# Patient Record
Sex: FEMALE | Race: BLACK OR AFRICAN AMERICAN | Employment: UNEMPLOYED | RURAL
[De-identification: names, ages, dates, MRNs, and addresses within clinical notes are randomized per-mention and may not be internally consistent; named-entity substitution may affect disease eponyms.]

---

## 2017-01-10 ENCOUNTER — OFFICE VISIT (OUTPATIENT)
Dept: FAMILY MEDICINE CLINIC | Age: 37
End: 2017-01-10

## 2017-01-10 VITALS
DIASTOLIC BLOOD PRESSURE: 84 MMHG | SYSTOLIC BLOOD PRESSURE: 130 MMHG | WEIGHT: 210 LBS | TEMPERATURE: 98.5 F | RESPIRATION RATE: 16 BRPM | HEIGHT: 62 IN | BODY MASS INDEX: 38.64 KG/M2 | HEART RATE: 115 BPM | OXYGEN SATURATION: 100 %

## 2017-01-10 DIAGNOSIS — J02.9 SORE THROAT: ICD-10-CM

## 2017-01-10 DIAGNOSIS — J02.0 STREP THROAT: Primary | ICD-10-CM

## 2017-01-10 LAB
S PYO AG THROAT QL: POSITIVE
VALID INTERNAL CONTROL?: YES

## 2017-01-10 RX ORDER — LOSARTAN POTASSIUM 100 MG/1
100 TABLET ORAL DAILY
COMMUNITY
End: 2017-04-28 | Stop reason: SDUPTHER

## 2017-01-10 RX ORDER — AMOXICILLIN 500 MG/1
500 CAPSULE ORAL 2 TIMES DAILY
Qty: 20 CAP | Refills: 0 | Status: SHIPPED | OUTPATIENT
Start: 2017-01-10 | End: 2017-01-20

## 2017-01-10 NOTE — MR AVS SNAPSHOT
Visit Information Date & Time Provider Department Dept. Phone Encounter #  
 1/10/2017  8:30 AM Eliseo Del Rosario  [address] [phone] [id number] Follow-up Instructions Return if symptoms worsen or fail to improve. Your Appointments 2/6/2017  9:45 AM  
ROUTINE CARE with Idalmis Sheffield MD  
Care Diabetes & Endocrinology [facility] Appt Note: 3 mo fu; r/s  
 [address] [place] [address] G [address] [facility] [address]  
[phone]  
  
   
 [address] [facility] [address]  
  
    
 4/28/2017  9:20 AM  
ESTABLISHED PATIENT with Jim Cruz MD  
[facility] ([facility]) Appt Note: 6 mo fup  
 [address] [facility] [address] 100[address] [facility]  
[address] [facility] [place] [address] [address] Upcoming Health Maintenance Date Due Pneumococcal 19-64 Medium Risk (1 of 1 - PPSV23) 1/18/1999 EYE EXAM RETINAL OR DILATED Q1 5/19/2014 HEMOGLOBIN A1C Q6M 2/28/2017 MICROALBUMIN Q1 4/13/2017 FOOT EXAM Q1 6/24/2017 LIPID PANEL Q1 10/4/2017 PAP AKA CERVICAL CYTOLOGY 10/1/2018 DTaP/Tdap/Td series (2 - Td) 12/8/2025 Allergies as of 1/10/2017  Review Complete On: 1/10/2017 By: Eliseo Del Rosario NP Severity Noted Reaction Type Reactions Aspirin  03/05/2010    Itching Contrast Agent [Iodine]  05/01/2015    Hives Dilaudid [Hydromorphone]  05/01/2015    Hives Metformin  07/03/2013    Nausea Only Reglan [Metoclopramide]  03/05/2010    Other (comments) Impaired speech Ritalin [Methylphenidate]  05/01/2015    Other (comments) Speech impairment Current Immunizations  Reviewed on 12/8/2015 Name Date Influenza Vaccine (Quad) PF 10/4/2016, 11/23/2015  9:10 AM  
 Tdap 12/8/2015  9:54 AM  
  
 Not reviewed this visit You Were Diagnosed With   
  
 Codes Comments Strep throat    -  Primary ICD-10-CM: J02.0 ICD-9-CM: 034.0 Sore throat     ICD-10-CM: J02.9 ICD-9-CM: 168 Vitals BP Pulse Temp Resp Height(growth percentile) Weight(growth percentile) 130/84 (!) 115 98.5 °F (36.9 °C) (Oral) 16 5' 2\" (1.575 m) 210 lb (95.3 kg) LMP SpO2 BMI OB Status Smoking Status 12/15/2016 100% 38.41 kg/m2 Having regular periods Never Smoker BMI and BSA Data Body Mass Index Body Surface Area  
 38.41 kg/m 2 2.04 m 2 Preferred Pharmacy Pharmacy Name Phone [facility] [address] [place] [address] [phone] Your Updated Medication List  
  
   
This list is accurate as of: 1/10/17  9:00 AM.  Always use your most recent med list.  
  
  
  
  
 * albiglutide 50 mg/0.5 mL injector pen Commonly known as:  TANZEUM  
1 Syringe by SubCUTAneous route every seven (7) days. * albiglutide 50 mg/0.5 mL injector pen Commonly known as:  TANZEUM  
1 Syringe by SubCUTAneous route every seven (7) days. * TANZEUM 50 mg/0.5 mL injector pen Generic drug:  albiglutide INJECT ONE SYRINGE SUBCUTANEOUSLY EVERY 7 DAYS. * albuterol 2.5 mg /3 mL (0.083 %) nebulizer solution Commonly known as:  PROVENTIL VENTOLIN  
3 mL by Nebulization route every four (4) hours as needed for Wheezing. * albuterol 90 mcg/actuation inhaler Commonly known as:  VENTOLIN HFA Take 2 Puffs by inhalation every four (4) hours as needed for Wheezing. Indications: ACUTE ASTHMA ATTACK  
  
 amoxicillin 500 mg capsule Commonly known as:  AMOXIL Take 1 Cap by mouth two (2) times a day for 10 days. Blood-Glucose Meter monitoring kit Patient is request the [facility] Brand meter. Patient test QID  
  
 BREO ELLIPTA 100-25 mcg/dose inhaler Generic drug:  fluticasone-vilanterol Take 1 Puff by inhalation daily. cyclobenzaprine 10 mg tablet Commonly known as:  FLEXERIL  
 Take 1 Tab by mouth three (3) times daily as needed. gabapentin 300 mg capsule Commonly known as:  NEURONTIN Take 300 mg by mouth three (3) times daily. glucose blood VI test strips strip Commonly known as:  blood glucose test  
Patient request the [facility] brand of meter and strips and to test QID. insulin glargine 100 unit/mL (3 mL) pen Commonly known as:  LANTUS SOLOSTAR Inject 70 units at bedtime  
  
 insulin glulisine 100 unit/mL pen Commonly known as:  APIDRA SOLOSTAR  
20 Units by SubCUTAneous route Before breakfast, lunch, and dinner. Insulin Needles (Disposable) 30 gauge x 1/3\" Sliding scale Lancets Misc Bid for diabetes 250.00 Lot # [id number] Exp 7/2018 Man Brian Nordisk  
  
 losartan 100 mg tablet Commonly known as:  COZAAR Take 100 mg by mouth daily. losartan-hydroCHLOROthiazide 100-25 mg per tablet Commonly known as:  HYZAAR Take 1 Tab by mouth daily. Indications: HYPERTENSION  
  
 montelukast 10 mg tablet Commonly known as:  SINGULAIR  
TAKE ONE TABLET BY MOUTH ONCE DAILY Nebulizer & Compressor machine 1 Each by Does Not Apply route two (2) times daily as needed. * Notice: This list has 5 medication(s) that are the same as other medications prescribed for you. Read the directions carefully, and ask your doctor or other care provider to review them with you. Prescriptions Sent to Pharmacy Refills  
 amoxicillin (AMOXIL) 500 mg capsule 0 Sig: Take 1 Cap by mouth two (2) times a day for 10 days. Class: Normal  
 Pharmacy: [address] [place] [address] Ph #: [phone] Route: Oral  
  
We Performed the Following AMB POC RAPID STREP A [91944 CPT(R)] Follow-up Instructions Return if symptoms worsen or fail to improve. Patient Instructions Strep Throat: Care Instructions Your Care Instructions Strep throat is a bacterial infection that causes sudden, severe sore throat and fever. Strep throat, which is caused by bacteria called streptococcus, is treated with antibiotics. Sometimes a strep test is necessary to tell if the sore throat is caused by strep bacteria. Treatment can help ease symptoms and may prevent future problems. Follow-up care is a key part of your treatment and safety. Be sure to make and go to all appointments, and call your doctor if you are having problems. It's also a good idea to know your test results and keep a list of the medicines you take. How can you care for yourself at home? · Take your antibiotics as directed. Do not stop taking them just because you feel better. You need to take the full course of antibiotics. · Strep throat can spread to others until 24 hours after you begin taking antibiotics. During this time, you should avoid contact with other people at work or home, especially infants and children. Do not sneeze or cough on others, and wash your hands often. Keep your drinking glass and eating utensils separate from those of others, and wash these items well in hot, soapy water. · Gargle with warm salt water at least once each hour to help reduce swelling and make your throat feel better. Use 1 teaspoon of salt mixed in 8 fluid ounces of warm water. · Take an over-the-counter pain medication, such as acetaminophen (Tylenol), ibuprofen (Advil, Motrin), or naproxen (Aleve). Read and follow all instructions on the label. · Try an over-the-counter anesthetic throat spray or throat lozenges, which may help relieve throat pain. · Drink plenty of fluids. Fluids may help soothe an irritated throat. Hot fluids, such as tea or soup, may help your throat feel better. · Eat soft solids and drink plenty of clear liquids. Flavored ice pops, ice cream, scrambled eggs, sherbet, and gelatin dessert (such as Jell-O) may also soothe the throat.  
· Get lots of rest. 
 · Do not smoke, and avoid secondhand smoke. If you need help quitting, talk to your doctor about stop-smoking programs and medicines. These can increase your chances of quitting for good. · Use a vaporizer or humidifier to add moisture to the air in your bedroom. Follow the directions for cleaning the machine. When should you call for help? Call your doctor now or seek immediate medical care if: 
· You have a new or higher fever. · You have a fever with a stiff neck or severe headache. · You have new or worse trouble swallowing. · Your sore throat gets much worse on one side. · Your pain becomes much worse on one side of your throat. Watch closely for changes in your health, and be sure to contact your doctor if: 
· You are not getting better after 2 days (48 hours). · You do not get better as expected. Where can you learn more? Go to http://aziza-niyah.info/. Enter K625 in the search box to learn more about \"Strep Throat: Care Instructions. \" Current as of: July 29, 2016 Content Version: 11.1 © 8345-3994 [employer]. Care instructions adapted under license by [employer] (which disclaims liability or warranty for this information). If you have questions about a medical condition or this instruction, always ask your healthcare professional. [address] any warranty or liability for your use of this information. Introducing [facility]! Kirit Eisenberg introduces Hangtime patient portal. Now you can access parts of your medical record, email your doctor's office, and request medication refills online. 1. In your internet browser, go to [email or web address] [email or web address] 2. Click on the First Time User? Click Here link in the Sign In box. You will see the New Member Sign Up page. 3. Enter your Hangtime Access Code exactly as it appears below. You will not need to use this code after youve completed the sign-up process.  If you do not sign up before the expiration date, you must request a new code. · [employer] Access Code: [email or web address] Expires: 4/10/2017  9:00 AM 
 
4. Enter the last four digits of your Social Security Number (xxxx) and Date of Birth (mm/dd/yyyy) as indicated and click Submit. You will be taken to the next sign-up page. 5. Create a [employer] ID. This will be your [employer] login ID and cannot be changed, so think of one that is secure and easy to remember. 6. Create a [employer] password. You can change your password at any time. 7. Enter your Password Reset Question and Answer. This can be used at a later time if you forget your password. 8. Enter your e-mail address. You will receive e-mail notification when new information is available in [address] 9. Click Sign Up. You can now view and download portions of your medical record. 10. Click the Download Summary menu link to download a portable copy of your medical information. If you have questions, please visit the Frequently Asked Questions section of the [employer] website. Remember, [employer] is NOT to be used for urgent needs. For medical emergencies, dial 911. Now available from your iPhone and Android! Please provide this summary of care documentation to your next provider. Your primary care clinician is listed as Omari Rodríguez. If you have any questions after today's visit, please call [phone].

## 2017-01-10 NOTE — PROGRESS NOTES
Identified pt with two pt identifiers(name and ). Chief Complaint   Patient presents with    Cold Symptoms     x1 week    Sinus Pain    Cough     dry cough    Sweats     around neck mostly at night        Health Maintenance Due   Topic    Pneumococcal 19-64 Medium Risk (1 of 1 - PPSV23)    EYE EXAM RETINAL OR DILATED Q1        Wt Readings from Last 3 Encounters:   01/10/17 210 lb (95.3 kg)   10/28/16 204 lb 9.6 oz (92.8 kg)   10/20/16 201 lb (91.2 kg)     Temp Readings from Last 3 Encounters:   01/10/17 98.5 °F (36.9 °C) (Oral)   10/20/16 98 °F (36.7 °C) (Oral)   10/04/16 97.7 °F (36.5 °C) (Oral)     BP Readings from Last 3 Encounters:   01/10/17 130/84   10/28/16 108/80   10/20/16 130/84     Pulse Readings from Last 3 Encounters:   01/10/17 (!) 115   10/28/16 96   10/20/16 90         Learning Assessment:  :     Learning Assessment 2016 2015 10/6/2015 2014   PRIMARY LEARNER Patient Patient Patient Patient   HIGHEST LEVEL OF EDUCATION - PRIMARY LEARNER  - - DID NOT GRADUATE HIGH SCHOOL SOME COLLEGE   BARRIERS PRIMARY LEARNER - - NONE NONE   CO-LEARNER CAREGIVER - - No -   PRIMARY LANGUAGE ENGLISH ENGLISH ENGLISH ENGLISH   LEARNER PREFERENCE PRIMARY LISTENING DEMONSTRATION DEMONSTRATION LISTENING   ANSWERED BY patient Patient patient patient   RELATIONSHIP SELF SELF SELF SELF       Depression Screening:  :     PHQ 2 / 9, over the last two weeks 1/10/2017   Little interest or pleasure in doing things Not at all   Feeling down, depressed or hopeless Not at all   Total Score PHQ 2 0           Abuse Screening:  :     Abuse Screening Questionnaire 8/15/2016 2014 2014   Do you ever feel afraid of your partner? N N N   Are you in a relationship with someone who physically or mentally threatens you? N N N   Is it safe for you to go home? Kuldeep Loo       Coordination of Care Questionnaire:  :     1) Have you been to an emergency room, urgent care clinic since your last visit?  Yes [facility] Nov. 2016  Hospitalized since your last visit? no             2) Have you seen or consulted any other health care providers outside of [address] [place] [address] since your last visit? no  (Include any pap smears or colon screenings in this section.)    3) Do you have an Advance Directive on file? no  Are you interested in receiving information about Advance Directives? no    Patient is accompanied by self I have received verbal consent from [address] [place] [address] to discuss any/all medical information while they are present in the room. Reviewed record in preparation for visit and have obtained necessary documentation. Medication reconciliation up to date and corrected with patient at this time.

## 2017-01-10 NOTE — PATIENT INSTRUCTIONS

## 2017-03-18 DIAGNOSIS — M62.838 MUSCLE SPASMS OF NECK: ICD-10-CM

## 2017-03-20 RX ORDER — CYCLOBENZAPRINE HCL 10 MG
TABLET ORAL
Qty: 30 TAB | Refills: 0 | Status: SHIPPED | OUTPATIENT
Start: 2017-03-20 | End: 2017-03-27 | Stop reason: ALTCHOICE

## 2017-03-26 RX ORDER — INSULIN GLARGINE 100 [IU]/ML
INJECTION, SOLUTION SUBCUTANEOUS
Qty: 30 ML | Refills: 1 | Status: SHIPPED | OUTPATIENT
Start: 2017-03-26 | End: 2017-04-28 | Stop reason: SDUPTHER

## 2017-03-27 ENCOUNTER — OFFICE VISIT (OUTPATIENT)
Dept: FAMILY MEDICINE CLINIC | Age: 37
End: 2017-03-27

## 2017-03-27 VITALS
RESPIRATION RATE: 16 BRPM | HEIGHT: 62 IN | HEART RATE: 78 BPM | OXYGEN SATURATION: 99 % | BODY MASS INDEX: 38.64 KG/M2 | TEMPERATURE: 98.2 F | DIASTOLIC BLOOD PRESSURE: 82 MMHG | SYSTOLIC BLOOD PRESSURE: 130 MMHG | WEIGHT: 210 LBS

## 2017-03-27 DIAGNOSIS — J40 BRONCHITIS: Primary | ICD-10-CM

## 2017-03-27 DIAGNOSIS — R68.89 FLU-LIKE SYMPTOMS: ICD-10-CM

## 2017-03-27 LAB
QUICKVUE INFLUENZA TEST: NEGATIVE
VALID INTERNAL CONTROL?: YES

## 2017-03-27 RX ORDER — BENZONATATE 100 MG/1
100 CAPSULE ORAL
Qty: 21 CAP | Refills: 0 | Status: SHIPPED | OUTPATIENT
Start: 2017-03-27 | End: 2017-04-03

## 2017-03-27 RX ORDER — CEFDINIR 300 MG/1
300 CAPSULE ORAL 2 TIMES DAILY
Qty: 20 CAP | Refills: 0 | Status: SHIPPED | OUTPATIENT
Start: 2017-03-27 | End: 2017-04-06

## 2017-03-27 NOTE — PROGRESS NOTES
HISTORY OF PRESENT ILLNESS  Anupam Lawson is a 40 y.o. female. HPI  Pt presents with \"cold symptoms, cough and ear pain\"    Symptoms started Wednesday (5 days ago)  Symptoms worsened drastically over the weekend  Chills  Dry, \"rough cough\"  Throat is still dry  Right ear pain  Headaches  Fever: under 99  Diarrhea all day yesterday  No nausea, no vomiting  OTC: Nyquil  Review of Systems   Constitutional: Positive for fever and malaise/fatigue. HENT: Positive for congestion and ear pain. Respiratory: Positive for cough. Negative for sputum production. Gastrointestinal: Negative for abdominal pain, diarrhea and vomiting. Neurological: Positive for headaches. Physical Exam   Constitutional: She is oriented to person, place, and time. She appears well-developed and well-nourished. HENT:   Head: Normocephalic and atraumatic. Right Ear: Hearing, tympanic membrane, external ear and ear canal normal.   Left Ear: Hearing, tympanic membrane, external ear and ear canal normal.   Nose: Mucosal edema present. Mouth/Throat: Oropharynx is clear and moist.   Neck: Normal range of motion. Neck supple. Cardiovascular: Normal rate, regular rhythm and normal heart sounds. Pulmonary/Chest: Effort normal. She has no decreased breath sounds. She has no wheezes. She has rhonchi in the right lower field and the left lower field. Lymphadenopathy:     She has no cervical adenopathy. Neurological: She is alert and oriented to person, place, and time. Skin: Skin is warm and dry. Psychiatric: She has a normal mood and affect. Her behavior is normal.       ASSESSMENT and PLAN    ICD-10-CM ICD-9-CM    1. Bronchitis J40 490 cefdinir (OMNICEF) 300 mg capsule      benzonatate (TESSALON PERLES) 100 mg capsule   2. Flu-like symptoms R68.89 780.99 AMB POC RAPID INFLUENZA TEST     Informed patient that I have sent medication to the pharmacy, and she should take as prescribed.   Educated about taking with food, to decrease the risk of stomach upset. Educated about staying well hydrated, by pushing fluids as much as possible. Pt informed to return to office with worsening of symptoms, or PRN with any questions or concerns. Pt verbalizes understanding of plan of care and denies further questions or concerns at this time.

## 2017-03-27 NOTE — PATIENT INSTRUCTIONS
Bronchitis: Care Instructions  Your Care Instructions    Bronchitis is inflammation of the bronchial tubes, which carry air to the lungs. The tubes swell and produce mucus, or phlegm. The mucus and inflamed bronchial tubes make you cough. You may have trouble breathing. Most cases of bronchitis are caused by viruses like those that cause colds. Antibiotics usually do not help and they may be harmful. Bronchitis usually develops rapidly and lasts about 2 to 3 weeks in otherwise healthy people. Follow-up care is a key part of your treatment and safety. Be sure to make and go to all appointments, and call your doctor if you are having problems. It's also a good idea to know your test results and keep a list of the medicines you take. How can you care for yourself at home? · Take all medicines exactly as prescribed. Call your doctor if you think you are having a problem with your medicine. · Get some extra rest.  · Take an over-the-counter pain medicine, such as acetaminophen (Tylenol), ibuprofen (Advil, Motrin), or naproxen (Aleve) to reduce fever and relieve body aches. Read and follow all instructions on the label. · Do not take two or more pain medicines at the same time unless the doctor told you to. Many pain medicines have acetaminophen, which is Tylenol. Too much acetaminophen (Tylenol) can be harmful. · Take an over-the-counter cough medicine that contains dextromethorphan to help quiet a dry, hacking cough so that you can sleep. Avoid cough medicines that have more than one active ingredient. Read and follow all instructions on the label. · Breathe moist air from a humidifier, hot shower, or sink filled with hot water. The heat and moisture will thin mucus so you can cough it out. · Do not smoke. Smoking can make bronchitis worse. If you need help quitting, talk to your doctor about stop-smoking programs and medicines. These can increase your chances of quitting for good.   When should you call for help? Call 911 anytime you think you may need emergency care. For example, call if:  · You have severe trouble breathing. Call your doctor now or seek immediate medical care if:  · You have new or worse trouble breathing. · You cough up dark brown or bloody mucus (sputum). · You have a new or higher fever. · You have a new rash. Watch closely for changes in your health, and be sure to contact your doctor if:  · You cough more deeply or more often, especially if you notice more mucus or a change in the color of your mucus. · You are not getting better as expected. Where can you learn more? Go to http://aziza-niyah.info/. Enter H333 in the search box to learn more about \"Bronchitis: Care Instructions. \"  Current as of: May 23, 2016  Content Version: 11.1  © 8558-9895 [employer]. Care instructions adapted under license by [employer] (which disclaims liability or warranty for this information). If you have questions about a medical condition or this instruction, always ask your healthcare professional. [address] any warranty or liability for your use of this information.

## 2017-03-27 NOTE — PROGRESS NOTES
Identified pt with two pt identifiers(name and ). Chief Complaint   Patient presents with    Cold Symptoms     x4 days    Cough    Ear Pain     x5 days        Health Maintenance Due   Topic    Pneumococcal 19-64 Medium Risk (1 of 1 - PPSV23)    EYE EXAM RETINAL OR DILATED Q1     HEMOGLOBIN A1C Q6M     MICROALBUMIN Q1        Wt Readings from Last 3 Encounters:   17 210 lb (95.3 kg)   01/10/17 210 lb (95.3 kg)   10/28/16 204 lb 9.6 oz (92.8 kg)     Temp Readings from Last 3 Encounters:   17 98.2 °F (36.8 °C) (Oral)   01/10/17 98.5 °F (36.9 °C) (Oral)   10/20/16 98 °F (36.7 °C) (Oral)     BP Readings from Last 3 Encounters:   17 130/82   01/10/17 130/84   10/28/16 108/80     Pulse Readings from Last 3 Encounters:   17 78   01/10/17 (!) 115   10/28/16 96         Learning Assessment:  :     Learning Assessment 2016 2015 10/6/2015 2014   PRIMARY LEARNER Patient Patient Patient Patient   HIGHEST LEVEL OF EDUCATION - PRIMARY LEARNER  - - DID NOT GRADUATE HIGH SCHOOL SOME COLLEGE   BARRIERS PRIMARY LEARNER - - NONE NONE   CO-LEARNER CAREGIVER - - No -   PRIMARY LANGUAGE ENGLISH ENGLISH ENGLISH ENGLISH   LEARNER PREFERENCE PRIMARY LISTENING DEMONSTRATION DEMONSTRATION LISTENING   ANSWERED BY patient Patient patient patient   RELATIONSHIP SELF SELF SELF SELF       Depression Screening:  :     PHQ 2 / 9, over the last two weeks 3/27/2017   Little interest or pleasure in doing things Not at all   Feeling down, depressed or hopeless Not at all   Total Score PHQ 2 0         Abuse Screening:  :     Abuse Screening Questionnaire 8/15/2016 2014 2014   Do you ever feel afraid of your partner? N N N   Are you in a relationship with someone who physically or mentally threatens you? N N N   Is it safe for you to go home?  Meño Mas       Coordination of Care Questionnaire:  :     1) Have you been to an emergency room, urgent care clinic since your last visit? no   Hospitalized since your last visit? no             2) Have you seen or consulted any other health care providers outside of [address] [place] [address] since your last visit? no  (Include any pap smears or colon screenings in this section.)    3) Do you have an Advance Directive on file? no  Are you interested in receiving information about Advance Directives? no    Patient is accompanied by self I have received verbal consent from [address] [place] [address] to discuss any/all medical information while they are present in the room. Reviewed record in preparation for visit and have obtained necessary documentation. Medication reconciliation up to date and corrected with patient at this time.

## 2017-03-27 NOTE — MR AVS SNAPSHOT
Visit Information Date & Time Provider Department Dept. Phone Encounter #  
 3/27/2017  8:00 AM Rachidclinton MartelTawanna 108 [phone] [id number] Follow-up Instructions Return if symptoms worsen or fail to improve. Your Appointments 4/28/2017  9:20 AM  
ESTABLISHED PATIENT with Dasha Andres MD  
[facility] ([address] [facility]) Appt Note: 6 mo fup  
 [address] [facility] [address] [facility]  
[address] [place] [address]  
  
    
 5/5/2017  9:30 AM  
ROUTINE CARE with Rosalia Arroyo MD  
[facility] [address] [facility]) Appt Note: fu DM; Pat RS time [address] [place] [address] [facility] [address]  
[phone]  
  
   
 [address] [place] [id number] Upcoming Health Maintenance Date Due Pneumococcal 19-64 Medium Risk (1 of 1 - PPSV23) 1/18/1999 EYE EXAM RETINAL OR DILATED Q1 5/19/2014 HEMOGLOBIN A1C Q6M 2/28/2017 MICROALBUMIN Q1 4/13/2017 FOOT EXAM Q1 6/24/2017 LIPID PANEL Q1 10/4/2017 PAP AKA CERVICAL CYTOLOGY 10/1/2018 DTaP/Tdap/Td series (2 - Td) 12/8/2025 Allergies as of 3/27/2017  Review Complete On: 3/27/2017 By: Rachid Martel NP Severity Noted Reaction Type Reactions Aspirin  03/05/2010    Itching Contrast Agent [Iodine]  05/01/2015    Hives Dilaudid [Hydromorphone]  05/01/2015    Hives Metformin  07/03/2013    Nausea Only Reglan [Metoclopramide]  03/05/2010    Other (comments) Impaired speech Ritalin [Methylphenidate]  05/01/2015    Other (comments) Speech impairment Current Immunizations  Reviewed on 12/8/2015 Name Date Influenza Vaccine (Quad) PF 10/4/2016, 11/23/2015  9:10 AM  
 Tdap 12/8/2015  9:54 AM  
  
 Not reviewed this visit You Were Diagnosed With   
  
 Codes Comments Bronchitis    -  Primary ICD-10-CM: N19 ICD-9-CM: 585 Vitals BP Pulse Temp Resp Height(growth percentile) Weight(growth percentile) 130/82 78 98.2 °F (36.8 °C) (Oral) 16 5' 2\" (1.575 m) 210 lb (95.3 kg) SpO2 BMI OB Status Smoking Status 99% 38.41 kg/m2 Having regular periods Never Smoker BMI and BSA Data Body Mass Index Body Surface Area  
 38.41 kg/m 2 2.04 m 2 Preferred Pharmacy Pharmacy Name Phone [facility] [address] [facility] [place] [phone] Your Updated Medication List  
  
   
This list is accurate as of: 3/27/17  8:30 AM.  Always use your most recent med list.  
  
  
  
  
 * albiglutide 50 mg/0.5 mL injector pen Commonly known as:  TANZEUM  
1 Syringe by SubCUTAneous route every seven (7) days. * albiglutide 50 mg/0.5 mL injector pen Commonly known as:  TANZEUM  
1 Syringe by SubCUTAneous route every seven (7) days. * TANZEUM 50 mg/0.5 mL injector pen Generic drug:  albiglutide INJECT ONE SYRINGE SUBCUTANEOUSLY EVERY 7 DAYS. * albuterol 2.5 mg /3 mL (0.083 %) nebulizer solution Commonly known as:  PROVENTIL VENTOLIN  
3 mL by Nebulization route every four (4) hours as needed for Wheezing. * albuterol 90 mcg/actuation inhaler Commonly known as:  VENTOLIN HFA Take 2 Puffs by inhalation every four (4) hours as needed for Wheezing. Indications: ACUTE ASTHMA ATTACK  
  
 benzonatate 100 mg capsule Commonly known as:  TESSALON PERLES Take 1 Cap by mouth three (3) times daily as needed for Cough for up to 7 days. Blood-Glucose Meter monitoring kit Patient is request the [facility] Brand meter. Patient test QID  
  
 BREO ELLIPTA 100-25 mcg/dose inhaler Generic drug:  fluticasone-vilanterol Take 1 Puff by inhalation daily. cefdinir 300 mg capsule Commonly known as:  OMNICEF Take 1 Cap by mouth two (2) times a day for 10 days. gabapentin 300 mg capsule Commonly known as:  NEURONTIN Take 300 mg by mouth three (3) times daily. glucose blood VI test strips strip Commonly known as:  blood glucose test  
Patient request the [facility] brand of meter and strips and to test QID. * insulin glargine 100 unit/mL (3 mL) pen Commonly known as:  LANTUS SOLOSTAR Inject 70 units at bedtime * LANTUS SOLOSTAR 100 unit/mL (3 mL) pen Generic drug:  insulin glargine INJECT 70 UNITS SUBCUTANEOUSLY AT BEDTIME  
  
 insulin glulisine 100 unit/mL pen Commonly known as:  APIDRA SOLOSTAR  
20 Units by SubCUTAneous route Before breakfast, lunch, and dinner. Insulin Needles (Disposable) 30 gauge x 1/3\" Sliding scale Lancets Misc Bid for diabetes 250.00 Lot # [id number] Exp 7/2018 Man Brian Nordisk  
  
 losartan 100 mg tablet Commonly known as:  COZAAR Take 100 mg by mouth daily. losartan-hydroCHLOROthiazide 100-25 mg per tablet Commonly known as:  HYZAAR Take 1 Tab by mouth daily. Indications: HYPERTENSION  
  
 montelukast 10 mg tablet Commonly known as:  SINGULAIR  
TAKE ONE TABLET BY MOUTH ONCE DAILY Nebulizer & Compressor machine 1 Each by Does Not Apply route two (2) times daily as needed. * Notice: This list has 7 medication(s) that are the same as other medications prescribed for you. Read the directions carefully, and ask your doctor or other care provider to review them with you. Prescriptions Sent to Pharmacy Refills  
 cefdinir (OMNICEF) 300 mg capsule 0 Sig: Take 1 Cap by mouth two (2) times a day for 10 days. Class: Normal  
 Pharmacy: [address] [place] [address] Ph #: [phone] Route: Oral  
 benzonatate (TESSALON PERLES) 100 mg capsule 0 Sig: Take 1 Cap by mouth three (3) times daily as needed for Cough for up to 7 days. Class: Normal  
 Pharmacy: [id number] [place] [address] Ph #: [phone] Route: Oral  
  
Follow-up Instructions Return if symptoms worsen or fail to improve. Patient Instructions Bronchitis: Care Instructions Your Care Instructions Bronchitis is inflammation of the bronchial tubes, which carry air to the lungs. The tubes swell and produce mucus, or phlegm. The mucus and inflamed bronchial tubes make you cough. You may have trouble breathing. Most cases of bronchitis are caused by viruses like those that cause colds. Antibiotics usually do not help and they may be harmful. Bronchitis usually develops rapidly and lasts about 2 to 3 weeks in otherwise healthy people. Follow-up care is a key part of your treatment and safety. Be sure to make and go to all appointments, and call your doctor if you are having problems. It's also a good idea to know your test results and keep a list of the medicines you take. How can you care for yourself at home? · Take all medicines exactly as prescribed. Call your doctor if you think you are having a problem with your medicine. · Get some extra rest. 
· Take an over-the-counter pain medicine, such as acetaminophen (Tylenol), ibuprofen (Advil, Motrin), or naproxen (Aleve) to reduce fever and relieve body aches. Read and follow all instructions on the label. · Do not take two or more pain medicines at the same time unless the doctor told you to. Many pain medicines have acetaminophen, which is Tylenol. Too much acetaminophen (Tylenol) can be harmful. · Take an over-the-counter cough medicine that contains dextromethorphan to help quiet a dry, hacking cough so that you can sleep. Avoid cough medicines that have more than one active ingredient. Read and follow all instructions on the label. · Breathe moist air from a humidifier, hot shower, or sink filled with hot water. The heat and moisture will thin mucus so you can cough it out. · Do not smoke. Smoking can make bronchitis worse.  If you need help quitting, talk to your doctor about stop-smoking programs and medicines. These can increase your chances of quitting for good. When should you call for help? Call 911 anytime you think you may need emergency care. For example, call if: 
· You have severe trouble breathing. Call your doctor now or seek immediate medical care if: 
· You have new or worse trouble breathing. · You cough up dark brown or bloody mucus (sputum). · You have a new or higher fever. · You have a new rash. Watch closely for changes in your health, and be sure to contact your doctor if: 
· You cough more deeply or more often, especially if you notice more mucus or a change in the color of your mucus. · You are not getting better as expected. Where can you learn more? Go to http://aziza-niyah.info/. Enter H333 in the search box to learn more about \"Bronchitis: Care Instructions. \" Current as of: May 23, 2016 Content Version: 11.1 © 2482-8010 [employer]. Care instructions adapted under license by [employer] (which disclaims liability or warranty for this information). If you have questions about a medical condition or this instruction, always ask your healthcare professional. [place] [address] any warranty or liability for your use of this information. Introducing [facility]! Lisseth Iraheta introduces [employer] patient portal. Now you can access parts of your medical record, email your doctor's office, and request medication refills online. 1. In your internet browser, go to [email or web address] 2. Click on the First Time User? Click Here link in the Sign In box. You will see the New Member Sign Up page. 3. Enter your [employer] Access Code exactly as it appears below. You will not need to use this code after youve completed the sign-up process. If you do not sign up before the expiration date, you must request a new code. · [email or web address] Access Code: [email or web address] Expires: 4/10/2017 10:00 AM 
 
4. Enter the last four digits of your Social Security Number (xxxx) and Date of Birth (mm/dd/yyyy) as indicated and click Submit. You will be taken to the next sign-up page. 5. Create a [employer] ID. This will be your [employer] login ID and cannot be changed, so think of one that is secure and easy to remember. 6. Create a [employer] password. You can change your password at any time. 7. Enter your Password Reset Question and Answer. This can be used at a later time if you forget your password. 8. Enter your e-mail address. You will receive e-mail notification when new information is available in [address] 9. Click Sign Up. You can now view and download portions of your medical record. 10. Click the Download Summary menu link to download a portable copy of your medical information. If you have questions, please visit the Frequently Asked Questions section of the [employer] website. Remember, [employer] is NOT to be used for urgent needs. For medical emergencies, dial 911. Now available from your iPhone and Android! Please provide this summary of care documentation to your next provider. Your primary care clinician is listed as Rika Herron. If you have any questions after today's visit, please call [phone].

## 2017-03-29 ENCOUNTER — TELEPHONE (OUTPATIENT)
Dept: FAMILY MEDICINE CLINIC | Age: 37
End: 2017-03-29

## 2017-03-29 NOTE — TELEPHONE ENCOUNTER
See note below. Pt states her throat is still very sore & swollen. Pt denies reaction to current Rx. Pt states she think antibiotic is not working. Advised pt that she needs to finish all antibiotic due to strep throat. advised pt to take OTC Advil or motrin for pain & inflammation. advised pt is she feels she has trouble breathing or  symptoms continue or worsen to call office or go to nearest ER for eval.  Pt understood.

## 2017-03-29 NOTE — TELEPHONE ENCOUNTER
Patient called stating that antibiotic prescribed on 3/27/17 is causing her throat to swell. Patient states she can barely swallow. Patient has requested a call back.     Best contact: [phone]

## 2017-04-17 DIAGNOSIS — M62.838 MUSCLE SPASMS OF NECK: ICD-10-CM

## 2017-04-17 RX ORDER — CYCLOBENZAPRINE HCL 10 MG
TABLET ORAL
Qty: 30 TAB | Refills: 0 | OUTPATIENT
Start: 2017-04-17

## 2017-04-17 RX ORDER — INSULIN GLULISINE 100 [IU]/ML
INJECTION, SOLUTION SUBCUTANEOUS
Qty: 70 ML | Refills: 2 | Status: SHIPPED | OUTPATIENT
Start: 2017-04-17 | End: 2017-05-05 | Stop reason: SDUPTHER

## 2017-04-17 NOTE — TELEPHONE ENCOUNTER
Pt states she is having pain in shoulders & arms. advised pt she will need to make an apt for eval. For new symptoms. Transferred pt to  to schedule apt.

## 2017-04-17 NOTE — TELEPHONE ENCOUNTER
This is not something she should need continuously. Should pain persist, we can refer to PT. Let me know. Thanks!

## 2017-04-28 ENCOUNTER — OFFICE VISIT (OUTPATIENT)
Dept: CARDIOLOGY CLINIC | Age: 37
End: 2017-04-28

## 2017-04-28 VITALS
OXYGEN SATURATION: 98 % | BODY MASS INDEX: 39.93 KG/M2 | RESPIRATION RATE: 20 BRPM | HEIGHT: 62 IN | DIASTOLIC BLOOD PRESSURE: 88 MMHG | SYSTOLIC BLOOD PRESSURE: 128 MMHG | HEART RATE: 96 BPM | WEIGHT: 217 LBS

## 2017-04-28 DIAGNOSIS — I87.2 VENOUS INSUFFICIENCY: ICD-10-CM

## 2017-04-28 DIAGNOSIS — R60.9 EDEMA, UNSPECIFIED TYPE: Primary | ICD-10-CM

## 2017-04-28 DIAGNOSIS — I10 ESSENTIAL HYPERTENSION: ICD-10-CM

## 2017-04-28 RX ORDER — LOSARTAN POTASSIUM 100 MG/1
100 TABLET ORAL DAILY
COMMUNITY
End: 2017-08-22 | Stop reason: ALTCHOICE

## 2017-04-28 RX ORDER — CYCLOBENZAPRINE HCL 10 MG
TABLET ORAL 3 TIMES DAILY
COMMUNITY
End: 2017-07-27

## 2017-04-28 NOTE — PROGRESS NOTES
Delfin Unger MD    [address] [place], [address] [facility]    Office [phone],EVU [phone]  Pager [phone]    Bernardo Puente is a 40 y.o. female is here for f/u visit. Primary care physician:  Popeye Kang MD    Patient Active Problem List   Diagnosis Code    Asthma J45.909    Diabetes mellitus ([facility] E11.9    Avascular necrosis of bones of both hips ([facility] M87.051, M87.052    Lumbar degenerative disc disease M51.36    LGSIL (low grade squamous intraepithelial dysplasia) [id number]    UTI (lower urinary tract infection) N39.0    Non compliance with medical treatment Z91.19    Obesity E66.9    Type II diabetes mellitus, uncontrolled ([facility] E11.65    BMI 35.0-35.9,adult Z68.35    Essential hypertension I10    Noncompliance of patient with dietary regimen Z91.11    Sinusitis J32.9    Ankle edema M25.473    Strep throat J02.0    Muscle spasms of neck M62.838    Recurrent streptococcal tonsillitis J03.01    Shortness of breath R06.02    Dysuria R30.0    Need for pneumococcal vaccination Z23    Hypoalbuminemia E88.09    Acute bacterial conjunctivitis H10.30    Facial bruising S00.83XA    Encounter for long-term (current) use of insulin (HCC) Z79.4    Muscle spasm of back M62.830    Elevated serum creatinine R79.89    Bronchitis J40       Chief complaint:  Chief Complaint   Patient presents with    Hypertension       Assessment:  Edema-probably secondary to venous insufficiency  Palpitations/Tachycardia -sinus tachycardia on Holter monitor  DM - uncontrolled  HTN  Hx of Asthma      Plan:     Patient states that her renal function is normal.  Diuretics did not have the swelling and cause her sodium to decrease. The swelling decreases on elevation. She probably has venous insufficiency. Advised to get fitted compression stockings and wear them regularly. .  Pt is allergic to ASA  Follow-up in 6 months or earlier when necessary.     Patient understands the plan. All questions were answered to the patient's satisfaction. Medication Side Effects and Warnings were discussed with patient: yes  Patient Labs were reviewed and or requested:  yes  Patient Past Records were reviewed and or requested: yes    I appreciate the opportunity to be involved in Ms. [employer]. See note below for details. Please do not hesitate to contact us with questions or concerns. Lazaro Rendon MD    Cardiac Testing/ Procedures: A. Cardiac Cath/PCI:    B.ECHO/MARK: 5/16/2016-EF 03-44%, grade 1 diastolic dysfunction    C. StressNuclear/Stress ECHO/Stress test: May 12, 2016-7 minutes, normal exercise nuclear study. EF 70%    D. Vascular:    E. EP: 4/22/2016-Holter sinus tachycardia 99-1 63 bpm, no PACs, 6 PVCs. Diary entries of dyspnea, chest pain, dizziness correlates with sinus tachycardia. F. Miscellaneous:    Subjective:  Sage Garcia is a 40 y.o. female who returns for follow up visit. Patient states that she continues to have swelling the lower extremities. Swelling reduces on keeping her feet elevated. She was started on diuretic previously but it did not help and her sodium went low-her nephrologist of the medication. No chest pain. Mild dyspnea on exertion. She states that she has been checked for clots in her lower extremities at [facility] previously and she had no clots. ROS:  (bold if positive, if negative)    mild SOB/HERNANDEZ         Medications before admission:    Current Outpatient Prescriptions   Medication Sig Dispense    losartan (COZAAR) 100 mg tablet Take 100 mg by mouth daily.  cyclobenzaprine (FLEXERIL) 10 mg tablet Take  by mouth three (3) times daily.      APIDRA SOLOSTAR 100 unit/mL pen INJECT 20 UNITS SUBCUTANEOUSLY BEFORE BREAKFAST, LUNCH, AND DINNER 70 mL    montelukast (SINGULAIR) 10 mg tablet TAKE ONE TABLET BY MOUTH ONCE DAILY 90 Tab    albiglutide (TANZEUM) 50 mg/0.5 mL pnij 1 Syringe by SubCUTAneous route every seven (7) days. 4 mL    fluticasone-vilanterol (BREO ELLIPTA) 100-25 mcg/dose inhaler Take 1 Puff by inhalation daily.  albuterol (VENTOLIN HFA) 90 mcg/actuation inhaler Take 2 Puffs by inhalation every four (4) hours as needed for Wheezing. Indications: ACUTE ASTHMA ATTACK 1 Inhaler    albuterol (PROVENTIL VENTOLIN) 2.5 mg /3 mL (0.083 %) nebulizer solution 3 mL by Nebulization route every four (4) hours as needed for Wheezing. 1 Package    gabapentin (NEURONTIN) 300 mg capsule Take 300 mg by mouth three (3) times daily.  Nebulizer & Compressor machine 1 Each by Does Not Apply route two (2) times daily as needed. 1 Each    insulin glargine (LANTUS SOLOSTAR) 100 unit/mL (3 mL) pen Inject 70 units at bedtime (Patient taking differently: Inject 80 units at bedtime) 30 mL    Blood-Glucose Meter monitoring kit Patient is request the [employer] meter. Patient test QID 1 Kit    glucose blood VI test strips (BLOOD GLUCOSE TEST) strip Patient request the [facility] brand of meter and strips and to test QID. 100 Strip    Insulin Needles, Disposable, 30 x 1/3 \" Sliding scale 4 Package    Lancets misc Bid for diabetes 250.00  Lot # [id number]  Exp 7/2018  Man Brian Nordisk 400 Package     No current facility-administered medications for this visit. Physical Exam:  Visit Vitals    /88 (BP 1 Location: Left arm)    Pulse 96    Resp 20    Ht 5' 2\" (1.575 m)    Wt 217 lb (98.4 kg)    SpO2 98%    BMI 39.69 kg/m2          Gen: Well-developed, well-nourished, in no acute distress, walks with a cane  Neck: Supple,No JVD, No Carotid Bruit,   Resp: No accessory muscle use, Clear breath sounds, No rales or rhonchi  Card: Regular Rate,Rythm,Normal S1, S2, No murmurs, rubs or gallop. No thrills.    Abd:  Soft, non-tender, non-distended,BS+,   MSK: No cyanosis  Skin: No rashes    Neuro: moving all four extremities , follows commands appropriately  Psych:  Good insight, oriented to person, place , alert, Nml Affect  LE: 1+ edema    EKG:       LABS:        Lab Results   Component Value Date/Time    WBC 6.8 04/08/2016 12:25 PM    HGB 12.6 04/08/2016 12:25 PM    HCT 37.0 04/08/2016 12:25 PM    PLATELET 170 86/80/1722 12:25 PM    MCV 90.9 04/08/2016 12:25 PM     Lab Results   Component Value Date/Time    Sodium 133 08/31/2016 12:19 PM    Potassium 4.3 08/31/2016 12:19 PM    Chloride 93 08/31/2016 12:19 PM    CO2 24 08/31/2016 12:19 PM    Anion gap 8 04/08/2016 12:25 PM    Glucose 254 08/31/2016 12:19 PM    BUN 15 08/31/2016 12:19 PM    Creatinine 1.07 08/31/2016 12:19 PM    BUN/Creatinine ratio 14 08/31/2016 12:19 PM    GFR est AA 77 08/31/2016 12:19 PM    GFR est non-AA 67 08/31/2016 12:19 PM    Calcium 9.2 08/31/2016 12:19 PM       No results found for: APTT  No results found for: INR, PTMR, PTP, PT1, PT2  No components found for: Kanwal Castanon MD

## 2017-04-28 NOTE — MR AVS SNAPSHOT
Visit Information Date & Time Provider Department Dept. Phone Encounter #  
 4/28/2017  9:20 AM Mabel Valladares MD [facility] Niharika Lamar [phone] [id number] Your Appointments 5/5/2017  9:30 AM  
ROUTINE CARE with Lupis Schneider MD  
[facility]) Appt Note: fu DM; Pat RS time [address] [place] [address] [address] [facility] [address] [phone]  
  
   
 [address] [place] [address] Upcoming Health Maintenance Date Due Pneumococcal 19-64 Medium Risk (1 of 1 - PPSV23) 1/18/1999 EYE EXAM RETINAL OR DILATED Q1 5/19/2014 HEMOGLOBIN A1C Q6M 2/28/2017 MICROALBUMIN Q1 4/13/2017 FOOT EXAM Q1 6/24/2017 LIPID PANEL Q1 10/4/2017 PAP AKA CERVICAL CYTOLOGY 10/1/2018 DTaP/Tdap/Td series (2 - Td) 12/8/2025 Allergies as of 4/28/2017  Review Complete On: 4/28/2017 By: Rai Irwin Severity Noted Reaction Type Reactions Aspirin  03/05/2010    Itching Contrast Agent [Iodine]  05/01/2015    Hives Dilaudid [Hydromorphone]  05/01/2015    Hives Metformin  07/03/2013    Nausea Only Reglan [Metoclopramide]  03/05/2010    Other (comments) Impaired speech Ritalin [Methylphenidate]  05/01/2015    Other (comments) Speech impairment Current Immunizations  Reviewed on 12/8/2015 Name Date Influenza Vaccine (Quad) PF 10/4/2016, 11/23/2015  9:10 AM  
 Tdap 12/8/2015  9:54 AM  
  
 Not reviewed this visit Vitals BP Pulse Resp Height(growth percentile) Weight(growth percentile) SpO2  
 128/88 (BP 1 Location: Left arm) 96 20 5' 2\" (1.575 m) 217 lb (98.4 kg) 98% BMI OB Status Smoking Status 39.69 kg/m2 Having regular periods Never Smoker Vitals History BMI and BSA Data Body Mass Index Body Surface Area  
 39.69 kg/m 2 2.07 m 2 Preferred Pharmacy Pharmacy Name Phone [facility] [address] [place] [address] Jo Ann [phone] Your Updated Medication List  
  
   
This list is accurate as of: 4/28/17 10:08 AM.  Always use your most recent med list.  
  
  
  
  
 albiglutide 50 mg/0.5 mL injector pen Commonly known as:  TANZEUM  
1 Syringe by SubCUTAneous route every seven (7) days. * albuterol 2.5 mg /3 mL (0.083 %) nebulizer solution Commonly known as:  PROVENTIL VENTOLIN  
3 mL by Nebulization route every four (4) hours as needed for Wheezing. * albuterol 90 mcg/actuation inhaler Commonly known as:  VENTOLIN HFA Take 2 Puffs by inhalation every four (4) hours as needed for Wheezing. Indications: ACUTE ASTHMA ATTACK APIDRA SOLOSTAR 100 unit/mL pen Generic drug:  insulin glulisine INJECT 20 UNITS SUBCUTANEOUSLY BEFORE BREAKFAST, LUNCH, AND DINNER Blood-Glucose Meter monitoring kit Patient is request the [facility] Brand meter. Patient test QID  
  
 BREO ELLIPTA 100-25 mcg/dose inhaler Generic drug:  fluticasone-vilanterol Take 1 Puff by inhalation daily. cyclobenzaprine 10 mg tablet Commonly known as:  FLEXERIL Take  by mouth three (3) times daily. gabapentin 300 mg capsule Commonly known as:  NEURONTIN Take 300 mg by mouth three (3) times daily. glucose blood VI test strips strip Commonly known as:  blood glucose test  
Patient request the [facility] brand of meter and strips and to test QID. insulin glargine 100 unit/mL (3 mL) pen Commonly known as:  LANTUS SOLOSTAR Inject 70 units at bedtime Insulin Needles (Disposable) 30 gauge x 1/3\" Sliding scale Lancets Misc Bid for diabetes 250.00 Lot # [id number] Exp 7/2018 Man Brian Nordisk  
  
 losartan 100 mg tablet Commonly known as:  COZAAR Take 100 mg by mouth daily. montelukast 10 mg tablet Commonly known as:  SINGULAIR  
TAKE ONE TABLET BY MOUTH ONCE DAILY Nebulizer & Compressor machine 1 Each by Does Not Apply route two (2) times daily as needed. * Notice: This list has 2 medication(s) that are the same as other medications prescribed for you. Read the directions carefully, and ask your doctor or other care provider to review them with you. Introducing [facility] SERVICES! Celina Sanchez introduces [employer] patient portal. Now you can access parts of your medical record, email your doctor's office, and request medication refills online. 1. In your internet browser, go to https://[employer]. EasyProve/[employer] 2. Click on the First Time User? Click Here link in the Sign In box. You will see the New Member Sign Up page. 3. Enter your Merc[employer]o Access Code exactly as it appears below. You will not need to use this code after youve completed the sign-up process. If you do not sign up before the expiration date, you must request a new code. · [employer] Access Code: [email or web address] Expires: 7/27/2017  9:26 AM 
 
4. Enter the last four digits of your Social Security Number (xxxx) and Date of Birth (mm/dd/yyyy) as indicated and click Submit. You will be taken to the next sign-up page. 5. Create a Mercateo ID. This will be your Mercateo login ID and cannot be changed, so think of one that is secure and easy to remember. 6. Create a Merc[employer]o password. You can change your password at any time. 7. Enter your Password Reset Question and Answer. This can be used at a later time if you forget your password. 8. Enter your e-mail address. You will receive e-mail notification when new information is available in [address] 9. Click Sign Up. You can now view and download portions of your medical record. 10. Click the Download Summary menu link to download a portable copy of your medical information. If you have questions, please visit the Frequently Asked Questions section of the Merc[employer] website. Remember, Merc[employer]o is NOT to be used for urgent needs. For medical emergencies, dial 911. Now available from your iPhone and Android! Please provide this summary of care documentation to your next provider. Your primary care clinician is listed as Geovani Kimball. If you have any questions after today's visit, please call [phone].

## 2017-05-05 DIAGNOSIS — Z79.4 TYPE 2 DIABETES MELLITUS WITH HYPERGLYCEMIA, WITH LONG-TERM CURRENT USE OF INSULIN (HCC): Primary | ICD-10-CM

## 2017-05-05 DIAGNOSIS — E11.65 TYPE 2 DIABETES MELLITUS WITH HYPERGLYCEMIA, WITH LONG-TERM CURRENT USE OF INSULIN (HCC): Primary | ICD-10-CM

## 2017-05-05 RX ORDER — INSULIN GLARGINE 100 [IU]/ML
INJECTION, SOLUTION SUBCUTANEOUS
Qty: 30 ML | Refills: 3 | Status: SHIPPED | OUTPATIENT
Start: 2017-05-05 | End: 2017-08-10 | Stop reason: SDUPTHER

## 2017-06-20 ENCOUNTER — OFFICE VISIT (OUTPATIENT)
Dept: FAMILY MEDICINE CLINIC | Age: 37
End: 2017-06-20

## 2017-06-20 VITALS
TEMPERATURE: 97.6 F | RESPIRATION RATE: 16 BRPM | DIASTOLIC BLOOD PRESSURE: 78 MMHG | BODY MASS INDEX: 39.56 KG/M2 | HEART RATE: 121 BPM | HEIGHT: 62 IN | WEIGHT: 215 LBS | SYSTOLIC BLOOD PRESSURE: 122 MMHG

## 2017-06-20 DIAGNOSIS — J02.9 ACUTE PHARYNGITIS, UNSPECIFIED ETIOLOGY: Primary | ICD-10-CM

## 2017-06-20 PROBLEM — J06.9 URI (UPPER RESPIRATORY INFECTION): Status: ACTIVE | Noted: 2017-06-20

## 2017-06-20 RX ORDER — AZITHROMYCIN 250 MG/1
TABLET, FILM COATED ORAL
Qty: 6 TAB | Refills: 0 | Status: SHIPPED | OUTPATIENT
Start: 2017-06-20 | End: 2017-06-25

## 2017-06-20 NOTE — PROGRESS NOTES
Identified pt with two pt identifiers(name and ). Chief Complaint   Patient presents with    Cold Symptoms     x4 days    Cough     thick white mucus        Health Maintenance Due   Topic    Pneumococcal 19-64 Medium Risk (1 of 1 - PPSV23)    EYE EXAM RETINAL OR DILATED Q1     HEMOGLOBIN A1C Q6M     MICROALBUMIN Q1     FOOT EXAM Q1        Wt Readings from Last 3 Encounters:   17 215 lb (97.5 kg)   17 217 lb (98.4 kg)   17 210 lb (95.3 kg)     Temp Readings from Last 3 Encounters:   17 97.6 °F (36.4 °C) (Oral)   17 98.2 °F (36.8 °C) (Oral)   01/10/17 98.5 °F (36.9 °C) (Oral)     BP Readings from Last 3 Encounters:   17 122/78   17 128/88   17 130/82     Pulse Readings from Last 3 Encounters:   17 (!) 121   17 96   17 78         Learning Assessment:  :     Learning Assessment 2016 2015 10/6/2015 2014   PRIMARY LEARNER Patient Patient Patient Patient   HIGHEST LEVEL OF EDUCATION - PRIMARY LEARNER  - - DID NOT GRADUATE HIGH SCHOOL SOME COLLEGE   BARRIERS PRIMARY LEARNER - - NONE NONE   CO-LEARNER CAREGIVER - - No -   PRIMARY LANGUAGE ENGLISH ENGLISH ENGLISH ENGLISH   LEARNER PREFERENCE PRIMARY LISTENING DEMONSTRATION DEMONSTRATION LISTENING   ANSWERED BY patient Patient patient patient   RELATIONSHIP SELF SELF SELF SELF       Depression Screening:  :     PHQ over the last two weeks 2017   Little interest or pleasure in doing things Not at all   Feeling down, depressed or hopeless Not at all   Total Score PHQ 2 0           Abuse Screening:  :     Abuse Screening Questionnaire 8/15/2016 2014 2014   Do you ever feel afraid of your partner? N N N   Are you in a relationship with someone who physically or mentally threatens you? N N N   Is it safe for you to go home? Bal Guzman       Coordination of Care Questionnaire:  :     1) Have you been to an emergency room, urgent care clinic since your last visit?  yes [facility] in May.  Hospitalized since your last visit? no             2) Have you seen or consulted any other health care providers outside of [address] [place] [address] since your last visit? no  (Include any pap smears or colon screenings in this section.)    3) Do you have an Advance Directive on file? no  Are you interested in receiving information about Advance Directives? no    Patient is accompanied by self I have received verbal consent from [address] [place] [address] to discuss any/all medical information while they are present in the room. Reviewed record in preparation for visit and have obtained necessary documentation. Medication reconciliation up to date and corrected with patient at this time.

## 2017-06-20 NOTE — MR AVS SNAPSHOT
Visit Information Date & Time Provider Department Dept. Phone Encounter #  
 6/20/2017  9:00 AM Александр GonzalesJose [phone] [id number] Follow-up Instructions Return if symptoms worsen or fail to improve. Your Appointments 8/18/2017  9:45 AM  
ROUTINE CARE with Wade Nuñez MD  
[facility] [address] [place] [address]) Appt Note: r/s fu  
 [address] [place] [address]  
[phone]  
  
   
 [address] [place] [id number]  
  
    
 10/30/2017  9:20 AM  
ESTABLISHED PATIENT with Sakina Bartlett MD  
[facility] ([facility]) Appt Note: 6 mo fup  
 [address] [facility] [place] [address] [facility]  
[address] [facility] [place] [address] [address] Upcoming Health Maintenance Date Due Pneumococcal 19-64 Medium Risk (1 of 1 - PPSV23) 1/18/1999 EYE EXAM RETINAL OR DILATED Q1 5/19/2014 HEMOGLOBIN A1C Q6M 2/28/2017 MICROALBUMIN Q1 4/13/2017 FOOT EXAM Q1 6/24/2017 INFLUENZA AGE 9 TO ADULT 8/1/2017 LIPID PANEL Q1 10/4/2017 PAP AKA CERVICAL CYTOLOGY 10/1/2018 DTaP/Tdap/Td series (2 - Td) 12/8/2025 Allergies as of 6/20/2017  Review Complete On: 6/20/2017 By: Александр Gonzales NP Severity Noted Reaction Type Reactions Aspirin  03/05/2010    Itching Contrast Agent [Iodine]  05/01/2015    Hives Dilaudid [Hydromorphone]  05/01/2015    Hives Metformin  07/03/2013    Nausea Only Reglan [Metoclopramide]  03/05/2010    Other (comments) Impaired speech Ritalin [Methylphenidate]  05/01/2015    Other (comments) Speech impairment Current Immunizations  Reviewed on 12/8/2015 Name Date Influenza Vaccine (Quad) PF 10/4/2016, 11/23/2015  9:10 AM  
 Tdap 12/8/2015  9:54 AM  
  
 Not reviewed this visit You Were Diagnosed With   
  
 Codes Comments Acute pharyngitis, unspecified etiology    -  Primary ICD-10-CM: J02.9 ICD-9-CM: 199 Vitals BP Pulse Temp Resp Height(growth percentile) Weight(growth percentile) 122/78 (!) 121 97.6 °F (36.4 °C) (Oral) 16 5' 2\" (1.575 m) 215 lb (97.5 kg) LMP PF BMI OB Status Smoking Status 05/24/2017 100 L/min 39.32 kg/m2 Having regular periods Never Smoker BMI and BSA Data Body Mass Index Body Surface Area  
 39.32 kg/m 2 2.07 m 2 Preferred Pharmacy Pharmacy Name Phone [facility] [address] [facility] [phone] Your Updated Medication List  
  
   
This list is accurate as of: 6/20/17  9:28 AM.  Always use your most recent med list.  
  
  
  
  
 * albiglutide 50 mg/0.5 mL injector pen Commonly known as:  TANZEUM  
1 Syringe by SubCUTAneous route every seven (7) days. * TANZEUM 50 mg/0.5 mL injector pen Generic drug:  albiglutide INJECT ONE SYRINGE SUBCUTANEOUSLY EVERY 7 DAYS. * albuterol 2.5 mg /3 mL (0.083 %) nebulizer solution Commonly known as:  PROVENTIL VENTOLIN  
3 mL by Nebulization route every four (4) hours as needed for Wheezing. * albuterol 90 mcg/actuation inhaler Commonly known as:  VENTOLIN HFA Take 2 Puffs by inhalation every four (4) hours as needed for Wheezing. Indications: ACUTE ASTHMA ATTACK  
  
 azithromycin 250 mg tablet Commonly known as:  Cesario Carpenter Take 2 tablets today, then take 1 tablet daily Blood-Glucose Meter monitoring kit Patient is request the [facility] Brand meter. Patient test QID  
  
 BREO ELLIPTA 100-25 mcg/dose inhaler Generic drug:  fluticasone-vilanterol Take 1 Puff by inhalation daily. cyclobenzaprine 10 mg tablet Commonly known as:  FLEXERIL Take  by mouth three (3) times daily. gabapentin 300 mg capsule Commonly known as:  NEURONTIN Take 300 mg by mouth three (3) times daily. glucose blood VI test strips strip Commonly known as:  blood glucose test  
Patient request the [facility] brand of meter and strips and to test QID. insulin glargine 100 unit/mL (3 mL) Inpn Commonly known asMickle Hammers Inject 70 units at bedtime  
  
 insulin glulisine 100 unit/mL pen Commonly known as:  Leandra Cervantes INJECT 20 UNITS SUBCUTANEOUSLY BEFORE BREAKFAST, LUNCH, AND DINNER Insulin Needles (Disposable) 30 gauge x 1/3\" Sliding scale Lancets Misc Bid for diabetes 250.00 Lot # [id number] Exp 7/2018 Man Brian Nordisk  
  
 losartan 100 mg tablet Commonly known as:  COZAAR Take 100 mg by mouth daily. montelukast 10 mg tablet Commonly known as:  SINGULAIR  
TAKE ONE TABLET BY MOUTH ONCE DAILY Nebulizer & Compressor machine 1 Each by Does Not Apply route two (2) times daily as needed. * Notice: This list has 4 medication(s) that are the same as other medications prescribed for you. Read the directions carefully, and ask your doctor or other care provider to review them with you. Prescriptions Sent to Pharmacy Refills  
 azithromycin (ZITHROMAX) 250 mg tablet 0 Sig: Take 2 tablets today, then take 1 tablet daily Class: Normal  
 Pharmacy: [address] #: [phone] Follow-up Instructions Return if symptoms worsen or fail to improve. Patient Instructions Sore Throat: Care Instructions Your Care Instructions Infection by bacteria or a virus causes most sore throats. Cigarette smoke, dry air, air pollution, allergies, and yelling can also cause a sore throat. Sore throats can be painful and annoying. Fortunately, most sore throats go away on their own. If you have a bacterial infection, your doctor may prescribe antibiotics. Follow-up care is a key part of your treatment and safety.  Be sure to make and go to all appointments, and call your doctor if you are having problems. It's also a good idea to know your test results and keep a list of the medicines you take. How can you care for yourself at home? · If your doctor prescribed antibiotics, take them as directed. Do not stop taking them just because you feel better. You need to take the full course of antibiotics. · Gargle with warm salt water once an hour to help reduce swelling and relieve discomfort. Use 1 teaspoon of salt mixed in 1 cup of warm water. · Take an over-the-counter pain medicine, such as acetaminophen (Tylenol), ibuprofen (Advil, Motrin), or naproxen (Aleve). Read and follow all instructions on the label. · Be careful when taking over-the-counter cold or flu medicines and Tylenol at the same time. Many of these medicines have acetaminophen, which is Tylenol. Read the labels to make sure that you are not taking more than the recommended dose. Too much acetaminophen (Tylenol) can be harmful. · Drink plenty of fluids. Fluids may help soothe an irritated throat. Hot fluids, such as tea or soup, may help decrease throat pain. · Use over-the-counter throat lozenges to soothe pain. Regular cough drops or hard candy may also help. These should not be given to young children because of the risk of choking. · Do not smoke or allow others to smoke around you. If you need help quitting, talk to your doctor about stop-smoking programs and medicines. These can increase your chances of quitting for good. · Use a vaporizer or humidifier to add moisture to your bedroom. Follow the directions for cleaning the machine. When should you call for help? Call your doctor now or seek immediate medical care if: 
· You have new or worse trouble swallowing. · Your sore throat gets much worse on one side. Watch closely for changes in your health, and be sure to contact your doctor if you do not get better as expected. Where can you learn more? Go to http://aziza-niyah.info/. Enter [id number] [phone] in the search box to learn more about \"Sore Throat: Care Instructions. \" Current as of: July 29, 2016 Content Version: 11.3 © 3795-7028 [employer]ettle. Care instructions adapted under license by [employer] (which disclaims liability or warranty for this information). If you have questions about a medical condition or this instruction, always ask your healthcare professional. [address] any warranty or liability for your use of this information. Introducing [facility]! Juana Tuttle introduces Our[employer] patient portal. Now you can access parts of your medical record, email your doctor's office, and request medication refills online. 1. In your internet browser, go to [email or web address] 2. Click on the First Time User? Click Here link in the Sign In box. You will see the New Member Sign Up page. 3. Enter your OurStory Access Code exactly as it appears below. You will not need to use this code after youve completed the sign-up process. If you do not sign up before the expiration date, you must request a new code. · Our[employer] Access Code: [email or web address] Expires: 7/27/2017  9:26 AM 
 
4. Enter the last four digits of your Social Security Number (xxxx) and Date of Birth (mm/dd/yyyy) as indicated and click Submit. You will be taken to the next sign-up page. 5. Create a OurStory ID. This will be your OurStory login ID and cannot be changed, so think of one that is secure and easy to remember. 6. Create a OurStory password. You can change your password at any time. 7. Enter your Password Reset Question and Answer. This can be used at a later time if you forget your password. 8. Enter your e-mail address. You will receive e-mail notification when new information is available in [address] 9. Click Sign Up. You can now view and download portions of your medical record. 10. Click the Download Summary menu link to download a portable copy of your medical information. If you have questions, please visit the Frequently Asked Questions section of the YouTube website. Remember, YouTube is NOT to be used for urgent needs. For medical emergencies, dial 911. Now available from your iPhone and Android! Please provide this summary of care documentation to your next provider. Your primary care clinician is listed as Lydia Hashimoto. If you have any questions after today's visit, please call [phone].

## 2017-06-20 NOTE — PATIENT INSTRUCTIONS
Sore Throat: Care Instructions  Your Care Instructions    Infection by bacteria or a virus causes most sore throats. Cigarette smoke, dry air, air pollution, allergies, and yelling can also cause a sore throat. Sore throats can be painful and annoying. Fortunately, most sore throats go away on their own. If you have a bacterial infection, your doctor may prescribe antibiotics. Follow-up care is a key part of your treatment and safety. Be sure to make and go to all appointments, and call your doctor if you are having problems. It's also a good idea to know your test results and keep a list of the medicines you take. How can you care for yourself at home? · If your doctor prescribed antibiotics, take them as directed. Do not stop taking them just because you feel better. You need to take the full course of antibiotics. · Gargle with warm salt water once an hour to help reduce swelling and relieve discomfort. Use 1 teaspoon of salt mixed in 1 cup of warm water. · Take an over-the-counter pain medicine, such as acetaminophen (Tylenol), ibuprofen (Advil, Motrin), or naproxen (Aleve). Read and follow all instructions on the label. · Be careful when taking over-the-counter cold or flu medicines and Tylenol at the same time. Many of these medicines have acetaminophen, which is Tylenol. Read the labels to make sure that you are not taking more than the recommended dose. Too much acetaminophen (Tylenol) can be harmful. · Drink plenty of fluids. Fluids may help soothe an irritated throat. Hot fluids, such as tea or soup, may help decrease throat pain. · Use over-the-counter throat lozenges to soothe pain. Regular cough drops or hard candy may also help. These should not be given to young children because of the risk of choking. · Do not smoke or allow others to smoke around you. If you need help quitting, talk to your doctor about stop-smoking programs and medicines.  These can increase your chances of quitting for good. · Use a vaporizer or humidifier to add moisture to your bedroom. Follow the directions for cleaning the machine. When should you call for help? Call your doctor now or seek immediate medical care if:  · You have new or worse trouble swallowing. · Your sore throat gets much worse on one side. Watch closely for changes in your health, and be sure to contact your doctor if you do not get better as expected. Where can you learn more? Go to http://aziza-niyah.info/. Enter [phone] in the search box to learn more about \"Sore Throat: Care Instructions. \"  Current as of: July 29, 2016  Content Version: 11.3  © 4597-2841 [employer]. Care instructions adapted under license by [employer] (which disclaims liability or warranty for this information). If you have questions about a medical condition or this instruction, always ask your healthcare professional. [address] any warranty or liability for your use of this information.

## 2017-06-22 DIAGNOSIS — R10.9 FLANK PAIN: ICD-10-CM

## 2017-06-22 RX ORDER — TRAMADOL HYDROCHLORIDE 50 MG/1
50 TABLET ORAL
Qty: 20 TAB | Refills: 0 | OUTPATIENT
Start: 2017-06-22

## 2017-06-22 RX ORDER — CYCLOBENZAPRINE HCL 10 MG
TABLET ORAL 3 TIMES DAILY
OUTPATIENT
Start: 2017-06-22

## 2017-06-22 NOTE — TELEPHONE ENCOUNTER
See note below. LOV 6/20/17  Last refill flexeril is historical Rx  Tramadol 6/2015 given by Marylen Mulberry  Please advise.

## 2017-06-22 NOTE — TELEPHONE ENCOUNTER
Pt should not be on Flexeril around the clock, and I do not prescribe pain medication without a visit, etc.  Thanks!

## 2017-06-22 NOTE — TELEPHONE ENCOUNTER
Per fax from [address] needs these two medications refilled. Noticed the ultram pt has allergic reaction too - hives ? ?  Not sure why it is being as to reorder

## 2017-07-07 ENCOUNTER — HOSPITAL ENCOUNTER (EMERGENCY)
Age: 37
Discharge: HOME | End: 2017-07-07
Attending: EMERGENCY MEDICINE
Payer: MEDICAID

## 2017-07-07 VITALS
WEIGHT: 215 LBS | TEMPERATURE: 98.3 F | BODY MASS INDEX: 39.56 KG/M2 | HEART RATE: 101 BPM | OXYGEN SATURATION: 94 % | HEIGHT: 62 IN | RESPIRATION RATE: 19 BRPM | DIASTOLIC BLOOD PRESSURE: 67 MMHG | SYSTOLIC BLOOD PRESSURE: 127 MMHG

## 2017-07-07 DIAGNOSIS — M79.10 MYALGIA: Primary | ICD-10-CM

## 2017-07-07 DIAGNOSIS — G43.911 INTRACTABLE MIGRAINE WITH STATUS MIGRAINOSUS, UNSPECIFIED MIGRAINE TYPE: ICD-10-CM

## 2017-07-07 LAB
ALBUMIN SERPL BCP-MCNC: 3.3 G/DL (ref 3.5–5)
ALBUMIN/GLOB SERPL: 0.7 {RATIO} (ref 1.1–2.2)
ALP SERPL-CCNC: 62 U/L (ref 45–117)
ALT SERPL-CCNC: 162 U/L (ref 12–78)
ANION GAP BLD CALC-SCNC: 13 MMOL/L (ref 5–15)
APPEARANCE UR: CLEAR
AST SERPL W P-5'-P-CCNC: 96 U/L (ref 15–37)
BACTERIA URNS QL MICRO: NEGATIVE /HPF
BASOPHILS # BLD AUTO: 0 K/UL (ref 0–0.1)
BASOPHILS # BLD: 0 % (ref 0–1)
BILIRUB SERPL-MCNC: 0.4 MG/DL (ref 0.2–1)
BILIRUB UR QL: NEGATIVE
BUN SERPL-MCNC: 8 MG/DL (ref 6–20)
BUN/CREAT SERPL: 7 (ref 12–20)
CALCIUM SERPL-MCNC: 9.2 MG/DL (ref 8.5–10.1)
CHLORIDE SERPL-SCNC: 102 MMOL/L (ref 97–108)
CO2 SERPL-SCNC: 23 MMOL/L (ref 21–32)
COLOR UR: ABNORMAL
CREAT SERPL-MCNC: 1.23 MG/DL (ref 0.55–1.02)
D DIMER PPP FEU-MCNC: 0.29 MG/L FEU (ref 0–0.65)
DIFFERENTIAL METHOD BLD: NORMAL
EOSINOPHIL # BLD: 0.1 K/UL (ref 0–0.4)
EOSINOPHIL NFR BLD: 2 % (ref 0–7)
EPITH CASTS URNS QL MICRO: ABNORMAL /LPF
ERYTHROCYTE [DISTWIDTH] IN BLOOD BY AUTOMATED COUNT: 12.3 % (ref 11.5–14.5)
GLOBULIN SER CALC-MCNC: 4.7 G/DL (ref 2–4)
GLUCOSE SERPL-MCNC: 162 MG/DL (ref 65–100)
GLUCOSE UR STRIP.AUTO-MCNC: NEGATIVE MG/DL
HCG UR QL: NEGATIVE
HCT VFR BLD AUTO: 37.4 % (ref 35–47)
HGB BLD-MCNC: 12.8 G/DL (ref 11.5–16)
HGB UR QL STRIP: ABNORMAL
KETONES UR QL STRIP.AUTO: NEGATIVE MG/DL
LEUKOCYTE ESTERASE UR QL STRIP.AUTO: NEGATIVE
LYMPHOCYTES # BLD AUTO: 42 % (ref 12–49)
LYMPHOCYTES # BLD: 2.7 K/UL (ref 0.8–3.5)
MCH RBC QN AUTO: 31.4 PG (ref 26–34)
MCHC RBC AUTO-ENTMCNC: 34.2 G/DL (ref 30–36.5)
MCV RBC AUTO: 91.9 FL (ref 80–99)
MONOCYTES # BLD: 0.9 K/UL (ref 0–1)
MONOCYTES NFR BLD AUTO: 13 % (ref 5–13)
NEUTS SEG # BLD: 2.8 K/UL (ref 1.8–8)
NEUTS SEG NFR BLD AUTO: 43 % (ref 32–75)
NITRITE UR QL STRIP.AUTO: NEGATIVE
PH UR STRIP: 6.5 [PH] (ref 5–8)
PLATELET # BLD AUTO: 372 K/UL (ref 150–400)
POTASSIUM SERPL-SCNC: 3.8 MMOL/L (ref 3.5–5.1)
PROT SERPL-MCNC: 8 G/DL (ref 6.4–8.2)
PROT UR STRIP-MCNC: 30 MG/DL
RBC # BLD AUTO: 4.07 M/UL (ref 3.8–5.2)
RBC #/AREA URNS HPF: ABNORMAL /HPF (ref 0–5)
SODIUM SERPL-SCNC: 138 MMOL/L (ref 136–145)
SP GR UR REFRACTOMETRY: 1.01 (ref 1–1.03)
UROBILINOGEN UR QL STRIP.AUTO: 0.2 EU/DL (ref 0.2–1)
WBC # BLD AUTO: 6.5 K/UL (ref 3.6–11)
WBC URNS QL MICRO: ABNORMAL /HPF (ref 0–4)

## 2017-07-07 PROCEDURE — 99285 EMERGENCY DEPT VISIT HI MDM: CPT

## 2017-07-07 PROCEDURE — 36415 COLL VENOUS BLD VENIPUNCTURE: CPT | Performed by: EMERGENCY MEDICINE

## 2017-07-07 PROCEDURE — 51701 INSERT BLADDER CATHETER: CPT

## 2017-07-07 PROCEDURE — 81001 URINALYSIS AUTO W/SCOPE: CPT | Performed by: EMERGENCY MEDICINE

## 2017-07-07 PROCEDURE — 85379 FIBRIN DEGRADATION QUANT: CPT | Performed by: EMERGENCY MEDICINE

## 2017-07-07 PROCEDURE — 77030011943

## 2017-07-07 PROCEDURE — 74011250637 HC RX REV CODE- 250/637: Performed by: EMERGENCY MEDICINE

## 2017-07-07 PROCEDURE — 81025 URINE PREGNANCY TEST: CPT

## 2017-07-07 PROCEDURE — 80053 COMPREHEN METABOLIC PANEL: CPT | Performed by: EMERGENCY MEDICINE

## 2017-07-07 PROCEDURE — 96365 THER/PROPH/DIAG IV INF INIT: CPT

## 2017-07-07 PROCEDURE — 96361 HYDRATE IV INFUSION ADD-ON: CPT

## 2017-07-07 PROCEDURE — 85025 COMPLETE CBC W/AUTO DIFF WBC: CPT | Performed by: EMERGENCY MEDICINE

## 2017-07-07 PROCEDURE — 96375 TX/PRO/DX INJ NEW DRUG ADDON: CPT

## 2017-07-07 PROCEDURE — 74011250636 HC RX REV CODE- 250/636: Performed by: EMERGENCY MEDICINE

## 2017-07-07 PROCEDURE — 77030005563 HC CATH URETH INT MMGH -A

## 2017-07-07 RX ORDER — SODIUM CHLORIDE 0.9 % (FLUSH) 0.9 %
5-10 SYRINGE (ML) INJECTION AS NEEDED
Status: DISCONTINUED | OUTPATIENT
Start: 2017-07-07 | End: 2017-07-07 | Stop reason: HOSPADM

## 2017-07-07 RX ORDER — ONDANSETRON 2 MG/ML
4 INJECTION INTRAMUSCULAR; INTRAVENOUS
Status: COMPLETED | OUTPATIENT
Start: 2017-07-07 | End: 2017-07-07

## 2017-07-07 RX ORDER — PREDNISONE 10 MG/1
TABLET ORAL
Qty: 21 TAB | Refills: 0 | Status: SHIPPED | OUTPATIENT
Start: 2017-07-07 | End: 2017-07-27 | Stop reason: ALTCHOICE

## 2017-07-07 RX ORDER — BUTALBITAL, ACETAMINOPHEN AND CAFFEINE 50; 325; 40 MG/1; MG/1; MG/1
2 TABLET ORAL
Status: COMPLETED | OUTPATIENT
Start: 2017-07-07 | End: 2017-07-07

## 2017-07-07 RX ORDER — BUTALBITAL, ACETAMINOPHEN AND CAFFEINE 50; 325; 40 MG/1; MG/1; MG/1
TABLET ORAL
Qty: 30 TAB | Refills: 0 | Status: SHIPPED | OUTPATIENT
Start: 2017-07-07 | End: 2017-07-27

## 2017-07-07 RX ORDER — MORPHINE SULFATE 2 MG/ML
4 INJECTION, SOLUTION INTRAMUSCULAR; INTRAVENOUS
Status: DISCONTINUED | OUTPATIENT
Start: 2017-07-07 | End: 2017-07-07

## 2017-07-07 RX ORDER — GABAPENTIN 300 MG/1
300 CAPSULE ORAL 3 TIMES DAILY
Qty: 30 CAP | Refills: 0 | Status: SHIPPED | OUTPATIENT
Start: 2017-07-07 | End: 2018-04-10 | Stop reason: SDUPTHER

## 2017-07-07 RX ORDER — SODIUM CHLORIDE 0.9 % (FLUSH) 0.9 %
5-10 SYRINGE (ML) INJECTION EVERY 8 HOURS
Status: DISCONTINUED | OUTPATIENT
Start: 2017-07-07 | End: 2017-07-07 | Stop reason: HOSPADM

## 2017-07-07 RX ORDER — DIPHENHYDRAMINE HYDROCHLORIDE 50 MG/ML
50 INJECTION, SOLUTION INTRAMUSCULAR; INTRAVENOUS
Status: COMPLETED | OUTPATIENT
Start: 2017-07-07 | End: 2017-07-07

## 2017-07-07 RX ORDER — PROCHLORPERAZINE MALEATE 5 MG
TABLET ORAL
Qty: 40 TAB | Refills: 0 | Status: SHIPPED | OUTPATIENT
Start: 2017-07-07 | End: 2017-07-27

## 2017-07-07 RX ORDER — DEXAMETHASONE SODIUM PHOSPHATE 4 MG/ML
10 INJECTION, SOLUTION INTRA-ARTICULAR; INTRALESIONAL; INTRAMUSCULAR; INTRAVENOUS; SOFT TISSUE
Status: COMPLETED | OUTPATIENT
Start: 2017-07-07 | End: 2017-07-07

## 2017-07-07 RX ORDER — MAGNESIUM SULFATE HEPTAHYDRATE 40 MG/ML
2 INJECTION, SOLUTION INTRAVENOUS
Status: COMPLETED | OUTPATIENT
Start: 2017-07-07 | End: 2017-07-07

## 2017-07-07 RX ORDER — MORPHINE SULFATE 2 MG/ML
2 INJECTION, SOLUTION INTRAMUSCULAR; INTRAVENOUS
Status: COMPLETED | OUTPATIENT
Start: 2017-07-07 | End: 2017-07-07

## 2017-07-07 RX ORDER — PROCHLORPERAZINE EDISYLATE 5 MG/ML
10 INJECTION INTRAMUSCULAR; INTRAVENOUS
Status: COMPLETED | OUTPATIENT
Start: 2017-07-07 | End: 2017-07-07

## 2017-07-07 RX ADMIN — PROCHLORPERAZINE EDISYLATE 10 MG: 5 INJECTION INTRAMUSCULAR; INTRAVENOUS at 12:27

## 2017-07-07 RX ADMIN — ONDANSETRON 4 MG: 2 INJECTION INTRAMUSCULAR; INTRAVENOUS at 10:51

## 2017-07-07 RX ADMIN — BUTALBITAL, ACETAMINOPHEN, AND CAFFEINE 2 TABLET: 50; 325; 40 TABLET ORAL at 12:22

## 2017-07-07 RX ADMIN — DEXAMETHASONE SODIUM PHOSPHATE 10 MG: 4 INJECTION, SOLUTION INTRAMUSCULAR; INTRAVENOUS at 12:24

## 2017-07-07 RX ADMIN — MAGNESIUM SULFATE HEPTAHYDRATE 2 G: 40 INJECTION, SOLUTION INTRAVENOUS at 12:25

## 2017-07-07 RX ADMIN — SODIUM CHLORIDE 1000 ML: 900 INJECTION, SOLUTION INTRAVENOUS at 10:50

## 2017-07-07 RX ADMIN — Medication 2 MG: at 10:52

## 2017-07-07 RX ADMIN — DIPHENHYDRAMINE HYDROCHLORIDE 50 MG: 50 INJECTION, SOLUTION INTRAMUSCULAR; INTRAVENOUS at 12:23

## 2017-07-07 RX ADMIN — SODIUM CHLORIDE 1000 ML: 900 INJECTION, SOLUTION INTRAVENOUS at 11:53

## 2017-07-07 NOTE — ED NOTES
Pt and spouse given discharge instructions by Dr Radha Lancaster they verbalize an understanding pt assisted to car via wheelchair

## 2017-07-07 NOTE — ED NOTES
Pt asked if we would give her medication for her migraine headache, I asked her if the morphine we gave her made the migraine worse she said yes and so I held new order for morphine and informed Dr Owen Baker

## 2017-07-07 NOTE — ED TRIAGE NOTES
Pt assisted to treatment area she states that she woke this morning with abdominal and lower back pain that has caused her to feel nauseated and has vomited 4 times. She notes that it hurts to urinate as well and this too started this morning. Pt states that she has not had this type of pain before and it is sharp everywhere.   Pt is tearful and hyperventilating

## 2017-07-07 NOTE — ED PROVIDER NOTES
HPI Comments: 46yo BF presents to the ED c/o \"hurting all over\" since she woke up this morning, c/o painful urination. Patient denies trauma, denies fevers, or having pain like this before. Patient admits to N/V and \"hurts to breath\" and c/o abdominal and back pain. Patient poorly cooperative with H&P as she is crying and taking shallow breaths    The history is provided by the patient.         Past Medical History:   Diagnosis Date    Abnormal pap 2012 10/5/13     hx w/Colpo     Arthritis     Asthma     Diabetes ([address]     Headache     Hypertension     Joint pain     Pap smear for cervical cancer screening 10/1/15 ASCUS HPV NEG RP 3 YEARS    Ringing in ears        Past Surgical History:   Procedure Laterality Date    HX  SECTION  01 & 08    HX COLPOSCOPY  2012 neg    HX HEENT      cataract R eye    HX TUBAL LIGATION           Family History:   Problem Relation Age of Onset    Diabetes Mother     Heart Disease Mother     Hypertension Mother     Stroke Mother     Hypertension Father     Stroke Father     Heart Disease Father     Breast Cancer Neg Hx        Social History     Social History    Marital status:      Spouse name: N/A    Number of children: 2    Years of education: N/A     Occupational History    --      Social History Main Topics    Smoking status: Never Smoker    Smokeless tobacco: Never Used    Alcohol use No    Drug use: No    Sexual activity: Yes     Partners: Male     Birth control/ protection: Surgical     Other Topics Concern     Service No    Blood Transfusions No    Caffeine Concern No    Occupational Exposure No    Hobby Hazards No    Sleep Concern Yes    Stress Concern Yes    Weight Concern Yes    Special Diet Yes     DM    Back Care Yes    Exercise Yes    Bike Helmet No    Seat Belt Yes    Self-Exams Yes     Social History Narrative         ALLERGIES: Aspirin; Contrast agent [iodine]; Dilaudid [hydromorphone]; Metformin; Reglan [metoclopramide]; and Ritalin [methylphenidate]    Review of Systems   Constitutional: Negative for chills and fever. HENT: Negative for congestion and trouble swallowing. Respiratory: Positive for shortness of breath. Negative for chest tightness. Gastrointestinal: Positive for abdominal pain, nausea and vomiting. Musculoskeletal: Positive for back pain. Negative for neck pain. Skin: Negative for rash and wound. Neurological: Positive for headaches. No headache mentioned initially, but reported after the morphine made it worse   All other systems reviewed and are negative. Vitals:    07/07/17 1025 07/07/17 1225   BP: (!) 175/98 127/71   Pulse: (!) 118 (!) 104   Resp: 24    Temp: 98.3 °F (36.8 °C)    SpO2: 100%    Weight: 97.5 kg (215 lb)    Height: 5' 2\" (1.575 m)             Physical Exam   Constitutional: She is oriented to person, place, and time. She appears well-developed and well-nourished. She appears distressed. HENT:   Head: Normocephalic and atraumatic. Eyes: Conjunctivae and EOM are normal.   Neck: Normal range of motion. Cardiovascular: Regular rhythm, normal heart sounds and intact distal pulses. Tachycardia present. No murmur heard. Pulmonary/Chest: Effort normal and breath sounds normal. No stridor. Tachypnea noted. No respiratory distress. She has no wheezes. Short shallow breathing with crying   Abdominal: Soft. Bowel sounds are normal. There is no tenderness. There is no rebound. Musculoskeletal: Normal range of motion. She exhibits no edema, tenderness or deformity. Neurological: She is alert and oriented to person, place, and time. No cranial nerve deficit. Skin: Skin is warm and dry. She is not diaphoretic. Psychiatric: Her mood appears anxious. tearful   Nursing note and vitals reviewed.        MDM  Number of Diagnoses or Management Options  Intractable migraine with status migrainosus, unspecified migraine type:   Myalgia:   Diagnosis management comments: Patient c/o pain all over - check labs and urine and give morphine and re-eval.    1155 - patient states her headache is worse (this is the first she mentioned of a headache) - hx of migraines - so will not give additional morphine and will give migraine headache.    1350 - patient improved, but still in pain. Will d/c home with meds for symptoms and refer her back to her PCP for further evaluation and care.   Advised to return to the Ed if symptoms worsen       Amount and/or Complexity of Data Reviewed  Clinical lab tests: reviewed and ordered    Patient Progress  Patient progress: improved    ED Course       Procedures

## 2017-07-07 NOTE — DISCHARGE INSTRUCTIONS
Migraine Headache: Care Instructions  Your Care Instructions  Migraines are painful, throbbing headaches that often start on one side of the head. They may cause nausea and vomiting and make you sensitive to light, sound, or smell. Without treatment, migraines can last from 4 hours to a few days. Medicines can help prevent migraines or stop them after they have started. Your doctor can help you find which ones work best for you. Follow-up care is a key part of your treatment and safety. Be sure to make and go to all appointments, and call your doctor if you are having problems. It's also a good idea to know your test results and keep a list of the medicines you take. How can you care for yourself at home? · Do not drive if you have taken a prescription pain medicine. · Rest in a quiet, dark room until your headache is gone. Close your eyes, and try to relax or go to sleep. Don't watch TV or read. · Put a cold, moist cloth or cold pack on the painful area for 10 to 20 minutes at a time. Put a thin cloth between the cold pack and your skin. · Use a warm, moist towel or a heating pad set on low to relax tight shoulder and neck muscles. · Have someone gently massage your neck and shoulders. · Take your medicines exactly as prescribed. Call your doctor if you think you are having a problem with your medicine. You will get more details on the specific medicines your doctor prescribes. · Be careful not to take pain medicine more often than the instructions allow. You could get worse or more frequent headaches when the medicine wears off. To prevent migraines  · Keep a headache diary so you can figure out what triggers your headaches. Avoiding triggers may help you prevent headaches. Record when each headache began, how long it lasted, and what the pain was like.  (Was it throbbing, aching, stabbing, or dull?) Write down any other symptoms you had with the headache, such as nausea, flashing lights or dark spots, or sensitivity to bright light or loud noise. Note if the headache occurred near your period. List anything that might have triggered the headache. Triggers may include certain foods (chocolate, cheese, wine) or odors, smoke, bright light, stress, or lack of sleep. · If your doctor has prescribed medicine for your migraines, take it as directed. You may have medicine that you take only when you get a migraine and medicine that you take all the time to help prevent migraines. ¨ If your doctor has prescribed medicine for when you get a headache, take it at the first sign of a migraine, unless your doctor has given you other instructions. ¨ If your doctor has prescribed medicine to prevent migraines, take it exactly as prescribed. Call your doctor if you think you are having a problem with your medicine. · Find healthy ways to deal with stress. Migraines are most common during or right after stressful times. Take time to relax before and after you do something that has caused a migraine in the past.  · Try to keep your muscles relaxed by keeping good posture. Check your jaw, face, neck, and shoulder muscles for tension. Try to relax them. When you sit at a desk, change positions often. And make sure to stretch for 30 seconds each hour. · Get plenty of sleep and exercise. · Eat meals on a regular schedule. Avoid foods and drinks that often trigger migraines. These include chocolate, alcohol (especially red wine and port), aspartame, monosodium glutamate (MSG), and some additives found in foods (such as hot dogs, martinez, cold cuts, aged cheeses, and pickled foods). · Limit caffeine. Don't drink too much coffee, tea, or soda. But don't quit caffeine suddenly. That can also give you migraines. · Do not smoke or allow others to smoke around you. If you need help quitting, talk to your doctor about stop-smoking programs and medicines. These can increase your chances of quitting for good.   · If you are taking birth control pills or hormone therapy, talk to your doctor about whether they are triggering your migraines. When should you call for help? Call 911 anytime you think you may need emergency care. For example, call if:  · You have signs of a stroke. These may include:  ¨ Sudden numbness, paralysis, or weakness in your face, arm, or leg, especially on only one side of your body. ¨ Sudden vision changes. ¨ Sudden trouble speaking. ¨ Sudden confusion or trouble understanding simple statements. ¨ Sudden problems with walking or balance. ¨ A sudden, severe headache that is different from past headaches. Call your doctor now or seek immediate medical care if:  · You have new or worse nausea and vomiting. · You have a new or higher fever. · Your headache gets much worse. Watch closely for changes in your health, and be sure to contact your doctor if:  · You are not getting better after 2 days (48 hours). Where can you learn more? Go to http://aziza-niyah.info/. Enter W047 in the search box to learn more about \"Migraine Headache: Care Instructions. \"  Current as of: October 14, 2016  Content Version: 11.3  © 5060-5616 [employer]. Care instructions adapted under license by [facility] (which disclaims liability or warranty for this information). If you have questions about a medical condition or this instruction, always ask your healthcare professional. [address] any warranty or liability for your use of this information. Fibromyalgia: Care Instructions  Your Care Instructions  Fibromyalgia is a painful condition that is not completely understood by medical experts. The cause of fibromyalgia is not known. It can make you feel tired and ache all over. It causes tender spots at specific points of the body that hurt only when you press on them. You may have trouble sleeping, as well as other symptoms.  These problems can upset your work and home life. Symptoms tend to come and go, although they may never go away completely. Fibromyalgia does not harm your muscles, joints, or organs. Follow-up care is a key part of your treatment and safety. Be sure to make and go to all appointments, and call your doctor if you are having problems. It's also a good idea to know your test results and keep a list of the medicines you take. How can you care for yourself at home? · Exercise often. Walk, swim, or bike to help with pain and sleep problems and to make you feel better. · Try to get a good night's sleep. Go to bed and get up at the same time each day, whether you feel rested or not. Make sure you have a good mattress and pillow. · Reduce stress. Avoid things that cause you stress, if you can. If not, work at making them less stressful. Learn to use biofeedback, guided imagery, meditation, or other methods to relax. · Make healthy changes. Eat a balanced diet, quit smoking, and limit alcohol and caffeine. · Use a heating pad set on low or take warm baths or showers for pain. Using cold packs for up to 20 minutes at a time can also relieve pain. Put a thin cloth between the cold pack and your skin. A gentle massage might help too. · Be safe with medicines. Take your medicines exactly as prescribed. Call your doctor if you think you are having a problem with your medicine. Your doctor may talk to you about taking antidepressant medicines. These medicines may improve sleep, relieve pain, and in some cases treat depression. · Learn about fibromyalgia. This makes coping easier. Then, take an active role in your treatment. · Think about joining a support group with others who have fibromyalgia to learn more and get support. When should you call for help?   Watch closely for changes in your health, and be sure to contact your doctor if:  · You feel sad, helpless, or hopeless; lose interest in things you used to enjoy; or have other symptoms of depression. · Your fibromyalgia symptoms get worse. Where can you learn more? Go to http://aziza-niyah.info/. Enter V003 in the search box to learn more about \"Fibromyalgia: Care Instructions. \"  Current as of: October 14, 2016  Content Version: 11.3  © 9907-9421 [employer]. Care instructions adapted under license by [facility] (which disclaims liability or warranty for this information). If you have questions about a medical condition or this instruction, always ask your healthcare professional. [place] [address] any warranty or liability for your use of this information.

## 2017-07-10 RX ORDER — CYCLOBENZAPRINE HCL 10 MG
TABLET ORAL 3 TIMES DAILY
OUTPATIENT
Start: 2017-07-10

## 2017-07-11 ENCOUNTER — OFFICE VISIT (OUTPATIENT)
Dept: FAMILY MEDICINE CLINIC | Age: 37
End: 2017-07-11

## 2017-07-11 VITALS
SYSTOLIC BLOOD PRESSURE: 120 MMHG | OXYGEN SATURATION: 97 % | HEIGHT: 62 IN | WEIGHT: 221 LBS | DIASTOLIC BLOOD PRESSURE: 74 MMHG | BODY MASS INDEX: 40.67 KG/M2 | HEART RATE: 117 BPM | TEMPERATURE: 99.7 F | RESPIRATION RATE: 16 BRPM

## 2017-07-11 DIAGNOSIS — J02.9 SORE THROAT: ICD-10-CM

## 2017-07-11 DIAGNOSIS — J45.20 MILD INTERMITTENT ASTHMA WITHOUT COMPLICATION: Primary | ICD-10-CM

## 2017-07-11 LAB
S PYO AG THROAT QL: NEGATIVE
VALID INTERNAL CONTROL?: YES

## 2017-07-11 RX ORDER — FLUTICASONE FUROATE 200 UG/1
POWDER RESPIRATORY (INHALATION)
COMMUNITY
Start: 2017-05-03 | End: 2017-07-11 | Stop reason: SDUPTHER

## 2017-07-11 RX ORDER — FLUTICASONE FUROATE 200 UG/1
1 POWDER RESPIRATORY (INHALATION) DAILY
Qty: 1 INHALER | Refills: 2 | Status: SHIPPED | OUTPATIENT
Start: 2017-07-11 | End: 2018-05-10 | Stop reason: SDUPTHER

## 2017-07-11 RX ORDER — CYCLOBENZAPRINE HCL 10 MG
TABLET ORAL 3 TIMES DAILY
OUTPATIENT
Start: 2017-07-11

## 2017-07-11 RX ORDER — ALBUTEROL SULFATE 90 UG/1
2 AEROSOL, METERED RESPIRATORY (INHALATION)
Qty: 1 INHALER | Refills: 3 | Status: SHIPPED | OUTPATIENT
Start: 2017-07-11 | End: 2018-05-10 | Stop reason: SDUPTHER

## 2017-07-11 NOTE — PATIENT INSTRUCTIONS
Sore Throat: Care Instructions  Your Care Instructions    Infection by bacteria or a virus causes most sore throats. Cigarette smoke, dry air, air pollution, allergies, and yelling can also cause a sore throat. Sore throats can be painful and annoying. Fortunately, most sore throats go away on their own. If you have a bacterial infection, your doctor may prescribe antibiotics. Follow-up care is a key part of your treatment and safety. Be sure to make and go to all appointments, and call your doctor if you are having problems. It's also a good idea to know your test results and keep a list of the medicines you take. How can you care for yourself at home? · If your doctor prescribed antibiotics, take them as directed. Do not stop taking them just because you feel better. You need to take the full course of antibiotics. · Gargle with warm salt water once an hour to help reduce swelling and relieve discomfort. Use 1 teaspoon of salt mixed in 1 cup of warm water. · Take an over-the-counter pain medicine, such as acetaminophen (Tylenol), ibuprofen (Advil, Motrin), or naproxen (Aleve). Read and follow all instructions on the label. · Be careful when taking over-the-counter cold or flu medicines and Tylenol at the same time. Many of these medicines have acetaminophen, which is Tylenol. Read the labels to make sure that you are not taking more than the recommended dose. Too much acetaminophen (Tylenol) can be harmful. · Drink plenty of fluids. Fluids may help soothe an irritated throat. Hot fluids, such as tea or soup, may help decrease throat pain. · Use over-the-counter throat lozenges to soothe pain. Regular cough drops or hard candy may also help. These should not be given to young children because of the risk of choking. · Do not smoke or allow others to smoke around you. If you need help quitting, talk to your doctor about stop-smoking programs and medicines.  These can increase your chances of quitting for good. · Use a vaporizer or humidifier to add moisture to your bedroom. Follow the directions for cleaning the machine. When should you call for help? Call your doctor now or seek immediate medical care if:  · You have new or worse trouble swallowing. · Your sore throat gets much worse on one side. Watch closely for changes in your health, and be sure to contact your doctor if you do not get better as expected. Where can you learn more? Go to http://aziza-niyah.info/. Enter [id number] [id number] in the search box to learn more about \"Sore Throat: Care Instructions. \"  Current as of: July 29, 2016  Content Version: 11.3  © 1682-6498 [employer]. Care instructions adapted under license by [employer] (which disclaims liability or warranty for this information). If you have questions about a medical condition or this instruction, always ask your healthcare professional. [address] any warranty or liability for your use of this information.

## 2017-07-11 NOTE — PROGRESS NOTES
HISTORY OF PRESENT ILLNESS  Dudley Gu is a 40 y.o. female. HPI  Pt presents with \"sore throat and body aches\"    Sore throat  This started yesterday  No fever  No nasal congestion, no cough  OTC: none    In addition, patient is requesting a refill of her Flexeril. I had prescribed this for the patient once, when she was experiencing neck muscle spasms. Pt states that \"she still has muscle spasms\". When asked where, etc, patient states that after she gives herself her insulin, she has muscle spasms in her arms as well as her stomach. She does not have \"muscle spasms\" any other time. Pt states that she discussed this with her endocrinologist, and they informed her that she should just continue to get the muscle relaxer from me, to take as needed. Review of Systems   Constitutional: Negative for fever. HENT: Positive for sore throat. Respiratory: Negative for cough. Gastrointestinal: Negative for diarrhea and vomiting. Physical Exam   Constitutional: She is oriented to person, place, and time. She appears well-developed and well-nourished. HENT:   Head: Normocephalic and atraumatic. Right Ear: Hearing, tympanic membrane, external ear and ear canal normal.   Left Ear: Hearing, tympanic membrane, external ear and ear canal normal.   Nose: Mucosal edema and rhinorrhea present. Mouth/Throat: Oropharynx is clear and moist.   Neck: Normal range of motion. Neck supple. Cardiovascular: Normal rate, regular rhythm and normal heart sounds. Pulmonary/Chest: Effort normal and breath sounds normal.   Lymphadenopathy:     She has no cervical adenopathy. Neurological: She is alert and oriented to person, place, and time. Skin: Skin is warm and dry. Psychiatric: She has a normal mood and affect.  Her behavior is normal.       ASSESSMENT and PLAN    ICD-10-CM ICD-9-CM    1. Mild intermittent asthma without complication J11.50 096.63 albuterol (VENTOLIN HFA) 90 mcg/actuation inhaler ARNUITY ELLIPTA 200 mcg/actuation dsdv inhaler   2. Sore throat J02.9 462 AMB POC RAPID STREP A     Educated patient that should she need the flexeril only for her \"spasms\" after taking her insulin, she needs to discuss this with her endocrinologist, as this Is not a common side effect that I am aware of. In addition, informed patient that I believe that sore throat is viral, and should improve with time. Educated about supportive measures, to improve symptoms. Pt informed to return to office with worsening of symptoms, or PRN with any questions or concerns. Pt verbalizes understanding of plan of care and denies further questions or concerns at this time.

## 2017-07-11 NOTE — MR AVS SNAPSHOT
Visit Information Date & Time Provider Department Dept. Phone Encounter #  
 7/11/2017  2:45 PM Frieda Ashley  [address] [phone] [id number] Follow-up Instructions Return if symptoms worsen or fail to improve. Your Appointments 8/18/2017  9:45 AM  
ROUTINE CARE with Teetee Richard MD  
[facility]) Appt Note: r/s fu  
 [address] [facility] [address]  
[phone]  
  
   
 [address] [place] [address]  
  
    
 10/30/2017  9:20 AM  
ESTABLISHED PATIENT with Tad Vaughan MD  
[facility] ([facility]) Appt Note: 6 mo fup  
 354 [facility] [address] [place] [address] Road  
54 [facility] [id number] [place] [address] Upcoming Health Maintenance Date Due Pneumococcal 19-64 Medium Risk (1 of 1 - PPSV23) 1/18/1999 EYE EXAM RETINAL OR DILATED Q1 5/19/2014 HEMOGLOBIN A1C Q6M 2/28/2017 MICROALBUMIN Q1 4/13/2017 FOOT EXAM Q1 6/24/2017 INFLUENZA AGE 9 TO ADULT 8/1/2017 LIPID PANEL Q1 10/4/2017 PAP AKA CERVICAL CYTOLOGY 10/1/2018 DTaP/Tdap/Td series (2 - Td) 12/8/2025 Allergies as of 7/11/2017  Review Complete On: 7/11/2017 By: Frieda Ashley NP Severity Noted Reaction Type Reactions Aspirin  03/05/2010    Itching Contrast Agent [Iodine]  05/01/2015    Hives Dilaudid [Hydromorphone]  05/01/2015    Hives Metformin  07/03/2013    Nausea Only Reglan [Metoclopramide]  03/05/2010    Other (comments) Impaired speech Ritalin [Methylphenidate]  05/01/2015    Other (comments) Speech impairment Current Immunizations  Reviewed on 12/8/2015 Name Date Influenza Vaccine (Quad) PF 10/4/2016, 11/23/2015  9:10 AM  
 Tdap 12/8/2015  9:54 AM  
  
 Not reviewed this visit You Were Diagnosed With   
  
 Codes Comments Mild intermittent asthma without complication    -  Primary ICD-10-CM: J45.20 ICD-9-CM: 493.90 Sore throat     ICD-10-CM: J02.9 ICD-9-CM: 144 Vitals BP Pulse Temp Resp Height(growth percentile) Weight(growth percentile) 120/74 (!) 117 99.7 °F (37.6 °C) (Oral) 16 5' 2\" (1.575 m) 221 lb (100.2 kg) LMP SpO2 BMI OB Status Smoking Status 07/01/2017 97% 40.42 kg/m2 Having regular periods Never Smoker BMI and BSA Data Body Mass Index Body Surface Area 40.42 kg/m 2 2.09 m 2 Preferred Pharmacy Pharmacy Name [facility] [address] [place] [address] [phone] Your Updated Medication List  
  
   
This list is accurate as of: 7/11/17  3:22 PM.  Always use your most recent med list.  
  
  
  
  
 albiglutide 50 mg/0.5 mL injector pen Commonly known as:  TANZEUM  
1 Syringe by SubCUTAneous route every seven (7) days. * albuterol 2.5 mg /3 mL (0.083 %) nebulizer solution Commonly known as:  PROVENTIL VENTOLIN  
3 mL by Nebulization route every four (4) hours as needed for Wheezing. * albuterol 90 mcg/actuation inhaler Commonly known as:  VENTOLIN HFA Take 2 Puffs by inhalation every four (4) hours as needed for Wheezing. Indications: Acute Asthma Attack ARNUITY ELLIPTA 200 mcg/actuation Dsdv inhaler Generic drug:  fluticasone furoate Take 1 Puff by inhalation daily. Blood-Glucose Meter monitoring kit Patient is request the [facility] Brand meter. Patient test QID  
  
 BREO ELLIPTA 100-25 mcg/dose inhaler Generic drug:  fluticasone-vilanterol Take 1 Puff by inhalation daily. butalbital-acetaminophen-caffeine -40 mg per tablet Commonly known as:  ESGIC  
1-2 tabs po every 4-6 hours prn pain, max of 6 tabs a day  
  
 cyclobenzaprine 10 mg tablet Commonly known as:  FLEXERIL Take  by mouth three (3) times daily. gabapentin 300 mg capsule Commonly known as:  NEURONTIN  
 Take 1 Cap by mouth three (3) times daily. glucose blood VI test strips strip Commonly known as:  blood glucose test  
Patient request the [facility] brand of meter and strips and to test QID. insulin glargine 100 unit/mL (3 mL) Inpn Commonly known asArabella Asp Inject 70 units at bedtime  
  
 insulin glulisine 100 unit/mL pen Commonly known as:  [place] INJECT 20 UNITS SUBCUTANEOUSLY BEFORE BREAKFAST, LUNCH, AND DINNER Insulin Needles (Disposable) 30 gauge x 1/3\" Sliding scale Lancets Misc Bid for diabetes 250.00 Lot # [id number] Exp 7/2018 Man Brian Nordisk  
  
 losartan 100 mg tablet Commonly known as:  COZAAR Take 100 mg by mouth daily. montelukast 10 mg tablet Commonly known as:  SINGULAIR  
TAKE ONE TABLET BY MOUTH ONCE DAILY Nebulizer & Compressor machine 1 Each by Does Not Apply route two (2) times daily as needed. predniSONE 10 mg dose pack Commonly known as:  STERAPRED DS Take as directed on package  
  
 prochlorperazine 5 mg tablet Commonly known as:  COMPAZINE  
1-2 tabs po every 6 hours prn nausea or headache * Notice: This list has 2 medication(s) that are the same as other medications prescribed for you. Read the directions carefully, and ask your doctor or other care provider to review them with you. Prescriptions Sent to Pharmacy Refills  
 albuterol (VENTOLIN HFA) 90 mcg/actuation inhaler 3 Sig: Take 2 Puffs by inhalation every four (4) hours as needed for Wheezing. Indications: Acute Asthma Attack Class: Normal  
 Pharmacy: [address] [place] [address] Ph #: [phone] Route: Inhalation ARNUITY ELLIPTA 200 mcg/actuation dsdv inhaler 2 Sig: Take 1 Puff by inhalation daily. Class: Normal  
 Pharmacy: [id number] [place] [address] Ph #: [phone] Route: Inhalation We Performed the Following AMB POC RAPID STREP A [19974 CPT(R)] Follow-up Instructions Return if symptoms worsen or fail to improve. Patient Instructions Sore Throat: Care Instructions Your Care Instructions Infection by bacteria or a virus causes most sore throats. Cigarette smoke, dry air, air pollution, allergies, and yelling can also cause a sore throat. Sore throats can be painful and annoying. Fortunately, most sore throats go away on their own. If you have a bacterial infection, your doctor may prescribe antibiotics. Follow-up care is a key part of your treatment and safety. Be sure to make and go to all appointments, and call your doctor if you are having problems. It's also a good idea to know your test results and keep a list of the medicines you take. How can you care for yourself at home? · If your doctor prescribed antibiotics, take them as directed. Do not stop taking them just because you feel better. You need to take the full course of antibiotics. · Gargle with warm salt water once an hour to help reduce swelling and relieve discomfort. Use 1 teaspoon of salt mixed in 1 cup of warm water. · Take an over-the-counter pain medicine, such as acetaminophen (Tylenol), ibuprofen (Advil, Motrin), or naproxen (Aleve). Read and follow all instructions on the label. · Be careful when taking over-the-counter cold or flu medicines and Tylenol at the same time. Many of these medicines have acetaminophen, which is Tylenol. Read the labels to make sure that you are not taking more than the recommended dose. Too much acetaminophen (Tylenol) can be harmful. · Drink plenty of fluids. Fluids may help soothe an irritated throat. Hot fluids, such as tea or soup, may help decrease throat pain. · Use over-the-counter throat lozenges to soothe pain. Regular cough drops or hard candy may also help. These should not be given to young children because of the risk of choking. · Do not smoke or allow others to smoke around you. If you need help quitting, talk to your doctor about stop-smoking programs and medicines. These can increase your chances of quitting for good. · Use a vaporizer or humidifier to add moisture to your bedroom. Follow the directions for cleaning the machine. When should you call for help? Call your doctor now or seek immediate medical care if: 
· You have new or worse trouble swallowing. · Your sore throat gets much worse on one side. Watch closely for changes in your health, and be sure to contact your doctor if you do not get better as expected. Where can you learn more? Go to http://aziza-niyah.info/. Enter [phone] in the search box to learn more about \"Sore Throat: Care Instructions. \" Current as of: July 29, 2016 Content Version: 11.3 © 9713-8046 [employer]. Care instructions adapted under license by [employer] (which disclaims liability or warranty for this information). If you have questions about a medical condition or this instruction, always ask your healthcare professional. [address] any warranty or liability for your use of this information. Introducing [facility] & [facility]! Laquita Londono introduces [employer] patient portal. Now you can access parts of your medical record, email your doctor's office, and request medication refills online. 1. In your internet browser, go to [email or web address] 2. Click on the First Time User? Click Here link in the Sign In box. You will see the New Member Sign Up page. 3. Enter your [employer] Access Code exactly as it appears below. You will not need to use this code after youve completed the sign-up process. If you do not sign up before the expiration date, you must request a new code. · [employer] Access Code: [email or web address] Expires: 7/27/2017  9:26 AM 
 
 4. Enter the last four digits of your Social Security Number (xxxx) and Date of Birth (mm/dd/yyyy) as indicated and click Submit. You will be taken to the next sign-up page. 5. Create a CHORD ID. This will be your CHORD login ID and cannot be changed, so think of one that is secure and easy to remember. 6. Create a CHORD password. You can change your password at any time. 7. Enter your Password Reset Question and Answer. This can be used at a later time if you forget your password. 8. Enter your e-mail address. You will receive e-mail notification when new information is available in [address] 9. Click Sign Up. You can now view and download portions of your medical record. 10. Click the Download Summary menu link to download a portable copy of your medical information. If you have questions, please visit the Frequently Asked Questions section of the CHORD website. Remember, CHORD is NOT to be used for urgent needs. For medical emergencies, dial 911. Now available from your iPhone and Android! Please provide this summary of care documentation to your next provider. Your primary care clinician is listed as Tahir Umanzor. If you have any questions after today's visit, please call [phone].

## 2017-07-11 NOTE — PROGRESS NOTES
Identified pt with two pt identifiers(name and ). Chief Complaint   Patient presents with    Sore Throat     x2 days    Generalized Body Aches     med review      Pt would like refill on flexeril, Arnuity Inhaler  & albuterol inhaler today. Health Maintenance Due   Topic    Pneumococcal 19-64 Medium Risk (1 of 1 - PPSV23)    EYE EXAM RETINAL OR DILATED Q1     HEMOGLOBIN A1C Q6M     MICROALBUMIN Q1     FOOT EXAM Q1        Wt Readings from Last 3 Encounters:   17 221 lb (100.2 kg)   17 215 lb (97.5 kg)   17 215 lb (97.5 kg)     Temp Readings from Last 3 Encounters:   17 99.7 °F (37.6 °C) (Oral)   17 98.3 °F (36.8 °C)   17 97.6 °F (36.4 °C) (Oral)     BP Readings from Last 3 Encounters:   17 120/74   17 127/67   17 122/78     Pulse Readings from Last 3 Encounters:   17 (!) 117   17 (!) 101   17 (!) 121         Learning Assessment:  :     Learning Assessment 2016 2015 10/6/2015 2014   PRIMARY LEARNER Patient Patient Patient Patient   HIGHEST LEVEL OF EDUCATION - PRIMARY LEARNER  - - DID NOT GRADUATE HIGH SCHOOL SOME COLLEGE   BARRIERS PRIMARY LEARNER - - NONE NONE   CO-LEARNER CAREGIVER - - No -   PRIMARY LANGUAGE ENGLISH ENGLISH ENGLISH ENGLISH   LEARNER PREFERENCE PRIMARY LISTENING DEMONSTRATION DEMONSTRATION LISTENING   ANSWERED BY patient Patient patient patient   RELATIONSHIP SELF SELF SELF SELF       Depression Screening:  :     PHQ over the last two weeks 2017   Little interest or pleasure in doing things Not at all   Feeling down, depressed or hopeless Not at all   Total Score PHQ 2 0         Abuse Screening:  :     Abuse Screening Questionnaire 8/15/2016 2014 2014   Do you ever feel afraid of your partner? N N N   Are you in a relationship with someone who physically or mentally threatens you? N N N   Is it safe for you to go home?  Solomon Montes De Oca       Coordination of Care Questionnaire:  :     1) Have you been to an emergency room, urgent care clinic since your last visit? Yes [facility]   Hospitalized since your last visit? no             2) Have you seen or consulted any other health care providers outside of [address] [place] [address] since your last visit? no  (Include any pap smears or colon screenings in this section.)    3) Do you have an Advance Directive on file? no  Are you interested in receiving information about Advance Directives? no    Patient is accompanied by self I have received verbal consent from [address] [place] [address] to discuss any/all medical information while they are present in the room. Reviewed record in preparation for visit and have obtained necessary documentation. Medication reconciliation up to date and corrected with patient at this time.

## 2017-07-13 ENCOUNTER — TELEPHONE (OUTPATIENT)
Dept: ENDOCRINOLOGY | Age: 37
End: 2017-07-13

## 2017-07-13 NOTE — TELEPHONE ENCOUNTER
Pt called asking for sample for Apidra; she stated her insurance will not let her refill it until August 20th. She stated her insurance told her they only cover 1 box a month of Apidra.

## 2017-07-14 NOTE — TELEPHONE ENCOUNTER
Spoke with patient and pharmacy. Patient stated she is out of Apidra and can not get it filled until August 20th. Called pharmacy and they stated patient had picked up Apidra on 07/13/17 at 1204pm. Called patient and verified this with her. She stated she told me the wrong medication and it is Tanzeum that she can not get filled. Pharmacy stated it was too soon to fill this medication and it can be picked up on July 16, 2017. Patient informed and verbalized understanding.

## 2017-07-20 ENCOUNTER — TELEPHONE (OUTPATIENT)
Dept: CARDIOLOGY CLINIC | Age: 37
End: 2017-07-20

## 2017-07-20 RX ORDER — CYCLOBENZAPRINE HCL 10 MG
TABLET ORAL 3 TIMES DAILY
OUTPATIENT
Start: 2017-07-20

## 2017-07-20 NOTE — TELEPHONE ENCOUNTER
Patient said shes having issues with her heartbeat and shortness of breath when she moves around.  She would like to speak with you

## 2017-07-27 ENCOUNTER — TELEPHONE (OUTPATIENT)
Dept: ENDOCRINOLOGY | Age: 37
End: 2017-07-27

## 2017-07-27 ENCOUNTER — OFFICE VISIT (OUTPATIENT)
Dept: CARDIOLOGY CLINIC | Age: 37
End: 2017-07-27

## 2017-07-27 VITALS
HEART RATE: 116 BPM | DIASTOLIC BLOOD PRESSURE: 82 MMHG | RESPIRATION RATE: 18 BRPM | BODY MASS INDEX: 39.05 KG/M2 | WEIGHT: 212.2 LBS | HEIGHT: 62 IN | OXYGEN SATURATION: 100 % | SYSTOLIC BLOOD PRESSURE: 134 MMHG

## 2017-07-27 DIAGNOSIS — I10 ESSENTIAL HYPERTENSION: Primary | ICD-10-CM

## 2017-07-27 DIAGNOSIS — R60.9 EDEMA, UNSPECIFIED TYPE: ICD-10-CM

## 2017-07-27 DIAGNOSIS — R00.0 SINUS TACHYCARDIA: ICD-10-CM

## 2017-07-27 DIAGNOSIS — R06.00 DYSPNEA, UNSPECIFIED TYPE: ICD-10-CM

## 2017-07-27 RX ORDER — CARVEDILOL 3.12 MG/1
3.12 TABLET ORAL 2 TIMES DAILY WITH MEALS
Qty: 60 TAB | Refills: 3 | Status: SHIPPED | OUTPATIENT
Start: 2017-07-27 | End: 2018-02-27 | Stop reason: SDUPTHER

## 2017-07-27 RX ORDER — CYCLOBENZAPRINE HCL 10 MG
TABLET ORAL 3 TIMES DAILY
OUTPATIENT
Start: 2017-07-27

## 2017-07-27 NOTE — TELEPHONE ENCOUNTER
Patient would like a refill on Slexrial.  Patient says her pcp has been filling the prescripton but pcp wants Dr. Carbone Favors to handle prescription.

## 2017-07-27 NOTE — TELEPHONE ENCOUNTER
Called and unable to leave message - no VM     I can not prescribe  Flexeril    It is risky to take the flexiril long term for pain after she takes insulin injection - she can massage the area after she takes insulin and the pain is usually temporary .

## 2017-07-27 NOTE — MR AVS SNAPSHOT
Visit Information Date & Time Provider Department Dept. Phone Encounter #  
 7/27/2017  1:20 PM Dalia Vallecillo MD [facility]uf[facility] [phone] [id number] Your Appointments 8/15/2017  1:15 PM  
ROUTINE CARE with Rojelio Hylton MD  
Care [facility]in[facility] [address] [place] [address]) Appt Note: r/s fu; RS  
 [address] [place] [address]  
[phone]  
  
   
 [address] [place] [address]  
  
    
 9/5/2017 10:40 AM  
HOSPITAL DISCHARGE with Dalia Vallecillo MD  
[facility] ([address] [place] [address] Road) Appt Note: dx sob [address] [facility] [address]  
[phone]  
  
   
 [address] [facility] [address]  
  
    
 10/30/2017  9:20 AM  
ESTABLISHED PATIENT with Dlaia Vallecillo MD  
[facility] ([address] [place] [address] Road) Appt Note: 6 mo fup  
 [address] [facility] [address]  
54 [facility] [address] [address] Upcoming Health Maintenance Date Due Pneumococcal 19-64 Medium Risk (1 of 1 - PPSV23) 1/18/1999 EYE EXAM RETINAL OR DILATED Q1 5/19/2014 HEMOGLOBIN A1C Q6M 2/28/2017 MICROALBUMIN Q1 4/13/2017 FOOT EXAM Q1 6/24/2017 INFLUENZA AGE 9 TO ADULT 8/1/2017 LIPID PANEL Q1 10/4/2017 PAP AKA CERVICAL CYTOLOGY 10/1/2018 DTaP/Tdap/Td series (2 - Td) 12/8/2025 Allergies as of 7/27/2017  Review Complete On: 7/27/2017 By: Madeleine Ellison LPN Severity Noted Reaction Type Reactions Aspirin  03/05/2010    Itching Contrast Agent [Iodine]  05/01/2015    Hives Dilaudid [Hydromorphone]  05/01/2015    Hives Metformin  07/03/2013    Nausea Only Reglan [Metoclopramide]  03/05/2010    Other (comments) Impaired speech Ritalin [Methylphenidate]  05/01/2015    Other (comments) Speech impairment Current Immunizations  Reviewed on 12/8/2015 Name Date Influenza Vaccine (Quad) PF 10/4/2016, 11/23/2015  9:10 AM  
 Tdap 12/8/2015  9:54 AM  
  
 Not reviewed this visit You Were Diagnosed With   
  
 Codes Comments Essential hypertension    -  Primary ICD-10-CM: I10 
ICD-9-CM: 401.9 Sinus tachycardia     ICD-10-CM: R00.0 ICD-9-CM: 427.89 Dyspnea, unspecified type     ICD-10-CM: R06.00 
ICD-9-CM: 786.09 Vitals BP Pulse Resp Height(growth percentile) Weight(growth percentile) LMP  
 134/82 (BP 1 Location: Left arm, BP Patient Position: Sitting) (!) 116 18 5' 2\" (1.575 m) 212 lb 3.2 oz (96.3 kg) 07/01/2017 SpO2 BMI OB Status Smoking Status 100% 38.81 kg/m2 Having regular periods Never Smoker Vitals History BMI and BSA Data Body Mass Index Body Surface Area  
 38.81 kg/m 2 2.05 m 2 Preferred Pharmacy Pharmacy Name [facility] [address] [place] [address] [phone] Your Updated Medication List  
  
   
This list is accurate as of: 7/27/17  1:58 PM.  Always use your most recent med list.  
  
  
  
  
 albiglutide 50 mg/0.5 mL injector pen Commonly known as:  TANZEUM  
1 Syringe by SubCUTAneous route every seven (7) days. * albuterol 2.5 mg /3 mL (0.083 %) nebulizer solution Commonly known as:  PROVENTIL VENTOLIN  
3 mL by Nebulization route every four (4) hours as needed for Wheezing. * albuterol 90 mcg/actuation inhaler Commonly known as:  VENTOLIN HFA Take 2 Puffs by inhalation every four (4) hours as needed for Wheezing. Indications: Acute Asthma Attack ARNUITY ELLIPTA 200 mcg/actuation Dsdv inhaler Generic drug:  fluticasone furoate Take 1 Puff by inhalation daily. Blood-Glucose Meter monitoring kit Patient is request the [facility] Brand meter. Patient test QID  
  
 carvedilol 3.125 mg tablet Commonly known as:  Erik Fonseca  
 Take 1 Tab by mouth two (2) times daily (with meals). gabapentin 300 mg capsule Commonly known as:  NEURONTIN Take 1 Cap by mouth three (3) times daily. glucose blood VI test strips strip Commonly known as:  blood glucose test  
Patient request the [facility] [employer] brand of meter and strips and to test QID. insulin glargine 100 unit/mL (3 mL) Inpn Commonly known asBchely Livingston Inject 70 units at bedtime  
  
 insulin glulisine 100 unit/mL pen Commonly known as:  Michael Legato INJECT 20 UNITS SUBCUTANEOUSLY BEFORE BREAKFAST, LUNCH, AND DINNER Insulin Needles (Disposable) 30 gauge x 1/3\" Sliding scale Lancets Misc Bid for diabetes 250.00 Lot # [id number] Exp 7/2018 Man Brian Nordisk  
  
 losartan 100 mg tablet Commonly known as:  COZAAR Take 100 mg by mouth daily. montelukast 10 mg tablet Commonly known as:  SINGULAIR  
TAKE ONE TABLET BY MOUTH ONCE DAILY Nebulizer & Compressor machine 1 Each by Does Not Apply route two (2) times daily as needed. * Notice: This list has 2 medication(s) that are the same as other medications prescribed for you. Read the directions carefully, and ask your doctor or other care provider to review them with you. Prescriptions Printed Refills  
 carvedilol (COREG) 3.125 mg tablet 3 Sig: Take 1 Tab by mouth two (2) times daily (with meals). Class: Print Route: Oral  
  
We Performed the Following AMB POC EKG ROUTINE W/ 12 LEADS, INTER & REP [51512 CPT(R)] Introducing [facility]! Ella Parisi introduces [employer] patient portal. Now you can access parts of your medical record, email your doctor's office, and request medication refills online. 1. In your internet browser, go to [email or web address] 2. Click on the First Time User? Click Here link in the Sign In box. You will see the New Member Sign Up page. 3. Enter your [employer] Access Code exactly as it appears below. You will not need to use this code after youve completed the sign-up process. If you do not sign up before the expiration date, you must request a new code. · [employer] Access Code: [email or web address] Expires: 10/25/2017  1:58 PM 
 
4. Enter the last four digits of your Social Security Number (xxxx) and Date of Birth (mm/dd/yyyy) as indicated and click Submit. You will be taken to the next sign-up page. 5. Create a [employer] ID. This will be your [employer] login ID and cannot be changed, so think of one that is secure and easy to remember. 6. Create a [employer] password. You can change your password at any time. 7. Enter your Password Reset Question and Answer. This can be used at a later time if you forget your password. 8. Enter your e-mail address. You will receive e-mail notification when new information is available in [address]. 9. Click Sign Up. You can now view and download portions of your medical record. 10. Click the Download Summary menu link to download a portable copy of your medical information. If you have questions, please visit the Frequently Asked Questions section of the [employer] website. Remember, [employer] is NOT to be used for urgent needs. For medical emergencies, dial 911. Now available from your iPhone and Android! Please provide this summary of care documentation to your next provider. Your primary care clinician is listed as Sherry Pino. If you have any questions after today's visit, please call [phone].

## 2017-07-27 NOTE — PROGRESS NOTES
Matt Kahn MD    [address] [facility] [place], [address] [facility]    Office [phone],GZQ [phone]  Pager [phone]    Michael Alaniz is a 40 y.o. female is here for f/u visit. Primary care physician:  Jigar Coffman MD    Patient Active Problem List   Diagnosis Code    Asthma J45.909    Diabetes mellitus ([facility] E11.9    Avascular necrosis of bones of both hips ([facility] M87.051, M87.052    Lumbar degenerative disc disease M51.36    LGSIL (low grade squamous intraepithelial dysplasia) [id number]    UTI (lower urinary tract infection) N39.0    Non compliance with medical treatment Z91.19    Obesity E66.9    Type II diabetes mellitus, uncontrolled ([facility] E11.65    BMI 35.0-35.9,adult Z68.35    Essential hypertension I10    Noncompliance of patient with dietary regimen Z91.11    Sinusitis J32.9    Ankle edema M25.473    Strep throat J02.0    Muscle spasms of neck M62.838    Recurrent streptococcal tonsillitis J03.01    Shortness of breath R06.02    Dysuria R30.0    Need for pneumococcal vaccination Z23    Hypoalbuminemia E88.09    Acute bacterial conjunctivitis H10.30    Facial bruising S00.83XA    Encounter for long-term (current) use of insulin ([facility]) Z79.4    Muscle spasm of back M62.830    Elevated serum creatinine R79.89    Bronchitis J40    URI (upper respiratory infection) J06.9             Chief complaint:  Chief Complaint   Patient presents with    Hypertension       Assessment:  Edema-probably secondary to venous insufficiency  Palpitations/Tachycardia -sinus tachycardia on Holter monitor  DM - uncontrolled  HTN  Hx of Asthma      Plan:   Patient has stopped losartan. We will start Coreg 3.25 mg twice daily. Patient states that her renal function is normal.  Diuretics did not help with the swelling and cause her sodium to decrease. The swelling decreases on elevation. She probably has venous insufficiency.   Advised to wear compression stockings regularly. Pt is allergic to ASA  Follow-up in 4-6 weeks or earlier as needed    Patient understands the plan. All questions were answered to the patient's satisfaction. Medication Side Effects and Warnings were discussed with patient: yes  Patient Labs were reviewed and or requested:  yes  Patient Past Records were reviewed and or requested: yes    I appreciate the opportunity to be involved in [employer]. See note below for details. Please do not hesitate to contact us with questions or concerns. Mike Gray MD    Cardiac Testing/ Procedures: A. Cardiac Cath/PCI:    B.ECHO/MARK: 5/16/2016-EF 36-77%, grade 1 diastolic dysfunction    C. StressNuclear/Stress ECHO/Stress test: May 12, 2016-7 minutes, normal exercise nuclear study. EF 70%    D. Vascular:    E. EP: 4/22/2016-Holter sinus tachycardia 99-1 63 bpm, no PACs, 6 PVCs. Diary entries of dyspnea, chest pain, dizziness correlates with sinus tachycardia. F. Miscellaneous:    Subjective:  José Buchanan is a 40 y.o. female who returns for follow up visit. Patient states that she continues to have swelling the lower extremities   Swelling reduces on keeping her feet elevated. She was started on diuretic previously but it did not help and her sodium went low-her nephrologist of the medication. No chest pain. Mild dyspnea on exertion. Patient also complains of palpitations. She went to [facility] recently. They ruled her out for a lung clot and discharge her. She feels that her palpitations are coming from taking losartan. She was previously on lisinopril and it was switched to losartan. She states that she has been checked for clots in her lower extremities at [facility] previously and she had no clots.     ROS:  (bold if positive, if negative)    mild SOB/HERNANDEZ         Medications before admission:    Current Outpatient Prescriptions   Medication Sig Dispense    albuterol (VENTOLIN HFA) 90 mcg/actuation inhaler Take 2 Puffs by inhalation every four (4) hours as needed for Wheezing. Indications: Acute Asthma Attack 1 Inhaler    ARNUITY ELLIPTA 200 mcg/actuation dsdv inhaler Take 1 Puff by inhalation daily. 1 Inhaler    gabapentin (NEURONTIN) 300 mg capsule Take 1 Cap by mouth three (3) times daily. 30 Cap    insulin glargine (BASAGLAR KWIKPEN) 100 unit/mL (3 mL) pen Inject 70 units at bedtime 30 mL    insulin glulisine (APIDRA SOLOSTAR) 100 unit/mL pen INJECT 20 UNITS SUBCUTANEOUSLY BEFORE BREAKFAST, LUNCH, AND DINNER 30 mL    losartan (COZAAR) 100 mg tablet Take 100 mg by mouth daily.  montelukast (SINGULAIR) 10 mg tablet TAKE ONE TABLET BY MOUTH ONCE DAILY 90 Tab    albiglutide (TANZEUM) 50 mg/0.5 mL pnij 1 Syringe by SubCUTAneous route every seven (7) days. 4 mL    albuterol (PROVENTIL VENTOLIN) 2.5 mg /3 mL (0.083 %) nebulizer solution 3 mL by Nebulization route every four (4) hours as needed for Wheezing. 1 Package    Nebulizer & Compressor machine 1 Each by Does Not Apply route two (2) times daily as needed. 1 Each    Blood-Glucose Meter monitoring kit Patient is request the [employer] meter. Patient test QID 1 Kit    glucose blood VI test strips (BLOOD GLUCOSE TEST) strip Patient request the [facility] brand of meter and strips and to test QID. 100 Strip    Insulin Needles, Disposable, 30 x 1/3 \" Sliding scale 4 Package    Lancets misc Bid for diabetes 250.00  Lot # [id number]  Exp 7/2018  Man Brian Nordisk 400 Package     No current facility-administered medications for this visit.         Physical Exam:  Visit Vitals    /82 (BP 1 Location: Left arm, BP Patient Position: Sitting)    Pulse (!) 116    Resp 18    Ht 5' 2\" (1.575 m)    Wt 212 lb 3.2 oz (96.3 kg)    LMP 07/01/2017    SpO2 100%    BMI 38.81 kg/m2          Gen: Well-developed, well-nourished, in no acute distress, walks with a cane  Neck: Supple,No JVD, No Carotid Bruit,   Resp: No accessory muscle use, Clear breath sounds, No rales or rhonchi  Card: Tachy,Reg Rythm,Normal S1, S2, No murmurs, rubs or gallop. No thrills.    Abd:  Soft, non-tender, non-distended,BS+,   MSK: No cyanosis  Skin: No rashes    Neuro: moving all four extremities , follows commands appropriately  Psych:  Good insight, oriented to person, place , alert, Nml Affect  LE: trace edema    EKG:       LABS:        Lab Results   Component Value Date/Time    WBC 6.5 07/07/2017 10:45 AM    HGB 12.8 07/07/2017 10:45 AM    HCT 37.4 07/07/2017 10:45 AM    PLATELET 516 98/78/6543 10:45 AM    MCV 91.9 07/07/2017 10:45 AM     Lab Results   Component Value Date/Time    Sodium 138 07/07/2017 10:45 AM    Potassium 3.8 07/07/2017 10:45 AM    Chloride 102 07/07/2017 10:45 AM    CO2 23 07/07/2017 10:45 AM    Anion gap 13 07/07/2017 10:45 AM    Glucose 162 07/07/2017 10:45 AM    BUN 8 07/07/2017 10:45 AM    Creatinine 1.23 07/07/2017 10:45 AM    BUN/Creatinine ratio 7 07/07/2017 10:45 AM    GFR est AA 60 07/07/2017 10:45 AM    GFR est non-AA 49 07/07/2017 10:45 AM    Calcium 9.2 07/07/2017 10:45 AM       No results found for: APTT  No results found for: INR, PTMR, PTP, PT1, PT2  No components found for: LAST LIPIDS        Lucyann Patient, MDdddd

## 2017-07-27 NOTE — TELEPHONE ENCOUNTER
I discussed with patient in office visit that I would not be continuously refilling this. She was needing this for \"muscle spasms in stomach after administering her insulin\". I informed patient to discuss this with her endocrinologist.  Thanks!

## 2017-07-28 NOTE — TELEPHONE ENCOUNTER
----- Message from Gretchen Champagne sent at 7/27/2017  6:27 PM EDT -----  Regarding: Dr. Prudence Pandya  Pt returned the call from the practice. Best contact number [phone].

## 2017-07-28 NOTE — TELEPHONE ENCOUNTER
Informed pt of Dr. Eduardo Buchanan note. Pt stated \"the pain is not temporary. That's what no one understands. But thank you for calling back\" and hung up the phone.

## 2017-08-01 ENCOUNTER — TELEPHONE (OUTPATIENT)
Dept: CARDIOLOGY CLINIC | Age: 37
End: 2017-08-01

## 2017-08-01 ENCOUNTER — DOCUMENTATION ONLY (OUTPATIENT)
Dept: CARDIOLOGY CLINIC | Age: 37
End: 2017-08-01

## 2017-08-01 NOTE — TELEPHONE ENCOUNTER
Pt is stating that Carvedilol ,which was recently prescribed to her, is not working and making her heart beat really fast.  Pt did not take medication today. Please give her a call back at [phone].     Thank you,  Brooke Reagan

## 2017-08-01 NOTE — PROGRESS NOTES
Pt called regarding palpitations/dyspnea  Has had neg stress test/nml EF on ECHO/Sinus tach on holter  Advised to continue Coreg for now  Advised to see PCP regarding her dyspnea - may need CTA chest/pulm w/u  If pulm w/u negative - will consider cardiac cath  D/w Ms Kaplan by Irwin Martel.  MD, [facility]

## 2017-08-08 ENCOUNTER — OFFICE VISIT (OUTPATIENT)
Dept: CARDIOLOGY CLINIC | Age: 37
End: 2017-08-08

## 2017-08-08 VITALS
HEIGHT: 62 IN | OXYGEN SATURATION: 99 % | HEART RATE: 92 BPM | BODY MASS INDEX: 40.08 KG/M2 | WEIGHT: 217.8 LBS | DIASTOLIC BLOOD PRESSURE: 82 MMHG | RESPIRATION RATE: 20 BRPM | SYSTOLIC BLOOD PRESSURE: 138 MMHG

## 2017-08-08 DIAGNOSIS — E66.09 OBESITY DUE TO EXCESS CALORIES, UNSPECIFIED OBESITY SEVERITY: ICD-10-CM

## 2017-08-08 DIAGNOSIS — M25.473 ANKLE EDEMA: ICD-10-CM

## 2017-08-08 DIAGNOSIS — I10 ESSENTIAL HYPERTENSION: ICD-10-CM

## 2017-08-08 DIAGNOSIS — R06.02 SHORTNESS OF BREATH: Primary | ICD-10-CM

## 2017-08-08 NOTE — MR AVS SNAPSHOT
Visit Information Date & Time Provider Department Dept. Phone Encounter #  
 8/8/2017  9:20 AM Dinora Alcazar MD [facility] Yonathan Howard [phone] [id number] Your Appointments 8/15/2017  1:15 PM  
ROUTINE CARE with Irwin Dawn MD  
[facility] [address] [place] [address]) Appt Note: r/s fu; RS  
 [address] [facility] [address]  
[phone]  
  
   
 [address] [place] [address]  
  
    
 10/30/2017  9:20 AM  
ESTABLISHED PATIENT with Dinora Alcazar MD  
[facility] ([address]) Appt Note: 6 mo fup  
 [address] [facility] [place] [address] [address] [facility]  
[address] [facility] [place] [address] [address] Upcoming Health Maintenance Date Due Pneumococcal 19-64 Medium Risk (1 of 1 - PPSV23) 1/18/1999 EYE EXAM RETINAL OR DILATED Q1 5/19/2014 HEMOGLOBIN A1C Q6M 2/28/2017 MICROALBUMIN Q1 4/13/2017 FOOT EXAM Q1 6/24/2017 INFLUENZA AGE 9 TO ADULT 8/1/2017 LIPID PANEL Q1 10/4/2017 PAP AKA CERVICAL CYTOLOGY 10/1/2018 DTaP/Tdap/Td series (2 - Td) 12/8/2025 Allergies as of 8/8/2017  Review Complete On: 8/8/2017 By: Portillo Germain Severity Noted Reaction Type Reactions Aspirin  03/05/2010    Itching Contrast Agent [Iodine]  05/01/2015    Hives Dilaudid [Hydromorphone]  05/01/2015    Hives Metformin  07/03/2013    Nausea Only Reglan [Metoclopramide]  03/05/2010    Other (comments) Impaired speech Ritalin [Methylphenidate]  05/01/2015    Other (comments) Speech impairment Current Immunizations  Reviewed on 12/8/2015 Name Date Influenza Vaccine (Quad) PF 10/4/2016, 11/23/2015  9:10 AM  
 Tdap 12/8/2015  9:54 AM  
  
 Not reviewed this visit Vitals  BP Pulse Resp Height(growth percentile) Weight(growth percentile) LMP  
 138/82 (BP 1 Location: Left arm) 92 20 5' 2\" (1.575 m) 217 lb 12.8 oz (98.8 kg) 07/01/2017 SpO2 BMI OB Status Smoking Status 99% 39.84 kg/m2 Having regular periods Never Smoker Vitals History BMI and BSA Data Body Mass Index Body Surface Area  
 39.84 kg/m 2 2.08 m 2 Preferred Pharmacy Pharmacy Name Phone [facility] [place] [phone] Your Updated Medication List  
  
   
This list is accurate as of: 8/8/17 10:30 AM.  Always use your most recent med list.  
  
  
  
  
 albiglutide 50 mg/0.5 mL injector pen Commonly known as:  TANZEUM  
1 Syringe by SubCUTAneous route every seven (7) days. * albuterol 2.5 mg /3 mL (0.083 %) nebulizer solution Commonly known as:  PROVENTIL VENTOLIN  
3 mL by Nebulization route every four (4) hours as needed for Wheezing. * albuterol 90 mcg/actuation inhaler Commonly known as:  VENTOLIN HFA Take 2 Puffs by inhalation every four (4) hours as needed for Wheezing. Indications: Acute Asthma Attack ARNUITY ELLIPTA 200 mcg/actuation Dsdv inhaler Generic drug:  fluticasone furoate Take 1 Puff by inhalation daily. Blood-Glucose Meter monitoring kit Patient is request the [facility] Brand meter. Patient test QID  
  
 carvedilol 3.125 mg tablet Commonly known as:  Tigist Sandee Take 1 Tab by mouth two (2) times daily (with meals). gabapentin 300 mg capsule Commonly known as:  NEURONTIN Take 1 Cap by mouth three (3) times daily. glucose blood VI test strips strip Commonly known as:  blood glucose test  
Patient request the [facility] brand of meter and strips and to test QID. insulin glargine 100 unit/mL (3 mL) Inpn Commonly known asLettie Sages Inject 70 units at bedtime  
  
 insulin glulisine 100 unit/mL pen Commonly known as:  Hilde[place]arde Puff INJECT 20 UNITS SUBCUTANEOUSLY BEFORE BREAKFAST, LUNCH, AND DINNER  
  
 Insulin Needles (Disposable) 30 gauge x 1/3\" Sliding scale Lancets Misc Bid for diabetes 250.00 Lot # [id number] Exp 7/2018 Man Brian Nordisk  
  
 losartan 100 mg tablet Commonly known as:  COZAAR Take 100 mg by mouth daily. montelukast 10 mg tablet Commonly known as:  SINGULAIR  
TAKE ONE TABLET BY MOUTH ONCE DAILY Nebulizer & Compressor machine 1 Each by Does Not Apply route two (2) times daily as needed. * Notice: This list has 2 medication(s) that are the same as other medications prescribed for you. Read the directions carefully, and ask your doctor or other care provider to review them with you. Introducing [facility]! Baljinder Hughes introduces Muut patient portal. Now you can access parts of your medical record, email your doctor's office, and request medication refills online. 1. In your internet browser, go to [email or web address] 2. Click on the First Time User? Click Here link in the Sign In box. You will see the New Member Sign Up page. 3. Enter your Muut Access Code exactly as it appears below. You will not need to use this code after youve completed the sign-up process. If you do not sign up before the expiration date, you must request a new code. · Muut Access Code: [email or web address] Expires: 10/25/2017  1:58 PM 
 
4. Enter the last four digits of your Social Security Number (xxxx) and Date of Birth (mm/dd/yyyy) as indicated and click Submit. You will be taken to the next sign-up page. 5. Create a Lyxiat ID. This will be your Muut login ID and cannot be changed, so think of one that is secure and easy to remember. 6. Create a Muut password. You can change your password at any time. 7. Enter your Password Reset Question and Answer. This can be used at a later time if you forget your password. 8. Enter your e-mail address. You will receive e-mail notification when new information is available in [address] 9. Click Sign Up. You can now view and download portions of your medical record. 10. Click the Download Summary menu link to download a portable copy of your medical information. If you have questions, please visit the Frequently Asked Questions section of the Heyday website. Remember, Heyday is NOT to be used for urgent needs. For medical emergencies, dial 911. Now available from your iPhone and Android! Please provide this summary of care documentation to your next provider. Your primary care clinician is listed as Marysol Medina. If you have any questions after today's visit, please call [phone].

## 2017-08-09 NOTE — PROGRESS NOTES
Shayna Chan MD    [address] [facility] [place], [address] [facility]    Office [phone],GNU [phone]  Pager [phone]    Robin Moncada is a 40 y.o. female is here for f/u visit. Primary care physician:  Ida Johnson MD    Patient Active Problem List   Diagnosis Code    Asthma J45.909    Diabetes mellitus ([facility] E11.9    Avascular necrosis of bones of both hips ([facility] M87.051, M87.052    Lumbar degenerative disc disease M51.36    LGSIL (low grade squamous intraepithelial dysplasia) [id number]    UTI (lower urinary tract infection) N39.0    Non compliance with medical treatment Z91.19    Obesity E66.9    Type II diabetes mellitus, uncontrolled ([facility] E11.65    BMI 35.0-35.9,adult Z68.35    Essential hypertension I10    Noncompliance of patient with dietary regimen Z91.11    Sinusitis J32.9    Ankle edema M25.473    Strep throat J02.0    Muscle spasms of neck M62.838    Recurrent streptococcal tonsillitis J03.01    Shortness of breath R06.02    Dysuria R30.0    Need for pneumococcal vaccination Z23    Hypoalbuminemia E88.09    Acute bacterial conjunctivitis H10.30    Facial bruising S00.83XA    Encounter for long-term (current) use of insulin (HCC) Z79.4    Muscle spasm of back M62.830    Elevated serum creatinine R79.89    Bronchitis J40    URI (upper respiratory infection) J06.9             Chief complaint:  Chief Complaint   Patient presents with   [facility] Follow Up       Assessment:  Dyspnea  Edema-probably secondary to venous insufficiency  Palpitations/Tachycardia -sinus tachycardia on Holter monitor  DM - uncontrolled  HTN  Hx of Asthma      Plan:     Patient's oxygen saturation did not drop with a walk test in the clinic  Review of Briseyda Wynn as well as records did not indicate that the patient has had a recent CTA chest.  Will proceed with CTA chest with contrast.  Advised to follow with pulmonary.   Diuretics did not help with the swelling and cause her sodium to decrease. The swelling decreases on elevation. She probably has venous insufficiency. Advised to wear compression stockings regularly. Pt is allergic to ASA  Follow-up in 4-6 weeks or earlier as needed    Patient understands the plan. All questions were answered to the patient's satisfaction. Medication Side Effects and Warnings were discussed with patient: yes  Patient Labs were reviewed and or requested:  yes  Patient Past Records were reviewed and or requested: yes    I appreciate the opportunity to be involved in [employer]. See note below for details. Please do not hesitate to contact us with questions or concerns. Marsha Moy MD    Cardiac Testing/ Procedures: A. Cardiac Cath/PCI:    B.ECHO/MARK: 5/16/2016-EF 67-41%, grade 1 diastolic dysfunction    C. StressNuclear/Stress ECHO/Stress test: May 12, 2016-7 minutes, normal exercise nuclear study. EF 70%    D. Vascular:    E. EP: 4/22/2016-Holter sinus tachycardia 99-1 63 bpm, no PACs, 6 PVCs. Diary entries of dyspnea, chest pain, dizziness correlates with sinus tachycardia. F. Miscellaneous:    Subjective:  Raul Cook is a 40 y.o. female who returns for follow up visit. Patient states that she continues to have dyspnea and swelling the lower extremities   Swelling reduces on keeping her feet elevated. She was started on diuretic previously but it did not help and her sodium went low-her nephrologist of the medication. No chest pain. Patient also complains of palpitations. She went to [facility] recently. She states that she has been checked for clots in her lower extremities at [facility] previously and she had no clots.     ROS:  (bold if positive, if negative)    mild SOB/HERNANDEZ         Medications before admission:    Current Outpatient Prescriptions   Medication Sig Dispense    carvedilol (COREG) 3.125 mg tablet Take 1 Tab by mouth two (2) times daily (with meals). 60 Tab    albuterol (VENTOLIN HFA) 90 mcg/actuation inhaler Take 2 Puffs by inhalation every four (4) hours as needed for Wheezing. Indications: Acute Asthma Attack 1 Inhaler    ARNUITY ELLIPTA 200 mcg/actuation dsdv inhaler Take 1 Puff by inhalation daily. 1 Inhaler    gabapentin (NEURONTIN) 300 mg capsule Take 1 Cap by mouth three (3) times daily. 30 Cap    insulin glargine (BASAGLAR KWIKPEN) 100 unit/mL (3 mL) pen Inject 70 units at bedtime 30 mL    insulin glulisine (APIDRA SOLOSTAR) 100 unit/mL pen INJECT 20 UNITS SUBCUTANEOUSLY BEFORE BREAKFAST, LUNCH, AND DINNER 30 mL    losartan (COZAAR) 100 mg tablet Take 100 mg by mouth daily.  montelukast (SINGULAIR) 10 mg tablet TAKE ONE TABLET BY MOUTH ONCE DAILY 90 Tab    albiglutide (TANZEUM) 50 mg/0.5 mL pnij 1 Syringe by SubCUTAneous route every seven (7) days. 4 mL    albuterol (PROVENTIL VENTOLIN) 2.5 mg /3 mL (0.083 %) nebulizer solution 3 mL by Nebulization route every four (4) hours as needed for Wheezing. 1 Package    Nebulizer & Compressor machine 1 Each by Does Not Apply route two (2) times daily as needed. 1 Each    Blood-Glucose Meter monitoring kit Patient is request the [employer] meter. Patient test QID 1 Kit    glucose blood VI test strips (BLOOD GLUCOSE TEST) strip Patient request the [facility] [employer] brand of meter and strips and to test QID. 100 Strip    Insulin Needles, Disposable, 30 x 1/3 \" Sliding scale 4 Package    Lancets misc Bid for diabetes 250.00  Lot # [id number]  Exp 7/2018  Man Brian Nordisk 400 Package     No current facility-administered medications for this visit.         Physical Exam:  Visit Vitals    /82 (BP 1 Location: Left arm)    Pulse 92    Resp 20    Ht 5' 2\" (1.575 m)    Wt 217 lb 12.8 oz (98.8 kg)    LMP 07/01/2017    SpO2 99%    BMI 39.84 kg/m2          Gen: Well-developed, well-nourished, in no acute distress, walks with a cane  Neck: Supple,No JVD, No Carotid Bruit,   Resp: No accessory muscle use, Clear breath sounds, No rales or rhonchi  Card: Tachy,Reg Rythm,Normal S1, S2, No murmurs, rubs or gallop. No thrills.    Abd:  Soft, non-tender, non-distended,BS+,   MSK: No cyanosis  Skin: No rashes    Neuro: moving all four extremities , follows commands appropriately  Psych:  Good insight, oriented to person, place , alert, Nml Affect  LE: trace edema    EKG:       LABS:        Lab Results   Component Value Date/Time    WBC 6.5 07/07/2017 10:45 AM    HGB 12.8 07/07/2017 10:45 AM    HCT 37.4 07/07/2017 10:45 AM    PLATELET 950 90/47/4026 10:45 AM    MCV 91.9 07/07/2017 10:45 AM     Lab Results   Component Value Date/Time    Sodium 138 07/07/2017 10:45 AM    Potassium 3.8 07/07/2017 10:45 AM    Chloride 102 07/07/2017 10:45 AM    CO2 23 07/07/2017 10:45 AM    Anion gap 13 07/07/2017 10:45 AM    Glucose 162 07/07/2017 10:45 AM    BUN 8 07/07/2017 10:45 AM    Creatinine 1.23 07/07/2017 10:45 AM    BUN/Creatinine ratio 7 07/07/2017 10:45 AM    GFR est AA 60 07/07/2017 10:45 AM    GFR est non-AA 49 07/07/2017 10:45 AM    Calcium 9.2 07/07/2017 10:45 AM       No results found for: APTT  No results found for: INR, PTMR, PTP, PT1, PT2  No components found for: LAST LIPIDS        Nam Peters, MDdddlaura

## 2017-08-10 DIAGNOSIS — E11.65 TYPE 2 DIABETES MELLITUS WITH HYPERGLYCEMIA, WITH LONG-TERM CURRENT USE OF INSULIN (HCC): ICD-10-CM

## 2017-08-10 DIAGNOSIS — Z79.4 TYPE 2 DIABETES MELLITUS WITH HYPERGLYCEMIA, WITH LONG-TERM CURRENT USE OF INSULIN (HCC): ICD-10-CM

## 2017-08-10 RX ORDER — INSULIN GLARGINE 100 [IU]/ML
INJECTION, SOLUTION SUBCUTANEOUS
Qty: 30 ML | Refills: 3 | Status: SHIPPED | OUTPATIENT
Start: 2017-08-10 | End: 2017-08-15 | Stop reason: SDUPTHER

## 2017-08-12 ENCOUNTER — HOSPITAL ENCOUNTER (EMERGENCY)
Age: 37
Discharge: HOME | End: 2017-08-12
Attending: EMERGENCY MEDICINE
Payer: MEDICAID

## 2017-08-12 ENCOUNTER — APPOINTMENT (OUTPATIENT)
Dept: CT IMAGING | Age: 37
End: 2017-08-12
Attending: EMERGENCY MEDICINE
Payer: MEDICAID

## 2017-08-12 VITALS
DIASTOLIC BLOOD PRESSURE: 70 MMHG | BODY MASS INDEX: 39.93 KG/M2 | WEIGHT: 217 LBS | SYSTOLIC BLOOD PRESSURE: 122 MMHG | HEART RATE: 93 BPM | OXYGEN SATURATION: 100 % | RESPIRATION RATE: 16 BRPM | HEIGHT: 62 IN | TEMPERATURE: 98.4 F

## 2017-08-12 DIAGNOSIS — K52.9 GASTROENTERITIS: Primary | ICD-10-CM

## 2017-08-12 LAB
ALBUMIN SERPL BCP-MCNC: 3 G/DL (ref 3.5–5)
ALBUMIN/GLOB SERPL: 0.7 {RATIO} (ref 1.1–2.2)
ALP SERPL-CCNC: 55 U/L (ref 45–117)
ALT SERPL-CCNC: 106 U/L (ref 12–78)
ANION GAP BLD CALC-SCNC: 8 MMOL/L (ref 5–15)
APPEARANCE UR: CLEAR
AST SERPL W P-5'-P-CCNC: 61 U/L (ref 15–37)
BACTERIA URNS QL MICRO: ABNORMAL /HPF
BASOPHILS # BLD AUTO: 0 K/UL (ref 0–0.1)
BASOPHILS # BLD: 1 % (ref 0–1)
BILIRUB SERPL-MCNC: 0.2 MG/DL (ref 0.2–1)
BILIRUB UR QL: NEGATIVE
BUN SERPL-MCNC: 7 MG/DL (ref 6–20)
BUN/CREAT SERPL: 6 (ref 12–20)
CALCIUM SERPL-MCNC: 8.6 MG/DL (ref 8.5–10.1)
CHLORIDE SERPL-SCNC: 105 MMOL/L (ref 97–108)
CO2 SERPL-SCNC: 26 MMOL/L (ref 21–32)
COLOR UR: ABNORMAL
CREAT SERPL-MCNC: 1.15 MG/DL (ref 0.55–1.02)
DIFFERENTIAL METHOD BLD: ABNORMAL
EOSINOPHIL # BLD: 0.1 K/UL (ref 0–0.4)
EOSINOPHIL NFR BLD: 2 % (ref 0–7)
EPITH CASTS URNS QL MICRO: ABNORMAL /LPF
ERYTHROCYTE [DISTWIDTH] IN BLOOD BY AUTOMATED COUNT: 13.3 % (ref 11.5–14.5)
GLOBULIN SER CALC-MCNC: 4.4 G/DL (ref 2–4)
GLUCOSE SERPL-MCNC: 120 MG/DL (ref 65–100)
GLUCOSE UR STRIP.AUTO-MCNC: NEGATIVE MG/DL
HCG UR QL: NEGATIVE
HCT VFR BLD AUTO: 34.9 % (ref 35–47)
HGB BLD-MCNC: 11.9 G/DL (ref 11.5–16)
HGB UR QL STRIP: ABNORMAL
HYALINE CASTS URNS QL MICRO: ABNORMAL /LPF (ref 0–5)
KETONES UR QL STRIP.AUTO: NEGATIVE MG/DL
LEUKOCYTE ESTERASE UR QL STRIP.AUTO: NEGATIVE
LYMPHOCYTES # BLD AUTO: 47 % (ref 12–49)
LYMPHOCYTES # BLD: 3.2 K/UL (ref 0.8–3.5)
MCH RBC QN AUTO: 31.9 PG (ref 26–34)
MCHC RBC AUTO-ENTMCNC: 34.1 G/DL (ref 30–36.5)
MCV RBC AUTO: 93.6 FL (ref 80–99)
MONOCYTES # BLD: 0.9 K/UL (ref 0–1)
MONOCYTES NFR BLD AUTO: 14 % (ref 5–13)
MUCOUS THREADS URNS QL MICRO: ABNORMAL /LPF
NEUTS SEG # BLD: 2.4 K/UL (ref 1.8–8)
NEUTS SEG NFR BLD AUTO: 36 % (ref 32–75)
NITRITE UR QL STRIP.AUTO: NEGATIVE
PH UR STRIP: 6 [PH] (ref 5–8)
PLATELET # BLD AUTO: 331 K/UL (ref 150–400)
POTASSIUM SERPL-SCNC: 3.3 MMOL/L (ref 3.5–5.1)
PROT SERPL-MCNC: 7.4 G/DL (ref 6.4–8.2)
PROT UR STRIP-MCNC: 100 MG/DL
RBC # BLD AUTO: 3.73 M/UL (ref 3.8–5.2)
RBC #/AREA URNS HPF: ABNORMAL /HPF (ref 0–5)
SODIUM SERPL-SCNC: 139 MMOL/L (ref 136–145)
SP GR UR REFRACTOMETRY: 1.02 (ref 1–1.03)
UROBILINOGEN UR QL STRIP.AUTO: 0.2 EU/DL (ref 0.2–1)
WBC # BLD AUTO: 6.8 K/UL (ref 3.6–11)
WBC URNS QL MICRO: ABNORMAL /HPF (ref 0–4)

## 2017-08-12 PROCEDURE — 96361 HYDRATE IV INFUSION ADD-ON: CPT

## 2017-08-12 PROCEDURE — 74176 CT ABD & PELVIS W/O CONTRAST: CPT

## 2017-08-12 PROCEDURE — 85025 COMPLETE CBC W/AUTO DIFF WBC: CPT | Performed by: EMERGENCY MEDICINE

## 2017-08-12 PROCEDURE — 80053 COMPREHEN METABOLIC PANEL: CPT | Performed by: EMERGENCY MEDICINE

## 2017-08-12 PROCEDURE — 81001 URINALYSIS AUTO W/SCOPE: CPT | Performed by: EMERGENCY MEDICINE

## 2017-08-12 PROCEDURE — 96375 TX/PRO/DX INJ NEW DRUG ADDON: CPT

## 2017-08-12 PROCEDURE — 36415 COLL VENOUS BLD VENIPUNCTURE: CPT | Performed by: EMERGENCY MEDICINE

## 2017-08-12 PROCEDURE — 96374 THER/PROPH/DIAG INJ IV PUSH: CPT

## 2017-08-12 PROCEDURE — 81025 URINE PREGNANCY TEST: CPT

## 2017-08-12 PROCEDURE — 74011250636 HC RX REV CODE- 250/636: Performed by: EMERGENCY MEDICINE

## 2017-08-12 PROCEDURE — 99284 EMERGENCY DEPT VISIT MOD MDM: CPT

## 2017-08-12 RX ORDER — ONDANSETRON 2 MG/ML
4 INJECTION INTRAMUSCULAR; INTRAVENOUS
Status: COMPLETED | OUTPATIENT
Start: 2017-08-12 | End: 2017-08-12

## 2017-08-12 RX ORDER — MORPHINE SULFATE 4 MG/ML
4 INJECTION, SOLUTION INTRAMUSCULAR; INTRAVENOUS
Status: COMPLETED | OUTPATIENT
Start: 2017-08-12 | End: 2017-08-12

## 2017-08-12 RX ORDER — DICYCLOMINE HYDROCHLORIDE 20 MG/1
20 TABLET ORAL
Qty: 12 TAB | Refills: 0 | Status: SHIPPED | OUTPATIENT
Start: 2017-08-12 | End: 2017-10-30 | Stop reason: ALTCHOICE

## 2017-08-12 RX ORDER — ONDANSETRON 4 MG/1
4 TABLET, ORALLY DISINTEGRATING ORAL
Qty: 10 TAB | Refills: 0 | Status: SHIPPED | OUTPATIENT
Start: 2017-08-12 | End: 2017-10-16

## 2017-08-12 RX ADMIN — SODIUM CHLORIDE 1000 ML: 900 INJECTION, SOLUTION INTRAVENOUS at 17:23

## 2017-08-12 RX ADMIN — ONDANSETRON 4 MG: 2 INJECTION INTRAMUSCULAR; INTRAVENOUS at 17:26

## 2017-08-12 RX ADMIN — Medication 4 MG: at 17:27

## 2017-08-12 NOTE — ED NOTES
Pt given discharge instructions by DR Luis Nelson she verbalizes an understanding, pt stable at time of discharge ambulates to lobby with spouse

## 2017-08-12 NOTE — DISCHARGE INSTRUCTIONS
We hope that we have addressed all of your medical concerns. The examination and treatment you received in the Emergency Department were for an emergent problem and were not intended as complete care. It is important that you follow up with your healthcare provider(s) for ongoing care. If your symptoms worsen or do not improve as expected, and you are unable to reach your usual health care provider(s), you should return to the Emergency Department. Today's healthcare is undergoing tremendous change, and patient satisfaction surveys are one of the many tools to assess the quality of medical care. You may receive a survey from the [employer] regarding your experience in the Emergency Department. I hope that your experience has been completely positive, particularly the medical care that I provided. As such, please participate in the survey; anything less than excellent does not meet my expectations or intentions. [facility] and [address] [place] [address] participate in nationally recognized quality of care measures. If your blood pressure is greater than 120/80, as reported below, we urge that you seek medical care to address the potential of high blood pressure, commonly known as hypertension. Hypertension can be hereditary or can be caused by certain medical conditions, pain, stress, or \"white coat syndrome. \"       Please make an appointment with your health care provider(s) for follow up of your Emergency Department visit. VITALS:   Patient Vitals for the past 8 hrs:   Temp Pulse Resp BP SpO2   08/12/17 1654 98.4 °F (36.9 °C) 100 18 (!) 179/102 99 %          Thank you for allowing us to provide you with medical care today. We realize that you have many choices for your emergency care needs. Please choose us in the future for any continued health care needs.       Amanuel Kelly MD    [facility], [facility]. Office: [phone]            Recent Results (from the past 24 hour(s))   CBC WITH AUTOMATED DIFF    Collection Time: 08/12/17  5:19 PM   Result Value Ref Range    WBC 6.8 3.6 - 11.0 K/uL    RBC 3.73 (L) 3.80 - 5.20 M/uL    HGB 11.9 11.5 - 16.0 g/dL    HCT 34.9 (L) 35.0 - 47.0 %    MCV 93.6 80.0 - 99.0 FL    MCH 31.9 26.0 - 34.0 PG    MCHC 34.1 30.0 - 36.5 g/dL    RDW 13.3 11.5 - 14.5 %    PLATELET 485 900 - 795 K/uL    NEUTROPHILS 36 32 - 75 %    LYMPHOCYTES 47 12 - 49 %    MONOCYTES 14 (H) 5 - 13 %    EOSINOPHILS 2 0 - 7 %    BASOPHILS 1 0 - 1 %    ABS. NEUTROPHILS 2.4 1.8 - 8.0 K/UL    ABS. LYMPHOCYTES 3.2 0.8 - 3.5 K/UL    ABS. MONOCYTES 0.9 0.0 - 1.0 K/UL    ABS. EOSINOPHILS 0.1 0.0 - 0.4 K/UL    ABS. BASOPHILS 0.0 0.0 - 0.1 K/UL    DF AUTOMATED     METABOLIC PANEL, COMPREHENSIVE    Collection Time: 08/12/17  5:19 PM   Result Value Ref Range    Sodium 139 136 - 145 mmol/L    Potassium 3.3 (L) 3.5 - 5.1 mmol/L    Chloride 105 97 - 108 mmol/L    CO2 26 21 - 32 mmol/L    Anion gap 8 5 - 15 mmol/L    Glucose 120 (H) 65 - 100 mg/dL    BUN 7 6 - 20 MG/DL    Creatinine 1.15 (H) 0.55 - 1.02 MG/DL    BUN/Creatinine ratio 6 (L) 12 - 20      GFR est AA >60 >60 ml/min/1.73m2    GFR est non-AA 53 (L) >60 ml/min/1.73m2    Calcium 8.6 8.5 - 10.1 MG/DL    Bilirubin, total 0.2 0.2 - 1.0 MG/DL    ALT (SGPT) 106 (H) 12 - 78 U/L    AST (SGOT) 61 (H) 15 - 37 U/L    Alk.  phosphatase 55 45 - 117 U/L    Protein, total 7.4 6.4 - 8.2 g/dL    Albumin 3.0 (L) 3.5 - 5.0 g/dL    Globulin 4.4 (H) 2.0 - 4.0 g/dL    A-G Ratio 0.7 (L) 1.1 - 2.2     URINALYSIS W/MICROSCOPIC    Collection Time: 08/12/17  5:19 PM   Result Value Ref Range    Color YELLOW/STRAW      Appearance CLEAR CLEAR      Specific gravity 1.020 1.003 - 1.030      pH (UA) 6.0 5.0 - 8.0      Protein 100 (A) NEG mg/dL    Glucose NEGATIVE  NEG mg/dL    Ketone NEGATIVE  NEG mg/dL    Bilirubin NEGATIVE  NEG      Blood TRACE (A) NEG      Urobilinogen 0.2 0.2 - 1.0 EU/dL Nitrites NEGATIVE  NEG      Leukocyte Esterase NEGATIVE  NEG      WBC 0-4 0 - 4 /hpf    RBC 0-5 0 - 5 /hpf    Epithelial cells MANY (A) FEW /lpf    Bacteria 1+ (A) NEG /hpf    Mucus 4+ (A) NEG /lpf    Hyaline cast 2-5 0 - 5 /lpf   HCG URINE, QL. - POC    Collection Time: 08/12/17  5:21 PM   Result Value Ref Range    Pregnancy test,urine (POC) NEGATIVE  NEG         Ct Abd Pelv Wo Cont    Result Date: 8/12/2017  INDICATION: Right lower quadrant abdominal pain since Thursday. Diarrhea. Exam: Noncontrast CT of the abdomen and pelvis is performed with 5 mm collimation. Sagittal and coronal reformatted images were also performed. CT dose reduction was achieved through the use of a standardized protocol tailored for this examination and automatic exposure control for dose modulation. Direct comparison is made to prior CT dated May 2015. FINDINGS: The visualized lung bases are clear. Abdomen: Liver: The liver is normal on noncontrast images. Spleen: The spleen is normal on noncontrast images. Adrenals: The adrenals are normal on noncontrast images. Pancreas: The pancreas is normal on noncontrast images. Gallbladder: The gallbladder is normal on noncontrast images. Kidneys: There is no perinephric stranding, hydronephrosis or hydroureter. No renal, ureteral bladder calculus is visualized. Bowel: No thickened or dilated loop of large or small bowel seen. Appendix: The appendix is normal. Pelvis: Urinary bladder is partially filled and grossly normal. Miscellaneous: There is no free intraperitoneal gas or fluid. There is no focal fluid collection to suggest abscess. There is a left adnexal tubal ligation clip. Right tubal ligation clip is no longer located in the region of the right adnexa and is now located posterior to the cervix. IMPRESSION: Normal appendix. Gastroenteritis: Care Instructions  Your Care Instructions  Gastroenteritis is an illness that may cause nausea, vomiting, and diarrhea.  It is sometimes called \"stomach flu. \" It can be caused by bacteria or a virus. You will probably begin to feel better in 1 to 2 days. In the meantime, get plenty of rest and make sure you do not become dehydrated. Dehydration occurs when your body loses too much fluid. Follow-up care is a key part of your treatment and safety. Be sure to make and go to all appointments, and call your doctor if you are having problems. Its also a good idea to know your test results and keep a list of the medicines you take. How can you care for yourself at home? · If your doctor prescribed antibiotics, take them as directed. Do not stop taking them just because you feel better. You need to take the full course of antibiotics. · Drink plenty of fluids to prevent dehydration, enough so that your urine is light yellow or clear like water. Choose water and other caffeine-free clear liquids until you feel better. If you have kidney, heart, or liver disease and have to limit fluids, talk with your doctor before you increase your fluid intake. · Drink fluids slowly, in frequent, small amounts, because drinking too much too fast can cause vomiting. · Begin eating mild foods, such as dry toast, yogurt, applesauce, bananas, and rice. Avoid spicy, hot, or high-fat foods, and do not drink alcohol or caffeine for a day or two. Do not drink milk or eat ice cream until you are feeling better. How to prevent gastroenteritis  · Keep hot foods hot and cold foods cold. · Do not eat meats, dressings, salads, or other foods that have been kept at room temperature for more than 2 hours. · Use a thermometer to check your refrigerator. It should be between 34°F and 40°F.  · Defrost meats in the refrigerator or microwave, not on the kitchen counter. · Keep your hands and your kitchen clean. Wash your hands, cutting boards, and countertops with hot soapy water frequently. · Cook meat until it is well done.   · Do not eat raw eggs or uncooked sauces made with raw eggs.  · Do not take chances. If food looks or tastes spoiled, throw it out. When should you call for help? Call 911 anytime you think you may need emergency care. For example, call if:  · You vomit blood or what looks like coffee grounds. · You passed out (lost consciousness). · You pass maroon or very bloody stools. Call your doctor now or seek immediate medical care if:  · You have severe belly pain. · You have signs of needing more fluids. You have sunken eyes, a dry mouth, and pass only a little dark urine. · You feel like you are going to faint. · You have increased belly pain that does not go away in 1 to 2 days. · You have new or increased nausea, or you are vomiting. · You have a new or higher fever. · Your stools are black and tarlike or have streaks of blood. Watch closely for changes in your health, and be sure to contact your doctor if:  · You are dizzy or lightheaded. · You urinate less than usual, or your urine is dark yellow or brown. · You do not feel better with each day that goes by. Where can you learn more? Go to http://aziza-niyah.info/. Enter N142 in the search box to learn more about \"Gastroenteritis: Care Instructions. \"  Current as of: March 3, 2017  Content Version: 11.3  © 9541-4875 [employer]. Care instructions adapted under license by [employer] (which disclaims liability or warranty for this information). If you have questions about a medical condition or this instruction, always ask your healthcare professional. [place] [address] any warranty or liability for your use of this information.

## 2017-08-12 NOTE — LETTER
NOTIFICATION RETURN TO WORK / SCHOOL 
 
8/12/2017 4:53 PM 
 
Ms. [address] [place] [id number] To Whom It May Concern: 
 
[address] is currently under the care of [facility]. She will return to work/school on:8/13/17 If there are questions or concerns please have the patient contact our office. Sincerely, Guido Mohan MD

## 2017-08-12 NOTE — ED PROVIDER NOTES
HPI Comments: 39 yo AAF with DM, htn presents with c/o rlq pain since 8/10 along with nausea and diarrhea. Pt reports each time she eats or drinks she has liquid stool. Has gone multiple times today and last urinated a small amount pta. Has been eating toast and drinking cranberry juice and water. Last took her meds yesterday evening. Denies urinary sx, vomiting or fever. Denies recent abx use, travel or sick contacts    Surgical hx c section x 2  lmp mid july    Patient is a 40 y.o. female presenting with abdominal pain and diarrhea. The history is provided by the patient. Abdominal Pain    Associated symptoms include diarrhea. Pertinent negatives include no fever, no dysuria and no chest pain. Diarrhea    Associated symptoms include diarrhea. Pertinent negatives include no fever, no dysuria and no chest pain.         Past Medical History:   Diagnosis Date    Abnormal pap 2012 10/5/13     hx w/Colpo     Arthritis     Asthma     Diabetes ([facility] [address]     Headache     Hypertension     Joint pain     Pap smear for cervical cancer screening 10/1/15 ASCUS HPV NEG RP 3 YEARS    Ringing in ears        Past Surgical History:   Procedure Laterality Date    HX  SECTION  01 & 08    HX COLPOSCOPY  2012 neg    HX HEENT      cataract R eye    HX TUBAL LIGATION           Family History:   Problem Relation Age of Onset    Diabetes Mother     Heart Disease Mother     Hypertension Mother     Stroke Mother     Hypertension Father     Stroke Father     Heart Disease Father     Breast Cancer Neg Hx        Social History     Social History    Marital status:      Spouse name: N/A    Number of children: 2    Years of education: N/A     Occupational History    --      Social History Main Topics    Smoking status: Never Smoker    Smokeless tobacco: Never Used    Alcohol use No    Drug use: No    Sexual activity: Yes     Partners: Male     Birth control/ protection: Surgical     Other Topics Concern     Service No    Blood Transfusions No    Caffeine Concern No    Occupational Exposure No    Hobby Hazards No    Sleep Concern Yes    Stress Concern Yes    Weight Concern Yes    Special Diet Yes     DM    Back Care Yes    Exercise Yes    Bike Helmet No    Seat Belt Yes    Self-Exams Yes     Social History Narrative         ALLERGIES: Aspirin; Contrast agent [iodine]; Dilaudid [hydromorphone]; Metformin; Reglan [metoclopramide]; and Ritalin [methylphenidate]    Review of Systems   Constitutional: Negative for fever. Respiratory: Negative for shortness of breath. Cardiovascular: Negative for chest pain. Gastrointestinal: Positive for abdominal pain and diarrhea. Genitourinary: Negative for dysuria. All other systems reviewed and are negative. Vitals:    08/12/17 1654   BP: (!) 179/102   Pulse: 100   Resp: 18   Temp: 98.4 °F (36.9 °C)   SpO2: 99%   Weight: 98.4 kg (217 lb)   Height: 5' 2\" (1.575 m)            Physical Exam   Constitutional: She is oriented to person, place, and time. She appears well-developed and well-nourished. No distress. HENT:   Head: Normocephalic and atraumatic. Eyes: Conjunctivae are normal.   Neck: Normal range of motion. Cardiovascular: Normal rate, regular rhythm, normal heart sounds and intact distal pulses. Exam reveals no friction rub. No murmur heard. Pulmonary/Chest: Effort normal and breath sounds normal. No respiratory distress. She has no wheezes. She has no rales. Abdominal: Soft. Bowel sounds are normal. She exhibits no distension. There is tenderness. There is guarding. There is no rebound. rlq   Musculoskeletal: Normal range of motion. Neurological: She is alert and oriented to person, place, and time. Coordination normal.   Skin: Skin is warm and dry. She is not diaphoretic. No pallor. Psychiatric: She has a normal mood and affect.  Her behavior is normal.   Nursing note and vitals reviewed. MDM  Number of Diagnoses or Management Options  Gastroenteritis:   Diagnosis management comments: Appendicitis vs gastroenteritis       Amount and/or Complexity of Data Reviewed  Clinical lab tests: ordered and reviewed  Tests in the radiology section of CPT®: ordered and reviewed    Patient Progress  Patient progress: stable    ED Course       Procedures    5:08 PM  Spoke with dr Otis Reeder radiology about need to eval for appendicitis and contrast dye allergy rec ct with no contrast       6:22 PM  Spoke with pt. White count nl and no fever. Nl appendix on ct. Likely gastroenteritis,. Bentyl for cramping and nausea meds. Follow up with pcp for recheck this week. lfts are baseline.  Return if worsening sx

## 2017-08-14 ENCOUNTER — HOSPITAL ENCOUNTER (OUTPATIENT)
Dept: CT IMAGING | Age: 37
Discharge: HOME | End: 2017-08-14
Attending: INTERNAL MEDICINE
Payer: MEDICAID

## 2017-08-14 DIAGNOSIS — R06.02 SHORTNESS OF BREATH: ICD-10-CM

## 2017-08-14 PROCEDURE — 71275 CT ANGIOGRAPHY CHEST: CPT

## 2017-08-14 PROCEDURE — 74011636320 HC RX REV CODE- 636/320: Performed by: INTERNAL MEDICINE

## 2017-08-14 RX ADMIN — IOPAMIDOL 100 ML: 755 INJECTION, SOLUTION INTRAVENOUS at 09:59

## 2017-08-15 ENCOUNTER — OFFICE VISIT (OUTPATIENT)
Dept: ENDOCRINOLOGY | Age: 37
End: 2017-08-15

## 2017-08-15 VITALS
RESPIRATION RATE: 16 BRPM | BODY MASS INDEX: 39.8 KG/M2 | DIASTOLIC BLOOD PRESSURE: 98 MMHG | OXYGEN SATURATION: 99 % | TEMPERATURE: 97.5 F | HEART RATE: 102 BPM | SYSTOLIC BLOOD PRESSURE: 140 MMHG | HEIGHT: 62 IN | WEIGHT: 216.3 LBS

## 2017-08-15 DIAGNOSIS — Z91.199 NON COMPLIANCE WITH MEDICAL TREATMENT: ICD-10-CM

## 2017-08-15 DIAGNOSIS — I10 ESSENTIAL HYPERTENSION: ICD-10-CM

## 2017-08-15 DIAGNOSIS — E11.65 TYPE 2 DIABETES MELLITUS WITH HYPERGLYCEMIA, WITH LONG-TERM CURRENT USE OF INSULIN (HCC): Primary | ICD-10-CM

## 2017-08-15 DIAGNOSIS — Z79.4 TYPE 2 DIABETES MELLITUS WITH HYPERGLYCEMIA, WITH LONG-TERM CURRENT USE OF INSULIN (HCC): Primary | ICD-10-CM

## 2017-08-15 LAB
GLUCOSE POC: 163 MG/DL
HBA1C MFR BLD HPLC: 10.5 %

## 2017-08-15 RX ORDER — INSULIN GLARGINE 100 [IU]/ML
INJECTION, SOLUTION SUBCUTANEOUS
Qty: 30 ML | Refills: 5 | Status: SHIPPED | OUTPATIENT
Start: 2017-08-15 | End: 2017-10-23 | Stop reason: ALTCHOICE

## 2017-08-15 NOTE — PROGRESS NOTES
Leticia Flores MD         Patient Information  Date:8/15/2017  Name : Nano Betancourt 40 y.o.     YOB: 1980         Referred by: Daksha Fregoso MD [facility]        Chief Complaint   Patient presents with    Diabetes       History of Present Illness: Nano Betancourt is a 40 y.o. female here for follow-up of  Type 2 Diabetes Mellitus. She has longstanding history of uncontrolled diabetes. She is still noncompliant. She did not bring the meter or log book. When asked she says her blood glucose is always good but A1c is high. While discussing, the patient was texting on the phone. Reports generalised muscle cramps when she takes insulin and has stopped taking Basaglar. She thinks Lantus was working better. She is requesting muscle relaxants and prescription pain medications which I discussed is not the long term solution. She needs insulin long term and cannot be on pain medications. Tylenol, cold packs, warm packs, Gabapentin has not helped. She has not taken insulin for one week. Very poor compliance with the diet, insulin, as well as the follow ups. She does not check the blood glucose as it always runs high, depending on the symptoms she decides when it is high and low even though it was explained to her multiple times that she cannot base it on the symptoms and given very high blood glucose her symptoms might not be accurate. Type 2 Diabetes was diagnosed in 2002 .          Wt Readings from Last 3 Encounters:   08/15/17 216 lb 4.8 oz (98.1 kg)   08/12/17 217 lb (98.4 kg)   08/08/17 217 lb 12.8 oz (98.8 kg)       BP Readings from Last 3 Encounters:   08/15/17 (!) 140/98   08/12/17 122/70   08/08/17 138/82           Past Medical History:   Diagnosis Date    Abnormal pap 2012 10/5/13     hx w/Colpo     Arthritis     Asthma     Diabetes ([address]     Headache     Hypertension     Joint pain     Pap smear for cervical cancer screening 10/1/15 ASCUS HPV NEG RP 3 YEARS    Ringing in ears      Current Outpatient Prescriptions   Medication Sig    dicyclomine (BENTYL) 20 mg tablet Take 1 Tab by mouth every six (6) hours as needed (abdominal cramps) for up to 20 doses.  ondansetron (ZOFRAN ODT) 4 mg disintegrating tablet Take 1 Tab by mouth every eight (8) hours as needed for Nausea.  BASAGLAR KWIKPEN 100 unit/mL (3 mL) inpn INJECT 70 UNITS SUBCUTANEOUSLY AT BEDTIME    carvedilol (COREG) 3.125 mg tablet Take 1 Tab by mouth two (2) times daily (with meals).  albuterol (VENTOLIN HFA) 90 mcg/actuation inhaler Take 2 Puffs by inhalation every four (4) hours as needed for Wheezing. Indications: Acute Asthma Attack    ARNUITY ELLIPTA 200 mcg/actuation dsdv inhaler Take 1 Puff by inhalation daily.  gabapentin (NEURONTIN) 300 mg capsule Take 1 Cap by mouth three (3) times daily.  insulin glulisine (APIDRA SOLOSTAR) 100 unit/mL pen INJECT 20 UNITS SUBCUTANEOUSLY BEFORE BREAKFAST, LUNCH, AND DINNER    losartan (COZAAR) 100 mg tablet Take 100 mg by mouth daily.  montelukast (SINGULAIR) 10 mg tablet TAKE ONE TABLET BY MOUTH ONCE DAILY    albiglutide (TANZEUM) 50 mg/0.5 mL pnij 1 Syringe by SubCUTAneous route every seven (7) days.  albuterol (PROVENTIL VENTOLIN) 2.5 mg /3 mL (0.083 %) nebulizer solution 3 mL by Nebulization route every four (4) hours as needed for Wheezing.  Nebulizer & Compressor machine 1 Each by Does Not Apply route two (2) times daily as needed.  Blood-Glucose Meter monitoring kit Patient is request the [employer] meter. Patient test QID    glucose blood VI test strips (BLOOD GLUCOSE TEST) strip Patient request the [facility] [employer] brand of meter and strips and to test QID.     Insulin Needles, Disposable, 30 x 1/3 \" Sliding scale    Lancets misc Bid for diabetes 250.00  Lot # [id number]  Exp 7/2018  Man Brian Nordisk     No current facility-administered medications for this visit. Allergies   Allergen Reactions    Aspirin Itching    Contrast Agent [Iodine] Hives    Dilaudid [Hydromorphone] Hives    Metformin Nausea Only    Reglan [Metoclopramide] Other (comments)     Impaired speech    Ritalin [Methylphenidate] Other (comments)     Speech impairment         Review of Systems:  - Eyes: no blurry vision no double vision  - Cardiovascular: no chest pain ,no palpitations  - Respiratory: no cough + shortness of breath  - Gastrointestinal: no dysphagia no  abdominal pain  - Musculoskeletal: no joint pains + weakness  - Integumentary: no rashes  - Neurological: + numbness, tingling, no  headaches  -     Physical Examination:   Blood pressure (!) 140/98, pulse (!) 102, temperature 97.5 °F (36.4 °C), temperature source Oral, resp. rate 16, height 5' 2\" (1.575 m), weight 216 lb 4.8 oz (98.1 kg), last menstrual period 07/15/2017, SpO2 99 %. Estimated body mass index is 39.56 kg/(m^2) as calculated from the following:    Height as of this encounter: 5' 2\" (1.575 m). -   Weight as of this encounter: 216 lb 4.8 oz (98.1 kg). - General: pleasant, no distress, good eye contact,obese  - HEENT: no pallor, no periorbital edema, EOMI  - Neck: supple, no thyromegaly  - Cardiovascular: regular, normal rate, normal S1 and S2  - Respiratory: clear to auscultation bilaterally  - Gastrointestinal: soft, nontender, nondistended,  BS +  - Musculoskeletal: + edema  - Neurological:alert and oriented  - Psychiatric: normal mood and affect  - Skin: color, texture, turgor normal.       Data Reviewed:     [] Glucose records reviewed. [] See glucose records for details (to be scanned).   [x] A1C  [x] Reviewed labs    Lab Results   Component Value Date/Time    Hemoglobin A1c 14.6 08/31/2016 12:19 PM    Hemoglobin A1c 12.5 10/06/2015 10:42 AM    Hemoglobin A1c 13.0 12/19/2014 11:29 AM    Glucose 120 08/12/2017 05:19 PM    Glucose (POC) 157 02/10/2009 07:27 PM    Glucose  08/15/2017 01:09 PM Microalbumin/Creat ratio (mg/g creat) 13 10/01/2010 09:40 AM    Microalb/Creat ratio (ug/mg creat.) 92.2 04/13/2016 02:46 PM    Microalbumin,urine random 2.44 10/01/2010 09:40 AM    LDL,Direct 168 08/31/2016 12:19 PM    LDL, calculated 162 10/04/2016 10:43 AM    Creatinine 1.15 08/12/2017 05:19 PM      Lab Results   Component Value Date/Time    Cholesterol, total 239 10/04/2016 10:43 AM    HDL Cholesterol 48 10/04/2016 10:43 AM    LDL,Direct 168 08/31/2016 12:19 PM    LDL, calculated 162 10/04/2016 10:43 AM    Triglyceride 145 10/04/2016 10:43 AM    CHOL/HDL Ratio 3.9 10/01/2010 09:43 AM     Lab Results   Component Value Date/Time    ALT (SGPT) 106 08/12/2017 05:19 PM    AST (SGOT) 61 08/12/2017 05:19 PM    Alk. phosphatase 55 08/12/2017 05:19 PM    Bilirubin, direct <0.10 10/06/2015 10:42 AM    Bilirubin, total 0.2 08/12/2017 05:19 PM     Lab Results   Component Value Date/Time    GFR est AA >60 08/12/2017 05:19 PM    GFR est non-AA 53 08/12/2017 05:19 PM    Creatinine 1.15 08/12/2017 05:19 PM    BUN 7 08/12/2017 05:19 PM    Sodium 139 08/12/2017 05:19 PM    Potassium 3.3 08/12/2017 05:19 PM    Chloride 105 08/12/2017 05:19 PM    CO2 26 08/12/2017 05:19 PM      Lab Results   Component Value Date/Time    TSH 0.820 08/31/2016 12:19 PM    T4, Free 1.12 12/19/2014 11:29 AM      Lab Results   Component Value Date/Time    Glucose 120 08/12/2017 05:19 PM    Glucose (POC) 157 02/10/2009 07:27 PM    Glucose  08/15/2017 01:09 PM        Assessment/Plan:     1. Type 2 Diabetes Mellitus ,uncontrolled,neuropathy , severe insulin resistance,noncompliance  Uncontrolled diabetes due to noncompliance with checking blood glucose and following recommendations. Every time I discuss the long term complications including renal failure, she stops me and says \" my kidneys are normal and my eyes have been good. \"    I discussed with her possibly getting an academic input about generalized muscle aches when she takes insulin  .   May have electrolyte shifts with insulin given severe hyperglycemia which can cause cramping otherwise I donot have a reasonable explanation  I have tried to  and she does not want to really follow or listen to anything I have to say. Change to Lantus, insurance changed it to Zkatter. She refused metformin         I started her on Tanzeum ( only GLP1 which is covered by her insurance) hoping to limit the amount of insulin but I do not see any improvement so given cramping with all her injections discontinue. Without her changing the behavior it is very hard for me to control the blood glucose. Discussed with the patient. 3 HTN - cozaar     4. Obesity:Body mass index is 39.56 kg/(m^2). Thank you for allowing me to participate in the care of this patient.     Mikael Rodarte MD    > 50% of the time was spent on counseling

## 2017-08-15 NOTE — PROGRESS NOTES
Creasie Boas is a 40 y.o. female here for   Chief Complaint   Patient presents with    Diabetes       Functional glucose monitor and record keeping system? - yes  Eye exam within last year? - Beginning of 2017  Foot exam within last year? - July 2017    1. Have you been to the ER, urgent care clinic since your last visit? Hospitalized since your last visit? - [facility] in     2. Have you seen or consulted any other health care providers outside of the [address] [place] [address] since your last visit?   Include any pap smears or colon screening.- no      Lab Results   Component Value Date/Time    Hemoglobin A1c 14.6 08/31/2016 12:19 PM    Hemoglobin A1c (POC) 12.2 04/13/2016 11:45 AM       Wt Readings from Last 3 Encounters:   08/12/17 217 lb (98.4 kg)   08/08/17 217 lb 12.8 oz (98.8 kg)   07/27/17 212 lb 3.2 oz (96.3 kg)     Temp Readings from Last 3 Encounters:   08/12/17 98.4 °F (36.9 °C)   07/11/17 99.7 °F (37.6 °C) (Oral)   07/07/17 98.3 °F (36.8 °C)     BP Readings from Last 3 Encounters:   08/12/17 122/70   08/08/17 138/82   07/27/17 134/82     Pulse Readings from Last 3 Encounters:   08/12/17 93   08/08/17 92   07/27/17 (!) 116

## 2017-08-15 NOTE — MR AVS SNAPSHOT
Visit Information Date & Time Provider Department Dept. Phone Encounter #  
 8/15/2017  1:15 PM Leticia Gupta MD [facility] [phone] [id number] Follow-up Instructions Return in about 3 months (around 11/15/2017). Your Appointments 10/2/2017  9:40 AM  
ESTABLISHED PATIENT with Reshma Oliva MD  
[facility] ([address]) Appt Note: 6 mo fup; 8 wk appt 354 [facility] 600 1007 [facility]  
54 [facility] [address] [address] Upcoming Health Maintenance Date Due Pneumococcal 19-64 Medium Risk (1 of 1 - PPSV23) 1/18/1999 EYE EXAM RETINAL OR DILATED Q1 5/19/2014 HEMOGLOBIN A1C Q6M 2/28/2017 MICROALBUMIN Q1 4/13/2017 FOOT EXAM Q1 6/24/2017 INFLUENZA AGE 9 TO ADULT 8/1/2017 LIPID PANEL Q1 10/4/2017 PAP AKA CERVICAL CYTOLOGY 10/1/2018 DTaP/Tdap/Td series (2 - Td) 12/8/2025 Allergies as of 8/15/2017  Review Complete On: 8/15/2017 By: Leticia Gupta MD  
  
 Severity Noted Reaction Type Reactions Aspirin  03/05/2010    Itching Contrast Agent [Iodine]  05/01/2015    Hives Dilaudid [Hydromorphone]  05/01/2015    Hives Metformin  07/03/2013    Nausea Only Reglan [Metoclopramide]  03/05/2010    Other (comments) Impaired speech Ritalin [Methylphenidate]  05/01/2015    Other (comments) Speech impairment Current Immunizations  Reviewed on 12/8/2015 Name Date Influenza Vaccine (Quad) PF 10/4/2016, 11/23/2015  9:10 AM  
 Tdap 12/8/2015  9:54 AM  
  
 Not reviewed this visit You Were Diagnosed With   
  
 Codes Comments Type 2 diabetes mellitus with hyperglycemia, with long-term current use of insulin (HCC)    -  Primary ICD-10-CM: E11.65, Z79.4 ICD-9-CM: 250.00, 790.29, V58.67 Vitals BP Pulse Temp Resp Height(growth percentile) Weight(growth percentile) (!) 140/98 (BP 1 Location: Right arm, BP Patient Position: Sitting) (!) 102 97.5 °F (36.4 °C) (Oral) 16 5' 2\" (1.575 m) 216 lb 4.8 oz (98.1 kg) LMP SpO2 BMI OB Status Smoking Status 07/15/2017 99% 39.56 kg/m2 Having regular periods Never Smoker BMI and BSA Data Body Mass Index Body Surface Area  
 39.56 kg/m 2 2.07 m 2 Preferred Pharmacy Pharmacy Name Phone [facility] [address] [place] [address] [place] [phone] Your Updated Medication List  
  
   
This list is accurate as of: 8/15/17  1:35 PM.  Always use your most recent med list.  
  
  
  
  
 albiglutide 50 mg/0.5 mL injector pen Commonly known as:  TANZEUM  
1 Syringe by SubCUTAneous route every seven (7) days. * albuterol 2.5 mg /3 mL (0.083 %) nebulizer solution Commonly known as:  PROVENTIL VENTOLIN  
3 mL by Nebulization route every four (4) hours as needed for Wheezing. * albuterol 90 mcg/actuation inhaler Commonly known as:  VENTOLIN HFA Take 2 Puffs by inhalation every four (4) hours as needed for Wheezing. Indications: Acute Asthma Attack ARNUITY ELLIPTA 200 mcg/actuation Dsdv inhaler Generic drug:  fluticasone furoate Take 1 Puff by inhalation daily. BASAGLAR KWIKPEN 100 unit/mL (3 mL) Inpn Generic drug:  insulin glargine INJECT 70 UNITS SUBCUTANEOUSLY AT BEDTIME Blood-Glucose Meter monitoring kit Patient is request the [facility] Brand meter. Patient test QID  
  
 carvedilol 3.125 mg tablet Commonly known as:  Hayden Sears Take 1 Tab by mouth two (2) times daily (with meals). dicyclomine 20 mg tablet Commonly known as:  BENTYL Take 1 Tab by mouth every six (6) hours as needed (abdominal cramps) for up to 20 doses. gabapentin 300 mg capsule Commonly known as:  NEURONTIN Take 1 Cap by mouth three (3) times daily. glucose blood VI test strips strip Commonly known as:  blood glucose test  
 Patient request the [facility] brand of meter and strips and to test QID. insulin glulisine 100 unit/mL pen Commonly known as:  Mark Weston INJECT 20 UNITS SUBCUTANEOUSLY BEFORE BREAKFAST, LUNCH, AND DINNER Insulin Needles (Disposable) 30 gauge x 1/3\" Sliding scale Lancets Misc Bid for diabetes 250.00 Lot # [id number] Exp 7/2018 Man Brian Nordisk  
  
 losartan 100 mg tablet Commonly known as:  COZAAR Take 100 mg by mouth daily. montelukast 10 mg tablet Commonly known as:  SINGULAIR  
TAKE ONE TABLET BY MOUTH ONCE DAILY Nebulizer & Compressor machine 1 Each by Does Not Apply route two (2) times daily as needed. ondansetron 4 mg disintegrating tablet Commonly known as:  ZOFRAN ODT Take 1 Tab by mouth every eight (8) hours as needed for Nausea. * Notice: This list has 2 medication(s) that are the same as other medications prescribed for you. Read the directions carefully, and ask your doctor or other care provider to review them with you. We Performed the Following AMB POC GLUCOSE, QUANTITATIVE, BLOOD [72131 CPT(R)] AMB POC HEMOGLOBIN A1C [05819 CPT(R)] Follow-up Instructions Return in about 3 months (around 11/15/2017). Introducing [facility] & HEALTH SERVICES! Iram De La Torre introduces [employer] patient portal. Now you can access parts of your medical record, email your doctor's office, and request medication refills online. 1. In your internet browser, go to [email or web address] 2. Click on the First Time User? Click Here link in the Sign In box. You will see the New Member Sign Up page. 3. Enter your [employer] Access Code exactly as it appears below. You will not need to use this code after youve completed the sign-up process. If you do not sign up before the expiration date, you must request a new code. · [employer] Access Code: [email or web address] Expires: 10/25/2017  1:58 PM 
 
 4. Enter the last four digits of your Social Security Number (xxxx) and Date of Birth (mm/dd/yyyy) as indicated and click Submit. You will be taken to the next sign-up page. 5. Create a Equals6 ID. This will be your Equals[employer] login ID and cannot be changed, so think of one that is secure and easy to remember. 6. Create a Equals6 password. You can change your password at any time. 7. Enter your Password Reset Question and Answer. This can be used at a later time if you forget your password. 8. Enter your e-mail address. You will receive e-mail notification when new information is available in [address] 9. Click Sign Up. You can now view and download portions of your medical record. 10. Click the Download Summary menu link to download a portable copy of your medical information. If you have questions, please visit the Frequently Asked Questions section of the Equ[employer] website. Remember, [employer] is NOT to be used for urgent needs. For medical emergencies, dial 911. Now available from your iPhone and Android! Please provide this summary of care documentation to your next provider. Your primary care clinician is listed as Jigar Coffman. If you have any questions after today's visit, please call [phone].

## 2017-08-16 ENCOUNTER — DOCUMENTATION ONLY (OUTPATIENT)
Dept: CARDIOLOGY CLINIC | Age: 37
End: 2017-08-16

## 2017-08-21 ENCOUNTER — TELEPHONE (OUTPATIENT)
Dept: ENDOCRINOLOGY | Age: 37
End: 2017-08-21

## 2017-08-21 NOTE — TELEPHONE ENCOUNTER
Attempted to call. Unsuccessful. Left msg for Israel Rubinstein Pope to give us a call back at the office. A callback number was left.     PUMA koch

## 2017-08-21 NOTE — TELEPHONE ENCOUNTER
Pt returned call. Informed her lantus was approved. Pt verbalized understanding with no further questions or concerns at this time.

## 2017-08-22 ENCOUNTER — OFFICE VISIT (OUTPATIENT)
Dept: FAMILY MEDICINE CLINIC | Age: 37
End: 2017-08-22

## 2017-08-22 VITALS
DIASTOLIC BLOOD PRESSURE: 95 MMHG | HEIGHT: 62 IN | HEART RATE: 99 BPM | BODY MASS INDEX: 38.64 KG/M2 | RESPIRATION RATE: 18 BRPM | OXYGEN SATURATION: 99 % | TEMPERATURE: 98.2 F | WEIGHT: 210 LBS | SYSTOLIC BLOOD PRESSURE: 145 MMHG

## 2017-08-22 DIAGNOSIS — J02.0 STREP THROAT: Primary | ICD-10-CM

## 2017-08-22 DIAGNOSIS — Z13.29 SCREENING FOR THYROID DISORDER: ICD-10-CM

## 2017-08-22 DIAGNOSIS — J02.9 SORE THROAT: ICD-10-CM

## 2017-08-22 LAB
S PYO AG THROAT QL: POSITIVE
VALID INTERNAL CONTROL?: YES

## 2017-08-22 RX ORDER — PREDNISONE 10 MG/1
TABLET ORAL
COMMUNITY
Start: 2017-07-07 | End: 2017-08-22 | Stop reason: ALTCHOICE

## 2017-08-22 RX ORDER — CYCLOBENZAPRINE HCL 10 MG
TABLET ORAL
COMMUNITY
Start: 2017-06-03 | End: 2017-10-30 | Stop reason: ALTCHOICE

## 2017-08-22 RX ORDER — ERGOCALCIFEROL 1.25 MG/1
50000 CAPSULE ORAL
COMMUNITY
Start: 2017-07-27

## 2017-08-22 RX ORDER — PROCHLORPERAZINE MALEATE 5 MG
TABLET ORAL
COMMUNITY
Start: 2017-07-07 | End: 2017-10-30 | Stop reason: ALTCHOICE

## 2017-08-22 RX ORDER — AMOXICILLIN 500 MG/1
500 CAPSULE ORAL 2 TIMES DAILY
Qty: 20 CAP | Refills: 0 | Status: SHIPPED | OUTPATIENT
Start: 2017-08-22 | End: 2017-09-01

## 2017-08-22 RX ORDER — BUTALBITAL, ACETAMINOPHEN AND CAFFEINE 50; 325; 40 MG/1; MG/1; MG/1
TABLET ORAL
COMMUNITY
Start: 2017-07-07 | End: 2017-10-30 | Stop reason: ALTCHOICE

## 2017-08-22 NOTE — PATIENT INSTRUCTIONS

## 2017-08-22 NOTE — PROGRESS NOTES
HISTORY OF PRESENT ILLNESS  Fatmata Hannah is a 40 y.o. female. HPI  Pt presents with \"headache, arm pain, and sore throat\"    Symptoms started yesterday  Headache  Sore throat  No fever  No nausea, no vomiting, no diarrhea    In addition, she is interested in getting her thyroid checked. Pt states that it has been years since it has been checked, and she is wanting to ensure that it is not possibly causing some of the cardiac symptoms that she has been experiencing and have been assessed by her cardiologist (Dr. Radha Weber), as he is unable to find anything cardiac in origin that is causing her symptoms. Review of Systems   Constitutional: Negative for fever. Gastrointestinal: Negative for abdominal pain, nausea and vomiting. Neurological: Positive for headaches. Physical Exam   Constitutional: She is oriented to person, place, and time. She appears well-developed and well-nourished. HENT:   Head: Normocephalic and atraumatic. Right Ear: Hearing, tympanic membrane, external ear and ear canal normal.   Left Ear: Hearing, tympanic membrane, external ear and ear canal normal.   Nose: Mucosal edema present. Mouth/Throat: Posterior oropharyngeal edema and posterior oropharyngeal erythema present. Neck: Normal range of motion. Neck supple. Cardiovascular: Normal rate, regular rhythm and normal heart sounds. Pulmonary/Chest: Effort normal and breath sounds normal.   Lymphadenopathy:     She has cervical adenopathy. Right cervical: Superficial cervical adenopathy present. Left cervical: Superficial cervical adenopathy present. Neurological: She is alert and oriented to person, place, and time. Skin: Skin is warm and dry. Psychiatric: She has a normal mood and affect. Her behavior is normal.       ASSESSMENT and PLAN    ICD-10-CM ICD-9-CM    1. Strep throat J02.0 034.0 amoxicillin (AMOXIL) 500 mg capsule   2.  Screening for thyroid disorder Z13.29 V77.0 THYROID CASCADE PROFILE   3. Sore throat J02.9 462 AMB POC RAPID STREP A     Informed patient that I have sent medication to the pharmacy, and she should take as prescribed. Educated about staying well hydrated, and throwing out her toothbrush within 48 hours of starting the antibiotics. Educated that we will call her when her labs return, and inform her of any change in plan of care at that time. Pt informed to return to office with worsening of symptoms, or PRN with any questions or concerns. Pt verbalizes understanding of plan of care and denies further questions or concerns at this time.

## 2017-08-22 NOTE — MR AVS SNAPSHOT
Visit Information Date & Time Provider Department Dept. Phone Encounter #  
 8/22/2017  1:00 PM Treasure Hoyt  [address] [phone] [id number] Follow-up Instructions Return if symptoms worsen or fail to improve. Your Appointments 10/2/2017  9:40 AM  
ESTABLISHED PATIENT with Keyur Conti MD  
[facility] ([address]) Appt Note: 6 mo fup; 8 wk appt 320 [facility] 600 1007 [facility]  
54 [facility] [address] [address] Upcoming Health Maintenance Date Due Pneumococcal 19-64 Medium Risk (1 of 1 - PPSV23) 1/18/1999 EYE EXAM RETINAL OR DILATED Q1 5/19/2014 MICROALBUMIN Q1 4/13/2017 FOOT EXAM Q1 6/24/2017 INFLUENZA AGE 9 TO ADULT 8/1/2017 LIPID PANEL Q1 10/4/2017 HEMOGLOBIN A1C Q6M 2/15/2018 PAP AKA CERVICAL CYTOLOGY 10/1/2018 DTaP/Tdap/Td series (2 - Td) 12/8/2025 Allergies as of 8/22/2017  Review Complete On: 8/22/2017 By: Treasure Hoyt NP Severity Noted Reaction Type Reactions Aspirin  03/05/2010    Itching Contrast Agent [Iodine]  05/01/2015    Hives Dilaudid [Hydromorphone]  05/01/2015    Hives Metformin  07/03/2013    Nausea Only Reglan [Metoclopramide]  03/05/2010    Other (comments) Impaired speech Ritalin [Methylphenidate]  05/01/2015    Other (comments) Speech impairment Current Immunizations  Reviewed on 12/8/2015 Name Date Influenza Vaccine (Quad) PF 10/4/2016, 11/23/2015  9:10 AM  
 Tdap 12/8/2015  9:54 AM  
  
 Not reviewed this visit You Were Diagnosed With   
  
 Codes Comments Strep throat    -  Primary ICD-10-CM: J02.0 ICD-9-CM: 034.0 Screening for thyroid disorder     ICD-10-CM: Z13.29 ICD-9-CM: V77.0 Sore throat     ICD-10-CM: J02.9 ICD-9-CM: 473 Vitals BP Pulse Temp Resp Height(growth percentile) Weight(growth percentile) (!) 145/95 (BP 1 Location: Left arm, BP Patient Position: Sitting) 99 98.2 °F (36.8 °C) (Oral) 18 5' 2\" (1.575 m) 210 lb (95.3 kg) LMP SpO2 BMI OB Status Smoking Status 07/15/2017 99% 38.41 kg/m2 Having regular periods Never Smoker Vitals History BMI and BSA Data Body Mass Index Body Surface Area  
 38.41 kg/m 2 2.04 m 2 Preferred Pharmacy Pharmacy Name Phone [facility] [address] [facility] [place] [phone] Your Updated Medication List  
  
   
This list is accurate as of: 8/22/17  1:13 PM.  Always use your most recent med list.  
  
  
  
  
 * albuterol 2.5 mg /3 mL (0.083 %) nebulizer solution Commonly known as:  PROVENTIL VENTOLIN  
3 mL by Nebulization route every four (4) hours as needed for Wheezing. * albuterol 90 mcg/actuation inhaler Commonly known as:  VENTOLIN HFA Take 2 Puffs by inhalation every four (4) hours as needed for Wheezing. Indications: Acute Asthma Attack  
  
 amoxicillin 500 mg capsule Commonly known as:  AMOXIL Take 1 Cap by mouth two (2) times a day for 10 days. ARNUITY ELLIPTA 200 mcg/actuation Dsdv inhaler Generic drug:  fluticasone furoate Take 1 Puff by inhalation daily. Blood-Glucose Meter monitoring kit Patient is request the [facility] Brand meter. Patient test QID  
  
 butalbital-acetaminophen-caffeine -40 mg per tablet Commonly known as:  FIORICET, ESGIC  
  
 carvedilol 3.125 mg tablet Commonly known as:  Ed Triston Take 1 Tab by mouth two (2) times daily (with meals). cyclobenzaprine 10 mg tablet Commonly known as:  FLEXERIL  
  
 dicyclomine 20 mg tablet Commonly known as:  BENTYL Take 1 Tab by mouth every six (6) hours as needed (abdominal cramps) for up to 20 doses. gabapentin 300 mg capsule Commonly known as:  NEURONTIN Take 1 Cap by mouth three (3) times daily. glucose blood VI test strips strip Commonly known as:  blood glucose test  
Patient request the Della Garcia brand of meter and strips and to test QID. insulin glargine 100 unit/mL (3 mL) Inpn Commonly known as:  Gayatri Millere INJECT 70 UNITS SUBCUTANEOUSLY AT BEDTIME (Stop Basaglar)  
  
 insulin glulisine 100 unit/mL pen Commonly known as:  Ivan Winnpattie INJECT 20 UNITS SUBCUTANEOUSLY BEFORE BREAKFAST, LUNCH, AND DINNER Insulin Needles (Disposable) 30 gauge x 1/3\" Sliding scale Lancets Misc Bid for diabetes 250.00 Lot # [id number] Exp 7/2018 Man Brian Nordisk  
  
 montelukast 10 mg tablet Commonly known as:  SINGULAIR  
TAKE ONE TABLET BY MOUTH ONCE DAILY Nebulizer & Compressor machine 1 Each by Does Not Apply route two (2) times daily as needed. ondansetron 4 mg disintegrating tablet Commonly known as:  ZOFRAN ODT Take 1 Tab by mouth every eight (8) hours as needed for Nausea. prochlorperazine 5 mg tablet Commonly known as:  COMPAZINE  
  
 TANZEUM 50 mg/0.5 mL injector pen Generic drug:  albiglutide VITAMIN D2 50,000 unit capsule Generic drug:  ergocalciferol * Notice: This list has 2 medication(s) that are the same as other medications prescribed for you. Read the directions carefully, and ask your doctor or other care provider to review them with you. Prescriptions Sent to Pharmacy Refills  
 amoxicillin (AMOXIL) 500 mg capsule 0 Sig: Take 1 Cap by mouth two (2) times a day for 10 days. Class: Normal  
 Pharmacy: [address] [place] [address] Ph #: [phone] Route: Oral  
  
We Performed the Following AMB POC RAPID STREP A [40575 CPT(R)] THYROID CASCADE PROFILE [[id number] Custom] Follow-up Instructions Return if symptoms worsen or fail to improve. Patient Instructions Strep Throat: Care Instructions Your Care Instructions Strep throat is a bacterial infection that causes sudden, severe sore throat and fever. Strep throat, which is caused by bacteria called streptococcus, is treated with antibiotics. Sometimes a strep test is necessary to tell if the sore throat is caused by strep bacteria. Treatment can help ease symptoms and may prevent future problems. Follow-up care is a key part of your treatment and safety. Be sure to make and go to all appointments, and call your doctor if you are having problems. It's also a good idea to know your test results and keep a list of the medicines you take. How can you care for yourself at home? · Take your antibiotics as directed. Do not stop taking them just because you feel better. You need to take the full course of antibiotics. · Strep throat can spread to others until 24 hours after you begin taking antibiotics. During this time, you should avoid contact with other people at work or home, especially infants and children. Do not sneeze or cough on others, and wash your hands often. Keep your drinking glass and eating utensils separate from those of others, and wash these items well in hot, soapy water. · Gargle with warm salt water at least once each hour to help reduce swelling and make your throat feel better. Use 1 teaspoon of salt mixed in 8 fluid ounces of warm water. · Take an over-the-counter pain medication, such as acetaminophen (Tylenol), ibuprofen (Advil, Motrin), or naproxen (Aleve). Read and follow all instructions on the label. · Try an over-the-counter anesthetic throat spray or throat lozenges, which may help relieve throat pain. · Drink plenty of fluids. Fluids may help soothe an irritated throat. Hot fluids, such as tea or soup, may help your throat feel better. · Eat soft solids and drink plenty of clear liquids. Flavored ice pops, ice cream, scrambled eggs, sherbet, and gelatin dessert (such as Jell-O) may also soothe the throat.  
· Get lots of rest. 
 · Do not smoke, and avoid secondhand smoke. If you need help quitting, talk to your doctor about stop-smoking programs and medicines. These can increase your chances of quitting for good. · Use a vaporizer or humidifier to add moisture to the air in your bedroom. Follow the directions for cleaning the machine. When should you call for help? Call your doctor now or seek immediate medical care if: 
· You have a new or higher fever. · You have a fever with a stiff neck or severe headache. · You have new or worse trouble swallowing. · Your sore throat gets much worse on one side. · Your pain becomes much worse on one side of your throat. Watch closely for changes in your health, and be sure to contact your doctor if: 
· You are not getting better after 2 days (48 hours). · You do not get better as expected. Where can you learn more? Go to http://aziza-niyah.info/. Enter K625 in the search box to learn more about \"Strep Throat: Care Instructions. \" Current as of: July 29, 2016 Content Version: 11.3 © 4949-9451 [employer]. Care instructions adapted under license by [employer] (which disclaims liability or warranty for this information). If you have questions about a medical condition or this instruction, always ask your healthcare professional. [address] any warranty or liability for your use of this information. Introducing [facility] & HEALTH SERVICES! Hanna Ribeiro introduces [employer] patient portal. Now you can access parts of your medical record, email your doctor's office, and request medication refills online. 1. In your internet browser, go to [email or web address] 2. Click on the First Time User? Click Here link in the Sign In box. You will see the New Member Sign Up page. 3. Enter your [employer] Access Code exactly as it appears below.  You will not need to use this code after youve completed the sign-up process. If you do not sign up before the expiration date, you must request a new code. · [employer] Access Code: [email or web address] Expires: 10/25/2017  1:58 PM 
 
4. Enter the last four digits of your Social Security Number (xxxx) and Date of Birth (mm/dd/yyyy) as indicated and click Submit. You will be taken to the next sign-up page. 5. Create a [employer] ID. This will be your [employer] login ID and cannot be changed, so think of one that is secure and easy to remember. 6. Create a [employer] password. You can change your password at any time. 7. Enter your Password Reset Question and Answer. This can be used at a later time if you forget your password. 8. Enter your e-mail address. You will receive e-mail notification when new information is available in [address] 9. Click Sign Up. You can now view and download portions of your medical record. 10. Click the Download Summary menu link to download a portable copy of your medical information. If you have questions, please visit the Frequently Asked Questions section of the [employer] website. Remember, [employer] is NOT to be used for urgent needs. For medical emergencies, dial 911. Now available from your iPhone and Android! Please provide this summary of care documentation to your next provider. Your primary care clinician is listed as Nevaeh Vyas. If you have any questions after today's visit, please call [phone].

## 2017-08-22 NOTE — PROGRESS NOTES
Chief Complaint   Patient presents with    Headache     daily headaches for last 2-3 days    Arm Pain     states she has pain in both arms and shoulders all the time    Sore Throat     started yesterday    Labs     wants thyroid checked     \"REVIEWED RECORD IN PREPARATION FOR VISIT AND HAVE OBTAINED THE NECESSARY DOCUMENTATION\"  1. Have you been to the ER, urgent care clinic since your last visit? Hospitalized since your last visit? Yes Where: [place] for abdominal pain and diarrhea    2. Have you seen or consulted any other health care providers outside of the [address] [place] [address] since your last visit? Include any pap smears or colon screening. No  Patient does not have advanced directives.

## 2017-08-23 ENCOUNTER — TELEPHONE (OUTPATIENT)
Dept: FAMILY MEDICINE CLINIC | Age: 37
End: 2017-08-23

## 2017-08-23 LAB — TSH SERPL DL<=0.005 MIU/L-ACNC: 1.44 UIU/ML (ref 0.45–4.5)

## 2017-08-23 NOTE — TELEPHONE ENCOUNTER
----- Message from Charli Wiley NP sent at 8/23/2017  9:49 AM EDT -----  Please call patient and let her know that her labs returned and are normal.  This is reassuring. Thanks!

## 2017-08-23 NOTE — PROGRESS NOTES
Please call patient and let her know that her labs returned and are normal.  This is reassuring. Thanks!

## 2017-08-31 ENCOUNTER — TELEPHONE (OUTPATIENT)
Dept: ENDOCRINOLOGY | Age: 37
End: 2017-08-31

## 2017-08-31 DIAGNOSIS — E11.65 TYPE 2 DIABETES MELLITUS WITH HYPERGLYCEMIA, WITH LONG-TERM CURRENT USE OF INSULIN (HCC): Primary | ICD-10-CM

## 2017-08-31 DIAGNOSIS — Z79.4 TYPE 2 DIABETES MELLITUS WITH HYPERGLYCEMIA, WITH LONG-TERM CURRENT USE OF INSULIN (HCC): Primary | ICD-10-CM

## 2017-08-31 RX ORDER — PEN NEEDLE, DIABETIC 30 GX3/16"
NEEDLE, DISPOSABLE MISCELLANEOUS
Qty: 200 PEN NEEDLE | Refills: 11 | Status: SHIPPED | OUTPATIENT
Start: 2017-08-31 | End: 2018-09-08

## 2017-08-31 RX ORDER — BLOOD-GLUCOSE METER
EACH MISCELLANEOUS
Qty: 1 EACH | Refills: 0 | Status: SHIPPED | OUTPATIENT
Start: 2017-08-31 | End: 2018-01-01

## 2017-08-31 NOTE — TELEPHONE ENCOUNTER
Need new prescription for Accucheck testing kit sent to [facility]. Patient says old meter stopped working. Refill on Lantus pen needles.

## 2017-09-22 ENCOUNTER — TELEPHONE (OUTPATIENT)
Dept: FAMILY MEDICINE CLINIC | Age: 37
End: 2017-09-22

## 2017-09-22 NOTE — TELEPHONE ENCOUNTER
[facility] at [address]  was calling to try to get a prior auth per the pt's insurance for the pt's Prevnar 13 vaccine due to the pt's age    She gave me a number of [phone]    If you have any questions you can call [facility] back and ask for [facility].    [phone]

## 2017-10-04 ENCOUNTER — OFFICE VISIT (OUTPATIENT)
Dept: FAMILY MEDICINE CLINIC | Age: 37
End: 2017-10-04

## 2017-10-04 VITALS
BODY MASS INDEX: 39.12 KG/M2 | DIASTOLIC BLOOD PRESSURE: 82 MMHG | SYSTOLIC BLOOD PRESSURE: 132 MMHG | HEIGHT: 62 IN | OXYGEN SATURATION: 100 % | HEART RATE: 98 BPM | RESPIRATION RATE: 18 BRPM | TEMPERATURE: 98.3 F | WEIGHT: 212.6 LBS

## 2017-10-04 DIAGNOSIS — J01.00 ACUTE NON-RECURRENT MAXILLARY SINUSITIS: Primary | ICD-10-CM

## 2017-10-04 RX ORDER — LOSARTAN POTASSIUM 100 MG/1
TABLET ORAL
COMMUNITY
Start: 2017-08-31 | End: 2017-11-10

## 2017-10-04 RX ORDER — AZITHROMYCIN 250 MG/1
TABLET, FILM COATED ORAL
Qty: 6 TAB | Refills: 0 | Status: SHIPPED | OUTPATIENT
Start: 2017-10-04 | End: 2017-10-09

## 2017-10-04 RX ORDER — SODIUM BICARBONATE 650 MG/1
TABLET ORAL
COMMUNITY
Start: 2017-09-25 | End: 2017-10-30 | Stop reason: ALTCHOICE

## 2017-10-04 NOTE — PROGRESS NOTES
HISTORY OF PRESENT ILLNESS  Cheryl Contreras is a 40 y.o. female. HPI  Pt presents with \"nasal congestion\"    Symptoms started about 3 days ago, and have been worsening since then  Nasal congestoiun  Nose is very congested  Sinus pain and pressire  Headaches due to the congestion  Chills  Dry cough    No fever  No sore throat  OTC : none  Review of Systems   Constitutional: Positive for chills. Negative for fever. HENT: Positive for congestion and sinus pain. Respiratory: Positive for cough. Negative for sputum production. Neurological: Positive for headaches. Physical Exam   Constitutional: She is oriented to person, place, and time. She appears well-developed and well-nourished. HENT:   Head: Normocephalic and atraumatic. Right Ear: Hearing, tympanic membrane, external ear and ear canal normal.   Left Ear: Hearing, tympanic membrane, external ear and ear canal normal.   Nose: Mucosal edema and rhinorrhea present. Right sinus exhibits maxillary sinus tenderness. Left sinus exhibits maxillary sinus tenderness. Mouth/Throat: Oropharynx is clear and moist.   Neck: Normal range of motion. Neck supple. Cardiovascular: Normal rate, regular rhythm and normal heart sounds. Pulmonary/Chest: Effort normal and breath sounds normal.   Lymphadenopathy:     She has no cervical adenopathy. Neurological: She is alert and oriented to person, place, and time. Skin: Skin is warm and dry. Psychiatric: She has a normal mood and affect. Her behavior is normal.       ASSESSMENT and PLAN    ICD-10-CM ICD-9-CM    1. Acute non-recurrent maxillary sinusitis J01.00 461.0 azithromycin (ZITHROMAX) 250 mg tablet     Informed patient that I have sent medication to the pharmacy, and she should take as prescribed. Educated about taking with food, to decrease the risk of stomach upset. Educated about staying well hydrated, by pushing fluids as much as possible.     Pt informed to return to office with worsening of symptoms, or PRN with any questions or concerns. Pt verbalizes understanding of plan of care and denies further questions or concerns at this time.

## 2017-10-04 NOTE — PROGRESS NOTES
Chief Complaint   Patient presents with    Nasal Congestion     started 3 days ago. hasnt taken any OTC meds. \"REVIEWED RECORD IN PREPARATION FOR VISIT AND HAVE OBTAINED THE NECESSARY DOCUMENTATION\"  1. Have you been to the ER, urgent care clinic since your last visit? Hospitalized since your last visit? No    2. Have you seen or consulted any other health care providers outside of the [address] [place] [address] since your last visit? Include any pap smears or colon screening. No  Patient does not have advanced directives.

## 2017-10-04 NOTE — MR AVS SNAPSHOT
Visit Information Date & Time Provider Department Dept. Phone Encounter #  
 10/4/2017 10:00 AM Jose Yeager [facility] [phone] [id number] Follow-up Instructions Return if symptoms worsen or fail to improve. Upcoming Health Maintenance Date Due Pneumococcal 19-64 Medium Risk (1 of 1 - PPSV23) 1/18/1999 EYE EXAM RETINAL OR DILATED Q1 5/19/2014 MICROALBUMIN Q1 4/13/2017 FOOT EXAM Q1 6/24/2017 LIPID PANEL Q1 10/4/2017 HEMOGLOBIN A1C Q6M 2/15/2018 PAP AKA CERVICAL CYTOLOGY 10/1/2018 DTaP/Tdap/Td series (2 - Td) 12/8/2025 Allergies as of 10/4/2017  Review Complete On: 10/4/2017 By: Suri Robertson NP Severity Noted Reaction Type Reactions Aspirin  03/05/2010    Itching Contrast Agent [Iodine]  05/01/2015    Hives Dilaudid [Hydromorphone]  05/01/2015    Hives Metformin  07/03/2013    Nausea Only Reglan [Metoclopramide]  03/05/2010    Other (comments) Impaired speech Ritalin [Methylphenidate]  05/01/2015    Other (comments) Speech impairment Current Immunizations  Reviewed on 12/8/2015 Name Date Influenza Vaccine (Quad) PF 10/4/2016, 11/23/2015  9:10 AM  
 Tdap 12/8/2015  9:54 AM  
  
 Not reviewed this visit You Were Diagnosed With   
  
 Codes Comments Acute non-recurrent maxillary sinusitis    -  Primary ICD-10-CM: J01.00 ICD-9-CM: 461.0 Vitals BP Pulse Temp Resp Height(growth percentile) Weight(growth percentile) (!) 150/100 (BP 1 Location: Left arm, BP Patient Position: Sitting) 98 98.3 °F (36.8 °C) (Oral) 18 5' 2\" (1.575 m) 212 lb 9.6 oz (96.4 kg) LMP SpO2 BMI OB Status Smoking Status 09/20/2017 100% 38.89 kg/m2 Having regular periods Never Smoker Vitals History BMI and BSA Data Body Mass Index Body Surface Area  
 38.89 kg/m 2 2.05 m 2 Preferred Pharmacy Pharmacy Name Phone [facility] [address] [place] [address] Jo Ann [phone] Your Updated Medication List  
  
   
This list is accurate as of: 10/4/17 10:21 AM.  Always use your most recent med list.  
  
  
  
  
 * albuterol 2.5 mg /3 mL (0.083 %) nebulizer solution Commonly known as:  PROVENTIL VENTOLIN  
3 mL by Nebulization route every four (4) hours as needed for Wheezing. * albuterol 90 mcg/actuation inhaler Commonly known as:  VENTOLIN HFA Take 2 Puffs by inhalation every four (4) hours as needed for Wheezing. Indications: Acute Asthma Attack ARNUITY ELLIPTA 200 mcg/actuation Dsdv inhaler Generic drug:  fluticasone furoate Take 1 Puff by inhalation daily. azithromycin 250 mg tablet Commonly known as:  Unicjennie Messing Take 2 tablets today, then take 1 tablet daily Blood-Glucose Meter Misc Commonly known as:  ACCU-CHEK FILEMON PLUS METER Test 4 times daily Dx Code: E11.65  
  
 butalbital-acetaminophen-caffeine -40 mg per tablet Commonly known as:  FIORICET, ESGIC  
  
 carvedilol 3.125 mg tablet Commonly known as:  Layman Blount Take 1 Tab by mouth two (2) times daily (with meals). cyclobenzaprine 10 mg tablet Commonly known as:  FLEXERIL  
  
 dicyclomine 20 mg tablet Commonly known as:  BENTYL Take 1 Tab by mouth every six (6) hours as needed (abdominal cramps) for up to 20 doses. FLUARIX QUAD 4921-2043 (PF) Syrg injection Generic drug:  influenza vaccine 2017-18 (3 yrs+)(PF)  
  
 gabapentin 300 mg capsule Commonly known as:  NEURONTIN Take 1 Cap by mouth three (3) times daily. glucose blood VI test strips strip Commonly known as:  blood glucose test  
Patient request the [facility] brand of meter and strips and to test QID. insulin glargine 100 unit/mL (3 mL) Inpn Commonly known as:  Elizabeth Shabazz INJECT 70 UNITS SUBCUTANEOUSLY AT BEDTIME (Stop Basaglar)  
  
 insulin glulisine 100 unit/mL pen Commonly known as:  Bushra Major INJECT 20 UNITS SUBCUTANEOUSLY BEFORE BREAKFAST, LUNCH, AND DINNER Insulin Needles (Disposable) 30 gauge x 1/3\" Sliding scale Insulin Needles (Disposable) 31 gauge x 5/16\" Ndle Use to inject Lantus and Apidra daily Dx Code: E11.65 Lancets Misc Bid for diabetes 250.00 Lot # [id number] Exp 7/2018 Man Brian Nordisk  
  
 losartan 100 mg tablet Commonly known as:  COZAAR  
  
 montelukast 10 mg tablet Commonly known as:  SINGULAIR  
TAKE ONE TABLET BY MOUTH ONCE DAILY Nebulizer & Compressor machine 1 Each by Does Not Apply route two (2) times daily as needed. ondansetron 4 mg disintegrating tablet Commonly known as:  ZOFRAN ODT Take 1 Tab by mouth every eight (8) hours as needed for Nausea. prochlorperazine 5 mg tablet Commonly known as:  COMPAZINE  
  
 sodium bicarbonate 650 mg tablet * TANZEUM 50 mg/0.5 mL injector pen Generic drug:  albiglutide * TANZEUM 50 mg/0.5 mL injector pen Generic drug:  albiglutide INJECT ONE SYRINGE SUBCUTANEOUSLY EVERY 7 DAYS. VITAMIN D2 50,000 unit capsule Generic drug:  ergocalciferol * Notice: This list has 4 medication(s) that are the same as other medications prescribed for you. Read the directions carefully, and ask your doctor or other care provider to review them with you. Prescriptions Sent to Pharmacy Refills  
 azithromycin (ZITHROMAX) 250 mg tablet 0 Sig: Take 2 tablets today, then take 1 tablet daily Class: Normal  
 Pharmacy: [address] #: [phone] Follow-up Instructions Return if symptoms worsen or fail to improve. Patient Instructions Sinusitis: Care Instructions Your Care Instructions Sinusitis is an infection of the lining of the sinus cavities in your head. Sinusitis often follows a cold. It causes pain and pressure in your head and face. In most cases, sinusitis gets better on its own in 1 to 2 weeks. But some mild symptoms may last for several weeks. Sometimes antibiotics are needed. Follow-up care is a key part of your treatment and safety. Be sure to make and go to all appointments, and call your doctor if you are having problems. It's also a good idea to know your test results and keep a list of the medicines you take. How can you care for yourself at home? · Take an over-the-counter pain medicine, such as acetaminophen (Tylenol), ibuprofen (Advil, Motrin), or naproxen (Aleve). Read and follow all instructions on the label. · If the doctor prescribed antibiotics, take them as directed. Do not stop taking them just because you feel better. You need to take the full course of antibiotics. · Be careful when taking over-the-counter cold or flu medicines and Tylenol at the same time. Many of these medicines have acetaminophen, which is Tylenol. Read the labels to make sure that you are not taking more than the recommended dose. Too much acetaminophen (Tylenol) can be harmful. · Breathe warm, moist air from a steamy shower, a hot bath, or a sink filled with hot water. Avoid cold, dry air. Using a humidifier in your home may help. Follow the directions for cleaning the machine. · Use saline (saltwater) nasal washes to help keep your nasal passages open and wash out mucus and bacteria. You can buy saline nose drops at a grocery store or drugstore. Or you can make your own at home by adding 1 teaspoon of salt and 1 teaspoon of baking soda to 2 cups of distilled water. If you make your own, fill a bulb syringe with the solution, insert the tip into your nostril, and squeeze gently. Arletn Parag your nose. · Put a hot, wet towel or a warm gel pack on your face 3 or 4 times a day for 5 to 10 minutes each time. · Try a decongestant nasal spray like oxymetazoline (Afrin).  Do not use it for more than 3 days in a row. Using it for more than 3 days can make your congestion worse. When should you call for help? Call your doctor now or seek immediate medical care if: 
· You have new or worse swelling or redness in your face or around your eyes. · You have a new or higher fever. Watch closely for changes in your health, and be sure to contact your doctor if: 
· You have new or worse facial pain. · The mucus from your nose becomes thicker (like pus) or has new blood in it. · You are not getting better as expected. Where can you learn more? Go to http://aziza-niyah.info/. Enter L899 in the search box to learn more about \"Sinusitis: Care Instructions. \" Current as of: July 29, 2016 Content Version: 11.3 © 2236-9412 [employer]. Care instructions adapted under license by [employer] (which disclaims liability or warranty for this information). If you have questions about a medical condition or this instruction, always ask your healthcare professional. [place] [address] any warranty or liability for your use of this information. Introducing [facility]! [facility] introduces The Mother List patient portal. Now you can access parts of your medical record, email your doctor's office, and request medication refills online. 1. In your internet browser, go to [email or web address] 2. Click on the First Time User? Click Here link in the Sign In box. You will see the New Member Sign Up page. 3. Enter your The Mother List Access Code exactly as it appears below. You will not need to use this code after youve completed the sign-up process. If you do not sign up before the expiration date, you must request a new code. · The Mother List Access Code: [email or web address] Expires: 10/25/2017  1:58 PM 
 
4.  Enter the last four digits of your Social Security Number (xxxx) and Date of Birth (mm/dd/yyyy) as indicated and click Submit. You will be taken to the next sign-up page. 5. Create a Photorank ID. This will be your Photorank login ID and cannot be changed, so think of one that is secure and easy to remember. 6. Create a Photorank password. You can change your password at any time. 7. Enter your Password Reset Question and Answer. This can be used at a later time if you forget your password. 8. Enter your e-mail address. You will receive e-mail notification when new information is available in [address] 9. Click Sign Up. You can now view and download portions of your medical record. 10. Click the Download Summary menu link to download a portable copy of your medical information. If you have questions, please visit the Frequently Asked Questions section of the Photorank website. Remember, Photorank is NOT to be used for urgent needs. For medical emergencies, dial 911. Now available from your iPhone and Android! Please provide this summary of care documentation to your next provider. Your primary care clinician is listed as Myra Mejia. If you have any questions after today's visit, please call [phone].

## 2017-10-04 NOTE — PATIENT INSTRUCTIONS
Sinusitis: Care Instructions  Your Care Instructions    Sinusitis is an infection of the lining of the sinus cavities in your head. Sinusitis often follows a cold. It causes pain and pressure in your head and face. In most cases, sinusitis gets better on its own in 1 to 2 weeks. But some mild symptoms may last for several weeks. Sometimes antibiotics are needed. Follow-up care is a key part of your treatment and safety. Be sure to make and go to all appointments, and call your doctor if you are having problems. It's also a good idea to know your test results and keep a list of the medicines you take. How can you care for yourself at home? · Take an over-the-counter pain medicine, such as acetaminophen (Tylenol), ibuprofen (Advil, Motrin), or naproxen (Aleve). Read and follow all instructions on the label. · If the doctor prescribed antibiotics, take them as directed. Do not stop taking them just because you feel better. You need to take the full course of antibiotics. · Be careful when taking over-the-counter cold or flu medicines and Tylenol at the same time. Many of these medicines have acetaminophen, which is Tylenol. Read the labels to make sure that you are not taking more than the recommended dose. Too much acetaminophen (Tylenol) can be harmful. · Breathe warm, moist air from a steamy shower, a hot bath, or a sink filled with hot water. Avoid cold, dry air. Using a humidifier in your home may help. Follow the directions for cleaning the machine. · Use saline (saltwater) nasal washes to help keep your nasal passages open and wash out mucus and bacteria. You can buy saline nose drops at a grocery store or drugstore. Or you can make your own at home by adding 1 teaspoon of salt and 1 teaspoon of baking soda to 2 cups of distilled water. If you make your own, fill a bulb syringe with the solution, insert the tip into your nostril, and squeeze gently. Melvia Creamer your nose.   · Put a hot, wet towel or a warm gel pack on your face 3 or 4 times a day for 5 to 10 minutes each time. · Try a decongestant nasal spray like oxymetazoline (Afrin). Do not use it for more than 3 days in a row. Using it for more than 3 days can make your congestion worse. When should you call for help? Call your doctor now or seek immediate medical care if:  · You have new or worse swelling or redness in your face or around your eyes. · You have a new or higher fever. Watch closely for changes in your health, and be sure to contact your doctor if:  · You have new or worse facial pain. · The mucus from your nose becomes thicker (like pus) or has new blood in it. · You are not getting better as expected. Where can you learn more? Go to http://aziza-niyah.info/. Enter D905 in the search box to learn more about \"Sinusitis: Care Instructions. \"  Current as of: July 29, 2016  Content Version: 11.3  © 1079-9963 [employer]. Care instructions adapted under license by [employer] (which disclaims liability or warranty for this information). If you have questions about a medical condition or this instruction, always ask your healthcare professional. [place] [address] any warranty or liability for your use of this information.

## 2017-10-13 ENCOUNTER — TELEPHONE (OUTPATIENT)
Dept: FAMILY MEDICINE CLINIC | Age: 37
End: 2017-10-13

## 2017-10-13 NOTE — TELEPHONE ENCOUNTER
Pt called and stated that she was referred to a pain management doctor but nothing in pt's chart please put a referral in the system and call pt with information

## 2017-10-13 NOTE — TELEPHONE ENCOUNTER
Pt states she saw Dr. Riya Rodriguez today and she said pt needs to see pain management for back pain. Pt wanted to know who to see for pain. Bird Gonzales NP suggests Dr. Cassius Tang and pt was notified.

## 2017-10-13 NOTE — TELEPHONE ENCOUNTER
I did not refer patient to pain management. Will you call her and figure out what she is referring to? Thanks!

## 2017-10-13 NOTE — TELEPHONE ENCOUNTER
I have not seen her in over a year. I am not sure who referred her to Pain Management. I will ask Stephen Gauthier if she suggested it.

## 2017-10-16 ENCOUNTER — HOSPITAL ENCOUNTER (EMERGENCY)
Age: 37
Discharge: HOME | End: 2017-10-16
Attending: EMERGENCY MEDICINE | Admitting: EMERGENCY MEDICINE
Payer: MEDICAID

## 2017-10-16 ENCOUNTER — TELEPHONE (OUTPATIENT)
Dept: ENDOCRINOLOGY | Age: 37
End: 2017-10-16

## 2017-10-16 ENCOUNTER — APPOINTMENT (OUTPATIENT)
Dept: ULTRASOUND IMAGING | Age: 37
End: 2017-10-16
Attending: EMERGENCY MEDICINE
Payer: MEDICAID

## 2017-10-16 VITALS
OXYGEN SATURATION: 99 % | SYSTOLIC BLOOD PRESSURE: 163 MMHG | WEIGHT: 211.64 LBS | TEMPERATURE: 98.3 F | HEIGHT: 62 IN | HEART RATE: 104 BPM | DIASTOLIC BLOOD PRESSURE: 96 MMHG | BODY MASS INDEX: 38.95 KG/M2 | RESPIRATION RATE: 18 BRPM

## 2017-10-16 DIAGNOSIS — R51.9 ACUTE NONINTRACTABLE HEADACHE, UNSPECIFIED HEADACHE TYPE: Primary | ICD-10-CM

## 2017-10-16 DIAGNOSIS — R73.9 HYPERGLYCEMIA: ICD-10-CM

## 2017-10-16 DIAGNOSIS — R74.8 ELEVATED LIPASE: ICD-10-CM

## 2017-10-16 LAB
ALBUMIN SERPL-MCNC: 2.8 G/DL (ref 3.5–5)
ALBUMIN/GLOB SERPL: 0.6 {RATIO} (ref 1.1–2.2)
ALP SERPL-CCNC: 48 U/L (ref 45–117)
ALT SERPL-CCNC: 82 U/L (ref 12–78)
ANION GAP SERPL CALC-SCNC: 8 MMOL/L (ref 5–15)
APPEARANCE UR: CLEAR
AST SERPL-CCNC: 43 U/L (ref 15–37)
BACTERIA URNS QL MICRO: NEGATIVE /HPF
BASOPHILS # BLD: 0 K/UL (ref 0–0.1)
BASOPHILS NFR BLD: 0 % (ref 0–1)
BILIRUB SERPL-MCNC: 0.2 MG/DL (ref 0.2–1)
BILIRUB UR QL: NEGATIVE
BUN SERPL-MCNC: 17 MG/DL (ref 6–20)
BUN/CREAT SERPL: 13 (ref 12–20)
CALCIUM SERPL-MCNC: 8.3 MG/DL (ref 8.5–10.1)
CHLORIDE SERPL-SCNC: 100 MMOL/L (ref 97–108)
CO2 SERPL-SCNC: 22 MMOL/L (ref 21–32)
COLOR UR: ABNORMAL
CREAT SERPL-MCNC: 1.33 MG/DL (ref 0.55–1.02)
DIFFERENTIAL METHOD BLD: ABNORMAL
EOSINOPHIL # BLD: 0.2 K/UL (ref 0–0.4)
EOSINOPHIL NFR BLD: 3 % (ref 0–7)
EPITH CASTS URNS QL MICRO: ABNORMAL /LPF
ERYTHROCYTE [DISTWIDTH] IN BLOOD BY AUTOMATED COUNT: 12.2 % (ref 11.5–14.5)
GLOBULIN SER CALC-MCNC: 4.5 G/DL (ref 2–4)
GLUCOSE BLD STRIP.AUTO-MCNC: 154 MG/DL (ref 65–100)
GLUCOSE BLD STRIP.AUTO-MCNC: 352 MG/DL (ref 65–100)
GLUCOSE SERPL-MCNC: 367 MG/DL (ref 65–100)
GLUCOSE UR STRIP.AUTO-MCNC: >1000 MG/DL
HCG UR QL: NEGATIVE
HCT VFR BLD AUTO: 33 % (ref 35–47)
HGB BLD-MCNC: 11.5 G/DL (ref 11.5–16)
HGB UR QL STRIP: ABNORMAL
KETONES SERPL QL: NEGATIVE
KETONES UR QL STRIP.AUTO: NEGATIVE MG/DL
LEUKOCYTE ESTERASE UR QL STRIP.AUTO: NEGATIVE
LIPASE SERPL-CCNC: 514 U/L (ref 73–393)
LYMPHOCYTES # BLD: 2.2 K/UL (ref 0.8–3.5)
LYMPHOCYTES NFR BLD: 33 % (ref 12–49)
MCH RBC QN AUTO: 31.9 PG (ref 26–34)
MCHC RBC AUTO-ENTMCNC: 34.8 G/DL (ref 30–36.5)
MCV RBC AUTO: 91.7 FL (ref 80–99)
MONOCYTES # BLD: 0.5 K/UL (ref 0–1)
MONOCYTES NFR BLD: 8 % (ref 5–13)
NEUTS SEG # BLD: 3.7 K/UL (ref 1.8–8)
NEUTS SEG NFR BLD: 56 % (ref 32–75)
NITRITE UR QL STRIP.AUTO: NEGATIVE
PH UR STRIP: 6 [PH] (ref 5–8)
PLATELET # BLD AUTO: 302 K/UL (ref 150–400)
POTASSIUM SERPL-SCNC: 3.5 MMOL/L (ref 3.5–5.1)
PROT SERPL-MCNC: 7.3 G/DL (ref 6.4–8.2)
PROT UR STRIP-MCNC: NEGATIVE MG/DL
RBC # BLD AUTO: 3.6 M/UL (ref 3.8–5.2)
RBC #/AREA URNS HPF: ABNORMAL /HPF (ref 0–5)
SERVICE CMNT-IMP: ABNORMAL
SERVICE CMNT-IMP: ABNORMAL
SODIUM SERPL-SCNC: 130 MMOL/L (ref 136–145)
SP GR UR REFRACTOMETRY: 1.02 (ref 1–1.03)
UROBILINOGEN UR QL STRIP.AUTO: 0.2 EU/DL (ref 0.2–1)
WBC # BLD AUTO: 6.6 K/UL (ref 3.6–11)
WBC URNS QL MICRO: ABNORMAL /HPF (ref 0–4)
YEAST URNS QL MICRO: PRESENT

## 2017-10-16 PROCEDURE — 96374 THER/PROPH/DIAG INJ IV PUSH: CPT

## 2017-10-16 PROCEDURE — 82962 GLUCOSE BLOOD TEST: CPT

## 2017-10-16 PROCEDURE — 74011250636 HC RX REV CODE- 250/636: Performed by: EMERGENCY MEDICINE

## 2017-10-16 PROCEDURE — 36415 COLL VENOUS BLD VENIPUNCTURE: CPT | Performed by: EMERGENCY MEDICINE

## 2017-10-16 PROCEDURE — 96375 TX/PRO/DX INJ NEW DRUG ADDON: CPT

## 2017-10-16 PROCEDURE — 74011636637 HC RX REV CODE- 636/637: Performed by: EMERGENCY MEDICINE

## 2017-10-16 PROCEDURE — 81025 URINE PREGNANCY TEST: CPT

## 2017-10-16 PROCEDURE — 99285 EMERGENCY DEPT VISIT HI MDM: CPT

## 2017-10-16 PROCEDURE — 83690 ASSAY OF LIPASE: CPT | Performed by: EMERGENCY MEDICINE

## 2017-10-16 PROCEDURE — 81001 URINALYSIS AUTO W/SCOPE: CPT | Performed by: EMERGENCY MEDICINE

## 2017-10-16 PROCEDURE — 74011250637 HC RX REV CODE- 250/637: Performed by: EMERGENCY MEDICINE

## 2017-10-16 PROCEDURE — 85025 COMPLETE CBC W/AUTO DIFF WBC: CPT | Performed by: EMERGENCY MEDICINE

## 2017-10-16 PROCEDURE — 82009 KETONE BODYS QUAL: CPT | Performed by: EMERGENCY MEDICINE

## 2017-10-16 PROCEDURE — 76705 ECHO EXAM OF ABDOMEN: CPT

## 2017-10-16 PROCEDURE — 80053 COMPREHEN METABOLIC PANEL: CPT | Performed by: EMERGENCY MEDICINE

## 2017-10-16 PROCEDURE — 96361 HYDRATE IV INFUSION ADD-ON: CPT

## 2017-10-16 RX ORDER — KETOROLAC TROMETHAMINE 30 MG/ML
30 INJECTION, SOLUTION INTRAMUSCULAR; INTRAVENOUS
Status: COMPLETED | OUTPATIENT
Start: 2017-10-16 | End: 2017-10-16

## 2017-10-16 RX ORDER — TRAMADOL HYDROCHLORIDE 50 MG/1
50 TABLET ORAL
Status: COMPLETED | OUTPATIENT
Start: 2017-10-16 | End: 2017-10-16

## 2017-10-16 RX ORDER — ONDANSETRON 8 MG/1
8 TABLET, ORALLY DISINTEGRATING ORAL
Qty: 12 TAB | Refills: 0 | Status: SHIPPED | OUTPATIENT
Start: 2017-10-16 | End: 2017-10-30 | Stop reason: ALTCHOICE

## 2017-10-16 RX ORDER — TRAMADOL HYDROCHLORIDE 50 MG/1
50 TABLET ORAL
Qty: 10 TAB | Refills: 0 | Status: SHIPPED | OUTPATIENT
Start: 2017-10-16 | End: 2017-10-30 | Stop reason: ALTCHOICE

## 2017-10-16 RX ADMIN — KETOROLAC TROMETHAMINE 30 MG: 30 INJECTION, SOLUTION INTRAMUSCULAR at 11:51

## 2017-10-16 RX ADMIN — SODIUM CHLORIDE 1000 ML: 900 INJECTION, SOLUTION INTRAVENOUS at 11:56

## 2017-10-16 RX ADMIN — TRAMADOL HYDROCHLORIDE 50 MG: 50 TABLET, FILM COATED ORAL at 12:22

## 2017-10-16 RX ADMIN — INSULIN HUMAN 10 UNITS: 100 INJECTION, SOLUTION PARENTERAL at 12:00

## 2017-10-16 RX ADMIN — SODIUM CHLORIDE 1000 ML: 9 INJECTION, SOLUTION INTRAVENOUS at 10:48

## 2017-10-16 NOTE — TELEPHONE ENCOUNTER
Need the sugar log to get an accurate picture , last week if it was in 120s and have to find out why it jumped up    Any infection ,dietary changes , steroids, old insulin     Check sugars before each meal     Use SSI as below in addition to what she is taking     Additional Novolog or Humalog for high blood sugars      150-200 mg                                     4 units   201-250 mg                                     8 units   251-300 mg                                     12 units   301-350 mg                                     16 units   351-400 mg                                     20 units         Note - patient is non compliant with insulin intake

## 2017-10-16 NOTE — ED PROVIDER NOTES
HPI Comments: This is a 27-year-old with history of diabetes, hypertension who presents with the chief complaint of hyperglycemia and headache. States that her symptoms started on Thursday. 4 days ago. She has a history of migraines headache and feels that this is similar however worse in severity. It was gradual onset headache. Headache severity 10 out of 10. She's had nausea and vomiting. Just complains of some epigastric pain which she attributes to vomiting. She denies any fevers or chills. Her blood sugar at home was in the 500s. Patient is a 40 y.o. female presenting with headaches and hyperglycemia. Headache    Associated symptoms include nausea and vomiting. Pertinent negatives include no fever and no shortness of breath. High Blood Sugar    Associated symptoms include nausea, vomiting and headaches. Pertinent negatives include no fever, no dysuria, no chest pain and no back pain.         Past Medical History:   Diagnosis Date    Abnormal pap 2012 10/5/13     hx w/Colpo     Arthritis     Asthma     Diabetes ([address]     Headache(784.0)     Hypertension     Joint pain     Pap smear for cervical cancer screening 10/1/15 ASCUS HPV NEG RP 3 YEARS    Ringing in ears        Past Surgical History:   Procedure Laterality Date    HX  SECTION  01 & 08    HX COLPOSCOPY  2012 neg    HX HEENT      cataract R eye    HX TUBAL LIGATION           Family History:   Problem Relation Age of Onset    Diabetes Mother     Heart Disease Mother     Hypertension Mother     Stroke Mother     Hypertension Father     Stroke Father     Heart Disease Father     Breast Cancer Neg Hx        Social History     Social History    Marital status:      Spouse name: N/A    Number of children: 2    Years of education: N/A     Occupational History    --      Social History Main Topics    Smoking status: Never Smoker    Smokeless tobacco: Never Used    Alcohol use No    Drug use: No    Sexual activity: Yes     Partners: Male     Birth control/ protection: Surgical     Other Topics Concern     Service No    Blood Transfusions No    Caffeine Concern No    Occupational Exposure No    Hobby Hazards No    Sleep Concern Yes    Stress Concern Yes    Weight Concern Yes    Special Diet Yes     DM    Back Care Yes    Exercise Yes    Bike Helmet No    Seat Belt Yes    Self-Exams Yes     Social History Narrative         ALLERGIES: Aspirin; Contrast agent [iodine]; Dilaudid [hydromorphone]; Metformin; Reglan [metoclopramide]; and Ritalin [methylphenidate]    Review of Systems   Constitutional: Negative for chills and fever. HENT: Negative for ear pain and sore throat. Eyes: Negative for pain. Respiratory: Negative for chest tightness and shortness of breath. Cardiovascular: Negative for chest pain and leg swelling. Gastrointestinal: Positive for abdominal pain, nausea and vomiting. Genitourinary: Negative for dysuria and flank pain. Musculoskeletal: Negative for back pain. Skin: Negative for rash. Neurological: Positive for headaches. All other systems reviewed and are negative. Vitals:    10/16/17 1100 10/16/17 1200 10/16/17 1201 10/16/17 1230   BP: (!) 145/92 (!) 159/93  (!) 159/91   Pulse: (!) 102      Resp: 18      Temp:       SpO2: 97%  97% 98%   Weight:       Height:                Physical Exam   Constitutional: She appears well-developed and well-nourished. HENT:   Head: Normocephalic and atraumatic. Mouth/Throat: Oropharynx is clear and moist.   Eyes: Conjunctivae and EOM are normal. Pupils are equal, round, and reactive to light. No scleral icterus. Neck: Neck supple. No tracheal deviation present. Cardiovascular: Regular rhythm, normal heart sounds and intact distal pulses. Mild tachycardia   Pulmonary/Chest: Effort normal and breath sounds normal. No respiratory distress. Abdominal: Soft.  She exhibits no distension. There is tenderness (mild epigastric). There is no rebound and no guarding. Genitourinary:   Genitourinary Comments: deferred   Musculoskeletal: She exhibits no edema. Neurological: She is alert. Skin: Skin is warm and dry. Psychiatric: She has a normal mood and affect. Nursing note and vitals reviewed. MDM  Number of Diagnoses or Management Options  Acute nonintractable headache, unspecified headache type:   Elevated lipase:   Hyperglycemia:   Patient Progress  Patient progress: improved    ED Course       Procedures    13:31    The patient has been reevaluated. The patient is ready for discharge. The patient's signs, symptoms, diagnosis, and discharge instructions have been discussed and the patient/ family has conveyed their understanding. The patient is to follow up as recommended or return to the ED should their symptoms worsen. Plan has been discussed and the patient is in agreement. LABORATORY TESTS:  Recent Results (from the past 12 hour(s))   GLUCOSE, POC    Collection Time: 10/16/17 10:32 AM   Result Value Ref Range    Glucose (POC) 352 (H) 65 - 100 mg/dL    Performed by Jamie Carrillo    CBC WITH AUTOMATED DIFF    Collection Time: 10/16/17 10:43 AM   Result Value Ref Range    WBC 6.6 3.6 - 11.0 K/uL    RBC 3.60 (L) 3.80 - 5.20 M/uL    HGB 11.5 11.5 - 16.0 g/dL    HCT 33.0 (L) 35.0 - 47.0 %    MCV 91.7 80.0 - 99.0 FL    MCH 31.9 26.0 - 34.0 PG    MCHC 34.8 30.0 - 36.5 g/dL    RDW 12.2 11.5 - 14.5 %    PLATELET 885 735 - 749 K/uL    NEUTROPHILS 56 32 - 75 %    LYMPHOCYTES 33 12 - 49 %    MONOCYTES 8 5 - 13 %    EOSINOPHILS 3 0 - 7 %    BASOPHILS 0 0 - 1 %    ABS. NEUTROPHILS 3.7 1.8 - 8.0 K/UL    ABS. LYMPHOCYTES 2.2 0.8 - 3.5 K/UL    ABS. MONOCYTES 0.5 0.0 - 1.0 K/UL    ABS. EOSINOPHILS 0.2 0.0 - 0.4 K/UL    ABS.  BASOPHILS 0.0 0.0 - 0.1 K/UL    DF AUTOMATED     METABOLIC PANEL, COMPREHENSIVE    Collection Time: 10/16/17 10:43 AM   Result Value Ref Range    Sodium 130 (L) 136 - 145 mmol/L    Potassium 3.5 3.5 - 5.1 mmol/L    Chloride 100 97 - 108 mmol/L    CO2 22 21 - 32 mmol/L    Anion gap 8 5 - 15 mmol/L    Glucose 367 (H) 65 - 100 mg/dL    BUN 17 6 - 20 MG/DL    Creatinine 1.33 (H) 0.55 - 1.02 MG/DL    BUN/Creatinine ratio 13 12 - 20      GFR est AA 54 (L) >60 ml/min/1.73m2    GFR est non-AA 45 (L) >60 ml/min/1.73m2    Calcium 8.3 (L) 8.5 - 10.1 MG/DL    Bilirubin, total 0.2 0.2 - 1.0 MG/DL    ALT (SGPT) 82 (H) 12 - 78 U/L    AST (SGOT) 43 (H) 15 - 37 U/L    Alk.  phosphatase 48 45 - 117 U/L    Protein, total 7.3 6.4 - 8.2 g/dL    Albumin 2.8 (L) 3.5 - 5.0 g/dL    Globulin 4.5 (H) 2.0 - 4.0 g/dL    A-G Ratio 0.6 (L) 1.1 - 2.2     ACETONE/KETONE, QL    Collection Time: 10/16/17 10:43 AM   Result Value Ref Range    Acetone/Ketone serum, QL. NEGATIVE  NEG        LIPASE    Collection Time: 10/16/17 10:43 AM   Result Value Ref Range    Lipase 514 (H) 73 - 393 U/L   URINALYSIS W/MICROSCOPIC    Collection Time: 10/16/17 11:45 AM   Result Value Ref Range    Color STRAW      Appearance CLEAR CLEAR      Specific gravity 1.017 1.003 - 1.030      pH (UA) 6.0 5.0 - 8.0      Protein NEGATIVE  NEG mg/dL    Glucose >1000 (A) NEG mg/dL    Ketone NEGATIVE  NEG mg/dL    Bilirubin NEGATIVE  NEG      Blood TRACE (A) NEG      Urobilinogen 0.2 0.2 - 1.0 EU/dL    Nitrites NEGATIVE  NEG      Leukocyte Esterase NEGATIVE  NEG      WBC 0-4 0 - 4 /hpf    RBC 0-5 0 - 5 /hpf    Epithelial cells MODERATE (A) FEW /lpf    Bacteria NEGATIVE  NEG /hpf    Yeast PRESENT (A) NEG     HCG URINE, QL. - POC    Collection Time: 10/16/17 11:45 AM   Result Value Ref Range    Pregnancy test,urine (POC) NEGATIVE  NEG     GLUCOSE, POC    Collection Time: 10/16/17 12:36 PM   Result Value Ref Range    Glucose (POC) 154 (H) 65 - 100 mg/dL    Performed by Kathleen Glass (PCT)        IMAGING RESULTS:  [facility]   Final Result        [employer] [facility]    Result Date: 10/16/2017  ULTRASOUND OF THE RIGHT UPPER QUADRANT INDICATION: Right upper quadrant abdominal pain COMPARISON: October 7, 2015 TECHNIQUE: Sonography of the right upper quadrant was performed. FINDINGS: GALLBLADDER: No gallstones, wall thickening, or pericholecystic fluid. COMMON DUCT: 2 mm in diameter. No visualized calculi. Candance Huger LIVER: Diffusely increased echogenicity, similar to the prior study. No focal hepatic mass or intrahepatic biliary dilatation is shown. MAIN PORTAL VEIN: Patent. Appropriate hepatopetal flow. PANCREAS: The visualized portions of the pancreas are normal. The pancreatic head and tail are obscured by overlying bowel gas. RIGHT KIDNEY: 10.5 in length. No hydronephrosis, shadowing calculus, or contour-deforming renal mass. IMPRESSION: Persistently increased hepatic echogenicity, compatible with hepatic parenchymal disease, likely hepatic steatosis. Normal gallbladder. MEDICATIONS GIVEN:  Medications   sodium chloride 0.9 % bolus infusion 1,000 mL (0 mL IntraVENous IV Completed 10/16/17 1148)   ketorolac (TORADOL) injection 30 mg (30 mg IntraVENous Given 10/16/17 1151)   sodium chloride 0.9 % bolus infusion 1,000 mL (0 mL IntraVENous IV Completed 10/16/17 1301)   insulin regular (NOVOLIN R, HUMULIN R) injection 10 Units (10 Units IntraVENous Given 10/16/17 1200)   traMADol (ULTRAM) tablet 50 mg (50 mg Oral Given 10/16/17 1222)       IMPRESSION:  1. Acute nonintractable headache, unspecified headache type    2. Hyperglycemia    3. Elevated lipase        PLAN:  1. Discharge Medication List as of 10/16/2017  1:24 PM      START taking these medications    Details   traMADol (ULTRAM) 50 mg tablet Take 1 Tab by mouth every six (6) hours as needed for Pain. Max Daily Amount: 200 mg., Print, Disp-10 Tab, R-0         CONTINUE these medications which have CHANGED    Details   ondansetron (ZOFRAN ODT) 8 mg disintegrating tablet Take 1 Tab by mouth every eight (8) hours as needed for Nausea. , Print, Disp-12 Tab, R-0         CONTINUE these medications which have NOT CHANGED Details   Blood-Glucose Meter (ACCU-CHEK FILEMON PLUS METER) misc Test 4 times daily Dx Code: E11.65, Normal, Disp-1 Each, R-0      Insulin Needles, Disposable, 31 gauge x 5/16\" ndle Use to inject Lantus and Apidra daily Dx Code: E11.65, Normal, Disp-200 Pen Needle, R-11      VITAMIN D2 50,000 unit capsule Historical Med, NIKI      insulin glargine (LANTUS,BASAGLAR) 100 unit/mL (3 mL) inpn INJECT 70 UNITS SUBCUTANEOUSLY AT BEDTIME (Stop Basaglar), NormalLantus penDisp-30 mL, R-5      carvedilol (COREG) 3.125 mg tablet Take 1 Tab by mouth two (2) times daily (with meals). , Print, Disp-60 Tab, R-3      albuterol (VENTOLIN HFA) 90 mcg/actuation inhaler Take 2 Puffs by inhalation every four (4) hours as needed for Wheezing. Indications: Acute Asthma Attack, Normal, Disp-1 Inhaler, R-3      ARNUITY ELLIPTA 200 mcg/actuation dsdv inhaler Take 1 Puff by inhalation daily. , Normal, Disp-1 Inhaler, R-2, NIKI      gabapentin (NEURONTIN) 300 mg capsule Take 1 Cap by mouth three (3) times daily. , Normal, Disp-30 Cap, R-0      insulin glulisine (APIDRA SOLOSTAR) 100 unit/mL pen INJECT 20 UNITS SUBCUTANEOUSLY BEFORE BREAKFAST, LUNCH, AND DINNER, Normal, Disp-30 mL, R-3      albuterol (PROVENTIL VENTOLIN) 2.5 mg /3 mL (0.083 %) nebulizer solution 3 mL by Nebulization route every four (4) hours as needed for Wheezing., Normal, Disp-1 Package, R-1      Nebulizer & Compressor machine 1 Each by Does Not Apply route two (2) times daily as needed., Normal, Disp-1 Each, R-0      glucose blood VI test strips (BLOOD GLUCOSE TEST) strip Patient request the [facility] [employer] brand of meter and strips and to test QID., Print, Disp-100 Strip, R-5      Insulin Needles, Disposable, 30 x 1/3 \" Sliding scale, Program, Disp-4 Package, R-0      Lancets misc Bid for diabetes 250.00  Lot # [id number]  Exp 7/2018  [facility], Program, Disp-400 Package, R-0      losartan (COZAAR) 100 mg tablet Historical Med      sodium bicarbonate 650 mg tablet Historical Med      TANZEUM 50 mg/0.5 mL injector pen INJECT ONE SYRINGE SUBCUTANEOUSLY EVERY 7 DAYS., Normal, Disp-4 Pen, R-4      butalbital-acetaminophen-caffeine (FIORICET, ESGIC) -40 mg per tablet Historical Med      cyclobenzaprine (FLEXERIL) 10 mg tablet Historical Med      prochlorperazine (COMPAZINE) 5 mg tablet Historical Med      dicyclomine (BENTYL) 20 mg tablet Take 1 Tab by mouth every six (6) hours as needed (abdominal cramps) for up to 20 doses. , Normal, Disp-12 Tab, R-0      montelukast (SINGULAIR) 10 mg tablet TAKE ONE TABLET BY MOUTH ONCE DAILY, Normal, Disp-90 Tab, R-0           2. Follow-up Information     Follow up With Details Comments Contact Info    Sheeba Irving MD Call today  [address] [place] [address]      Rex Johnson MD Call today  [place] [id number]  [phone]      [facility] EM[facility]  If symptoms worsen [place] [id number]  [phone]            Return to ED for new or worsening symptoms       Jeb Langford.  Dru Donohue MD

## 2017-10-16 NOTE — ED NOTES
Patient condition unchanged since presentation to ED. IV fluids continue to infuse via gravity without difficulty.

## 2017-10-16 NOTE — ED TRIAGE NOTES
Patient presents ambulatory to treatment area with a steady gait. Patient states she has a headache and that her glucose levels are climbing. Patient states that he last glucose was over 500; patient took 60u of fast acting insulin at that time. Patient has also had nausea and vomiting. Patient was seen at [facility] yesterday for these symptoms.

## 2017-10-16 NOTE — LETTER
NOTIFICATION RETURN TO WORK / SCHOOL 
 
10/16/2017 1:32 PM 
 
Ms. Mike Monroe [place] [phone] To Whom It May Concern: 
 
Mike Monroe is currently under the care of [facility]. She will return to work/school on: 10/17/17  She was accompanied by her . If there are questions or concerns please have the patient contact our office. Sincerely, Ness Schilder.  Zeeshan Murphy MD

## 2017-10-16 NOTE — ED NOTES
Patient has returned from ultrasound in no distress. Patient states pain has improved with treatment regimen. Patient voices no complaints at this time. Vital signs updated. Family at bedside.

## 2017-10-16 NOTE — ED NOTES
Patient medicated for headache as ordered. Patient tolerated PO tramadol well. Lights dimmed and blanket provided for comfort. Awaiting ultrasound. Family at bedside. Call bell in reach. Will continue to monitor.

## 2017-10-16 NOTE — ED NOTES
IV fluids infusing via gravity without difficulty as ordered. Patient medicated with IV insulin as ordered. Patient tolerating treatment well. Dr. Ishmael Valdivia at bedside to update patient regarding further care management. Patient verbalizes understanding and agreement. Call bell in reach. Vital signs updated. Will continue to monitor.

## 2017-10-16 NOTE — ED NOTES
Patient resting on stretcher in no distress. IV access established and blood samples sent to lab for ordered testing. Patient tolerated procedure well. IV fluids infusing via gravity without difficulty as ordered. Patient updated regarding plan of care and associated time constraints. Patient verbalizes understanding and agreement to current care plan. Call bell in reach. Will continue to monitor.

## 2017-10-16 NOTE — ED NOTES
The patient was discharged home by Dr. Daniella Brandon in stable condition. The patient is alert and oriented, in no respiratory distress and discharge vital signs obtained. The patient's diagnosis, condition and treatment were explained. The patient expressed understanding. Two prescriptions given. No work/school note given. A discharge plan has been developed. A  was not involved in the process. Aftercare instructions were given. Pt ambulatory out of the ED.

## 2017-10-16 NOTE — DISCHARGE INSTRUCTIONS
Learning About High Blood Sugar  What is high blood sugar? Your body turns the food you eat into glucose (sugar), which it uses for energy. But if your body isn't able to use the sugar right away, it can build up in your blood and lead to high blood sugar. When the amount of sugar in your blood stays too high for too much of the time, you may have diabetes. Diabetes is a disease that can cause serious health problems. The good news is that lifestyle changes may help you get your blood sugar back to normal and avoid or delay diabetes. What causes high blood sugar? Sugar (glucose) can build up in your blood if you:  · Are overweight. · Have a family history of diabetes. · Take certain medicines, such as steroids. What are the symptoms? Having high blood sugar may not cause any symptoms at all. Or it may make you feel very thirsty or very hungry. You may also urinate more often than usual, have blurry vision, or lose weight without trying. How is high blood sugar treated? You can take steps to lower your blood sugar level if you understand what makes it get higher. Your doctor may want you to learn how to test your blood sugar level at home. Then you can see how illness, stress, or different kinds of food or medicine raise or lower your blood sugar level. Other tests may be needed to see if you have diabetes. How can you prevent high blood sugar? · Watch your weight. If you're overweight, losing just a small amount of weight may help. Reducing fat around your waist is most important. · Limit the amount of calories, sweets, and unhealthy fat you eat. Ask your doctor if a dietitian can help you. A registered dietitian can help you create meal plans that fit your lifestyle. · Get at least 30 minutes of exercise on most days of the week. Exercise helps control your blood sugar. It also helps you maintain a healthy weight. Walking is a good choice.  You also may want to do other activities, such as running, swimming, cycling, or playing tennis or team sports. · If your doctor prescribed medicines, take them exactly as prescribed. Call your doctor if you think you are having a problem with your medicine. You will get more details on the specific medicines your doctor prescribes. Follow-up care is a key part of your treatment and safety. Be sure to make and go to all appointments, and call your doctor if you are having problems. It's also a good idea to know your test results and keep a list of the medicines you take. Where can you learn more? Go to http://aziza-niyah.info/. Enter O108 in the search box to learn more about \"Learning About High Blood Sugar. \"  Current as of: March 13, 2017  Content Version: 11.3  © 6397-9070 [employer]. Care instructions adapted under license by [employer] (which disclaims liability or warranty for this information). If you have questions about a medical condition or this instruction, always ask your healthcare professional. [address] any warranty or liability for your use of this information.

## 2017-10-17 NOTE — TELEPHONE ENCOUNTER
Asked pt if she could give BG readings or bring meter to be downloaded. Pt states she can bring meter next week. Pt states she is not sure if she has infection but doesn't think so. Asked pt to check BG before each meal and gave SSI verbally. Pt wrote down SSI and verbalized understanding.

## 2017-10-30 ENCOUNTER — OFFICE VISIT (OUTPATIENT)
Dept: FAMILY MEDICINE CLINIC | Age: 37
End: 2017-10-30

## 2017-10-30 VITALS
DIASTOLIC BLOOD PRESSURE: 82 MMHG | BODY MASS INDEX: 39.38 KG/M2 | WEIGHT: 214 LBS | TEMPERATURE: 98.1 F | SYSTOLIC BLOOD PRESSURE: 128 MMHG | OXYGEN SATURATION: 99 % | RESPIRATION RATE: 16 BRPM | HEART RATE: 99 BPM | HEIGHT: 62 IN

## 2017-10-30 DIAGNOSIS — R11.0 NAUSEA: ICD-10-CM

## 2017-10-30 DIAGNOSIS — R51.9 ACUTE NONINTRACTABLE HEADACHE, UNSPECIFIED HEADACHE TYPE: Primary | ICD-10-CM

## 2017-10-30 RX ORDER — PROMETHAZINE HYDROCHLORIDE 25 MG/ML
25 INJECTION, SOLUTION INTRAMUSCULAR; INTRAVENOUS ONCE
Qty: 1 VIAL | Refills: 0
Start: 2017-10-30 | End: 2017-10-30

## 2017-10-30 RX ORDER — ONDANSETRON 4 MG/1
TABLET, ORALLY DISINTEGRATING ORAL
COMMUNITY
Start: 2017-08-12 | End: 2017-10-30 | Stop reason: ALTCHOICE

## 2017-10-30 RX ORDER — KETOROLAC TROMETHAMINE 30 MG/ML
30 INJECTION, SOLUTION INTRAMUSCULAR; INTRAVENOUS ONCE
Qty: 1 VIAL | Refills: 0
Start: 2017-10-30 | End: 2017-10-30

## 2017-10-30 NOTE — MR AVS SNAPSHOT
Visit Information Date & Time Provider Department Dept. Phone Encounter #  
 10/30/2017  9:30 AM Jose Arguello Zaire [phone] [id number] Follow-up Instructions Return if symptoms worsen or fail to improve. Upcoming Health Maintenance Date Due Pneumococcal 19-64 Medium Risk (1 of 1 - PPSV23) 1/18/1999 EYE EXAM RETINAL OR DILATED Q1 5/19/2014 MICROALBUMIN Q1 4/13/2017 FOOT EXAM Q1 6/24/2017 LIPID PANEL Q1 10/4/2017 HEMOGLOBIN A1C Q6M 2/15/2018 PAP AKA CERVICAL CYTOLOGY 10/1/2018 DTaP/Tdap/Td series (2 - Td) 12/8/2025 Allergies as of 10/30/2017  Review Complete On: 10/30/2017 By: Olivia Gupta NP Severity Noted Reaction Type Reactions Aspirin  03/05/2010    Itching Contrast Agent [Iodine]  05/01/2015    Hives Dilaudid [Hydromorphone]  05/01/2015    Hives Metformin  07/03/2013    Nausea Only Reglan [Metoclopramide]  03/05/2010    Other (comments) Impaired speech Ritalin [Methylphenidate]  05/01/2015    Other (comments) Speech impairment Current Immunizations  Reviewed on 12/8/2015 Name Date Influenza Vaccine (Quad) PF 10/4/2016, 11/23/2015  9:10 AM  
 Tdap 12/8/2015  9:54 AM  
  
 Not reviewed this visit You Were Diagnosed With   
  
 Codes Comments Acute nonintractable headache, unspecified headache type    -  Primary ICD-10-CM: R51 ICD-9-CM: 784.0 Nausea     ICD-10-CM: R11.0 ICD-9-CM: 787.02 Vitals BP Pulse Temp Resp Height(growth percentile) Weight(growth percentile) 144/90 (BP 1 Location: Left arm, BP Patient Position: Sitting) 99 98.1 °F (36.7 °C) (Oral) 16 5' 2\" (1.575 m) 214 lb (97.1 kg) LMP SpO2 BMI OB Status Smoking Status 09/20/2017 99% 39.14 kg/m2 Unknown Never Smoker BMI and BSA Data Body Mass Index Body Surface Area  
 39.14 kg/m 2 2.06 m 2 Preferred Pharmacy Pharmacy Name Phone [facility] [address] [place] [address] Jo Ann [phone] Your Updated Medication List  
  
   
This list is accurate as of: 10/30/17  9:55 AM.  Always use your most recent med list.  
  
  
  
  
 * albuterol 2.5 mg /3 mL (0.083 %) nebulizer solution Commonly known as:  PROVENTIL VENTOLIN  
3 mL by Nebulization route every four (4) hours as needed for Wheezing. * albuterol 90 mcg/actuation inhaler Commonly known as:  VENTOLIN HFA Take 2 Puffs by inhalation every four (4) hours as needed for Wheezing. Indications: Acute Asthma Attack ARNUITY ELLIPTA 200 mcg/actuation Dsdv inhaler Generic drug:  fluticasone furoate Take 1 Puff by inhalation daily. Blood-Glucose Meter Misc Commonly known as:  ACCU-CHEK FILEMON PLUS METER Test 4 times daily Dx Code: E11.65  
  
 carvedilol 3.125 mg tablet Commonly known as:  Tabby Chew Take 1 Tab by mouth two (2) times daily (with meals). gabapentin 300 mg capsule Commonly known as:  NEURONTIN Take 1 Cap by mouth three (3) times daily. glucose blood VI test strips strip Commonly known as:  blood glucose test  
Patient request the [facility] [facility] brand of meter and strips and to test QID. insulin glargine 100 unit/mL (3 mL) Inpn Commonly known as:  Viviana Fridge Inject 90 units at night. insulin glulisine 100 unit/mL pen Commonly known as:  Siria Deist INJECT 30 UNITS SUBCUTANEOUSLY BEFORE BREAKFAST, LUNCH, AND DINNER plus sliding scale. Max daily dose 150 units. Insulin Needles (Disposable) 30 gauge x 1/3\" Sliding scale Insulin Needles (Disposable) 31 gauge x 5/16\" Ndle Use to inject Lantus and Apidra daily Dx Code: E11.65  
  
 ketorolac 30 mg/mL (1 mL) injection Commonly known as:  TORADOL  
1 mL by IntraMUSCular route once for 1 dose. Lancets Misc Bid for diabetes 250.00 Lot # [id number] Exp 7/2018 Man Brian Nordisk  
  
 losartan 100 mg tablet Commonly known as:  COZAAR  
  
 montelukast 10 mg tablet Commonly known as:  SINGULAIR  
TAKE ONE TABLET BY MOUTH ONCE DAILY Nebulizer & Compressor machine 1 Each by Does Not Apply route two (2) times daily as needed. promethazine 25 mg/mL injection Commonly known as:  PHENERGAN  
1 mL by IntraMUSCular route once for 1 dose. TANZEUM 50 mg/0.5 mL injector pen Generic drug:  albiglutide VITAMIN D2 50,000 unit capsule Generic drug:  ergocalciferol * Notice: This list has 2 medication(s) that are the same as other medications prescribed for you. Read the directions carefully, and ask your doctor or other care provider to review them with you. We Performed the Following KETOROLAC TROMETHAMINE INJ [ [facility]] CT THER/PROPH/DIAG INJECTION, SUBCUT/IM [id number] CPT(R)] CT THER/PROPH/DIAG INJECTION, SUBCUT/IM [id number] CPT(R)] PROMETHAZINE HCL INJECTION [ [facility]] REFERRAL TO NEUROLOGY [NOM71 Custom] Follow-up Instructions Return if symptoms worsen or fail to improve. Referral Information Referral ID Referred By Referred To  
  
 [id number] [place], [address] Mirna Bertrand, MD [address] [facility], [address] [facility] Phone: [phone] Fax: [phone] Visits Status Start Date End Date 1 New Request 10/30/17 10/30/18 If your referral has a status of pending review or denied, additional information will be sent to support the outcome of this decision. Patient Instructions Headache: Care Instructions Your Care Instructions Headaches have many possible causes. Most headaches aren't a sign of a more serious problem, and they will get better on their own. Home treatment may help you feel better faster. The doctor has checked you carefully, but problems can develop later.  If you notice any problems or new symptoms, get medical treatment right away. Follow-up care is a key part of your treatment and safety. Be sure to make and go to all appointments, and call your doctor if you are having problems. It's also a good idea to know your test results and keep a list of the medicines you take. How can you care for yourself at home? · Do not drive if you have taken a prescription pain medicine. · Rest in a quiet, dark room until your headache is gone. Close your eyes and try to relax or go to sleep. Don't watch TV or read. · Put a cold, moist cloth or cold pack on the painful area for 10 to 20 minutes at a time. Put a thin cloth between the cold pack and your skin. · Use a warm, moist towel or a heating pad set on low to relax tight shoulder and neck muscles. · Have someone gently massage your neck and shoulders. · Take pain medicines exactly as directed. ¨ If the doctor gave you a prescription medicine for pain, take it as prescribed. ¨ If you are not taking a prescription pain medicine, ask your doctor if you can take an over-the-counter medicine. · Be careful not to take pain medicine more often than the instructions allow, because you may get worse or more frequent headaches when the medicine wears off. · Do not ignore new symptoms that occur with a headache, such as a fever, weakness or numbness, vision changes, or confusion. These may be signs of a more serious problem. To prevent headaches · Keep a headache diary so you can figure out what triggers your headaches. Avoiding triggers may help you prevent headaches. Record when each headache began, how long it lasted, and what the pain was like (throbbing, aching, stabbing, or dull). Write down any other symptoms you had with the headache, such as nausea, flashing lights or dark spots, or sensitivity to bright light or loud noise.  Note if the headache occurred near your period. List anything that might have triggered the headache, such as certain foods (chocolate, cheese, wine) or odors, smoke, bright light, stress, or lack of sleep. · Find healthy ways to deal with stress. Headaches are most common during or right after stressful times. Take time to relax before and after you do something that has caused a headache in the past. 
· Try to keep your muscles relaxed by keeping good posture. Check your jaw, face, neck, and shoulder muscles for tension, and try relaxing them. When sitting at a desk, change positions often, and stretch for 30 seconds each hour. · Get plenty of sleep and exercise. · Eat regularly and well. Long periods without food can trigger a headache. · Treat yourself to a massage. Some people find that regular massages are very helpful in relieving tension. · Limit caffeine by not drinking too much coffee, tea, or soda. But don't quit caffeine suddenly, because that can also give you headaches. · Reduce eyestrain from computers by blinking frequently and looking away from the computer screen every so often. Make sure you have proper eyewear and that your monitor is set up properly, about an arm's length away. · Seek help if you have depression or anxiety. Your headaches may be linked to these conditions. Treatment can both prevent headaches and help with symptoms of anxiety or depression. When should you call for help? Call 911 anytime you think you may need emergency care. For example, call if: 
? · You have signs of a stroke. These may include: 
¨ Sudden numbness, paralysis, or weakness in your face, arm, or leg, especially on only one side of your body. ¨ Sudden vision changes. ¨ Sudden trouble speaking. ¨ Sudden confusion or trouble understanding simple statements. ¨ Sudden problems with walking or balance. ¨ A sudden, severe headache that is different from past headaches. ?Call your doctor now or seek immediate medical care if: ? · You have a new or worse headache. ? · Your headache gets much worse. Where can you learn more? Go to http://aziza-niyah.info/. Enter M271 in the search box to learn more about \"Headache: Care Instructions. \" Current as of: October 14, 2016 Content Version: 11.4 © 2495-7172 [employer]. Care instructions adapted under license by [employer] (which disclaims liability or warranty for this information). If you have questions about a medical condition or this instruction, always ask your healthcare professional. [address] any warranty or liability for your use of this information. Introducing [facility]! [facility] introduces [employer] patient portal. Now you can access parts of your medical record, email your doctor's office, and request medication refills online. 1. In your internet browser, go to [email or web address]. [employer] 2. Click on the First Time User? Click Here link in the Sign In box. You will see the New Member Sign Up page. 3. Enter your [employer] Access Code exactly as it appears below. You will not need to use this code after youve completed the sign-up process. If you do not sign up before the expiration date, you must request a new code. · [employer] Access Code: [email or web address] Expires: 1/20/2018  8:48 AM 
 
4. Enter the last four digits of your Social Security Number (xxxx) and Date of Birth (mm/dd/yyyy) as indicated and click Submit. You will be taken to the next sign-up page. 5. Create a [employer] ID. This will be your Lumenz login ID and cannot be changed, so think of one that is secure and easy to remember. 6. Create a [employer]enz password. You can change your password at any time. 7. Enter your Password Reset Question and Answer. This can be used at a later time if you forget your password. 8. Enter your e-mail address.  You will receive e-mail notification when new information is available in [email or web address] 9. Click Sign Up. You can now view and download portions of your medical record. 10. Click the Download Summary menu link to download a portable copy of your medical information. If you have questions, please visit the Frequently Asked Questions section of the [email or web address] website. Remember, [email or web address] is NOT to be used for urgent needs. For medical emergencies, dial 911. Now available from your iPhone and Android! Please provide this summary of care documentation to your next provider. Your primary care clinician is listed as Varsha Richey. If you have any questions after today's visit, please call [phone].

## 2017-10-30 NOTE — PROGRESS NOTES
HISTORY OF PRESENT ILLNESS  Maurice Miranda is a 40 y.o. female. HPI  Pt presents with \"headache, cough and vomiting\"    Pt states that she has been battling this headache all week  The headache comes and goes  Pain is sharp, and in frontal region of her head  She has had some nausea and vomiting with the headache  NO light sensitivity, no sound sensitivity  No visual changes, no dizziness  Pt was given Fioricet in ER, but does not feel like it helped her headache. She has cough, off and on  Some ear pain, bilaterally  Review of Systems   Constitutional: Negative for fever. HENT: Positive for ear pain. Respiratory: Positive for cough. Gastrointestinal: Positive for nausea and vomiting. Neurological: Positive for headaches. Physical Exam   Constitutional: She is oriented to person, place, and time. She appears well-developed and well-nourished. HENT:   Head: Normocephalic and atraumatic. Right Ear: Hearing, tympanic membrane, external ear and ear canal normal.   Left Ear: Hearing, tympanic membrane, external ear and ear canal normal.   Nose: Nose normal.   Mouth/Throat: Oropharynx is clear and moist.   Eyes: Conjunctivae and EOM are normal. Pupils are equal, round, and reactive to light. Neck: Normal range of motion. Neck supple. Cardiovascular: Normal rate, regular rhythm and normal heart sounds. Pulmonary/Chest: Effort normal and breath sounds normal.   Lymphadenopathy:     She has no cervical adenopathy. Neurological: She is alert and oriented to person, place, and time. Skin: Skin is warm and dry. Psychiatric: She has a normal mood and affect. Her behavior is normal.       ASSESSMENT and PLAN    ICD-10-CM ICD-9-CM    1.  Acute nonintractable headache, unspecified headache type R51 784.0 REFERRAL TO NEUROLOGY      ketorolac (TORADOL) 30 mg/mL (1 mL) injection      KETOROLAC TROMETHAMINE INJ      [place] THER/PROPH/DIAG INJECTION, SUBCUT/IM      promethazine (PHENERGAN) 25 mg/mL injection      PROMETHAZINE HCL INJECTION      [place] THER/PROPH/DIAG INJECTION, SUBCUT/IM   2. Nausea R11.0 787.02      Informed patient that she should call neurology, should headache continue after medication administration. Educated about always going to the ER with worsening of symptoms, visual changes, dizziness, etc.    Pt informed to return to office with worsening of symptoms, or PRN with any questions or concerns. Pt verbalizes understanding of plan of care and denies further questions or concerns at this time.

## 2017-10-30 NOTE — PATIENT INSTRUCTIONS

## 2017-11-01 ENCOUNTER — TELEPHONE (OUTPATIENT)
Dept: ENDOCRINOLOGY | Age: 37
End: 2017-11-01

## 2017-11-01 NOTE — TELEPHONE ENCOUNTER
Spoke with pharmacy and informed them a PA has been sent in regards to Quantity of Apidra. Results are pending.

## 2017-11-01 NOTE — TELEPHONE ENCOUNTER
Jerry Lu called stating Apidra is requesting prior auth for 3 boxes for 30 days. Please advise [facility].

## 2017-11-07 ENCOUNTER — TELEPHONE (OUTPATIENT)
Dept: ENDOCRINOLOGY | Age: 37
End: 2017-11-07

## 2017-11-07 NOTE — TELEPHONE ENCOUNTER
----- Message from Gregory Pleitez sent at 11/7/2017 11:03 AM EST -----  Regarding: /Lui Fernandez from [address] [facility] is requesting that [address] [facility] reji BARTHOLOMEW for the quantity of the medication not the drug. Best contact [phone].

## 2017-11-10 ENCOUNTER — TELEPHONE (OUTPATIENT)
Dept: ENDOCRINOLOGY | Age: 37
End: 2017-11-10

## 2017-11-10 ENCOUNTER — OFFICE VISIT (OUTPATIENT)
Dept: NEUROLOGY | Age: 37
End: 2017-11-10

## 2017-11-10 VITALS
BODY MASS INDEX: 38.4 KG/M2 | DIASTOLIC BLOOD PRESSURE: 86 MMHG | SYSTOLIC BLOOD PRESSURE: 140 MMHG | OXYGEN SATURATION: 99 % | RESPIRATION RATE: 18 BRPM | HEIGHT: 62 IN | TEMPERATURE: 98.1 F | WEIGHT: 208.7 LBS | HEART RATE: 104 BPM

## 2017-11-10 DIAGNOSIS — H55.00 NYSTAGMUS: ICD-10-CM

## 2017-11-10 DIAGNOSIS — E11.65 TYPE 2 DIABETES MELLITUS WITH HYPERGLYCEMIA, WITH LONG-TERM CURRENT USE OF INSULIN (HCC): ICD-10-CM

## 2017-11-10 DIAGNOSIS — Z79.4 TYPE 2 DIABETES MELLITUS WITH HYPERGLYCEMIA, WITH LONG-TERM CURRENT USE OF INSULIN (HCC): ICD-10-CM

## 2017-11-10 DIAGNOSIS — R79.89 ELEVATED LFTS: ICD-10-CM

## 2017-11-10 DIAGNOSIS — E78.5 DYSLIPIDEMIA: ICD-10-CM

## 2017-11-10 DIAGNOSIS — R51.9 ACUTE NONINTRACTABLE HEADACHE, UNSPECIFIED HEADACHE TYPE: Primary | ICD-10-CM

## 2017-11-10 RX ORDER — AMITRIPTYLINE HYDROCHLORIDE 25 MG/1
25 TABLET, FILM COATED ORAL
Qty: 30 TAB | Refills: 3 | Status: SHIPPED | OUTPATIENT
Start: 2017-11-10 | End: 2018-01-10 | Stop reason: SDUPTHER

## 2017-11-10 RX ORDER — SODIUM BICARBONATE 325 MG/1
325 TABLET ORAL 2 TIMES DAILY
COMMUNITY
End: 2018-01-01 | Stop reason: DRUGHIGH

## 2017-11-10 NOTE — PROGRESS NOTES
Date:  11/10/17     Name:  Gaby Coffey  :  1980  MRN:  [id number]     PCP:  Alma Ku MD    Chief Complaint   Patient presents with    Migraine     has had consistent migraine x 3 months     HISTORY OF PRESENT ILLNESS: Follow up for headaches. Her last office visit was in  at which time she was diagnosed with analgesic rebound headache. This was treated by Dr. Reyna Butler and she did not return for follow up. Takes something for the headaches on a daily basis. Prior to this, she indicates that they would come and go. She would get them twice a week sometimes and other times only once. Perhaps on average she has one a week. She has been taking Tylenol twice a day. Headache Characterization: throbbing and aching  Pain Level:  10/10  Aura: no   Frequency/Length: started three months ago  Location: forehead area  Nausea/Vomiting: some nausea  Photophobia: yes  Phonophobia: yes  Provoking factors: no  Relieving factors: no  Focal neurologic symptoms with headache: no    Meds:  Prior abortive tx: tramadol, compazine, fioricet  Prior preventative tx: none    Current abortive tx: tylenol  Current preventative tx: none    Family Hx of headaches/migraines: no    Social:  Work: No   Caffeine:  No   Tobacco use: No  Sleep habits: will have 8 hours of sleep, she does DOC on CPAP since February  Exercise: sometimes    Except as noted above, denies  fever, chills, cough. No CP or SOB. No dysuria, loss of bowel or bladder control. No Weight loss. Appetite good. Sleeping well. No sweats. No edema. No bruising or bleeding. No nausea or vomit. No diarrhea. No frequency, urgency, No depressive sxs. No anxiety. Denies sore throat, nasal congestion, nasal discharge, epistaxis, tinnitus, hearing loss, back pain, muscle pain, or joint pain. Current Outpatient Prescriptions   Medication Sig    sodium bicarbonate 325 mg tablet Take 325 mg by mouth two (2) times a day.     TANZEUM 50 mg/0.5 mL injector pen 50 mg by SubCUTAneous route every seven (7) days.  insulin glulisine (APIDRA SOLOSTAR) 100 unit/mL pen INJECT 30 UNITS SUBCUTANEOUSLY BEFORE BREAKFAST, LUNCH, AND DINNER plus sliding scale. Max daily dose 150 units.  insulin glargine (LANTUS,BASAGLAR) 100 unit/mL (3 mL) inpn Inject 90 units at night.  Blood-Glucose Meter (ACCU-CHEK FILEMON PLUS METER) misc Test 4 times daily Dx Code: E11.65    Insulin Needles, Disposable, 31 gauge x 5/16\" ndle Use to inject Lantus and Apidra daily Dx Code: E11.65    VITAMIN D2 50,000 unit capsule Take 50,000 Units by mouth two (2) times a week.  carvedilol (COREG) 3.125 mg tablet Take 1 Tab by mouth two (2) times daily (with meals).  albuterol (VENTOLIN HFA) 90 mcg/actuation inhaler Take 2 Puffs by inhalation every four (4) hours as needed for Wheezing. Indications: Acute Asthma Attack    ARNUITY ELLIPTA 200 mcg/actuation dsdv inhaler Take 1 Puff by inhalation daily.  gabapentin (NEURONTIN) 300 mg capsule Take 1 Cap by mouth three (3) times daily.  montelukast (SINGULAIR) 10 mg tablet TAKE ONE TABLET BY MOUTH ONCE DAILY    glucose blood VI test strips (BLOOD GLUCOSE TEST) strip Patient request the [facility] [employer] [facility] brand of meter and strips and to test QID.  Insulin Needles, Disposable, 30 x 1/3 \" Sliding scale    Lancets misc Bid for diabetes 250.00  Lot # [id number]  Exp 7/2018  Man Brian Nordisk    Nebulizer & Compressor machine 1 Each by Does Not Apply route two (2) times daily as needed. No current facility-administered medications for this visit.       Allergies   Allergen Reactions    Aspirin Itching    Contrast Agent [Iodine] Hives    Dilaudid [Hydromorphone] Hives    Metformin Nausea Only    Reglan [Metoclopramide] Other (comments)     Impaired speech    Ritalin [Methylphenidate] Other (comments)     Speech impairment     Past Medical History:   Diagnosis Date    Abnormal pap 2012 10/5/13     hx w/Colpo     Arthritis     Asthma  Diabetes ([address]     Headache(784.0)     Hypertension     Joint pain     Pap smear for cervical cancer screening 10/1/15 ASCUS HPV NEG RP 3 YEARS    Ringing in ears      Past Surgical History:   Procedure Laterality Date    HX  SECTION  01 & 08    HX COLPOSCOPY  2012 neg    HX HEENT      cataract R eye    HX TUBAL LIGATION       Social History     Social History    Marital status:      Spouse name: N/A    Number of children: 2    Years of education: N/A     Occupational History    --      Social History Main Topics    Smoking status: Never Smoker    Smokeless tobacco: Never Used    Alcohol use No    Drug use: No    Sexual activity: Yes     Partners: Male     Birth control/ protection: Surgical     Other Topics Concern     Service No    Blood Transfusions No    Caffeine Concern No    Occupational Exposure No    Hobby Hazards No    Sleep Concern Yes    Stress Concern Yes    Weight Concern Yes    Special Diet Yes     DM    Back Care Yes    Exercise Yes    Bike Helmet No    Seat Belt Yes    Self-Exams Yes     Social History Narrative     Family History   Problem Relation Age of Onset    Diabetes Mother     Heart Disease Mother     Hypertension Mother     Stroke Mother     Hypertension Father     Stroke Father     Heart Disease Father     Breast Cancer Neg Hx        PHYSICAL EXAMINATION:    Visit Vitals    /86 (BP 1 Location: Left arm, BP Patient Position: Sitting)    Pulse (!) 104    Temp 98.1 °F (36.7 °C) (Oral)    Resp 18    Ht 5' 2\" (1.575 m)    Wt 94.7 kg (208 lb 11.2 oz)    SpO2 99%    BMI 38.17 kg/m2     General:  Well defined, nourished, and groomed individual in no acute distress. Neck: Supple, nontender, no bruits, no pain with resistance to active range of motion. Heart: Regular rate and rhythm, no murmurs, rub, or gallop. Normal S1S2.   Lungs:  Clear to auscultation bilaterally with equal chest expansion, no cough, no wheeze  Musculoskeletal:  Extremities revealed no edema and had full range of motion of joints. Psych:  Good mood and bright affect    NEUROLOGICAL EXAMINATION:     Mental Status:   Alert and oriented to person, place, and time with recent and remote memory intact. Attention span and concentration are normal. Speech is fluent with a full fund of knowledge. Cranial Nerves:    II, III, IV, VI:  Visual acuity grossly intact. Visual fields are normal.    Pupils are equal, round, and reactive to light and accommodation. Extra-ocular movements are full and fluid. Fundoscopic exam was benign, no ptosis. Positive nystagmus noted in the right eye with the fast phase to the left which may relate to a lazy eye. V-XII: Hearing is grossly intact. Facial features are symmetric, with normal sensation and strength. The palate rises symmetrically and the tongue protrudes midline. Sternocleidomastoids 5/5. Motor Examination: Normal tone, bulk, and strength, 5/5 muscle strength throughout. Coordination:  Finger to nose was normal.   No resting or intention tremor    Gait and Station:  Steady while walking. Normal arm swing. No pronator drift. No muscle wasting or fasiculations noted. Reflexes:  DTRs 2+ throughout. ASSESSMENT AND PLAN    ICD-10-CM ICD-9-CM    1. Acute nonintractable headache, unspecified headache type R51 784.0 amitriptyline (ELAVIL) 25 mg tablet   2. Elevated LFTs R79.89 790.6 CBC WITH AUTOMATED DIFF      METABOLIC PANEL, COMPREHENSIVE   3. Type 2 diabetes mellitus with hyperglycemia, with long-term current use of insulin (HCC) E11.65 250.00 CBC WITH AUTOMATED DIFF    Z79.4 790.29 HEMOGLOBIN A1C WITH EAG     V58.67    4. Dyslipidemia E78.5 272.4 CBC WITH AUTOMATED DIFF   5.  Nystagmus H55.00 379.50 MRI BRAIN WO CONT      DUPLEX CAROTID BILATERAL AMB NEURO     We discussed analgesic induced HA and the use of analgesics more than 2-3 times weekly will do nothing but drive the HA and to break this cycle all analgesics, whether OTC or prescribed have to be discontinued and HA likely to get worse before they get better. She was instructed to discontinue taking the Tylenol as often as she has been. She was placed on amitriptyline for prevention of her headaches. Purpose of potential side effects were discussed and she has verbalized understanding. In looking at her chart, she has had some issues with elevated LFTs. Her last hemoglobin A1c was 10.5% and she has a history of dyslipidemia as well as hypertension. On exam today, she had some noted nystagmus in addition to this more nonspecific headache which started about 3 months ago. For further evaluation, she was sent for an MRI of her brain with and without contrast as well as a carotid Doppler. We will reevaluate her LFTs and hemoglobin A1c. She was encouraged to continue follow-up with her other providers for ongoing management of her comorbid diseases particularly as it relates to potential stroke. We did discuss primary stroke prevention today to include as well as signs and symptoms of stroke, BE-FAST, and when to call for EMS. Stressed importance of recognition and early intervention. LDL goal less than 75 per ADA guidelines. Discussed lifestyle modification and importance of exercise and appropriate diet, weight management, and management of comorbid disease with appropriate follow up visits with primary care and other healthcare providers. More than 50% of today's 40+ minute office visit was spent in education and counseling as well as coordination of care. Charlotte Cummins

## 2017-11-10 NOTE — PROGRESS NOTES
Maurice Miranda is a 40 y.o. female  Chief Complaint   Patient presents with    Migraine     has had consistent migraine x 3 months

## 2017-11-10 NOTE — TELEPHONE ENCOUNTER
Spoke with insurance in regards to [employer] [facility]. Stated there is an approval on file for medication as well as quantity and patient should be able to get 45 ml for 30 days. Insurance sees paid claim two days ago for 15 ml dispensed. They said to communicate with pharmacy and inform them to do a reverse claim and dispense full amount to patient and gave a phone number for pharmacy to call for a rejection so they could then do an override. Pharmacy called and given the information. The phone number the pharmacy was given is [phone] for [facility].

## 2017-11-10 NOTE — MR AVS SNAPSHOT
Visit Information Date & Time Provider Department Dept. Phone Encounter #  
 11/10/2017  2:00 PM Diallo Montemayor NP [facility] Neurology [facility] [phone] [id number] Follow-up Instructions Return in about 2 months (around 1/10/2018). Upcoming Health Maintenance Date Due Pneumococcal 19-64 Medium Risk (1 of 1 - PPSV23) 1/18/1999 EYE EXAM RETINAL OR DILATED Q1 5/19/2014 MICROALBUMIN Q1 4/13/2017 FOOT EXAM Q1 6/24/2017 LIPID PANEL Q1 10/4/2017 HEMOGLOBIN A1C Q6M 2/15/2018 PAP AKA CERVICAL CYTOLOGY 10/1/2018 DTaP/Tdap/Td series (2 - Td) 12/8/2025 Allergies as of 11/10/2017  Review Complete On: 11/10/2017 By: Diallo Montemayor NP Severity Noted Reaction Type Reactions Aspirin  03/05/2010    Itching Contrast Agent [Iodine]  05/01/2015    Hives Dilaudid [Hydromorphone]  05/01/2015    Hives Metformin  07/03/2013    Nausea Only Reglan [Metoclopramide]  03/05/2010    Other (comments) Impaired speech Ritalin [Methylphenidate]  05/01/2015    Other (comments) Speech impairment Current Immunizations  Reviewed on 12/8/2015 Name Date Influenza Vaccine (Quad) PF 10/4/2016, 11/23/2015  9:10 AM  
 Tdap 12/8/2015  9:54 AM  
  
 Not reviewed this visit You Were Diagnosed With   
  
 Codes Comments Acute nonintractable headache, unspecified headache type    -  Primary ICD-10-CM: R51 ICD-9-CM: 784.0 Elevated LFTs     ICD-10-CM: R79.89 ICD-9-CM: 790.6 Type 2 diabetes mellitus with hyperglycemia, with long-term current use of insulin (HCC)     ICD-10-CM: E11.65, Z79.4 ICD-9-CM: 250.00, 790.29, V58.67 Dyslipidemia     ICD-10-CM: E78.5 ICD-9-CM: 272.4 Nystagmus     ICD-10-CM: H55.00 ICD-9-CM: 379.50 Vitals BP Pulse Temp Resp Height(growth percentile) Weight(growth percentile)  140/86 (BP 1 Location: Left arm, BP Patient Position: Sitting) (!) 104 98.1 °F (36.7 °C) (Oral) 18 5' 2\" (1.575 m) 208 lb 11.2 oz (94.7 kg) SpO2 BMI OB Status Smoking Status 99% 38.17 kg/m2 Unknown Never Smoker Vitals History BMI and BSA Data Body Mass Index Body Surface Area  
 38.17 kg/m 2 2.04 m 2 Preferred Pharmacy Pharmacy Name Phone [facility] [address] [place], [place] [address]e [facility] Oren THORNE [phone] Your Updated Medication List  
  
   
This list is accurate as of: 11/10/17  3:01 PM.  Always use your most recent med list.  
  
  
  
  
 albuterol 90 mcg/actuation inhaler Commonly known as:  VENTOLIN HFA Take 2 Puffs by inhalation every four (4) hours as needed for Wheezing. Indications: Acute Asthma Attack  
  
 amitriptyline 25 mg tablet Commonly known as:  ELAVIL Take 1 Tab by mouth nightly. ARNUITY ELLIPTA 200 mcg/actuation Dsdv inhaler Generic drug:  fluticasone furoate Take 1 Puff by inhalation daily. Blood-Glucose Meter Misc Commonly known as:  ACCU-CHEK FILEMON PLUS METER Test 4 times daily Dx Code: E11.65  
  
 carvedilol 3.125 mg tablet Commonly known as:  Josiah Bourdon Take 1 Tab by mouth two (2) times daily (with meals). gabapentin 300 mg capsule Commonly known as:  NEURONTIN Take 1 Cap by mouth three (3) times daily. glucose blood VI test strips strip Commonly known as:  blood glucose test  
Patient request the [facility] brand of meter and strips and to test QID. insulin glargine 100 unit/mL (3 mL) Inpn Commonly known as:  Mulu Valdes Inject 90 units at night. insulin glulisine 100 unit/mL pen Commonly known as:  Glenda Moses INJECT 30 UNITS SUBCUTANEOUSLY BEFORE BREAKFAST, LUNCH, AND DINNER plus sliding scale. Max daily dose 150 units. Insulin Needles (Disposable) 30 gauge x 1/3\" Sliding scale Insulin Needles (Disposable) 31 gauge x 5/16\" Ndle Use to inject Lantus and Apidra daily Dx Code: E11.65 Lancets Misc Bid for diabetes 250.00 Lot # [id number] Exp 7/2018 Man Brian Nordisk  
  
 montelukast 10 mg tablet Commonly known as:  SINGULAIR  
TAKE ONE TABLET BY MOUTH ONCE DAILY Nebulizer & Compressor machine 1 Each by Does Not Apply route two (2) times daily as needed. sodium bicarbonate 325 mg tablet Take 325 mg by mouth two (2) times a day. TANZEUM 50 mg/0.5 mL injector pen Generic drug:  albiglutide 50 mg by SubCUTAneous route every seven (7) days. VITAMIN D2 50,000 unit capsule Generic drug:  ergocalciferol Take 50,000 Units by mouth two (2) times a week. Prescriptions Sent to Pharmacy Refills  
 amitriptyline (ELAVIL) 25 mg tablet 3 Sig: Take 1 Tab by mouth nightly. Class: Normal  
 Pharmacy: [address] Ph #: [phone] Route: Oral  
  
We Performed the Following CBC WITH AUTOMATED DIFF [41992 CPT(R)] HEMOGLOBIN A1C WITH EAG [25968 CPT(R)] METABOLIC PANEL, COMPREHENSIVE [86591 CPT(R)] Follow-up Instructions Return in about 2 months (around 1/10/2018). To-Do List   
 11/10/2017 Imaging:  DUPLEX CAROTID BILATERAL AMB NEURO   
  
 11/10/2017 Imaging:  MRI BRAIN WO CONT Referral Information Referral ID Referred By Referred To  
  
 [id number] Samantha Ihsan Not Available Visits Status Start Date End Date 1 New Request 11/10/17 11/10/18 If your referral has a status of pending review or denied, additional information will be sent to support the outcome of this decision. Introducing [facility]! [facility] introduces [employer] patient portal. Now you can access parts of your medical record, email your doctor's office, and request medication refills online. 1. In your internet browser, go to [email or web address] 2. Click on the First Time User? Click Here link in the Sign In box. You will see the New Member Sign Up page. 3. Enter your MILI Access Code exactly as it appears below. You will not need to use this code after youve completed the sign-up process. If you do not sign up before the expiration date, you must request a new code. · MILI Access Code: [email or web address] Expires: 1/20/2018  7:48 AM 
 
4. Enter the last four digits of your Social Security Number (xxxx) and Date of Birth (mm/dd/yyyy) as indicated and click Submit. You will be taken to the next sign-up page. 5. Create a MILI ID. This will be your MILI login ID and cannot be changed, so think of one that is secure and easy to remember. 6. Create a MILI password. You can change your password at any time. 7. Enter your Password Reset Question and Answer. This can be used at a later time if you forget your password. 8. Enter your e-mail address. You will receive e-mail notification when new information is available in [address]. 9. Click Sign Up. You can now view and download portions of your medical record. 10. Click the Download Summary menu link to download a portable copy of your medical information. If you have questions, please visit the Frequently Asked Questions section of the MILI website. Remember, MILI is NOT to be used for urgent needs. For medical emergencies, dial 911. Now available from your iPhone and Android! Please provide this summary of care documentation to your next provider. Your primary care clinician is listed as Rika Herron. If you have any questions after today's visit, please call [phone].

## 2017-11-10 NOTE — TELEPHONE ENCOUNTER
Patient called stating that her neurologist wants her seen due to high blood sugar's . She states she was told to be seen as soon as possible. Blood sugar's running in 300's. Where would you like to schedule patient?

## 2017-11-10 NOTE — TELEPHONE ENCOUNTER
I have discussed with Dr Steven/PCP about referring to [facility] the last time patient had called.  Please ask her to call PCP        Note : pt has been noncompliant with my recommendations despite multiple counselings and continues to have uncontrolled DM

## 2017-11-20 ENCOUNTER — HOSPITAL ENCOUNTER (OUTPATIENT)
Dept: MRI IMAGING | Age: 37
Discharge: HOME | End: 2017-11-20
Payer: MEDICAID

## 2017-11-20 DIAGNOSIS — H55.00 NYSTAGMUS: ICD-10-CM

## 2017-11-20 PROCEDURE — 70551 MRI BRAIN STEM W/O DYE: CPT

## 2017-11-21 NOTE — TELEPHONE ENCOUNTER
Patient returned called, advised patient to check with her primary and they will get her set up with a [address]. Patient states she will call her primary care.

## 2018-01-01 ENCOUNTER — OFFICE VISIT (OUTPATIENT)
Dept: FAMILY MEDICINE CLINIC | Age: 38
End: 2018-01-01

## 2018-01-01 ENCOUNTER — TELEPHONE (OUTPATIENT)
Dept: FAMILY MEDICINE CLINIC | Age: 38
End: 2018-01-01

## 2018-01-01 VITALS
DIASTOLIC BLOOD PRESSURE: 79 MMHG | SYSTOLIC BLOOD PRESSURE: 137 MMHG | RESPIRATION RATE: 18 BRPM | TEMPERATURE: 97.7 F | HEART RATE: 96 BPM | WEIGHT: 210 LBS | HEIGHT: 62 IN | OXYGEN SATURATION: 100 % | BODY MASS INDEX: 38.64 KG/M2

## 2018-01-01 DIAGNOSIS — Z01.818 PRE-OP EVALUATION: Primary | ICD-10-CM

## 2018-01-01 DIAGNOSIS — H33.22 LEFT RETINAL DETACHMENT: ICD-10-CM

## 2018-01-01 DIAGNOSIS — E11.21 TYPE 2 DIABETES WITH NEPHROPATHY (HCC): ICD-10-CM

## 2018-01-01 DIAGNOSIS — E11.65 TYPE 2 DIABETES MELLITUS WITH HYPERGLYCEMIA, WITH LONG-TERM CURRENT USE OF INSULIN (HCC): Primary | ICD-10-CM

## 2018-01-01 DIAGNOSIS — Z79.4 TYPE 2 DIABETES MELLITUS WITH HYPERGLYCEMIA, WITH LONG-TERM CURRENT USE OF INSULIN (HCC): Primary | ICD-10-CM

## 2018-01-01 LAB
ALBUMIN SERPL-MCNC: 3.5 G/DL (ref 3.5–5.5)
ALBUMIN/GLOB SERPL: 1 {RATIO} (ref 1.2–2.2)
ALP SERPL-CCNC: 60 IU/L (ref 39–117)
ALT SERPL-CCNC: 33 IU/L (ref 0–32)
AST SERPL-CCNC: 25 IU/L (ref 0–40)
BASOPHILS # BLD AUTO: 0 X10E3/UL (ref 0–0.2)
BASOPHILS NFR BLD AUTO: 0 %
BILIRUB SERPL-MCNC: 0.2 MG/DL (ref 0–1.2)
BUN SERPL-MCNC: 10 MG/DL (ref 6–20)
BUN/CREAT SERPL: 10 (ref 9–23)
CALCIUM SERPL-MCNC: 8.9 MG/DL (ref 8.7–10.2)
CHLORIDE SERPL-SCNC: 104 MMOL/L (ref 96–106)
CO2 SERPL-SCNC: 25 MMOL/L (ref 20–29)
CREAT SERPL-MCNC: 0.96 MG/DL (ref 0.57–1)
EOSINOPHIL # BLD AUTO: 0.1 X10E3/UL (ref 0–0.4)
EOSINOPHIL NFR BLD AUTO: 2 %
ERYTHROCYTE [DISTWIDTH] IN BLOOD BY AUTOMATED COUNT: 13.7 % (ref 12.3–15.4)
GLOBULIN SER CALC-MCNC: 3.4 G/DL (ref 1.5–4.5)
GLUCOSE SERPL-MCNC: 180 MG/DL (ref 65–99)
HCT VFR BLD AUTO: 33.2 % (ref 34–46.6)
HGB BLD-MCNC: 11.1 G/DL (ref 11.1–15.9)
IMM GRANULOCYTES # BLD: 0 X10E3/UL (ref 0–0.1)
IMM GRANULOCYTES NFR BLD: 1 %
LYMPHOCYTES # BLD AUTO: 2.8 X10E3/UL (ref 0.7–3.1)
LYMPHOCYTES NFR BLD AUTO: 39 %
MCH RBC QN AUTO: 32 PG (ref 26.6–33)
MCHC RBC AUTO-ENTMCNC: 33.4 G/DL (ref 31.5–35.7)
MCV RBC AUTO: 96 FL (ref 79–97)
MONOCYTES # BLD AUTO: 0.8 X10E3/UL (ref 0.1–0.9)
MONOCYTES NFR BLD AUTO: 11 %
NEUTROPHILS # BLD AUTO: 3.4 X10E3/UL (ref 1.4–7)
NEUTROPHILS NFR BLD AUTO: 47 %
PLATELET # BLD AUTO: 386 X10E3/UL (ref 150–379)
POTASSIUM SERPL-SCNC: 3.8 MMOL/L (ref 3.5–5.2)
PROT SERPL-MCNC: 6.9 G/DL (ref 6–8.5)
RBC # BLD AUTO: 3.47 X10E6/UL (ref 3.77–5.28)
SODIUM SERPL-SCNC: 139 MMOL/L (ref 134–144)
WBC # BLD AUTO: 7.1 X10E3/UL (ref 3.4–10.8)

## 2018-01-01 RX ORDER — INSULIN GLULISINE 100 [IU]/ML
80 INJECTION, SOLUTION SUBCUTANEOUS
COMMUNITY
Start: 2018-10-18 | End: 2018-01-01 | Stop reason: SDUPTHER

## 2018-01-01 RX ORDER — OFLOXACIN 3 MG/ML
SOLUTION/ DROPS OPHTHALMIC
COMMUNITY
Start: 2018-01-01 | End: 2019-01-01 | Stop reason: ALTCHOICE

## 2018-01-01 RX ORDER — INSULIN GLULISINE 100 [IU]/ML
80 INJECTION, SOLUTION SUBCUTANEOUS
Qty: 30 ML | Refills: 2 | Status: SHIPPED | OUTPATIENT
Start: 2018-01-01 | End: 2019-01-01 | Stop reason: CLARIF

## 2018-01-01 RX ORDER — BUMETANIDE 0.5 MG/1
0.5 TABLET ORAL DAILY
COMMUNITY
Start: 2018-11-06

## 2018-01-01 RX ORDER — PREDNISOLONE ACETATE 10 MG/ML
SUSPENSION/ DROPS OPHTHALMIC
COMMUNITY
Start: 2018-01-01 | End: 2019-01-01

## 2018-01-01 RX ORDER — ATORVASTATIN CALCIUM 20 MG/1
20 TABLET, FILM COATED ORAL
COMMUNITY
Start: 2018-01-01 | End: 2019-01-01

## 2018-01-01 RX ORDER — ISOSORBIDE MONONITRATE 30 MG/1
TABLET, EXTENDED RELEASE ORAL
Qty: 30 TAB | Refills: 0 | Status: SHIPPED | OUTPATIENT
Start: 2018-01-01 | End: 2019-01-01 | Stop reason: SDUPTHER

## 2018-01-01 RX ORDER — ISOSORBIDE MONONITRATE 30 MG/1
TABLET, EXTENDED RELEASE ORAL
Qty: 30 TAB | Refills: 0 | Status: SHIPPED | OUTPATIENT
Start: 2018-01-01 | End: 2018-01-01 | Stop reason: SDUPTHER

## 2018-01-01 RX ORDER — INSULIN GLARGINE 100 [IU]/ML
30 INJECTION, SOLUTION SUBCUTANEOUS
COMMUNITY
Start: 2018-07-30 | End: 2019-01-01

## 2018-01-01 RX ORDER — SODIUM BICARBONATE 650 MG/1
650 TABLET ORAL 2 TIMES DAILY
COMMUNITY
Start: 2018-01-01

## 2018-01-10 ENCOUNTER — OFFICE VISIT (OUTPATIENT)
Dept: NEUROLOGY | Age: 38
End: 2018-01-10

## 2018-01-10 VITALS
OXYGEN SATURATION: 97 % | RESPIRATION RATE: 20 BRPM | HEART RATE: 109 BPM | HEIGHT: 62 IN | DIASTOLIC BLOOD PRESSURE: 104 MMHG | BODY MASS INDEX: 39.56 KG/M2 | SYSTOLIC BLOOD PRESSURE: 160 MMHG | WEIGHT: 215 LBS

## 2018-01-10 DIAGNOSIS — I10 ESSENTIAL HYPERTENSION: ICD-10-CM

## 2018-01-10 DIAGNOSIS — R51.9 ACUTE NONINTRACTABLE HEADACHE, UNSPECIFIED HEADACHE TYPE: ICD-10-CM

## 2018-01-10 DIAGNOSIS — R51.9 CHRONIC DAILY HEADACHE: Primary | ICD-10-CM

## 2018-01-10 DIAGNOSIS — Z79.4 TYPE 2 DIABETES MELLITUS WITH HYPERGLYCEMIA, WITH LONG-TERM CURRENT USE OF INSULIN (HCC): ICD-10-CM

## 2018-01-10 DIAGNOSIS — E11.65 TYPE 2 DIABETES MELLITUS WITH HYPERGLYCEMIA, WITH LONG-TERM CURRENT USE OF INSULIN (HCC): ICD-10-CM

## 2018-01-10 DIAGNOSIS — E78.5 DYSLIPIDEMIA (HIGH LDL; LOW HDL): ICD-10-CM

## 2018-01-10 PROBLEM — E11.40 TYPE 2 DIABETES MELLITUS WITH DIABETIC NEUROPATHY (HCC): Status: ACTIVE | Noted: 2018-01-10

## 2018-01-10 PROBLEM — E11.21 TYPE 2 DIABETES MELLITUS WITH NEPHROPATHY (HCC): Status: ACTIVE | Noted: 2018-01-10

## 2018-01-10 RX ORDER — AMITRIPTYLINE HYDROCHLORIDE 25 MG/1
25 TABLET, FILM COATED ORAL
Qty: 30 TAB | Refills: 3 | Status: SHIPPED | OUTPATIENT
Start: 2018-01-10 | End: 2018-04-10 | Stop reason: SINTOL

## 2018-01-10 RX ORDER — LOSARTAN POTASSIUM 50 MG/1
50 TABLET ORAL DAILY
Qty: 30 TAB | Refills: 3 | Status: SHIPPED | OUTPATIENT
Start: 2018-01-10 | End: 2018-04-10 | Stop reason: SDUPTHER

## 2018-01-10 NOTE — PROGRESS NOTES
Date:  01/10/18     Name:  Etta Sandhoff  :  1980  MRN:  [id number]     PCP:  Sowmya Carrera MD    Chief Complaint   Patient presents with    Migraine     HISTORY OF PRESENT ILLNESS: Follow up for chronic daily headaches. Continues to have them daily. Only taking the Elavil for the headaches at this point. Denies taking anything else. MRI of the brain was normal. Labs were not completed. Headache Characterization: throbbing and aching  Pain Level:  7-8/10-getting them in the evening. None during the day  Aura: no   Frequency/Length: started six months ago  Location: forehead area  Nausea/Vomiting: some nausea, but not always  Photophobia:no  Phonophobia: no  Provoking factors: no  Relieving factors: no  Focal neurologic symptoms with headache: no     Meds:  Prior abortive tx: tramadol, compazine, fioricet  Prior preventative tx: none     Current abortive tx:               none  Current preventative tx:  Elavil      Family Hx of headaches/migraines: no     Social:  Work:             No   Caffeine:  No   Tobacco use: No  Sleep habits: will have 8 hours of sleep, she does DOC on CPAP since February  Exercise: sometimes      Current Outpatient Prescriptions   Medication Sig    sodium bicarbonate 325 mg tablet Take 325 mg by mouth two (2) times a day.  amitriptyline (ELAVIL) 25 mg tablet Take 1 Tab by mouth nightly.  TANZEUM 50 mg/0.5 mL injector pen 50 mg by SubCUTAneous route every seven (7) days.  insulin glargine (LANTUS,BASAGLAR) 100 unit/mL (3 mL) inpn Inject 90 units at night.  Blood-Glucose Meter (ACCU-CHEK FILEMON PLUS METER) misc Test 4 times daily Dx Code: E11.65    Insulin Needles, Disposable, 31 gauge x \" ndle Use to inject Lantus and Apidra daily Dx Code: E11.65    VITAMIN D2 50,000 unit capsule Take 50,000 Units by mouth two (2) times a week.  carvedilol (COREG) 3.125 mg tablet Take 1 Tab by mouth two (2) times daily (with meals).     albuterol (VENTOLIN HFA) 90 mcg/actuation inhaler Take 2 Puffs by inhalation every four (4) hours as needed for Wheezing. Indications: Acute Asthma Attack    ARNUITY ELLIPTA 200 mcg/actuation dsdv inhaler Take 1 Puff by inhalation daily.  gabapentin (NEURONTIN) 300 mg capsule Take 1 Cap by mouth three (3) times daily.  montelukast (SINGULAIR) 10 mg tablet TAKE ONE TABLET BY MOUTH ONCE DAILY    Nebulizer & Compressor machine 1 Each by Does Not Apply route two (2) times daily as needed.  glucose blood VI test strips (BLOOD GLUCOSE TEST) strip Patient request the [employer] [facility] [employer] brand of meter and strips and to test QID.  Insulin Needles, Disposable, 30 x 1/3 \" Sliding scale    Lancets misc Bid for diabetes 250.00  Lot # [id number]  Exp 2018  Man Brian Nordisk    insulin glulisine (APIDRA SOLOSTAR) 100 unit/mL pen INJECT 30 UNITS SUBCUTANEOUSLY BEFORE BREAKFAST, LUNCH, AND DINNER plus sliding scale. Max daily dose 150 units. No current facility-administered medications for this visit.       Allergies   Allergen Reactions    Aspirin Itching    Contrast Agent [Iodine] Hives    Dilaudid [Hydromorphone] Hives    Metformin Nausea Only    Reglan [Metoclopramide] Other (comments)     Impaired speech    Ritalin [Methylphenidate] Other (comments)     Speech impairment     Past Medical History:   Diagnosis Date    Abnormal pap 2012 10/5/13     hx w/Colpo     Arthritis     Asthma     Diabetes ([facility] [address]     Headache(784.0)     Hypertension     Joint pain     Pap smear for cervical cancer screening 10/1/15 ASCUS HPV NEG RP 3 YEARS    Ringing in ears      Past Surgical History:   Procedure Laterality Date    HX  SECTION  01 & 08    HX COLPOSCOPY  2012 neg    HX HEENT      cataract R eye    HX TUBAL LIGATION       Social History     Social History    Marital status:      Spouse name: N/A    Number of children: 2    Years of education: N/A     Occupational History    -- Social History Main Topics    Smoking status: Never Smoker    Smokeless tobacco: Never Used    Alcohol use No    Drug use: No    Sexual activity: Yes     Partners: Male     Birth control/ protection: Surgical     Other Topics Concern     Service No    Blood Transfusions No    Caffeine Concern No    Occupational Exposure No    Hobby Hazards No    Sleep Concern Yes    Stress Concern Yes    Weight Concern Yes    Special Diet Yes     DM    Back Care Yes    Exercise Yes    Bike Helmet No    Seat Belt Yes    Self-Exams Yes     Social History Narrative     Family History   Problem Relation Age of Onset    Diabetes Mother     Heart Disease Mother     Hypertension Mother     Stroke Mother     Hypertension Father     Stroke Father     Heart Disease Father     Breast Cancer Neg Hx        PHYSICAL EXAMINATION:    Visit Vitals    BP (!) 160/104    Pulse (!) 109    Resp 20    Ht 5' 2\" (1.575 m)    Wt 97.5 kg (215 lb)    SpO2 97%    BMI 39.32 kg/m2     General:  Well defined, nourished, and groomed individual in no acute distress. Neck:             Supple, nontender, no bruits, no pain with resistance to active range of motion. Heart:            Regular rate and rhythm, no murmurs, rub, or gallop. Normal S1S2. Lungs:  Clear to auscultation bilaterally with equal chest expansion, no cough, no wheeze  Musculoskeletal:  Extremities revealed no edema and had full range of motion of joints. Psych:  Good mood and bright affect     NEUROLOGICAL EXAMINATION:     Mental Status:   Alert and oriented to person, place, and time with recent and remote memory intact. Attention span and concentration are normal. Speech is fluent with a full fund of knowledge.       Cranial Nerves:    II, III, IV, VI:  Visual acuity grossly intact. Visual fields are normal.    Pupils are equal, round, and reactive to light and accommodation. Extra-ocular movements are full and fluid.   Fundoscopic exam was benign, no ptosis. Positive nystagmus noted in the right eye with the fast phase to the left which may relate to a lazy eye. V-XII:             Hearing is grossly intact. Facial features are symmetric, with normal sensation and strength. The palate rises symmetrically and the tongue protrudes midline. Sternocleidomastoids 5/5.       Motor Examination:               Normal tone, bulk, and strength, 5/5 muscle strength throughout.       Coordination:  Finger to nose was normal.   No resting or intention tremor     Gait and Station:  Steady while walking. Normal arm swing. No pronator drift. No muscle wasting or fasiculations noted.       Reflexes:  DTRs 2+ throughout. ASSESSMENT AND PLAN    ICD-10-CM ICD-9-CM    1. Chronic daily headache R51 784.0    2. Acute nonintractable headache, unspecified headache type R51 784.0 amitriptyline (ELAVIL) 25 mg tablet   3. Type 2 diabetes mellitus with hyperglycemia, with long-term current use of insulin ([facility]) B38.23 619.88 METABOLIC PANEL, COMPREHENSIVE    Z79.4 790.29 CBC W/O DIFF     V58.67 HEMOGLOBIN A1C WITH EAG   4. Essential hypertension H89 670.1 METABOLIC PANEL, COMPREHENSIVE      CBC W/O DIFF      losartan (COZAAR) 50 mg tablet   5. Dyslipidemia (high LDL; low HDL) E78.4 272.4 LIPID PANEL      METABOLIC PANEL, COMPREHENSIVE      CBC W/O DIFF     While she does continue to have a daily headache, the intensity is improved as are the associated symptoms. She will continue with Elavil as prescribed. Of note, her blood pressure remains less than optimal and this could be a contributing factor in the headaches. As such, she was started on Cozaar 50mg daily. I have instructed her to return to the clinic in two weeks to have her blood pressure rechecked. I will plan to see her back in six weeks to reassess the headaches. In the meantime, she was sent for fasting lipids, CMP, CBC, and HgbA1C.  She was instructed to call her endocrinologist for a follow up appointment as well. Today, we discussed her multiple comorbid diseases and how these could contribute to headaches, especially the HTN and any cardiovascular components, but also in terms of cardiovascular and cerebrovascular risk, long term organ damage with uncontrolled DM and HTN. Stressed the importance of compliance with medication and appropriate follow up with her healthcare providers. More than 50% of today's 40+ minute office visit was spent in education and counseling. Charlotte Chapman Angry

## 2018-01-10 NOTE — PATIENT INSTRUCTIONS

## 2018-01-10 NOTE — MR AVS SNAPSHOT
Visit Information Date & Time Provider Department Dept. Phone Encounter #  
 1/10/2018  2:00 PM Sam Becker NP [facility] Neurology [facility] [phone] [id number] Follow-up Instructions Return in about 6 weeks (around 2/21/2018). Upcoming Health Maintenance Date Due Pneumococcal 19-64 Medium Risk (1 of 1 - PPSV23) 1/18/1999 EYE EXAM RETINAL OR DILATED Q1 5/19/2014 MICROALBUMIN Q1 4/13/2017 FOOT EXAM Q1 6/24/2017 LIPID PANEL Q1 10/4/2017 HEMOGLOBIN A1C Q6M 2/15/2018 PAP AKA CERVICAL CYTOLOGY 10/1/2018 DTaP/Tdap/Td series (2 - Td) 12/8/2025 Allergies as of 1/10/2018  Review Complete On: 1/10/2018 By: Joleen Goldmann, LPN Severity Noted Reaction Type Reactions Aspirin  03/05/2010    Itching Contrast Agent [Iodine]  05/01/2015    Hives Dilaudid [Hydromorphone]  05/01/2015    Hives Metformin  07/03/2013    Nausea Only Reglan [Metoclopramide]  03/05/2010    Other (comments) Impaired speech Ritalin [Methylphenidate]  05/01/2015    Other (comments) Speech impairment Current Immunizations  Reviewed on 12/8/2015 Name Date Influenza Vaccine (Quad) PF 10/4/2016, 11/23/2015  9:10 AM  
 Tdap 12/8/2015  9:54 AM  
  
 Not reviewed this visit You Were Diagnosed With   
  
 Codes Comments Chronic daily headache    -  Primary ICD-10-CM: C49 ICD-9-CM: 784.0 Acute nonintractable headache, unspecified headache type     ICD-10-CM: R51 ICD-9-CM: 303. 0 Type 2 diabetes mellitus with hyperglycemia, with long-term current use of insulin (HCC)     ICD-10-CM: E11.65, Z79.4 ICD-9-CM: 250.00, 790.29, V58.67 Essential hypertension     ICD-10-CM: I10 
ICD-9-CM: 401.9 Dyslipidemia (high LDL; low HDL)     ICD-10-CM: E78.4 ICD-9-CM: 272.4 Vitals BP Pulse Resp Height(growth percentile) Weight(growth percentile) SpO2  
 (!) 160/104 (!) 109 20 5' 2\" (1.575 m) 215 lb (97.5 kg) 97% BMI OB Status Smoking Status 39.32 kg/m2 Unknown Never Smoker Vitals History BMI and BSA Data Body Mass Index Body Surface Area  
 39.32 kg/m 2 2.07 m 2 Preferred Pharmacy Pharmacy Name Phone [facility] [phone] Your Updated Medication List  
  
   
This list is accurate as of: 1/10/18  2:49 PM.  Always use your most recent med list.  
  
  
  
  
 albuterol 90 mcg/actuation inhaler Commonly known as:  VENTOLIN HFA Take 2 Puffs by inhalation every four (4) hours as needed for Wheezing. Indications: Acute Asthma Attack  
  
 amitriptyline 25 mg tablet Commonly known as:  ELAVIL Take 1 Tab by mouth nightly. ARNUITY ELLIPTA 200 mcg/actuation Dsdv inhaler Generic drug:  fluticasone furoate Take 1 Puff by inhalation daily. Blood-Glucose Meter Misc Commonly known as:  ACCU-CHEK FILEMON PLUS METER Test 4 times daily Dx Code: E11.65  
  
 carvedilol 3.125 mg tablet Commonly known as:  Gillian [place] Take 1 Tab by mouth two (2) times daily (with meals). gabapentin 300 mg capsule Commonly known as:  NEURONTIN Take 1 Cap by mouth three (3) times daily. glucose blood VI test strips strip Commonly known as:  blood glucose test  
Patient request the [employer] [facility] [employer] brand of meter and strips and to test QID. insulin glargine 100 unit/mL (3 mL) Inpn Commonly known as:  Lazaro Bash Inject 90 units at night. insulin glulisine 100 unit/mL pen Commonly known as:  Jose Molt INJECT 30 UNITS SUBCUTANEOUSLY BEFORE BREAKFAST, LUNCH, AND DINNER plus sliding scale. Max daily dose 150 units. Insulin Needles (Disposable) 30 gauge x 1/3\" Sliding scale Insulin Needles (Disposable) 31 gauge x 5/16\" Ndle Use to inject Lantus and Apidra daily Dx Code: E11.65 Lancets Misc Bid for diabetes 250.00 Lot # [id number] Exp 7/2018 [facility] Watchwith losartan 50 mg tablet Commonly known as:  COZAAR Take 1 Tab by mouth daily. montelukast 10 mg tablet Commonly known as:  SINGULAIR  
TAKE ONE TABLET BY MOUTH ONCE DAILY Nebulizer & Compressor machine 1 Each by Does Not Apply route two (2) times daily as needed. sodium bicarbonate 325 mg tablet Take 325 mg by mouth two (2) times a day. TANZEUM 50 mg/0.5 mL injector pen Generic drug:  albiglutide 50 mg by SubCUTAneous route every seven (7) days. VITAMIN D2 50,000 unit capsule Generic drug:  ergocalciferol Take 50,000 Units by mouth two (2) times a week. Prescriptions Sent to Pharmacy Refills  
 amitriptyline (ELAVIL) 25 mg tablet 3 Sig: Take 1 Tab by mouth nightly. Class: Normal  
 Pharmacy: [address] [place] [address] Ph #: [phone] Route: Oral  
 losartan (COZAAR) 50 mg tablet 3 Sig: Take 1 Tab by mouth daily. Class: Normal  
 Pharmacy: [address] [place] [address] Ph #: [phone] Route: Oral  
  
We Performed the Following CBC W/O DIFF [88930 CPT(R)] HEMOGLOBIN A1C WITH EAG [76759 CPT(R)] LIPID PANEL [31266 CPT(R)] METABOLIC PANEL, COMPREHENSIVE [21445 CPT(R)] Follow-up Instructions Return in about 6 weeks (around 2/21/2018). Patient Instructions A Healthy Lifestyle: Care Instructions Your Care Instructions A healthy lifestyle can help you feel good, stay at a healthy weight, and have plenty of energy for both work and play. A healthy lifestyle is something you can share with your whole family. A healthy lifestyle also can lower your risk for serious health problems, such as high blood pressure, heart disease, and diabetes. You can follow a few steps listed below to improve your health and the health of your family. Follow-up care is a key part of your treatment and safety.  Be sure to make and go to all appointments, and call your doctor if you are having problems. It's also a good idea to know your test results and keep a list of the medicines you take. How can you care for yourself at home? · Do not eat too much sugar, fat, or fast foods. You can still have dessert and treats now and then. The goal is moderation. · Start small to improve your eating habits. Pay attention to portion sizes, drink less juice and soda pop, and eat more fruits and vegetables. ¨ Eat a healthy amount of food. A 3-ounce serving of meat, for example, is about the size of a deck of cards. Fill the rest of your plate with vegetables and whole grains. ¨ Limit the amount of soda and sports drinks you have every day. Drink more water when you are thirsty. ¨ Eat at least 5 servings of fruits and vegetables every day. It may seem like a lot, but it is not hard to reach this goal. A serving or helping is 1 piece of fruit, 1 cup of vegetables, or 2 cups of leafy, raw vegetables. Have an apple or some carrot sticks as an afternoon snack instead of a candy bar. Try to have fruits and/or vegetables at every meal. 
· Make exercise part of your daily routine. You may want to start with simple activities, such as walking, bicycling, or slow swimming. Try to be active 30 to 60 minutes every day. You do not need to do all 30 to 60 minutes all at once. For example, you can exercise 3 times a day for 10 or 20 minutes. Moderate exercise is safe for most people, but it is always a good idea to talk to your doctor before starting an exercise program. 
· Keep moving. Allen Lan the lawn, work in the garden, or [employer]. Take the stairs instead of the elevator at work. · If you smoke, quit. People who smoke have an increased risk for heart attack, stroke, cancer, and other lung illnesses. Quitting is hard, but there are ways to boost your chance of quitting tobacco for good. ¨ Use nicotine gum, patches, or lozenges. ¨ Ask your doctor about stop-smoking programs and medicines. ¨ Keep trying. In addition to reducing your risk of diseases in the future, you will notice some benefits soon after you stop using tobacco. If you have shortness of breath or asthma symptoms, they will likely get better within a few weeks after you quit. · Limit how much alcohol you drink. Moderate amounts of alcohol (up to 2 drinks a day for men, 1 drink a day for women) are okay. But drinking too much can lead to liver problems, high blood pressure, and other health problems. Family health If you have a family, there are many things you can do together to improve your health. · Eat meals together as a family as often as possible. · Eat healthy foods. This includes fruits, vegetables, lean meats and dairy, and whole grains. · Include your family in your fitness plan. Most people think of activities such as jogging or tennis as the way to fitness, but there are many ways you and your family can be more active. Anything that makes you breathe hard and gets your heart pumping is exercise. Here are some tips: 
¨ Walk to do errands or to take your child to school or the bus. ¨ Go for a family bike ride after dinner instead of watching TV. Where can you learn more? Go to http://aziza-niyah.info/. Enter H480 in the search box to learn more about \"A Healthy Lifestyle: Care Instructions. \" Current as of: May 12, 2017 Content Version: 11.4 © 3582-1458 [employer]. Care instructions adapted under license by [employer] (which disclaims liability or warranty for this information). If you have questions about a medical condition or this instruction, always ask your healthcare professional. [address] any warranty or liability for your use of this information. Introducing \[facility]\"" & HEALTH SERVICES!    
 Sangeeta Prajapati introduces [employer] patient portal. Now you can access parts of your medical record, email your doctor's office, and request medication refills online. 1. In your internet browser, go to [email or web address] 2. Click on the First Time User? Click Here link in the Sign In box. You will see the New Member Sign Up page. 3. Enter your [employer] Access Code exactly as it appears below. You will not need to use this code after youve completed the sign-up process. If you do not sign up before the expiration date, you must request a new code. · ConferenceEdge Access Code: [email or web address] Expires: 1/20/2018  7:48 AM 
 
4. Enter the last four digits of your Social Security Number (xxxx) and Date of Birth (mm/dd/yyyy) as indicated and click Submit. You will be taken to the next sign-up page. 5. Create a ConferenceEdge ID. This will be your ConferenceEdge login ID and cannot be changed, so think of one that is secure and easy to remember. 6. Create a [employer] password. You can change your password at any time. 7. Enter your Password Reset Question and Answer. This can be used at a later time if you forget your password. 8. Enter your e-mail address. You will receive e-mail notification when new information is available in [address] 9. Click Sign Up. You can now view and download portions of your medical record. 10. Click the Download Summary menu link to download a portable copy of your medical information. If you have questions, please visit the Frequently Asked Questions section of the [employer] website. Remember, [employer] is NOT to be used for urgent needs. For medical emergencies, dial 911. Now available from your iPhone and Android! Please provide this summary of care documentation to your next provider. Your primary care clinician is listed as Amanuel Emerson. If you have any questions after today's visit, please call [phone].

## 2018-01-10 NOTE — PROGRESS NOTES
Shalonda- have been the same as when she was here last   Still having them everyday, just using the amitriptyline daily, nothing as needed for them

## 2018-01-25 ENCOUNTER — OFFICE VISIT (OUTPATIENT)
Dept: FAMILY MEDICINE CLINIC | Age: 38
End: 2018-01-25

## 2018-01-25 VITALS
OXYGEN SATURATION: 99 % | WEIGHT: 215 LBS | HEART RATE: 110 BPM | TEMPERATURE: 98 F | BODY MASS INDEX: 39.56 KG/M2 | RESPIRATION RATE: 18 BRPM | DIASTOLIC BLOOD PRESSURE: 90 MMHG | HEIGHT: 62 IN | SYSTOLIC BLOOD PRESSURE: 138 MMHG

## 2018-01-25 DIAGNOSIS — J02.9 SORE THROAT: Primary | ICD-10-CM

## 2018-01-25 LAB
S PYO AG THROAT QL: NEGATIVE
VALID INTERNAL CONTROL?: YES

## 2018-01-25 RX ORDER — AMOXICILLIN 500 MG/1
500 CAPSULE ORAL 2 TIMES DAILY
Qty: 20 CAP | Refills: 0 | Status: SHIPPED | OUTPATIENT
Start: 2018-01-25 | End: 2018-02-04

## 2018-01-25 NOTE — PROGRESS NOTES
HISTORY OF PRESENT ILLNESS  Joeann Kawasaki is a 45 y.o. female. HPI  Pt presents with \"sore throat, nasal congestion, and hip pain\"    Sore throat  Body aches  This started yesterday  No fever  No nausea, no vomiting, no diarrhea  OTC: none    In addition, patient continues to have pain on the left side of her body. Normally around her left hip, and will shoot down her left leg. Feels like a sharp pain. Pt has discussed this with neurology, as well as endocrinology, and they both could not find a cause of the pain. Pt is already on gabapentin for neuropathy. Review of Systems   Constitutional: Negative for fever. HENT: Positive for sore throat. Respiratory: Negative for cough. Gastrointestinal: Negative for diarrhea and vomiting. Physical Exam   Constitutional: She is oriented to person, place, and time. She appears well-developed and well-nourished. HENT:   Head: Normocephalic and atraumatic. Right Ear: Hearing, tympanic membrane, external ear and ear canal normal.   Left Ear: Hearing, tympanic membrane, external ear and ear canal normal.   Nose: Mucosal edema and rhinorrhea present. Mouth/Throat: Posterior oropharyngeal edema and posterior oropharyngeal erythema present. Neck: Normal range of motion. Neck supple. Cardiovascular: Normal rate, regular rhythm and normal heart sounds. Pulmonary/Chest: Effort normal and breath sounds normal.   Lymphadenopathy:     She has cervical adenopathy. Right cervical: Superficial cervical adenopathy present. Left cervical: Superficial cervical adenopathy present. Neurological: She is alert and oriented to person, place, and time. Skin: Skin is warm and dry. Psychiatric: She has a normal mood and affect. Her behavior is normal.       ASSESSMENT and PLAN    ICD-10-CM ICD-9-CM    1.  Sore throat J02.9 462 AMB POC RAPID STREP A      amoxicillin (AMOXIL) 500 mg capsule     Informed patient that I have sent medication to the pharmacy, and she should take as prescribed. Educated about staying well hydrated, and treating fever as needed. Educated about following up with neurology, should pain continue. Pt informed to return to office with worsening of symptoms, or PRN with any questions or concerns. Pt verbalizes understanding of plan of care and denies further questions or concerns at this time.

## 2018-01-25 NOTE — PROGRESS NOTES
Chief Complaint   Patient presents with    Sore Throat     started feeling sick over a week ago.  Nasal Congestion     \"REVIEWED RECORD IN PREPARATION FOR VISIT AND HAVE OBTAINED THE NECESSARY DOCUMENTATION\"  1. Have you been to the ER, urgent care clinic since your last visit? Hospitalized since your last visit? No    2. Have you seen or consulted any other health care providers outside of the [address] [place] [address] since your last visit? Include any pap smears or colon screening. No  Patient does not have advanced directives.

## 2018-01-25 NOTE — MR AVS SNAPSHOT
[address] [facility] 
 
 
 [address] [facility] 
[phone] Patient: Liberty Calabrese MRN: [id number] SHE:[birth year]71[birth year] Visit Information Date & Time Provider Department Dept. Phone Encounter #  
 1/25/2018  8:00 AM Tamara Ramey  [address] [phone] [id number] Follow-up Instructions Return if symptoms worsen or fail to improve. Your Appointments 2/21/2018  2:00 PM  
Follow Up with Scotty Senior NP 1991 [facility] ([facility]) Appt Note: 6wks f/up chronic daily headache cr  
 [place] [address] [place] [address] [phone]  
  
   
 [place] [address] [id number] [address] Upcoming Health Maintenance Date Due Pneumococcal 19-64 Medium Risk (1 of 1 - PPSV23) 1/18/1999 EYE EXAM RETINAL OR DILATED Q1 5/19/2014 MICROALBUMIN Q1 4/13/2017 FOOT EXAM Q1 6/24/2017 LIPID PANEL Q1 10/4/2017 HEMOGLOBIN A1C Q6M 2/15/2018 PAP AKA CERVICAL CYTOLOGY 10/1/2018 DTaP/Tdap/Td series (2 - Td) 12/8/2025 Allergies as of 1/25/2018  Review Complete On: 1/25/2018 By: Tamara Ramey NP Severity Noted Reaction Type Reactions Aspirin  03/05/2010    Itching Contrast Agent [Iodine]  05/01/2015    Hives Dilaudid [Hydromorphone]  05/01/2015    Hives Metformin  07/03/2013    Nausea Only Reglan [Metoclopramide]  03/05/2010    Other (comments) Impaired speech Ritalin [Methylphenidate]  05/01/2015    Other (comments) Speech impairment Current Immunizations  Reviewed on 12/8/2015 Name Date Influenza Vaccine (Quad) PF 10/4/2016, 11/23/2015  9:10 AM  
 Tdap 12/8/2015  9:54 AM  
  
 Not reviewed this visit You Were Diagnosed With   
  
 Codes Comments Sore throat    -  Primary ICD-10-CM: J02.9 ICD-9-CM: 473 Vitals BP Pulse Temp Resp Height(growth percentile) Weight(growth percentile) 138/90 (BP 1 Location: Left arm, BP Patient Position: Sitting) (!) 110 98 °F (36.7 °C) (Oral) 18 5' 2\" (1.575 m) 215 lb (97.5 kg) LMP SpO2 BMI OB Status Smoking Status 01/01/2018 99% 39.32 kg/m2 Having regular periods Never Smoker BMI and BSA Data Body Mass Index Body Surface Area  
 39.32 kg/m 2 2.07 m 2 Preferred Pharmacy Pharmacy Name Phone Bernice Demarco [facility] [place] [phone] Your Updated Medication List  
  
   
This list is accurate as of: 1/25/18  8:26 AM.  Always use your most recent med list.  
  
  
  
  
 albuterol 90 mcg/actuation inhaler Commonly known as:  VENTOLIN HFA Take 2 Puffs by inhalation every four (4) hours as needed for Wheezing. Indications: Acute Asthma Attack  
  
 amitriptyline 25 mg tablet Commonly known as:  ELAVIL Take 1 Tab by mouth nightly. amoxicillin 500 mg capsule Commonly known as:  AMOXIL Take 1 Cap by mouth two (2) times a day for 10 days. ARNUITY ELLIPTA 200 mcg/actuation Dsdv inhaler Generic drug:  fluticasone furoate Take 1 Puff by inhalation daily. Blood-Glucose Meter Misc Commonly known as:  ACCU-CHEK FILEMON PLUS METER Test 4 times daily Dx Code: E11.65  
  
 carvedilol 3.125 mg tablet Commonly known as:  Ruthell Fellers Take 1 Tab by mouth two (2) times daily (with meals). gabapentin 300 mg capsule Commonly known as:  NEURONTIN Take 1 Cap by mouth three (3) times daily. glucose blood VI test strips strip Commonly known as:  blood glucose test  
Patient request the [employer]IST[employer] [facility] [employer] brand of meter and strips and to test QID. insulin glargine 100 unit/mL (3 mL) Inpn Commonly known as:  Kathryn Shows Inject 90 units at night. * APIDRA 100 unit/mL injection Generic drug:  insulin glulisine 1 unit * insulin glulisine 100 unit/mL pen Commonly known as:  Mila Walnut INJECT 30 UNITS SUBCUTANEOUSLY BEFORE BREAKFAST, LUNCH, AND DINNER plus sliding scale. Max daily dose 150 units. Insulin Needles (Disposable) 30 gauge x 1/3\" Sliding scale Insulin Needles (Disposable) 31 gauge x 5/16\" Ndle Use to inject Lantus and Apidra daily Dx Code: E11.65 Lancets Misc Bid for diabetes 250.00 Lot # [id number] Exp 7/2018 Man Brian Nordisk  
  
 losartan 50 mg tablet Commonly known as:  COZAAR Take 1 Tab by mouth daily. montelukast 10 mg tablet Commonly known as:  SINGULAIR  
TAKE ONE TABLET BY MOUTH ONCE DAILY Nebulizer & Compressor machine 1 Each by Does Not Apply route two (2) times daily as needed. sodium bicarbonate 325 mg tablet Take 325 mg by mouth two (2) times a day. TANZEUM 50 mg/0.5 mL injector pen Generic drug:  albiglutide 50 mg by SubCUTAneous route every seven (7) days. VITAMIN D2 50,000 unit capsule Generic drug:  ergocalciferol Take 50,000 Units by mouth two (2) times a week. * Notice: This list has 2 medication(s) that are the same as other medications prescribed for you. Read the directions carefully, and ask your doctor or other care provider to review them with you. Prescriptions Sent to Pharmacy Refills  
 amoxicillin (AMOXIL) 500 mg capsule 0 Sig: Take 1 Cap by mouth two (2) times a day for 10 days. Class: Normal  
 Pharmacy: [address] Ph #: [phone] Route: Oral  
  
We Performed the Following AMB POC RAPID STREP A [47860 CPT(R)] Follow-up Instructions Return if symptoms worsen or fail to improve. Patient Instructions Sore Throat: Care Instructions Your Care Instructions Infection by bacteria or a virus causes most sore throats.  Cigarette smoke, dry air, air pollution, allergies, and yelling can also cause a sore throat. Sore throats can be painful and annoying. Fortunately, most sore throats go away on their own. If you have a bacterial infection, your doctor may prescribe antibiotics. Follow-up care is a key part of your treatment and safety. Be sure to make and go to all appointments, and call your doctor if you are having problems. It's also a good idea to know your test results and keep a list of the medicines you take. How can you care for yourself at home? · If your doctor prescribed antibiotics, take them as directed. Do not stop taking them just because you feel better. You need to take the full course of antibiotics. · Gargle with warm salt water once an hour to help reduce swelling and relieve discomfort. Use 1 teaspoon of salt mixed in 1 cup of warm water. · Take an over-the-counter pain medicine, such as acetaminophen (Tylenol), ibuprofen (Advil, Motrin), or naproxen (Aleve). Read and follow all instructions on the label. · Be careful when taking over-the-counter cold or flu medicines and Tylenol at the same time. Many of these medicines have acetaminophen, which is Tylenol. Read the labels to make sure that you are not taking more than the recommended dose. Too much acetaminophen (Tylenol) can be harmful. · Drink plenty of fluids. Fluids may help soothe an irritated throat. Hot fluids, such as tea or soup, may help decrease throat pain. · Use over-the-counter throat lozenges to soothe pain. Regular cough drops or hard candy may also help. These should not be given to young children because of the risk of choking. · Do not smoke or allow others to smoke around you. If you need help quitting, talk to your doctor about stop-smoking programs and medicines. These can increase your chances of quitting for good. · Use a vaporizer or humidifier to add moisture to your bedroom. Follow the directions for cleaning the machine. When should you call for help? Call your doctor now or seek immediate medical care if: 
? · You have new or worse trouble swallowing. ? · Your sore throat gets much worse on one side. ? Watch closely for changes in your health, and be sure to contact your doctor if you do not get better as expected. Where can you learn more? Go to http://aziza-niyah.info/. Enter [id number] in the search box to learn more about \"Sore Throat: Care Instructions. \" Current as of: May 12, 2017 Content Version: 11.4 © 3542-4496 [employer]. Care instructions adapted under license by "[employer] (which disclaims liability or warranty for this information). If you have questions about a medical condition or this instruction, always ask your healthcare professional. [place] [address] any warranty or liability for your use of this information. Introducing [facility]! [facility] introduces Wickr patient portal. Now you can access parts of your medical record, email your doctor's office, and request medication refills online. 1. In your internet browser, go to [email or web address] 2. Click on the First Time User? Click Here link in the Sign In box. You will see the New Member Sign Up page. 3. Enter your Wickr Access Code exactly as it appears below. You will not need to use this code after youve completed the sign-up process. If you do not sign up before the expiration date, you must request a new code. · Wickr Access Code: [email or web address] Expires: 4/25/2018  8:26 AM 
 
4. Enter the last four digits of your Social Security Number (xxxx) and Date of Birth (mm/dd/yyyy) as indicated and click Submit. You will be taken to the next sign-up page. 5. Create a Wickr ID. This will be your Wickr login ID and cannot be changed, so think of one that is secure and easy to remember. 6. Create a Wickr password. You can change your password at any time. 7. Enter your Password Reset Question and Answer. This can be used at a later time if you forget your password. 8. Enter your e-mail address. You will receive e-mail notification when new information is available in [address] 9. Click Sign Up. You can now view and download portions of your medical record. 10. Click the Download Summary menu link to download a portable copy of your medical information. If you have questions, please visit the Frequently Asked Questions section of the [employer] website. Remember, [employer] is NOT to be used for urgent needs. For medical emergencies, dial 911. Now available from your iPhone and Android! Please provide this summary of care documentation to your next provider. Your primary care clinician is listed as Iraida Enamorado. If you have any questions after today's visit, please call [phone].

## 2018-01-25 NOTE — PATIENT INSTRUCTIONS
Sore Throat: Care Instructions  Your Care Instructions    Infection by bacteria or a virus causes most sore throats. Cigarette smoke, dry air, air pollution, allergies, and yelling can also cause a sore throat. Sore throats can be painful and annoying. Fortunately, most sore throats go away on their own. If you have a bacterial infection, your doctor may prescribe antibiotics. Follow-up care is a key part of your treatment and safety. Be sure to make and go to all appointments, and call your doctor if you are having problems. It's also a good idea to know your test results and keep a list of the medicines you take. How can you care for yourself at home? · If your doctor prescribed antibiotics, take them as directed. Do not stop taking them just because you feel better. You need to take the full course of antibiotics. · Gargle with warm salt water once an hour to help reduce swelling and relieve discomfort. Use 1 teaspoon of salt mixed in 1 cup of warm water. · Take an over-the-counter pain medicine, such as acetaminophen (Tylenol), ibuprofen (Advil, Motrin), or naproxen (Aleve). Read and follow all instructions on the label. · Be careful when taking over-the-counter cold or flu medicines and Tylenol at the same time. Many of these medicines have acetaminophen, which is Tylenol. Read the labels to make sure that you are not taking more than the recommended dose. Too much acetaminophen (Tylenol) can be harmful. · Drink plenty of fluids. Fluids may help soothe an irritated throat. Hot fluids, such as tea or soup, may help decrease throat pain. · Use over-the-counter throat lozenges to soothe pain. Regular cough drops or hard candy may also help. These should not be given to young children because of the risk of choking. · Do not smoke or allow others to smoke around you. If you need help quitting, talk to your doctor about stop-smoking programs and medicines.  These can increase your chances of quitting for good. · Use a vaporizer or humidifier to add moisture to your bedroom. Follow the directions for cleaning the machine. When should you call for help? Call your doctor now or seek immediate medical care if:  ? · You have new or worse trouble swallowing. ? · Your sore throat gets much worse on one side. ? Watch closely for changes in your health, and be sure to contact your doctor if you do not get better as expected. Where can you learn more? Go to http://aziza-niyah.info/. Enter [phone] [id number] in the search box to learn more about \"Sore Throat: Care Instructions. \"  Current as of: May 12, 2017  Content Version: 11.4  © 7892-2602 Healthwise[employer] Incorporated. Care instructions adapted under license by [employer] (which disclaims liability or warranty for this information). If you have questions about a medical condition or this instruction, always ask your healthcare professional. [address] any warranty or liability for your use of this information.

## 2018-02-06 ENCOUNTER — OFFICE VISIT (OUTPATIENT)
Dept: FAMILY MEDICINE CLINIC | Age: 38
End: 2018-02-06

## 2018-02-06 VITALS
SYSTOLIC BLOOD PRESSURE: 150 MMHG | HEIGHT: 62 IN | TEMPERATURE: 98.6 F | DIASTOLIC BLOOD PRESSURE: 90 MMHG | BODY MASS INDEX: 39.38 KG/M2 | RESPIRATION RATE: 20 BRPM | HEART RATE: 98 BPM | OXYGEN SATURATION: 99 % | WEIGHT: 214 LBS

## 2018-02-06 DIAGNOSIS — J02.9 SORE THROAT: ICD-10-CM

## 2018-02-06 DIAGNOSIS — Z79.4 TYPE 2 DIABETES MELLITUS WITH HYPERGLYCEMIA, WITH LONG-TERM CURRENT USE OF INSULIN (HCC): ICD-10-CM

## 2018-02-06 DIAGNOSIS — J06.9 VIRAL URI: Primary | ICD-10-CM

## 2018-02-06 DIAGNOSIS — E11.65 TYPE 2 DIABETES MELLITUS WITH HYPERGLYCEMIA, WITH LONG-TERM CURRENT USE OF INSULIN (HCC): ICD-10-CM

## 2018-02-06 LAB
S PYO AG THROAT QL: NEGATIVE
VALID INTERNAL CONTROL?: YES

## 2018-02-06 NOTE — MR AVS SNAPSHOT
[facility] [address] [facility] 
[phone] Patient: Karl Mata MRN: [id number] AOV:[birth year] Visit Information Date & Time Provider Department Dept. Phone Encounter #  
 2/6/2018 10:15 AM Carlos [phone] [id number] Follow-up Instructions Return if symptoms worsen or fail to improve. Your Appointments 2/21/2018  2:00 PM  
Follow Up with Kaitlin Alexis NP 1991 [facility] ([place]) Appt Note: 6wks f/up chronic daily headache cr  
 [place] [address] [facility] [address] [facility] [id number] [phone]  
  
   
 [place] [address] [id number] [address] Upcoming Health Maintenance Date Due Pneumococcal 19-64 Medium Risk (1 of 1 - PPSV23) 1/18/1999 EYE EXAM RETINAL OR DILATED Q1 5/19/2014 MICROALBUMIN Q1 4/13/2017 FOOT EXAM Q1 6/24/2017 LIPID PANEL Q1 10/4/2017 HEMOGLOBIN A1C Q6M 2/15/2018 PAP AKA CERVICAL CYTOLOGY 10/1/2018 DTaP/Tdap/Td series (2 - Td) 12/8/2025 Allergies as of 2/6/2018  Review Complete On: 2/6/2018 By: Jayjay Campos NP Severity Noted Reaction Type Reactions Aspirin  03/05/2010    Itching Contrast Agent [Iodine]  05/01/2015    Hives Dilaudid [Hydromorphone]  05/01/2015    Hives Metformin  07/03/2013    Nausea Only Reglan [Metoclopramide]  03/05/2010    Other (comments) Impaired speech Ritalin [Methylphenidate]  05/01/2015    Other (comments) Speech impairment Current Immunizations  Reviewed on 12/8/2015 Name Date Influenza Vaccine (Quad) PF 10/4/2016, 11/23/2015  9:10 AM  
 Tdap 12/8/2015  9:54 AM  
  
 Not reviewed this visit You Were Diagnosed With   
  
 Codes Comments Viral URI    -  Primary ICD-10-CM: J06.9, B97.89 ICD-9-CM: 465.9 Sore throat     ICD-10-CM: J02.9 ICD-9-CM: 079 Vitals BP Pulse Temp Resp Height(growth percentile) Weight(growth percentile) 150/90 (BP 1 Location: Left arm, BP Patient Position: Sitting) 98 98.6 °F (37 °C) (Oral) 20 5' 2\" (1.575 m) 214 lb (97.1 kg) LMP SpO2 BMI OB Status Smoking Status 01/31/2018 99% 39.14 kg/m2 Having regular periods Never Smoker BMI and BSA Data Body Mass Index Body Surface Area  
 39.14 kg/m 2 2.06 m 2 Preferred Pharmacy Pharmacy Name Phone [address] [phone] Your Updated Medication List  
  
   
This list is accurate as of: 2/6/18 10:29 AM.  Always use your most recent med list.  
  
  
  
  
 albuterol 90 mcg/actuation inhaler Commonly known as:  VENTOLIN HFA Take 2 Puffs by inhalation every four (4) hours as needed for Wheezing. Indications: Acute Asthma Attack  
  
 amitriptyline 25 mg tablet Commonly known as:  ELAVIL Take 1 Tab by mouth nightly. ARNUITY ELLIPTA 200 mcg/actuation Dsdv inhaler Generic drug:  fluticasone furoate Take 1 Puff by inhalation daily. Blood-Glucose Meter Misc Commonly known as:  ACCU-CHEK FILEMON PLUS METER Test 4 times daily Dx Code: E11.65  
  
 carvedilol 3.125 mg tablet Commonly known as:  Clerance Barley Take 1 Tab by mouth two (2) times daily (with meals). gabapentin 300 mg capsule Commonly known as:  NEURONTIN Take 1 Cap by mouth three (3) times daily. glucose blood VI test strips strip Commonly known as:  blood glucose test  
Patient request the SIST[employer] [facility] [employer] brand of meter and strips and to test QID. insulin glargine 100 unit/mL (3 mL) Inpn Commonly known as:  Brendan Oms Inject 90 units at night. * APIDRA 100 unit/mL injection Generic drug:  insulin glulisine 1 unit * insulin glulisine 100 unit/mL pen Commonly known as:  Kaushal Harrington INJECT 30 UNITS SUBCUTANEOUSLY BEFORE BREAKFAST, LUNCH, AND DINNER plus sliding scale. Max daily dose 150 units. Insulin Needles (Disposable) 30 gauge x 1/3\" Sliding scale Insulin Needles (Disposable) 31 gauge x 5/16\" Ndle Use to inject Lantus and Apidra daily Dx Code: E11.65 Lancets Misc Bid for diabetes 250.00 Lot # [id number] Exp 7/2018 Man Brian Nordisk  
  
 losartan 50 mg tablet Commonly known as:  COZAAR Take 1 Tab by mouth daily. montelukast 10 mg tablet Commonly known as:  SINGULAIR  
TAKE ONE TABLET BY MOUTH ONCE DAILY Nebulizer & Compressor machine 1 Each by Does Not Apply route two (2) times daily as needed. sodium bicarbonate 325 mg tablet Take 325 mg by mouth two (2) times a day. TANZEUM 50 mg/0.5 mL injector pen Generic drug:  albiglutide 50 mg by SubCUTAneous route every seven (7) days. VITAMIN D2 50,000 unit capsule Generic drug:  ergocalciferol Take 50,000 Units by mouth two (2) times a week. * Notice: This list has 2 medication(s) that are the same as other medications prescribed for you. Read the directions carefully, and ask your doctor or other care provider to review them with you. We Performed the Following AMB POC RAPID STREP A [46296 CPT(R)] Follow-up Instructions Return if symptoms worsen or fail to improve. Patient Instructions Sore Throat: Care Instructions Your Care Instructions Infection by bacteria or a virus causes most sore throats. Cigarette smoke, dry air, air pollution, allergies, and yelling can also cause a sore throat. Sore throats can be painful and annoying. Fortunately, most sore throats go away on their own. If you have a bacterial infection, your doctor may prescribe antibiotics. Follow-up care is a key part of your treatment and safety. Be sure to make and go to all appointments, and call your doctor if you are having problems. It's also a good idea to know your test results and keep a list of the medicines you take. How can you care for yourself at home? · If your doctor prescribed antibiotics, take them as directed. Do not stop taking them just because you feel better. You need to take the full course of antibiotics. · Gargle with warm salt water once an hour to help reduce swelling and relieve discomfort. Use 1 teaspoon of salt mixed in 1 cup of warm water. · Take an over-the-counter pain medicine, such as acetaminophen (Tylenol), ibuprofen (Advil, Motrin), or naproxen (Aleve). Read and follow all instructions on the label. · Be careful when taking over-the-counter cold or flu medicines and Tylenol at the same time. Many of these medicines have acetaminophen, which is Tylenol. Read the labels to make sure that you are not taking more than the recommended dose. Too much acetaminophen (Tylenol) can be harmful. · Drink plenty of fluids. Fluids may help soothe an irritated throat. Hot fluids, such as tea or soup, may help decrease throat pain. · Use over-the-counter throat lozenges to soothe pain. Regular cough drops or hard candy may also help. These should not be given to young children because of the risk of choking. · Do not smoke or allow others to smoke around you. If you need help quitting, talk to your doctor about stop-smoking programs and medicines. These can increase your chances of quitting for good. · Use a vaporizer or humidifier to add moisture to your bedroom. Follow the directions for cleaning the machine. When should you call for help? Call your doctor now or seek immediate medical care if: 
? · You have new or worse trouble swallowing. ? · Your sore throat gets much worse on one side. ? Watch closely for changes in your health, and be sure to contact your doctor if you do not get better as expected. Where can you learn more? Go to http://aziza-niyah.info/. Enter [phone] [id number] [phone] in the search box to learn more about \"Sore Throat: Care Instructions. \" Current as of: May 12, 2017 Content Version: 11.4 © 8867-2278 Healthwise, [employer]. Care instructions adapted under license by [employer] (which disclaims liability or warranty for this information). If you have questions about a medical condition or this instruction, always ask your healthcare professional. [address] any warranty or liability for your use of this information. Introducing [facility]! Brandon Prado introduces [employer] ([employer]) patient portal. Now you can access parts of your medical record, email your doctor's office, and request medication refills online. 1. In your internet browser, go to [email or web address] 2. Click on the First Time User? Click Here link in the Sign In box. You will see the New Member Sign Up page. 3. Enter your [employer] ([employer]I) Access Code exactly as it appears below. You will not need to use this code after youve completed the sign-up process. If you do not sign up before the expiration date, you must request a new code. · [employer] ([employer]I) Access Code: [email or web address] Expires: 4/25/2018  8:26 AM 
 
4. Enter the last four digits of your Social Security Number (xxxx) and Date of Birth (mm/dd/yyyy) as indicated and click Submit. You will be taken to the next sign-up page. 5. Create a [employer] ([employer]I) ID. This will be your [employer] ([employer]) login ID and cannot be changed, so think of one that is secure and easy to remember. 6. Create a [employer] ([employer]I) password. You can change your password at any time. 7. Enter your Password Reset Question and Answer. This can be used at a later time if you forget your password. 8. Enter your e-mail address. You will receive e-mail notification when new information is available in [address] 9. Click Sign Up. You can now view and download portions of your medical record. 10. Click the Download Summary menu link to download a portable copy of your medical information. If you have questions, please visit the Frequently Asked Questions section of the [employer] website. Remember, [employer] is NOT to be used for urgent needs. For medical emergencies, dial 911. Now available from your iPhone and Android! Please provide this summary of care documentation to your next provider. Your primary care clinician is listed as Tiffany Choi. If you have any questions after today's visit, please call [phone].

## 2018-02-06 NOTE — PROGRESS NOTES
HISTORY OF PRESENT ILLNESS  Armando Portillo is a 45 y.o. female. HPI  Pt presents with \"headache and sore throat\"    Symptoms started yesterday  Sore throat  Headache  Body aches  Fever: none  Nausea and vomiting  No diarrhea  She last vomited yesterday, no vomiting today  OTC: none  Review of Systems   Constitutional: Negative for fever. HENT: Positive for sore throat. Respiratory: Negative for cough. Gastrointestinal: Positive for nausea and vomiting. Negative for abdominal pain and diarrhea. Physical Exam   Constitutional: She is oriented to person, place, and time. She appears well-developed and well-nourished. HENT:   Head: Normocephalic and atraumatic. Right Ear: Hearing, tympanic membrane, external ear and ear canal normal.   Left Ear: Hearing, tympanic membrane, external ear and ear canal normal.   Nose: Nose normal.   Mouth/Throat: Oropharynx is clear and moist.   Neck: Normal range of motion. Neck supple. Cardiovascular: Normal rate, regular rhythm and normal heart sounds. Pulmonary/Chest: Effort normal and breath sounds normal. She has no wheezes. She has no rales. She exhibits no tenderness. Lymphadenopathy:     She has no cervical adenopathy. Neurological: She is alert and oriented to person, place, and time. Skin: Skin is warm and dry. Psychiatric: She has a normal mood and affect. Her behavior is normal.       ASSESSMENT and PLAN    ICD-10-CM ICD-9-CM    1. Viral URI J06.9 465.9     B97.89     2. Sore throat J02.9 462 AMB POC RAPID STREP A     Informed patient that I believe that symptoms are viral, and should improve with time. Educated about staying well hydrated and treating fever as needed. Pt informed to return to office with worsening of symptoms, or PRN with any questions or concerns. Pt verbalizes understanding of plan of care and denies further questions or concerns at this time.

## 2018-02-06 NOTE — PATIENT INSTRUCTIONS
Sore Throat: Care Instructions  Your Care Instructions    Infection by bacteria or a virus causes most sore throats. Cigarette smoke, dry air, air pollution, allergies, and yelling can also cause a sore throat. Sore throats can be painful and annoying. Fortunately, most sore throats go away on their own. If you have a bacterial infection, your doctor may prescribe antibiotics. Follow-up care is a key part of your treatment and safety. Be sure to make and go to all appointments, and call your doctor if you are having problems. It's also a good idea to know your test results and keep a list of the medicines you take. How can you care for yourself at home? · If your doctor prescribed antibiotics, take them as directed. Do not stop taking them just because you feel better. You need to take the full course of antibiotics. · Gargle with warm salt water once an hour to help reduce swelling and relieve discomfort. Use 1 teaspoon of salt mixed in 1 cup of warm water. · Take an over-the-counter pain medicine, such as acetaminophen (Tylenol), ibuprofen (Advil, Motrin), or naproxen (Aleve). Read and follow all instructions on the label. · Be careful when taking over-the-counter cold or flu medicines and Tylenol at the same time. Many of these medicines have acetaminophen, which is Tylenol. Read the labels to make sure that you are not taking more than the recommended dose. Too much acetaminophen (Tylenol) can be harmful. · Drink plenty of fluids. Fluids may help soothe an irritated throat. Hot fluids, such as tea or soup, may help decrease throat pain. · Use over-the-counter throat lozenges to soothe pain. Regular cough drops or hard candy may also help. These should not be given to young children because of the risk of choking. · Do not smoke or allow others to smoke around you. If you need help quitting, talk to your doctor about stop-smoking programs and medicines.  These can increase your chances of quitting for good. · Use a vaporizer or humidifier to add moisture to your bedroom. Follow the directions for cleaning the machine. When should you call for help? Call your doctor now or seek immediate medical care if:  ? · You have new or worse trouble swallowing. ? · Your sore throat gets much worse on one side. ? Watch closely for changes in your health, and be sure to contact your doctor if you do not get better as expected. Where can you learn more? Go to http://aziza-niyah.info/. Enter [phone] in the search box to learn more about \"Sore Throat: Care Instructions. \"  Current as of: May 12, 2017  Content Version: 11.4  © 9574-8636 [employer]. Care instructions adapted under license by [employer] (which disclaims liability or warranty for this information). If you have questions about a medical condition or this instruction, always ask your healthcare professional. [address] any warranty or liability for your use of this information.

## 2018-02-06 NOTE — PROGRESS NOTES
Chief Complaint   Patient presents with    Headache     symptoms started yesterday. feels the same today    Sore Throat     \"REVIEWED RECORD IN PREPARATION FOR VISIT AND HAVE OBTAINED THE NECESSARY DOCUMENTATION\"  1. Have you been to the ER, urgent care clinic since your last visit? Hospitalized since your last visit? No    2. Have you seen or consulted any other health care providers outside of the [address] [place] [address] since your last visit? Include any pap smears or colon screening. No  Patient does not have advanced directives.

## 2018-02-27 RX ORDER — CARVEDILOL 3.12 MG/1
TABLET ORAL
Qty: 60 TAB | Refills: 3 | Status: SHIPPED | OUTPATIENT
Start: 2018-02-27 | End: 2018-07-09 | Stop reason: SDUPTHER

## 2018-03-05 ENCOUNTER — HOSPITAL ENCOUNTER (EMERGENCY)
Age: 38
Discharge: HOME | End: 2018-03-05
Attending: EMERGENCY MEDICINE | Admitting: EMERGENCY MEDICINE
Payer: MEDICAID

## 2018-03-05 ENCOUNTER — APPOINTMENT (OUTPATIENT)
Dept: CT IMAGING | Age: 38
End: 2018-03-05
Attending: EMERGENCY MEDICINE
Payer: MEDICAID

## 2018-03-05 VITALS
WEIGHT: 217.15 LBS | HEART RATE: 102 BPM | BODY MASS INDEX: 39.96 KG/M2 | HEIGHT: 62 IN | OXYGEN SATURATION: 95 % | RESPIRATION RATE: 16 BRPM | DIASTOLIC BLOOD PRESSURE: 94 MMHG | SYSTOLIC BLOOD PRESSURE: 168 MMHG | TEMPERATURE: 98.3 F

## 2018-03-05 DIAGNOSIS — R51.9 ACUTE NONINTRACTABLE HEADACHE, UNSPECIFIED HEADACHE TYPE: Primary | ICD-10-CM

## 2018-03-05 LAB
ALBUMIN SERPL-MCNC: 2.9 G/DL (ref 3.5–5)
ALBUMIN/GLOB SERPL: 0.6 {RATIO} (ref 1.1–2.2)
ALP SERPL-CCNC: 69 U/L (ref 45–117)
ALT SERPL-CCNC: 116 U/L (ref 12–78)
ANION GAP SERPL CALC-SCNC: 5 MMOL/L (ref 5–15)
APPEARANCE CSF: CLEAR
APPEARANCE CSF: CLEAR
AST SERPL-CCNC: 68 U/L (ref 15–37)
BASOPHILS # BLD: 0 K/UL (ref 0–0.1)
BASOPHILS NFR BLD: 0 % (ref 0–1)
BILIRUB SERPL-MCNC: 0.3 MG/DL (ref 0.2–1)
BUN SERPL-MCNC: 11 MG/DL (ref 6–20)
BUN/CREAT SERPL: 10 (ref 12–20)
CALCIUM SERPL-MCNC: 8.9 MG/DL (ref 8.5–10.1)
CHLORIDE SERPL-SCNC: 101 MMOL/L (ref 97–108)
CO2 SERPL-SCNC: 31 MMOL/L (ref 21–32)
COLOR CSF: COLORLESS
COLOR CSF: COLORLESS
CREAT SERPL-MCNC: 1.13 MG/DL (ref 0.55–1.02)
DIFFERENTIAL METHOD BLD: NORMAL
EOSINOPHIL # BLD: 0.1 K/UL (ref 0–0.4)
EOSINOPHIL NFR BLD: 3 % (ref 0–7)
ERYTHROCYTE [DISTWIDTH] IN BLOOD BY AUTOMATED COUNT: 13.3 % (ref 11.5–14.5)
GLOBULIN SER CALC-MCNC: 4.9 G/DL (ref 2–4)
GLUCOSE CSF-MCNC: 174 MG/DL (ref 40–70)
GLUCOSE SERPL-MCNC: 380 MG/DL (ref 65–100)
HCT VFR BLD AUTO: 36.2 % (ref 35–47)
HGB BLD-MCNC: 12.1 G/DL (ref 11.5–16)
LYMPHOCYTES # BLD: 2 K/UL (ref 0.8–3.5)
LYMPHOCYTES NFR BLD: 39 % (ref 12–49)
MCH RBC QN AUTO: 31.2 PG (ref 26–34)
MCHC RBC AUTO-ENTMCNC: 33.4 G/DL (ref 30–36.5)
MCV RBC AUTO: 93.3 FL (ref 80–99)
MONOCYTES # BLD: 0.6 K/UL (ref 0–1)
MONOCYTES NFR BLD: 11 % (ref 5–13)
NEUTS SEG # BLD: 2.4 K/UL (ref 1.8–8)
NEUTS SEG NFR BLD: 47 % (ref 32–75)
PLATELET # BLD AUTO: 330 K/UL (ref 150–400)
PMV BLD AUTO: 10.3 FL (ref 8.9–12.9)
POTASSIUM SERPL-SCNC: 4.2 MMOL/L (ref 3.5–5.1)
PROT CSF-MCNC: 40 MG/DL (ref 15–45)
PROT SERPL-MCNC: 7.8 G/DL (ref 6.4–8.2)
RBC # BLD AUTO: 3.88 M/UL (ref 3.8–5.2)
RBC # CSF: 1 /CU MM
RBC # CSF: 4 /CU MM
SODIUM SERPL-SCNC: 137 MMOL/L (ref 136–145)
TUBE # CSF: 1
TUBE # CSF: 3
WBC # BLD AUTO: 5.1 K/UL (ref 3.6–11)
WBC # CSF: 1 /CU MM (ref 0–5)
WBC # CSF: 2 /CU MM (ref 0–5)
XXWBCSUS: 0

## 2018-03-05 PROCEDURE — 96376 TX/PRO/DX INJ SAME DRUG ADON: CPT

## 2018-03-05 PROCEDURE — 99284 EMERGENCY DEPT VISIT MOD MDM: CPT

## 2018-03-05 PROCEDURE — 96374 THER/PROPH/DIAG INJ IV PUSH: CPT

## 2018-03-05 PROCEDURE — 85025 COMPLETE CBC W/AUTO DIFF WBC: CPT | Performed by: EMERGENCY MEDICINE

## 2018-03-05 PROCEDURE — 36415 COLL VENOUS BLD VENIPUNCTURE: CPT | Performed by: EMERGENCY MEDICINE

## 2018-03-05 PROCEDURE — 87205 SMEAR GRAM STAIN: CPT | Performed by: EMERGENCY MEDICINE

## 2018-03-05 PROCEDURE — 75810000133 HC LUMBAR PUNCTURE

## 2018-03-05 PROCEDURE — 74011250636 HC RX REV CODE- 250/636: Performed by: EMERGENCY MEDICINE

## 2018-03-05 PROCEDURE — 96375 TX/PRO/DX INJ NEW DRUG ADDON: CPT

## 2018-03-05 PROCEDURE — 80053 COMPREHEN METABOLIC PANEL: CPT | Performed by: EMERGENCY MEDICINE

## 2018-03-05 PROCEDURE — 82945 GLUCOSE OTHER FLUID: CPT | Performed by: EMERGENCY MEDICINE

## 2018-03-05 PROCEDURE — 84157 ASSAY OF PROTEIN OTHER: CPT | Performed by: EMERGENCY MEDICINE

## 2018-03-05 PROCEDURE — 96361 HYDRATE IV INFUSION ADD-ON: CPT

## 2018-03-05 PROCEDURE — 77030019701 HC TY LUMBR PUNC SIMS -A

## 2018-03-05 PROCEDURE — 89050 BODY FLUID CELL COUNT: CPT | Performed by: EMERGENCY MEDICINE

## 2018-03-05 PROCEDURE — 70450 CT HEAD/BRAIN W/O DYE: CPT

## 2018-03-05 RX ORDER — DIPHENHYDRAMINE HYDROCHLORIDE 50 MG/ML
25 INJECTION, SOLUTION INTRAMUSCULAR; INTRAVENOUS
Status: COMPLETED | OUTPATIENT
Start: 2018-03-05 | End: 2018-03-05

## 2018-03-05 RX ORDER — KETOROLAC TROMETHAMINE 30 MG/ML
30 INJECTION, SOLUTION INTRAMUSCULAR; INTRAVENOUS
Status: COMPLETED | OUTPATIENT
Start: 2018-03-05 | End: 2018-03-05

## 2018-03-05 RX ORDER — PROCHLORPERAZINE EDISYLATE 5 MG/ML
10 INJECTION INTRAMUSCULAR; INTRAVENOUS
Status: COMPLETED | OUTPATIENT
Start: 2018-03-05 | End: 2018-03-05

## 2018-03-05 RX ORDER — DEXAMETHASONE SODIUM PHOSPHATE 10 MG/ML
10 INJECTION INTRAMUSCULAR; INTRAVENOUS
Status: COMPLETED | OUTPATIENT
Start: 2018-03-05 | End: 2018-03-05

## 2018-03-05 RX ADMIN — KETOROLAC TROMETHAMINE 30 MG: 30 INJECTION, SOLUTION INTRAMUSCULAR at 14:30

## 2018-03-05 RX ADMIN — PROCHLORPERAZINE EDISYLATE 10 MG: 5 INJECTION INTRAMUSCULAR; INTRAVENOUS at 13:18

## 2018-03-05 RX ADMIN — DEXAMETHASONE SODIUM PHOSPHATE 10 MG: 10 INJECTION, SOLUTION INTRAMUSCULAR; INTRAVENOUS at 09:52

## 2018-03-05 RX ADMIN — DIPHENHYDRAMINE HYDROCHLORIDE 25 MG: 50 INJECTION, SOLUTION INTRAMUSCULAR; INTRAVENOUS at 09:49

## 2018-03-05 RX ADMIN — SODIUM CHLORIDE 1000 ML: 900 INJECTION, SOLUTION INTRAVENOUS at 09:48

## 2018-03-05 RX ADMIN — DIPHENHYDRAMINE HYDROCHLORIDE 25 MG: 50 INJECTION, SOLUTION INTRAMUSCULAR; INTRAVENOUS at 13:12

## 2018-03-05 NOTE — ED TRIAGE NOTES
Pt presents to ED with c/o one week hx of left parietal headache which is worse today. Pt states she has had some nausea and had one episode of vomiting today.

## 2018-03-05 NOTE — DISCHARGE INSTRUCTIONS

## 2018-03-05 NOTE — ED PROVIDER NOTES
HPI Comments: 80-year-old female with a history of diabetes, migraine headaches, hypertension, asthma presents from home with chief complaint of headache. Started last night around midnight it was a sudden onset severe headache. She rates it 10 out of 10. She denies any neck pain. She denies any fevers or chills. States she usually gets headache but this one is different because it is unilateral, not associated with photophobia, and severe. The history is provided by the patient.         Past Medical History:   Diagnosis Date    Abnormal pap 2012 10/5/13     hx w/Colpo     Arthritis     Asthma     Diabetes ([address]     Headache(784.0)     Hypertension     Joint pain     Pap smear for cervical cancer screening 10/1/15 ASCUS HPV NEG RP 3 YEARS    Ringing in ears        Past Surgical History:   Procedure Laterality Date    HX  SECTION  01 & 08    HX COLPOSCOPY  2012 neg    HX HEENT      cataract R eye    HX TUBAL LIGATION           Family History:   Problem Relation Age of Onset    Diabetes Mother     Heart Disease Mother     Hypertension Mother     Stroke Mother     Hypertension Father     Stroke Father     Heart Disease Father     Breast Cancer Neg Hx        Social History     Social History    Marital status:      Spouse name: N/A    Number of children: 2    Years of education: N/A     Occupational History    --      Social History Main Topics    Smoking status: Never Smoker    Smokeless tobacco: Never Used    Alcohol use No    Drug use: No    Sexual activity: Yes     Partners: Male     Birth control/ protection: Surgical     Other Topics Concern     Service No    Blood Transfusions No    Caffeine Concern No    Occupational Exposure No    Hobby Hazards No    Sleep Concern Yes    Stress Concern Yes    Weight Concern Yes    Special Diet Yes     DM    Back Care Yes    Exercise Yes    Bike Helmet No    Seat Belt Yes  Self-Exams Yes     Social History Narrative         ALLERGIES: Aspirin; Contrast agent [iodine]; Dilaudid [hydromorphone]; Metformin; Reglan [metoclopramide]; and Ritalin [methylphenidate]    Review of Systems   Constitutional: Negative for chills and fever. HENT: Negative for ear pain and sore throat. Eyes: Negative for pain. Respiratory: Negative for chest tightness and shortness of breath. Cardiovascular: Negative for chest pain and leg swelling. Gastrointestinal: Positive for nausea and vomiting. Negative for abdominal pain. Genitourinary: Negative for dysuria and flank pain. Musculoskeletal: Negative for back pain. Skin: Negative for rash. Neurological: Positive for headaches. All other systems reviewed and are negative. Vitals:    03/05/18 1100 03/05/18 1300 03/05/18 1330 03/05/18 1400   BP: (!) 169/101 (!) 163/98 (!) 164/99 (!) 162/94   Pulse:       Resp:       Temp:       SpO2: 97% 94% 94% 93%   Weight:       Height:                Physical Exam   Constitutional: She is oriented to person, place, and time. She appears well-developed and well-nourished. HENT:   Head: Normocephalic and atraumatic. Mouth/Throat: Oropharynx is clear and moist.   Eyes: Conjunctivae and EOM are normal. Pupils are equal, round, and reactive to light. No scleral icterus. Neck: Neck supple. No tracheal deviation present. Cardiovascular: Normal rate, regular rhythm, normal heart sounds and intact distal pulses. Pulmonary/Chest: Effort normal and breath sounds normal. No respiratory distress. Abdominal: Soft. She exhibits no distension. There is no tenderness. There is no rebound and no guarding. Genitourinary:   Genitourinary Comments: deferred   Musculoskeletal: She exhibits no edema. Neurological: She is alert and oriented to person, place, and time. No cranial nerve deficit.    Alert and Oriented x3, Cranial nerves 2-12 intact, normal motor and sensation, positive Romberg, no pronator drift, normal finger to nose   Skin: Skin is warm and dry. Psychiatric: She has a normal mood and affect. Nursing note and vitals reviewed. MDM  Number of Diagnoses or Management Options  Acute nonintractable headache, unspecified headache type:   Diagnosis management comments: The patient presents with headache with a differential diagnosis of  ICH, migraine and sinusitis. Different from usual headache, sudden onset, severe. Will rule out SAH. ED Course   2:23 PM Headache now 4/10. toradol ordered      Lumbar Puncture  Date/Time: 3/5/2018 2:50 PM  Performed by: Leda Car Authorized by: Leda Car     Consent:     Consent obtained:  Verbal and written    Consent given by:  Patient    Risks discussed:  Bleeding, infection, pain, nerve damage and headache    Alternatives discussed:  No treatment  Pre-procedure details:     Procedure purpose:  Diagnostic  Anesthesia (see MAR for exact dosages): Anesthesia method:  Local infiltration  Procedure details:     Lumbar space:  L4-L5 interspace    Patient position:  Sitting    Needle gauge:  20    Needle type:  Spinal needle - Quincke tip    Needle length (in):  3.5    Ultrasound guidance: yes      Number of attempts:  2    Fluid appearance:  Clear    Tubes of fluid:  4    Total volume (ml):  6  Post-procedure:     Puncture site:  Adhesive bandage applied and direct pressure applied    Patient tolerance of procedure: Tolerated well, no immediate complications          9:16 PM  The patient has been reevaluated. The patient is ready for discharge. The patient's signs, symptoms, diagnosis, and discharge instructions have been discussed and the patient/ family has conveyed their understanding. The patient is to follow up as recommended or return to the ED should their symptoms worsen. Plan has been discussed and the patient is in agreement.     LABORATORY TESTS:  Labs Reviewed   METABOLIC PANEL, COMPREHENSIVE - Abnormal; Notable for the following:        Result Value    Glucose 380 (*)     Creatinine 1.13 (*)     BUN/Creatinine ratio 10 (*)     GFR est non-AA 54 (*)     ALT (SGPT) 116 (*)     AST (SGOT) 68 (*)     Albumin 2.9 (*)     Globulin 4.9 (*)     A-G Ratio 0.6 (*)     All other components within normal limits   GLUCOSE, CSF - Abnormal; Notable for the following:     Glucose, (*)     All other components within normal limits   CELL COUNT, CSF - Abnormal; Notable for the following:     CSF RBCS 1 (*)     All other components within normal limits   CELL COUNT, CSF - Abnormal; Notable for the following:     CSF RBCS 4 (*)     All other components within normal limits   CULTURE, CSF W GRAM STAIN   CBC WITH AUTOMATED DIFF   PROTEIN, CSF       IMAGING RESULTS:  Ct Head Wo Cont    Result Date: 3/5/2018  EXAM:  CT HEAD WO CONT INDICATION:   Headache, acute, severe, thunderclap, worst headache of life. Left parietal headache. Symptoms present for one week. Associated nausea and vomiting today. COMPARISON: 4/8/2016. CONTRAST:  None. TECHNIQUE: Unenhanced CT of the head was performed using 5 mm images. Brain and bone windows were generated. CT dose reduction was achieved through use of a standardized protocol tailored for this examination and automatic exposure control for dose modulation. Adaptive statistical iterative reconstruction (ASIR) was utilized. FINDINGS: The ventricles and sulci are normal in size, shape and configuration and midline. There is no significant white matter disease. There is no intracranial hemorrhage, extra-axial collection, mass, mass effect or midline shift. The basilar cisterns are open. No acute infarct is identified. The bone windows demonstrate no abnormalities. The visualized portions of the paranasal sinuses and mastoid air cells are clear. Incidental note is made of a right scleral band.      IMPRESSION: No acute intracranial process seen         MEDICATIONS GIVEN:  Medications   ketorolac (TORADOL) injection 30 mg (not administered)   sodium chloride 0.9 % bolus infusion 1,000 mL (0 mL IntraVENous IV Completed 3/5/18 1040)   dexamethasone (PF) (DECADRON) injection 10 mg (10 mg IntraVENous Given 3/5/18 0952)   diphenhydrAMINE (BENADRYL) injection 25 mg (25 mg IntraVENous Given 3/5/18 0949)   prochlorperazine (COMPAZINE) injection 10 mg (10 mg IntraVENous Given 3/5/18 1318)   diphenhydrAMINE (BENADRYL) injection 25 mg (25 mg IntraVENous Given 3/5/18 1312)       IMPRESSION:  1. Acute nonintractable headache, unspecified headache type        PLAN:  1. Current Discharge Medication List        2. Follow-up Information     Follow up With Details Comments MD Denisse Schedule an appointment as soon as possible for a visit  [address] [place] [address] Drive [address]      [address] [place] [address]   [phone] [facility]  [address]  [address] [place] [address] Drive  [phone]        3. Return to ED for new or worsening symptoms       Araceli Pike.  Erickson Bashir MD

## 2018-03-12 LAB
BACTERIA SPEC CULT: NORMAL
BACTERIA SPEC CULT: NORMAL
GRAM STN SPEC: NORMAL
GRAM STN SPEC: NORMAL
SERVICE CMNT-IMP: NORMAL

## 2018-03-16 ENCOUNTER — TELEPHONE (OUTPATIENT)
Dept: NEUROLOGY | Age: 38
End: 2018-03-16

## 2018-03-16 RX ORDER — METHYLPREDNISOLONE 4 MG/1
TABLET ORAL
Qty: 1 DOSE PACK | Refills: 0 | Status: SHIPPED | OUTPATIENT
Start: 2018-03-16 | End: 2018-04-10

## 2018-03-16 RX ORDER — SUMATRIPTAN 100 MG/1
TABLET, FILM COATED ORAL
Qty: 9 TAB | Refills: 5 | Status: SHIPPED | OUTPATIENT
Start: 2018-03-16 | End: 2018-05-01 | Stop reason: ALTCHOICE

## 2018-03-16 NOTE — TELEPHONE ENCOUNTER
Pt is calling and stated her headaches have been getting worse in the last 2 weeks, she has had a headache for the last week everyday. She doesn't think the amitriptyline is working. She has not taken anything OTC or prn for the headaches. Please advise     Sent imitrex and medrol pack to her [facility]     The medrol pack taken as directed on it. Watch BS with diabetes can increase sometimes. The imitrex can be taken first to see if this will abort the HA without steroids.  Thanks per NP.     returned her call and gave her the previous message sent from the NP.

## 2018-03-16 NOTE — TELEPHONE ENCOUNTER
----- Message from [facility] sent at 3/16/2018 10:42 AM EDT -----  Regarding: REYNA Newell/Telephone  The patient has been experiencing a migraine all week and is requesting a call back from the nurse to discuss her medication not working. [phone]

## 2018-04-02 ENCOUNTER — TELEPHONE (OUTPATIENT)
Dept: NEUROLOGY | Age: 38
End: 2018-04-02

## 2018-04-03 ENCOUNTER — TELEPHONE (OUTPATIENT)
Dept: NEUROLOGY | Age: 38
End: 2018-04-03

## 2018-04-03 NOTE — TELEPHONE ENCOUNTER
Request for sumatriptan approval submitted with [facility]). Request faxed vis e-fax. Awaiting determination.

## 2018-04-10 ENCOUNTER — OFFICE VISIT (OUTPATIENT)
Dept: NEUROLOGY | Age: 38
End: 2018-04-10

## 2018-04-10 VITALS
HEIGHT: 62 IN | OXYGEN SATURATION: 98 % | BODY MASS INDEX: 41.22 KG/M2 | SYSTOLIC BLOOD PRESSURE: 146 MMHG | WEIGHT: 224 LBS | HEART RATE: 108 BPM | RESPIRATION RATE: 20 BRPM | DIASTOLIC BLOOD PRESSURE: 90 MMHG

## 2018-04-10 DIAGNOSIS — E11.40 TYPE 2 DIABETES MELLITUS WITH DIABETIC NEUROPATHY, WITHOUT LONG-TERM CURRENT USE OF INSULIN (HCC): ICD-10-CM

## 2018-04-10 DIAGNOSIS — G43.709 CHRONIC MIGRAINE WITHOUT AURA WITHOUT STATUS MIGRAINOSUS, NOT INTRACTABLE: Primary | ICD-10-CM

## 2018-04-10 DIAGNOSIS — R79.89 ELEVATED LFTS: ICD-10-CM

## 2018-04-10 DIAGNOSIS — R79.89 ELEVATED SERUM CREATININE: ICD-10-CM

## 2018-04-10 DIAGNOSIS — I10 ESSENTIAL HYPERTENSION: ICD-10-CM

## 2018-04-10 DIAGNOSIS — J45.20 MILD INTERMITTENT ASTHMA WITHOUT COMPLICATION: ICD-10-CM

## 2018-04-10 PROBLEM — E66.01 OBESITY, MORBID (HCC): Status: ACTIVE | Noted: 2018-04-10

## 2018-04-10 RX ORDER — LOSARTAN POTASSIUM 100 MG/1
100 TABLET ORAL DAILY
Qty: 30 TAB | Refills: 3 | Status: SHIPPED | OUTPATIENT
Start: 2018-04-10 | End: 2018-04-11 | Stop reason: SDUPTHER

## 2018-04-10 RX ORDER — GABAPENTIN 300 MG/1
600 CAPSULE ORAL 3 TIMES DAILY
Qty: 180 CAP | Refills: 3 | Status: SHIPPED | OUTPATIENT
Start: 2018-04-10 | End: 2018-08-12 | Stop reason: SDUPTHER

## 2018-04-10 RX ORDER — MONTELUKAST SODIUM 10 MG/1
TABLET ORAL
Qty: 30 TAB | Refills: 2 | Status: SHIPPED | OUTPATIENT
Start: 2018-04-10 | End: 2019-01-01

## 2018-04-10 NOTE — PROGRESS NOTES
Date:  04/10/18     Name:  Piedad Melendez  :  1980  MRN:  [id number]     PCP:  Brianna Zavala MD    Chief Complaint   Patient presents with    Migraine     HISTORY OF PRESENT ILLNESS: Follow up for migraines. At her last office visit, she was started on Cozaar for her hypertension and was to have some lab work done as this had not been done in some time. The labs were not completed. She is taking the Cozaar but she is finding that this is causing her to have swelling in her legs which she states is why she was taken off of the Losartan. She has also been on Lisinopril in the past which seemed to cause her to have leg pains. The migraines have been daily. She has been using the sumitriptan twice a week which helps but she has not had any relief from the daily headaches. She is stopped taking the Elavil after she was given a Medrol dose pack towards the beginning of March. States that she felt like this was making her nauseated and tired in the day. She does see a cardiologist as well as a nephrologist.     Except as noted above, denies  fever, chills, cough. No CP or SOB. No dysuria, loss of bowel or bladder control. No Weight loss. Appetite good. Sleeping well. No sweats. No edema. No bruising or bleeding. No nausea or vomit. No diarrhea. No frequency, urgency, No depressive sxs. No anxiety. Denies sore throat, nasal congestion, nasal discharge, epistaxis, tinnitus, hearing loss, back pain, muscle pain, or joint pain. Current Outpatient Prescriptions   Medication Sig    SUMAtriptan (IMITREX) 100 mg tablet 1 at HA onset and repeat in 2 hours if needed. Max 2 in 24 hours    carvedilol (COREG) 3.125 mg tablet TAKE ONE TABLET BY MOUTH TWICE DAILY WITH MEALS    insulin glulisine (APIDRA SOLOSTAR) 100 unit/mL pen INJECT 30 UNITS SUBCUTANEOUSLY BEFORE BREAKFAST, LUNCH, AND DINNER plus sliding scale. Max daily dose 150 units.     insulin glulisine (APIDRA) 100 unit/mL injection 1 unit    losartan (COZAAR) 50 mg tablet Take 1 Tab by mouth daily.  sodium bicarbonate 325 mg tablet Take 325 mg by mouth two (2) times a day.  TANZEUM 50 mg/0.5 mL injector pen 50 mg by SubCUTAneous route every seven (7) days.  insulin glargine (LANTUS,BASAGLAR) 100 unit/mL (3 mL) inpn Inject 90 units at night.  Blood-Glucose Meter (ACCU-CHEK FILEMON PLUS METER) misc Test 4 times daily Dx Code: E11.65    Insulin Needles, Disposable, 31 gauge x 5/16\" ndle Use to inject Lantus and Apidra daily Dx Code: E11.65    VITAMIN D2 50,000 unit capsule Take 50,000 Units by mouth two (2) times a week.  albuterol (VENTOLIN HFA) 90 mcg/actuation inhaler Take 2 Puffs by inhalation every four (4) hours as needed for Wheezing. Indications: Acute Asthma Attack    ARNUITY ELLIPTA 200 mcg/actuation dsdv inhaler Take 1 Puff by inhalation daily.  gabapentin (NEURONTIN) 300 mg capsule Take 1 Cap by mouth three (3) times daily.  montelukast (SINGULAIR) 10 mg tablet TAKE ONE TABLET BY MOUTH ONCE DAILY    Nebulizer & Compressor machine 1 Each by Does Not Apply route two (2) times daily as needed.  glucose blood VI test strips (BLOOD GLUCOSE TEST) strip Patient request the [facility] brand of meter and strips and to test QID.  Insulin Needles, Disposable, 30 x 1/3 \" Sliding scale    Lancets misc Bid for diabetes 250.00  Lot # [id number]  Exp 7/2018  Man Brian Nordisk     No current facility-administered medications for this visit.       Allergies   Allergen Reactions    Aspirin Itching    Contrast Agent [Iodine] Hives    Dilaudid [Hydromorphone] Hives    Metformin Nausea Only    Reglan [Metoclopramide] Other (comments)     Impaired speech    Ritalin [Methylphenidate] Other (comments)     Speech impairment     Past Medical History:   Diagnosis Date    Abnormal pap 2012 10/5/13     hx w/Colpo     Arthritis     Asthma     Diabetes ([address]     Headache(784.0)     Hypertension     Joint pain     Pap smear for cervical cancer screening 10/1/15 ASCUS HPV NEG RP 3 YEARS    Ringing in ears      Past Surgical History:   Procedure Laterality Date    HX  SECTION  01 & 08    HX COLPOSCOPY  2012 neg    HX HEENT      cataract R eye    HX TUBAL LIGATION       Social History     Social History    Marital status:      Spouse name: N/A    Number of children: 2    Years of education: N/A     Occupational History    --      Social History Main Topics    Smoking status: Never Smoker    Smokeless tobacco: Never Used    Alcohol use No    Drug use: No    Sexual activity: Yes     Partners: Male     Birth control/ protection: Surgical     Other Topics Concern     Service No    Blood Transfusions No    Caffeine Concern No    Occupational Exposure No    Hobby Hazards No    Sleep Concern Yes    Stress Concern Yes    Weight Concern Yes    Special Diet Yes     DM    Back Care Yes    Exercise Yes    Bike Helmet No    Seat Belt Yes    Self-Exams Yes     Social History Narrative     Family History   Problem Relation Age of Onset    Diabetes Mother     Heart Disease Mother     Hypertension Mother     Stroke Mother     Hypertension Father     Stroke Father     Heart Disease Father     Breast Cancer Neg Hx        PHYSICAL EXAMINATION:    Visit Vitals    /90    Pulse (!) 108    Resp 20    Ht 5' 2\" (1.575 m)    Wt 101.6 kg (224 lb)    SpO2 98%    BMI 40.97 kg/m2     General:  Well defined, nourished, and groomed individual in no acute distress.    Neck:             Supple, nontender, no bruits, no pain with resistance to active range of motion.    Heart:            Regular rate and rhythm, no murmurs, rub, or gallop.  Normal S1S2.   Lungs:  Clear to auscultation bilaterally with equal chest expansion, no cough, no wheeze  Musculoskeletal:  Extremities revealed no edema and had full range of motion of joints.    Psych:  Good mood and bright affect      NEUROLOGICAL EXAMINATION:     Mental Status:   Alert and oriented to person, place, and time with recent and remote memory intact.  Attention span and concentration are normal. Speech is fluent with a full fund of knowledge.        Cranial Nerves:    II, III, IV, VI:  Visual acuity grossly intact. Visual fields are normal.    Pupils are equal, round, and reactive to light and accommodation.    Extra-ocular movements are full and fluid.  Fundoscopic exam was benign, no ptosis. Positive nystagmus noted in the right eye with the fast phase to the left which may relate to a lazy eye.    V-XII:             Hearing is grossly intact.  Facial features are symmetric, with normal sensation and strength.  The palate rises symmetrically and the tongue protrudes midline.  Sternocleidomastoids 5/5.        Motor Examination:               Normal tone, bulk, and strength, 5/5 muscle strength throughout.        Coordination:  Finger to nose was normal.   No resting or intention tremor      Gait and Station: [place] while walking.  Normal arm swing.  No pronator drift.   No muscle wasting or fasiculations noted.        Reflexes:  DTRs 2+ throughout.      ASSESSMENT AND PLAN    ICD-10-CM ICD-9-CM    1. Chronic migraine without aura without status migrainosus, not intractable G43.709 346.70 gabapentin (NEURONTIN) 300 mg capsule   2. Type 2 diabetes mellitus with diabetic neuropathy, without long-term current use of insulin ([facility]) E11.40 250.60 HEMOGLOBIN A1C WITH EAG     357.2 CBC WITH AUTOMATED DIFF      METABOLIC PANEL, COMPREHENSIVE      LIPID PANEL   3. Essential hypertension I10 401.9 DISCONTINUED: losartan (COZAAR) 100 mg tablet   4. Elevated serum creatinine J68.96 767.05 METABOLIC PANEL, COMPREHENSIVE   5.  Elevated LFTs G27.74 332.3 METABOLIC PANEL, COMPREHENSIVE     Discussed migraines at length and the potential for her underlying health issues to be at least a factor in the frequency and intensity of the migraines. Based on chart review, she has not been evaluated by endocrinology since at least last August and she has not had any of her lab work assessed to include the HgbA1C, CMP, CBC, and Lipids. Her blood pressure continues to be sub optimal and I suspect that her diabetes is not well controlled despite her insistence that her checked blood sugars at home have been good. We discussed ADA guidelines regarding lipid management and her LDL goal should be less than 75 to help reduce her risk of cardiac and cerebrovascular event. We discussed her diet as well as her weight and her comorbid diseases of HTN, dyslipidemia, DM, obesity as potential risk factors. Of note, she has also had some mild renal insufficiency noted on her labs as well as elevated LFTs. I have recommended that she increase her Cozaar to 100mg despite the swelling in her legs which did not improve with discontinuation of the drug. I suspect that this is a dependent edema secondary to vascular insufficiency. I have also recommended she follow up with her PCP, nephrologist, cardiologist, and endocrinologist for monitoring and management. In the meantime, she will increase her gabapentin for additional migraine management as this will have the least effect on her kidneys the status of which I do not know. I would not recommend any other therapy until the status is known. Hopefully, the increase in the Cozaar and the increase in the gabapentin will result in better headache management. More than 50% of today's 40+ minute office visit was spent in education and counseling. Charlotte Washingtonr

## 2018-04-10 NOTE — PROGRESS NOTES
Migraines- still have been really bad   She was prescribed a medrol dose pack which she has finished, using the sumatriptan as needed   Still has been daily with migraines, sumatriptan helps she has only been taking 2 of them a week

## 2018-04-10 NOTE — MR AVS SNAPSHOT
[address] [place] [address] [address] [facility] [id number] [phone] Patient: Nomi Muller MRN: [id number] GNI:[id number][birth year]50 Visit Information Date & Time Provider Department Dept. Phone Encounter #  
 4/10/2018 10:00 AM Jesús Reyes NP [facility] Neu[facility] [phone] [id number] Follow-up Instructions Return in about 2 months (around 6/10/2018). Upcoming Health Maintenance Date Due Pneumococcal 19-64 Medium Risk (1 of 1 - PPSV23) 1/18/1999 EYE EXAM RETINAL OR DILATED Q1 5/19/2014 MICROALBUMIN Q1 4/13/2017 FOOT EXAM Q1 6/24/2017 LIPID PANEL Q1 10/4/2017 HEMOGLOBIN A1C Q6M 2/15/2018 PAP AKA CERVICAL CYTOLOGY 10/1/2018 DTaP/Tdap/Td series (2 - Td) 12/8/2025 Allergies as of 4/10/2018  Review Complete On: 4/10/2018 By: Jesús Reyes NP Severity Noted Reaction Type Reactions Aspirin  03/05/2010    Itching Contrast Agent [Iodine]  05/01/2015    Hives Dilaudid [Hydromorphone]  05/01/2015    Hives Metformin  07/03/2013    Nausea Only Reglan [Metoclopramide]  03/05/2010    Other (comments) Impaired speech Ritalin [Methylphenidate]  05/01/2015    Other (comments) Speech impairment Current Immunizations  Reviewed on 12/8/2015 Name Date Influenza Vaccine (Quad) PF 10/4/2016, 11/23/2015  9:10 AM  
 Tdap 12/8/2015  9:54 AM  
  
 Not reviewed this visit You Were Diagnosed With   
  
 Codes Comments Chronic migraine without aura without status migrainosus, not intractable    -  Primary ICD-10-CM: P60.721 ICD-9-CM: 346.70 Type 2 diabetes mellitus with diabetic neuropathy, without long-term current use of insulin (HCC)     ICD-10-CM: E11.40 ICD-9-CM: 250.60, 357.2 Essential hypertension     ICD-10-CM: I10 
ICD-9-CM: 401.9 Elevated serum creatinine     ICD-10-CM: R79.89 ICD-9-CM: 790.99  Elevated LFTs     ICD-10-CM: R79.89 
 ICD-9-CM: 790.6 Vitals BP Pulse Resp Height(growth percentile) Weight(growth percentile) SpO2  
 146/90 (!) 108 20 5' 2\" (1.575 m) 224 lb (101.6 kg) 98% BMI OB Status Smoking Status 40.97 kg/m2 Having regular periods Never Smoker Vitals History BMI and BSA Data Body Mass Index Body Surface Area 40.97 kg/m 2 2.11 m 2 Preferred Pharmacy Pharmacy Name [facility] [phone] Your Updated Medication List  
  
   
This list is accurate as of 4/10/18 11:19 AM.  Always use your most recent med list.  
  
  
  
  
 albuterol 90 mcg/actuation inhaler Commonly known as:  VENTOLIN HFA Take 2 Puffs by inhalation every four (4) hours as needed for Wheezing. Indications: Acute Asthma Attack * APIDRA U-100 INSULIN 100 unit/mL injection Generic drug:  insulin glulisine U-100  
1 unit * insulin glulisine U-100 100 unit/mL pen Commonly known as:  APIDRA SOLOSTAR U-100 INSULIN INJECT 30 UNITS SUBCUTANEOUSLY BEFORE BREAKFAST, LUNCH, AND DINNER plus sliding scale. Max daily dose 150 units. ARNUITY ELLIPTA 200 mcg/actuation Dsdv inhaler Generic drug:  fluticasone furoate Take 1 Puff by inhalation daily. Blood-Glucose Meter Misc Commonly known as:  ACCU-CHEK FILEMON PLUS METER Test 4 times daily Dx Code: E11.65  
  
 carvedilol 3.125 mg tablet Commonly known as:  COREG  
TAKE ONE TABLET BY MOUTH TWICE DAILY WITH MEALS  
  
 gabapentin 300 mg capsule Commonly known as:  NEURONTIN Take 2 Caps by mouth three (3) times daily. glucose blood VI test strips strip Commonly known as:  blood glucose test  
Patient request the [facility] brand of meter and strips and to test QID. insulin glargine 100 unit/mL (3 mL) Inpn Commonly known as:  Alex Bhardwaj Inject 90 units at night. Insulin Needles (Disposable) 30 gauge x 1/3\" Sliding scale Insulin Needles (Disposable) 31 gauge x 5/16\" Ndle Use to inject Lantus and Apidra daily Dx Code: E11.65 Lancets Misc Bid for diabetes 250.00 Lot # [id number] Exp 7/2018 Man Brian Nordisk  
  
 losartan 100 mg tablet Commonly known as:  COZAAR Take 1 Tab by mouth daily. montelukast 10 mg tablet Commonly known as:  SINGULAIR  
TAKE ONE TABLET BY MOUTH ONCE DAILY Nebulizer & Compressor machine 1 Each by Does Not Apply route two (2) times daily as needed. sodium bicarbonate 325 mg tablet Take 325 mg by mouth two (2) times a day. SUMAtriptan 100 mg tablet Commonly known as:  IMITREX  
1 at HA onset and repeat in 2 hours if needed. Max 2 in 24 hours TANZEUM 50 mg/0.5 mL injector pen Generic drug:  albiglutide 50 mg by SubCUTAneous route every seven (7) days. VITAMIN D2 50,000 unit capsule Generic drug:  ergocalciferol Take 50,000 Units by mouth two (2) times a week. * Notice: This list has 2 medication(s) that are the same as other medications prescribed for you. Read the directions carefully, and ask your doctor or other care provider to review them with you. Prescriptions Sent to Pharmacy Refills  
 losartan (COZAAR) 100 mg tablet 3 Sig: Take 1 Tab by mouth daily. Class: Normal  
 Pharmacy: [address] [place] [address] Ph #: [phone] Route: Oral  
 gabapentin (NEURONTIN) 300 mg capsule 3 Sig: Take 2 Caps by mouth three (3) times daily. Class: Normal  
 Pharmacy: [address] [place] [address] Ph #: [phone] Route: Oral  
  
We Performed the Following CBC WITH AUTOMATED DIFF [31544 CPT(R)] HEMOGLOBIN A1C WITH EAG [02999 CPT(R)] LIPID PANEL [35521 CPT(R)] METABOLIC PANEL, COMPREHENSIVE [48741 CPT(R)] Follow-up Instructions Return in about 2 months (around 6/10/2018). Patient Instructions A Healthy Lifestyle: Care Instructions Your Care Instructions A healthy lifestyle can help you feel good, stay at a healthy weight, and have plenty of energy for both work and play. A healthy lifestyle is something you can share with your whole family. A healthy lifestyle also can lower your risk for serious health problems, such as high blood pressure, heart disease, and diabetes. You can follow a few steps listed below to improve your health and the health of your family. Follow-up care is a key part of your treatment and safety. Be sure to make and go to all appointments, and call your doctor if you are having problems. It's also a good idea to know your test results and keep a list of the medicines you take. How can you care for yourself at home? · Do not eat too much sugar, fat, or fast foods. You can still have dessert and treats now and then. The goal is moderation. · Start small to improve your eating habits. Pay attention to portion sizes, drink less juice and soda pop, and eat more fruits and vegetables. ¨ Eat a healthy amount of food. A 3-ounce serving of meat, for example, is about the size of a deck of cards. Fill the rest of your plate with vegetables and whole grains. ¨ Limit the amount of soda and sports drinks you have every day. Drink more water when you are thirsty. ¨ Eat at least 5 servings of fruits and vegetables every day. It may seem like a lot, but it is not hard to reach this goal. A serving or helping is 1 piece of fruit, 1 cup of vegetables, or 2 cups of leafy, raw vegetables. Have an apple or some carrot sticks as an afternoon snack instead of a candy bar. Try to have fruits and/or vegetables at every meal. 
· Make exercise part of your daily routine. You may want to start with simple activities, such as walking, bicycling, or slow swimming. Try to be active 30 to 60 minutes every day.  You do not need to do all 30 to 60 minutes all at once. For example, you can exercise 3 times a day for 10 or 20 minutes. Moderate exercise is safe for most people, but it is always a good idea to talk to your doctor before starting an exercise program. 
· Keep moving. [place] the lawn, work in the garden, or [place]. Take the stairs instead of the elevator at work. · If you smoke, quit. People who smoke have an increased risk for heart attack, stroke, cancer, and other lung illnesses. Quitting is hard, but there are ways to boost your chance of quitting tobacco for good. ¨ Use nicotine gum, patches, or lozenges. ¨ Ask your doctor about stop-smoking programs and medicines. ¨ Keep trying. In addition to reducing your risk of diseases in the future, you will notice some benefits soon after you stop using tobacco. If you have shortness of breath or asthma symptoms, they will likely get better within a few weeks after you quit. · Limit how much alcohol you drink. Moderate amounts of alcohol (up to 2 drinks a day for men, 1 drink a day for women) are okay. But drinking too much can lead to liver problems, high blood pressure, and other health problems. Family health If you have a family, there are many things you can do together to improve your health. · Eat meals together as a family as often as possible. · Eat healthy foods. This includes fruits, vegetables, lean meats and dairy, and whole grains. · Include your family in your fitness plan. Most people think of activities such as jogging or tennis as the way to fitness, but there are many ways you and your family can be more active. Anything that makes you breathe hard and gets your heart pumping is exercise. Here are some tips: 
¨ Walk to do errands or to take your child to school or the bus. ¨ Go for a family bike ride after dinner instead of watching TV. Where can you learn more? Go to http://aziza-niyah.info/. Enter F794 in the search box to learn more about \"A Healthy Lifestyle: Care Instructions. \" Current as of: May 12, 2017 Content Version: 11.4 © 1499-2594 [employer]wise[employer]orporated. Care instructions adapted under license by [employer] (which disclaims liability or warranty for this information). If you have questions about a medical condition or this instruction, always ask your healthcare professional. [address] any warranty or liability for your use of this information. Introducing [facility]! [facility] introduces aBIZinaBOX patient portal. Now you can access parts of your medical record, email your doctor's office, and request medication refills online. 1. In your internet browser, go to [email or web address] 2. Click on the First Time User? Click Here link in the Sign In box. You will see the New Member Sign Up page. 3. Enter your aBIZinaBOX Access Code exactly as it appears below. You will not need to use this code after youve completed the sign-up process. If you do not sign up before the expiration date, you must request a new code. · aBIZinaBOX Access Code: [email or web address] Expires: 4/25/2018  9:26 AM 
 
4. Enter the last four digits of your Social Security Number (xxxx) and Date of Birth (mm/dd/yyyy) as indicated and click Submit. You will be taken to the next sign-up page. 5. Create a aBIZinaBOX ID. This will be your aBIZ[employer]BOX login ID and cannot be changed, so think of one that is secure and easy to remember. 6. Create a aBIZinaBOX password. You can change your password at any time. 7. Enter your Password Reset Question and Answer. This can be used at a later time if you forget your password. 8. Enter your e-mail address. You will receive e-mail notification when new information is available in [address] 9. Click Sign Up. You can now view and download portions of your medical record. 10. Click the Download Summary menu link to download a portable copy of your medical information. If you have questions, please visit the Frequently Asked Questions section of the [employer] website. Remember, [employer] is NOT to be used for urgent needs. For medical emergencies, dial 911. Now available from your iPhone and Android! Please provide this summary of care documentation to your next provider. Your primary care clinician is listed as Brianna Zavala. If you have any questions after today's visit, please call [phone].

## 2018-04-10 NOTE — PATIENT INSTRUCTIONS

## 2018-04-11 ENCOUNTER — OFFICE VISIT (OUTPATIENT)
Dept: FAMILY MEDICINE CLINIC | Age: 38
End: 2018-04-11

## 2018-04-11 VITALS
WEIGHT: 225 LBS | TEMPERATURE: 98.9 F | BODY MASS INDEX: 41.41 KG/M2 | HEART RATE: 105 BPM | RESPIRATION RATE: 18 BRPM | HEIGHT: 62 IN | OXYGEN SATURATION: 100 % | SYSTOLIC BLOOD PRESSURE: 122 MMHG | DIASTOLIC BLOOD PRESSURE: 84 MMHG

## 2018-04-11 DIAGNOSIS — Z79.4 TYPE 2 DIABETES MELLITUS WITH HYPERGLYCEMIA, WITH LONG-TERM CURRENT USE OF INSULIN (HCC): Primary | ICD-10-CM

## 2018-04-11 DIAGNOSIS — I10 ESSENTIAL HYPERTENSION: ICD-10-CM

## 2018-04-11 DIAGNOSIS — E11.65 TYPE 2 DIABETES MELLITUS WITH HYPERGLYCEMIA, WITH LONG-TERM CURRENT USE OF INSULIN (HCC): Primary | ICD-10-CM

## 2018-04-11 LAB
ALBUMIN UR QL STRIP: 150 MG/L
CREATININE, URINE POC: 100 MG/DL
MICROALBUMIN/CREAT RATIO POC: >300 MG/G

## 2018-04-11 RX ORDER — LOSARTAN POTASSIUM 100 MG/1
100 TABLET ORAL DAILY
Qty: 90 TAB | Refills: 1 | Status: SHIPPED | OUTPATIENT
Start: 2018-04-11 | End: 2019-01-01 | Stop reason: SDUPTHER

## 2018-04-11 RX ORDER — AMITRIPTYLINE HYDROCHLORIDE 25 MG/1
TABLET, FILM COATED ORAL
COMMUNITY
Start: 2018-02-27 | End: 2018-05-01 | Stop reason: ALTCHOICE

## 2018-04-11 RX ORDER — LOSARTAN POTASSIUM 50 MG/1
TABLET ORAL
COMMUNITY
Start: 2018-02-27 | End: 2018-04-11 | Stop reason: ALTCHOICE

## 2018-04-11 NOTE — PROGRESS NOTES
Chief Complaint   Patient presents with    Physical     fasting physical.   Saw Neuro yesterday for headaches and suggested physical and labs     \"REVIEWED RECORD IN PREPARATION FOR VISIT AND HAVE OBTAINED THE NECESSARY DOCUMENTATION\"  1. Have you been to the ER, urgent care clinic since your last visit? Hospitalized since your last visit? No    2. Have you seen or consulted any other health care providers outside of the [address] [place] [address] since your last visit? Include any pap smears or colon screening. No  Patient does not have advanced directives.

## 2018-04-11 NOTE — MR AVS SNAPSHOT
[address] [facility] 
 
 
 [address] [facility] 
[phone] Patient: Tori Santos MRN: [id number] EII:[id number] Visit Information Date & Time Provider Department Dept. Phone Encounter #  
 4/11/2018  8:00 AM Matt ConnerTawanna 108 [phone] [id number] Follow-up Instructions Return if symptoms worsen or fail to improve. Your Appointments 4/16/2018  2:00 PM  
ESTABLISHED PATIENT with Chantelle Hercules MD  
[facility] ([address] [place] [address]) Appt Note: Annual f/u per Patient [address] [facility] [address] [facility] [id number] [phone]  
  
   
 [address] [facility] 501 [facility] [address]  
  
    
 6/12/2018 10:30 AM  
Follow Up with Jefe Joaquin NP [address] [facility] ([address] [facility]) Appt Note: 2mo f/up migraine cr  
 [place] [address] [facility] [address] [id number] [phone]  
  
   
 [place] [address] [address] [address] Upcoming Health Maintenance Date Due  
 EYE EXAM RETINAL OR DILATED Q1 5/19/2014 FOOT EXAM Q1 6/24/2017 Pneumococcal 19-64 Medium Risk (1 of 1 - PPSV23) 5/25/2018* PAP AKA CERVICAL CYTOLOGY 10/1/2018 HEMOGLOBIN A1C Q6M 10/11/2018 MICROALBUMIN Q1 4/11/2019 LIPID PANEL Q1 4/11/2019 DTaP/Tdap/Td series (2 - Td) 12/8/2025 *Topic was postponed. The date shown is not the original due date. Allergies as of 4/11/2018  Review Complete On: 4/11/2018 By: Matt Conner NP Severity Noted Reaction Type Reactions Aspirin  03/05/2010    Itching Contrast Agent [Iodine]  05/01/2015    Hives Dilaudid [Hydromorphone]  05/01/2015    Hives Metformin  07/03/2013    Nausea Only Reglan [Metoclopramide]  03/05/2010    Other (comments) Impaired speech Ritalin [Methylphenidate]  05/01/2015    Other (comments) Speech impairment Current Immunizations  Reviewed on 12/8/2015 Name Date Influenza Vaccine (Quad) PF 10/4/2016, 11/23/2015  9:10 AM  
 Tdap 12/8/2015  9:54 AM  
  
 Not reviewed this visit You Were Diagnosed With   
  
 Codes Comments Type 2 diabetes mellitus with hyperglycemia, with long-term current use of insulin (HCC)    -  Primary ICD-10-CM: E11.65, Z79.4 ICD-9-CM: 250.00, 790.29, V58.67 Essential hypertension     ICD-10-CM: I10 
ICD-9-CM: 401.9 Vitals BP Pulse Temp Resp Height(growth percentile) Weight(growth percentile) 122/84 (!) 105 98.9 °F (37.2 °C) (Oral) 18 5' 2\" (1.575 m) 225 lb (102.1 kg) LMP SpO2 BMI OB Status Smoking Status 04/02/2018 100% 41.15 kg/m2 Having regular periods Never Smoker Vitals History BMI and BSA Data Body Mass Index Body Surface Area  
 41.15 kg/m 2 2.11 m 2 Preferred Pharmacy Pharmacy Name Phone [address] [phone] Your Updated Medication List  
  
   
This list is accurate as of 4/11/18  8:19 AM.  Always use your most recent med list.  
  
  
  
  
 albuterol 90 mcg/actuation inhaler Commonly known as:  VENTOLIN HFA Take 2 Puffs by inhalation every four (4) hours as needed for Wheezing. Indications: Acute Asthma Attack  
  
 amitriptyline 25 mg tablet Commonly known as:  ELAVIL * APIDRA U-100 INSULIN 100 unit/mL injection Generic drug:  insulin glulisine U-100  
1 unit * insulin glulisine U-100 100 unit/mL pen Commonly known as:  APIDRA SOLOSTAR U-100 INSULIN INJECT 30 UNITS SUBCUTANEOUSLY BEFORE BREAKFAST, LUNCH, AND DINNER plus sliding scale. Max daily dose 150 units. ARNUITY ELLIPTA 200 mcg/actuation Dsdv inhaler Generic drug:  fluticasone furoate Take 1 Puff by inhalation daily. Blood-Glucose Meter Misc Commonly known as:  ACCU-CHEK FILEMON PLUS METER Test 4 times daily Dx Code: E11.65  
  
 carvedilol 3.125 mg tablet Commonly known as:  COREG  
TAKE ONE TABLET BY MOUTH TWICE DAILY WITH MEALS  
  
 gabapentin 300 mg capsule Commonly known as:  NEURONTIN Take 2 Caps by mouth three (3) times daily. glucose blood VI test strips strip Commonly known as:  blood glucose test  
Patient request the SIST[employer]  [employer] brand of meter and strips and to test QID. insulin glargine 100 unit/mL (3 mL) Inpn Commonly known as:  Michael Brim Inject 90 units at night. Insulin Needles (Disposable) 30 gauge x 1/3\" Sliding scale Insulin Needles (Disposable) 31 gauge x 5/16\" Ndle Use to inject Lantus and Apidra daily Dx Code: E11.65 Lancets Misc Bid for diabetes 250.00 Lot # [id number] Exp 7/2018 Man Brian Nordisk  
  
 losartan 100 mg tablet Commonly known as:  COZAAR Take 1 Tab by mouth daily. montelukast 10 mg tablet Commonly known as:  SINGULAIR  
TAKE ONE TABLET BY MOUTH ONCE DAILY Nebulizer & Compressor machine 1 Each by Does Not Apply route two (2) times daily as needed. sodium bicarbonate 325 mg tablet Take 325 mg by mouth two (2) times a day. SUMAtriptan 100 mg tablet Commonly known as:  IMITREX  
1 at HA onset and repeat in 2 hours if needed. Max 2 in 24 hours TANZEUM 50 mg/0.5 mL injector pen Generic drug:  albiglutide 50 mg by SubCUTAneous route every seven (7) days. VITAMIN D2 50,000 unit capsule Generic drug:  ergocalciferol Take 50,000 Units by mouth two (2) times a week. * Notice: This list has 2 medication(s) that are the same as other medications prescribed for you. Read the directions carefully, and ask your doctor or other care provider to review them with you. We Performed the Following AMB POC URINE, MICROALBUMIN, SEMIQUANT (3 RESULTS) [27517 CPT(R)] CBC W/O DIFF [39127 CPT(R)] HEMOGLOBIN A1C WITH EAG [28815 CPT(R)] LIPID PANEL [86274 CPT(R)] METABOLIC PANEL, COMPREHENSIVE [43678 CPT(R)] Follow-up Instructions Return if symptoms worsen or fail to improve. Patient Instructions Learning About Diabetes Food Guidelines Your Care Instructions Meal planning is important to manage diabetes. It helps keep your blood sugar at a target level (which you set with your doctor). You don't have to eat special foods. You can eat what your family eats, including sweets once in a while. But you do have to pay attention to how often you eat and how much you eat of certain foods. You may want to work with a dietitian or a certified diabetes educator (CDE) to help you plan meals and snacks. A dietitian or CDE can also help you lose weight if that is one of your goals. What should you know about eating carbs? Managing the amount of carbohydrate (carbs) you eat is an important part of healthy meals when you have diabetes. Carbohydrate is found in many foods. · Learn which foods have carbs. And learn the amounts of carbs in different foods. ¨ Bread, cereal, pasta, and rice have about 15 grams of carbs in a serving. A serving is 1 slice of bread (1 ounce), ½ cup of cooked cereal, or 1/3 cup of cooked pasta or rice. ¨ Fruits have 15 grams of carbs in a serving. A serving is 1 small fresh fruit, such as an apple or orange; ½ of a banana; ½ cup of cooked or canned fruit; ½ cup of fruit juice; 1 cup of melon or raspberries; or 2 tablespoons of dried fruit. ¨ Milk and no-sugar-added yogurt have 15 grams of carbs in a serving. A serving is 1 cup of milk or 2/3 cup of no-sugar-added yogurt. ¨ Starchy vegetables have 15 grams of carbs in a serving. A serving is ½ cup of mashed potatoes or sweet potato; 1 cup winter squash; ½ of a small baked potato; ½ cup of cooked beans; or ½ cup cooked corn or green peas.  
· Learn how much carbs to eat each day and at each meal. A dietitian or CDE can teach you how to keep track of the amount of carbs you eat. This is called carbohydrate counting. · If you are not sure how to count carbohydrate grams, use the Plate Method to plan meals. It is a good, quick way to make sure that you have a balanced meal. It also helps you spread carbs throughout the day. ¨ Divide your plate by types of foods. Put non-starchy vegetables on half the plate, meat or other protein food on one-quarter of the plate, and a grain or starchy vegetable in the final quarter of the plate. To this you can add a small piece of fruit and 1 cup of milk or yogurt, depending on how many carbs you are supposed to eat at a meal. 
· Try to eat about the same amount of carbs at each meal. Do not \"save up\" your daily allowance of carbs to eat at one meal. 
· Proteins have very little or no carbs per serving. Examples of proteins are beef, chicken, turkey, fish, eggs, tofu, cheese, cottage cheese, and peanut butter. A serving size of meat is 3 ounces, which is about the size of a deck of cards. Examples of meat substitute serving sizes (equal to 1 ounce of meat) are 1/4 cup of cottage cheese, 1 egg, 1 tablespoon of peanut butter, and ½ cup of tofu. How can you eat out and still eat healthy? · Learn to estimate the serving sizes of foods that have carbohydrate. If you measure food at home, it will be easier to estimate the amount in a serving of restaurant food. · If the meal you order has too much carbohydrate (such as potatoes, corn, or baked beans), ask to have a low-carbohydrate food instead. Ask for a salad or green vegetables. · If you use insulin, check your blood sugar before and after eating out to help you plan how much to eat in the future. · If you eat more carbohydrate at a meal than you had planned, take a walk or do other exercise. This will help lower your blood sugar. What else should you know? · Limit saturated fat, such as the fat from meat and dairy products.  This is a healthy choice because people who have diabetes are at higher risk of heart disease. So choose lean cuts of meat and nonfat or low-fat dairy products. Use olive or canola oil instead of butter or shortening when cooking. · Don't skip meals. Your blood sugar may drop too low if you skip meals and take insulin or certain medicines for diabetes. · Check with your doctor before you drink alcohol. Alcohol can cause your blood sugar to drop too low. Alcohol can also cause a bad reaction if you take certain diabetes medicines. Follow-up care is a key part of your treatment and safety. Be sure to make and go to all appointments, and call your doctor if you are having problems. It's also a good idea to know your test results and keep a list of the medicines you take. Where can you learn more? Go to http://aziza-niyah.info/. Enter I094 in the search box to learn more about \"Learning About Diabetes Food Guidelines. \" Current as of: March 13, 2017 Content Version: 11.4 © 7191-7623 [employer]. Care instructions adapted under license by [employer] (which disclaims liability or warranty for this information). If you have questions about a medical condition or this instruction, always ask your healthcare professional. [address] any warranty or liability for your use of this information. Introducing [facility]! [facility] introduces InRiver patient portal. Now you can access parts of your medical record, email your doctor's office, and request medication refills online. 1. In your internet browser, go to [email or web address] 2. Click on the First Time User? Click Here link in the Sign In box. You will see the New Member Sign Up page. 3. Enter your InRiver Access Code exactly as it appears below. You will not need to use this code after youve completed the sign-up process.  If you do not sign up before the expiration date, you must request a new code. · [employer] Access Code: [email or web address] Expires: 4/25/2018  9:26 AM 
 
4. Enter the last four digits of your Social Security Number (xxxx) and Date of Birth (mm/dd/yyyy) as indicated and click Submit. You will be taken to the next sign-up page. 5. Create a [employer] ID. This will be your [employer] login ID and cannot be changed, so think of one that is secure and easy to remember. 6. Create a [employer] password. You can change your password at any time. 7. Enter your Password Reset Question and Answer. This can be used at a later time if you forget your password. 8. Enter your e-mail address. You will receive e-mail notification when new information is available in [address] 9. Click Sign Up. You can now view and download portions of your medical record. 10. Click the Download Summary menu link to download a portable copy of your medical information. If you have questions, please visit the Frequently Asked Questions section of the [employer] website. Remember, [employer] is NOT to be used for urgent needs. For medical emergencies, dial 911. Now available from your iPhone and Android! Please provide this summary of care documentation to your next provider. Your primary care clinician is listed as Prem Friend. If you have any questions after today's visit, please call [phone].

## 2018-04-11 NOTE — PROGRESS NOTES
Subjective:   45 y.o. female for Well Woman Check. Her gyne and breast care is done elsewhere by her Ob-Gyne physician. Pt states that she is here for physical and fasting labs, as \"her neurologist told her she needed labs\". Pt is continued to be followed by endocrinology for diabetes. Pt has no concerns or complaints at this time.     Patient Active Problem List    Diagnosis Date Noted    Obesity, morbid ([address] 04/10/2018    Type 2 diabetes mellitus with nephropathy ([address] 01/10/2018    Type 2 diabetes mellitus with diabetic neuropathy ([address] 01/10/2018    Headache 10/30/2017    Nausea 10/30/2017    Acute non-recurrent maxillary sinusitis 10/04/2017    Screening for thyroid disorder 08/22/2017    URI (upper respiratory infection) 06/20/2017    Bronchitis 03/27/2017    Elevated serum creatinine 10/20/2016    Muscle spasm of back 07/06/2016    Encounter for long-term (current) use of insulin ([address] 05/12/2016    Acute bacterial conjunctivitis 04/27/2016    Facial bruising 04/27/2016    Hypoalbuminemia 04/13/2016    Need for pneumococcal vaccination 04/04/2016    Shortness of breath 03/25/2016    Dysuria 03/25/2016    Recurrent streptococcal tonsillitis 02/12/2016    Muscle spasms of neck 02/01/2016    Ankle edema 01/12/2016    Strep throat 01/12/2016    Sinusitis 01/04/2016    Obesity 11/28/2015    Type II diabetes mellitus, uncontrolled ([address] 11/28/2015    BMI 35.0-35.9,adult 11/28/2015    Essential hypertension 11/28/2015    Noncompliance of patient with dietary regimen 11/28/2015    UTI (lower urinary tract infection) 12/19/2014    Non compliance with medical treatment 12/19/2014    LGSIL (low grade squamous intraepithelial dysplasia) 11/20/2013    Lumbar degenerative disc disease 11/13/2013    Avascular necrosis of bones of both hips ([address] 10/14/2013    Asthma 03/05/2010    Diabetes mellitus ([address] 03/05/2010     Current Outpatient Prescriptions   Medication Sig Dispense Refill    amitriptyline (ELAVIL) 25 mg tablet       losartan (COZAAR) 100 mg tablet Take 1 Tab by mouth daily. 30 Tab 3    gabapentin (NEURONTIN) 300 mg capsule Take 2 Caps by mouth three (3) times daily. 180 Cap 3    SUMAtriptan (IMITREX) 100 mg tablet 1 at HA onset and repeat in 2 hours if needed. Max 2 in 24 hours 9 Tab 5    carvedilol (COREG) 3.125 mg tablet TAKE ONE TABLET BY MOUTH TWICE DAILY WITH MEALS 60 Tab 3    insulin glulisine (APIDRA SOLOSTAR) 100 unit/mL pen INJECT 30 UNITS SUBCUTANEOUSLY BEFORE BREAKFAST, LUNCH, AND DINNER plus sliding scale. Max daily dose 150 units. 45 mL 3    insulin glulisine (APIDRA) 100 unit/mL injection 1 unit      sodium bicarbonate 325 mg tablet Take 325 mg by mouth two (2) times a day.  insulin glargine (LANTUS,BASAGLAR) 100 unit/mL (3 mL) inpn Inject 90 units at night. 30 mL 6    Blood-Glucose Meter (ACCU-CHEK FILEMON PLUS METER) misc Test 4 times daily Dx Code: E11.65 1 Each 0    Insulin Needles, Disposable, 31 gauge x 5/16\" ndle Use to inject Lantus and Apidra daily Dx Code: E11.65 200 Pen Needle 11    VITAMIN D2 50,000 unit capsule Take 50,000 Units by mouth two (2) times a week.  albuterol (VENTOLIN HFA) 90 mcg/actuation inhaler Take 2 Puffs by inhalation every four (4) hours as needed for Wheezing. Indications: Acute Asthma Attack 1 Inhaler 3    ARNUITY ELLIPTA 200 mcg/actuation dsdv inhaler Take 1 Puff by inhalation daily. 1 Inhaler 2    Nebulizer & Compressor machine 1 Each by Does Not Apply route two (2) times daily as needed. 1 Each 0    glucose blood VI test strips (BLOOD GLUCOSE TEST) strip Patient request the SISTERS OF [facility] [employer] brand of meter and strips and to test QID.  100 Strip 5    Insulin Needles, Disposable, 30 x 1/3 \" Sliding scale 4 Package 0    Lancets misc Bid for diabetes 250.00  Lot # [id number]  Exp 7/2018  Man Brian Nordisk 400 Package 0    montelukast (SINGULAIR) 10 mg tablet TAKE ONE TABLET BY MOUTH ONCE DAILY 30 Tab 2    TANZEUM 50 mg/0.5 mL injector pen 50 mg by SubCUTAneous route every seven (7) days.        Allergies   Allergen Reactions    Aspirin Itching    Contrast Agent [Iodine] Hives    Dilaudid [Hydromorphone] Hives    Metformin Nausea Only    Reglan [Metoclopramide] Other (comments)     Impaired speech    Ritalin [Methylphenidate] Other (comments)     Speech impairment     Past Medical History:   Diagnosis Date    Abnormal pap 2012 10/5/13     hx w/Colpo     Arthritis     Asthma     Diabetes ([address]     Headache(784.0)     Hypertension     Joint pain     Pap smear for cervical cancer screening 10/1/15 ASCUS HPV NEG RP 3 YEARS    Ringing in ears      Past Surgical History:   Procedure Laterality Date    HX  SECTION  01 & 08    HX COLPOSCOPY  2012 neg    HX HEENT      cataract R eye    HX TUBAL LIGATION       Family History   Problem Relation Age of Onset    Diabetes Mother     Heart Disease Mother     Hypertension Mother     Stroke Mother     Hypertension Father     Stroke Father     Heart Disease Father     Breast Cancer Neg Hx      Social History   Substance Use Topics    Smoking status: Never Smoker    Smokeless tobacco: Never Used    Alcohol use No        Lab Results  Component Value Date/Time   WBC 5.1 2018 09:47 AM   HGB 12.1 2018 09:47 AM   HCT 36.2 2018 09:47 AM   PLATELET 090  09:47 AM   MCV 93.3 2018 09:47 AM     Lab Results  Component Value Date/Time   Hemoglobin A1c 14.6 (H) 2016 12:19 PM   Hemoglobin A1c 12.5 (H) 10/06/2015 10:42 AM   Hemoglobin A1c 13.0 (H) 2014 11:29 AM   Glucose 380 (H) 2018 09:47 AM   Glucose (POC) 154 (H) 10/16/2017 12:36 PM   Microalbumin/Creat ratio (mg/g creat) 13 10/01/2010 09:40 AM   Microalb/Creat ratio (ug/mg creat.) 92.2 (H) 2016 02:46 PM   Microalbumin,urine random 2.44 10/01/2010 09:40 AM   LDL,Direct 168 (H) 2016 12:19 PM   LDL, calculated 162 (H) 10/04/2016 10:43 AM Creatinine 1.13 (H) 03/05/2018 09:47 AM      Lab Results  Component Value Date/Time   Cholesterol, total 239 (H) 10/04/2016 10:43 AM   HDL Cholesterol 48 10/04/2016 10:43 AM   LDL,Direct 168 (H) 08/31/2016 12:19 PM   LDL, calculated 162 (H) 10/04/2016 10:43 AM   Triglyceride 145 10/04/2016 10:43 AM   CHOL/HDL Ratio 3.9 10/01/2010 09:43 AM     Lab Results  Component Value Date/Time   GFR est non-AA 54 (L) 03/05/2018 09:47 AM   GFR est AA >60 03/05/2018 09:47 AM   Creatinine 1.13 (H) 03/05/2018 09:47 AM   BUN 11 03/05/2018 09:47 AM   Sodium 137 03/05/2018 09:47 AM   Potassium 4.2 03/05/2018 09:47 AM   Chloride 101 03/05/2018 09:47 AM   CO2 31 03/05/2018 09:47 AM        ROS: Feeling generally well. No TIA's or unusual headaches, no dysphagia. No prolonged cough. No dyspnea or chest pain on exertion. No abdominal pain, change in bowel habits, black or bloody stools. No urinary tract symptoms. No new or unusual musculoskeletal symptoms. Specific concerns today: see above. Objective: The patient appears well, alert, oriented x 3, in no distress. Visit Vitals    BP (!) 137/97 (BP 1 Location: Left arm, BP Patient Position: Sitting)    Pulse (!) 105    Temp 98.9 °F (37.2 °C) (Oral)    Resp 18    Ht 5' 2\" (1.575 m)    Wt 225 lb (102.1 kg)    LMP 04/02/2018    SpO2 100%    BMI 41.15 kg/m2     ENT normal.  Neck supple. No adenopathy or thyromegaly. ALEXANDER. Lungs are clear, good air entry, no wheezes, rhonchi or rales. S1 and S2 normal, no murmurs, regular rate and rhythm. Extremities show no edema, normal peripheral pulses. Neurological is normal, no focal findings. Breast and Pelvic exams are deferred. Assessment/Plan:   Well Woman  lose weight, increase physical activity, routine labs ordered, call if any problems    ICD-10-CM ICD-9-CM    1.  Type 2 diabetes mellitus with hyperglycemia, with long-term current use of insulin (HCC) E11.65 250.00 CBC W/O DIFF    T35.6 207.98 METABOLIC PANEL, COMPREHENSIVE     V58.67 LIPID PANEL      HEMOGLOBIN A1C WITH EAG      AMB POC URINE, MICROALBUMIN, SEMIQUANT (3 RESULTS)   2. Essential hypertension I10 401.9 CBC W/O DIFF      METABOLIC PANEL, COMPREHENSIVE     Informed patient that we will notify her when her labs return, and inform her of any change in plan of care at that time. Educated about importance of following a diabetic diet, and focusing on blood sugar control. Pt informed to return to office with worsening of symptoms, or PRN with any questions or concerns. Pt verbalizes understanding of plan of care and denies further questions or concerns at this time.

## 2018-04-11 NOTE — PATIENT INSTRUCTIONS

## 2018-04-12 ENCOUNTER — TELEPHONE (OUTPATIENT)
Dept: FAMILY MEDICINE CLINIC | Age: 38
End: 2018-04-12

## 2018-04-12 ENCOUNTER — TELEPHONE (OUTPATIENT)
Dept: NEUROLOGY | Age: 38
End: 2018-04-12

## 2018-04-12 DIAGNOSIS — E78.5 HYPERLIPIDEMIA, UNSPECIFIED HYPERLIPIDEMIA TYPE: Primary | ICD-10-CM

## 2018-04-12 LAB
ALBUMIN SERPL-MCNC: 3.5 G/DL (ref 3.5–5.5)
ALBUMIN/GLOB SERPL: 1 {RATIO} (ref 1.2–2.2)
ALP SERPL-CCNC: 61 IU/L (ref 39–117)
ALT SERPL-CCNC: 81 IU/L (ref 0–32)
AST SERPL-CCNC: 65 IU/L (ref 0–40)
BILIRUB SERPL-MCNC: 0.2 MG/DL (ref 0–1.2)
BUN SERPL-MCNC: 15 MG/DL (ref 6–20)
BUN/CREAT SERPL: 14 (ref 9–23)
CALCIUM SERPL-MCNC: 8.7 MG/DL (ref 8.7–10.2)
CHLORIDE SERPL-SCNC: 98 MMOL/L (ref 96–106)
CHOLEST SERPL-MCNC: 208 MG/DL (ref 100–199)
CO2 SERPL-SCNC: 25 MMOL/L (ref 18–29)
CREAT SERPL-MCNC: 1.05 MG/DL (ref 0.57–1)
ERYTHROCYTE [DISTWIDTH] IN BLOOD BY AUTOMATED COUNT: 14.9 % (ref 12.3–15.4)
EST. AVERAGE GLUCOSE BLD GHB EST-MCNC: 306 MG/DL
GFR SERPLBLD CREATININE-BSD FMLA CKD-EPI: 68 ML/MIN/1.73
GFR SERPLBLD CREATININE-BSD FMLA CKD-EPI: 78 ML/MIN/1.73
GLOBULIN SER CALC-MCNC: 3.5 G/DL (ref 1.5–4.5)
GLUCOSE SERPL-MCNC: 296 MG/DL (ref 65–99)
HBA1C MFR BLD: 12.3 % (ref 4.8–5.6)
HCT VFR BLD AUTO: 36.9 % (ref 34–46.6)
HDLC SERPL-MCNC: 49 MG/DL
HGB BLD-MCNC: 12 G/DL (ref 11.1–15.9)
INTERPRETATION, 910389: NORMAL
LDLC SERPL CALC-MCNC: 113 MG/DL (ref 0–99)
Lab: NORMAL
MCH RBC QN AUTO: 31.6 PG (ref 26.6–33)
MCHC RBC AUTO-ENTMCNC: 32.5 G/DL (ref 31.5–35.7)
MCV RBC AUTO: 97 FL (ref 79–97)
PLATELET # BLD AUTO: 359 X10E3/UL (ref 150–379)
POTASSIUM SERPL-SCNC: 4.1 MMOL/L (ref 3.5–5.2)
PROT SERPL-MCNC: 7 G/DL (ref 6–8.5)
RBC # BLD AUTO: 3.8 X10E6/UL (ref 3.77–5.28)
SODIUM SERPL-SCNC: 136 MMOL/L (ref 134–144)
TRIGL SERPL-MCNC: 229 MG/DL (ref 0–149)
VLDLC SERPL CALC-MCNC: 46 MG/DL (ref 5–40)
WBC # BLD AUTO: 7.8 X10E3/UL (ref 3.4–10.8)

## 2018-04-12 RX ORDER — ATORVASTATIN CALCIUM 10 MG/1
10 TABLET, FILM COATED ORAL DAILY
Qty: 90 TAB | Refills: 0 | Status: SHIPPED | OUTPATIENT
Start: 2018-04-12 | End: 2018-07-09 | Stop reason: SDUPTHER

## 2018-04-12 NOTE — TELEPHONE ENCOUNTER
----- Message from [facility] sent at 4/12/2018  4:18 PM EDT -----  Regarding: REYNA Newell/ telephone  The pt is requesting a callback about labs. Best contact number is [phone].

## 2018-04-12 NOTE — TELEPHONE ENCOUNTER
Results reviewed with pt. Pt states she has never seen an Endocrinologist or anyone for elevated liver enzymes.

## 2018-04-12 NOTE — PROGRESS NOTES
Please call patient and let her know that her labs returned:  1. She needs to follow up with endocrinology ASAP. Glucose is still HIGH  2. Has she ever been seen by someone in regards to her elevated liver enzymes? They are high and have been for a while. Let me know. 3.  Cholesterol is high. This increases risk of heart attack/stroke. Will placed on lipitor. Should take daily, and should return to office with any negative side effects such as muscle aches. Needs to focus on diabetic diet.   Thanks

## 2018-04-12 NOTE — TELEPHONE ENCOUNTER
----- Message from Mitchell Vieira NP sent at 4/12/2018  2:24 PM EDT -----  Please call patient and let her know that her labs returned:  1. She needs to follow up with endocrinology ASAP. Glucose is still HIGH  2. Has she ever been seen by someone in regards to her elevated liver enzymes? They are high and have been for a while. Let me know. 3.  Cholesterol is high. This increases risk of heart attack/stroke. Will placed on lipitor. Should take daily, and should return to office with any negative side effects such as muscle aches. Needs to focus on diabetic diet.   Thanks

## 2018-04-13 DIAGNOSIS — R79.89 ELEVATED LFTS: Primary | ICD-10-CM

## 2018-04-13 NOTE — TELEPHONE ENCOUNTER
Pt called and stated that she had blood work done at the [facility] office and it came back that her liver function and cholesterol were elevated. Do you still want her to take the amitriptyline? Labs are in her chart from her PCP. Please advise     Her elevated lipids has nothing to do with her lipids and likely nothing to do with her LFT. Aside from which, I had discontinued this due to side effects when I saw her earlier this week and told her to increase the gabapentin. Per NP.     returned her call and gave her the previous message sent from the NP. she has verbalized understanding. She did not have any further questions for me at this time.

## 2018-04-13 NOTE — TELEPHONE ENCOUNTER
Her endocrinologist is her diabetes doctor, she saw her in August.  Dr Elina Emerson. Needs to make an appointment ASAP. I have placed an order for Dr Glenn Gomez, and she should call and make an appointment ASAP [phone].   Thanks

## 2018-04-13 NOTE — TELEPHONE ENCOUNTER
Pt states she will call Dr. Stephen De La Cruz for an appt and she has a call to Dr. Jozef Cadet office now for the first available appt.

## 2018-05-01 ENCOUNTER — OFFICE VISIT (OUTPATIENT)
Dept: HEMATOLOGY | Age: 38
End: 2018-05-01

## 2018-05-01 VITALS
HEIGHT: 62 IN | OXYGEN SATURATION: 97 % | SYSTOLIC BLOOD PRESSURE: 107 MMHG | HEART RATE: 100 BPM | TEMPERATURE: 99.2 F | WEIGHT: 220.2 LBS | BODY MASS INDEX: 40.52 KG/M2 | DIASTOLIC BLOOD PRESSURE: 66 MMHG

## 2018-05-01 DIAGNOSIS — E11.40 TYPE 2 DIABETES MELLITUS WITH DIABETIC NEUROPATHY, WITH LONG-TERM CURRENT USE OF INSULIN (HCC): ICD-10-CM

## 2018-05-01 DIAGNOSIS — R74.8 ABNORMAL LIVER ENZYMES: Primary | ICD-10-CM

## 2018-05-01 DIAGNOSIS — E66.01 OBESITY, MORBID (HCC): ICD-10-CM

## 2018-05-01 DIAGNOSIS — I10 ESSENTIAL HYPERTENSION: ICD-10-CM

## 2018-05-01 DIAGNOSIS — K76.0 NAFLD (NONALCOHOLIC FATTY LIVER DISEASE): ICD-10-CM

## 2018-05-01 DIAGNOSIS — Z79.4 TYPE 2 DIABETES MELLITUS WITH DIABETIC NEUROPATHY, WITH LONG-TERM CURRENT USE OF INSULIN (HCC): ICD-10-CM

## 2018-05-01 DIAGNOSIS — Z91.119 NONCOMPLIANCE OF PATIENT WITH DIETARY REGIMEN: ICD-10-CM

## 2018-05-01 DIAGNOSIS — Z79.4 ENCOUNTER FOR LONG-TERM (CURRENT) USE OF INSULIN (HCC): ICD-10-CM

## 2018-05-01 PROBLEM — E11.21 TYPE 2 DIABETES WITH NEPHROPATHY (HCC): Status: ACTIVE | Noted: 2018-05-01

## 2018-05-01 PROBLEM — J01.00 ACUTE NON-RECURRENT MAXILLARY SINUSITIS: Status: RESOLVED | Noted: 2017-10-04 | Resolved: 2018-05-01

## 2018-05-01 PROBLEM — J40 BRONCHITIS: Status: RESOLVED | Noted: 2017-03-27 | Resolved: 2018-05-01

## 2018-05-01 PROBLEM — Z13.29 SCREENING FOR THYROID DISORDER: Status: RESOLVED | Noted: 2017-08-22 | Resolved: 2018-05-01

## 2018-05-01 PROBLEM — J06.9 URI (UPPER RESPIRATORY INFECTION): Status: RESOLVED | Noted: 2017-06-20 | Resolved: 2018-05-01

## 2018-05-01 PROBLEM — R11.0 NAUSEA: Status: RESOLVED | Noted: 2017-10-30 | Resolved: 2018-05-01

## 2018-05-01 PROBLEM — R51.9 HEADACHE: Status: RESOLVED | Noted: 2017-10-30 | Resolved: 2018-05-01

## 2018-05-01 PROBLEM — E11.21 TYPE 2 DIABETES MELLITUS WITH NEPHROPATHY (HCC): Status: RESOLVED | Noted: 2018-01-10 | Resolved: 2018-05-01

## 2018-05-01 NOTE — MR AVS SNAPSHOT
1111 [facility] [phone] [address] [place] [address] 
[phone] Patient: Sandrita Sandoval MRN: [id number] ODO:[id number] Visit Information Date & Time Provider Department Dept. Phone Encounter #  
 5/1/2018  1:30 PM April RALPH Felixsebas, [facility]7 [facility] [place] [facility] [place] 219 [id number] Your Appointments 5/2/2018  9:20 AM  
ESTABLISHED PATIENT with Ulysses Decree, MD  
[facility] ([address] [place] [address]) Appt Note: Annual f/u per Patient; Annual f/u per Patient; pt r/s from 4/ to 750 [address] 600 [facility] [id number] [phone]  
  
   
 [address] [address]  
  
    
 6/12/2018 10:30 AM  
Follow Up with Karson Cardozo NP 1991 [facility] ([address]) Appt Note: 2mo f/up migraine cr  
 [place] 1923 [facility] 250 [facility] 99 [id number] [phone]  
  
   
 [place] 1923 [facility] 84 [id number]  [address] [place] Upcoming Health Maintenance Date Due  
 EYE EXAM RETINAL OR DILATED Q1 5/19/2014 FOOT EXAM Q1 6/24/2017 Pneumococcal 19-64 Medium Risk (1 of 1 - PPSV23) 5/25/2018* Influenza Age 5 to Adult 8/1/2018 PAP AKA CERVICAL CYTOLOGY 10/1/2018 HEMOGLOBIN A1C Q6M 10/11/2018 MICROALBUMIN Q1 4/11/2019 LIPID PANEL Q1 4/11/2019 DTaP/Tdap/Td series (2 - Td) 12/8/2025 *Topic was postponed. The date shown is not the original due date. Allergies as of 5/1/2018  Review Complete On: 5/1/2018 By: Audry Skiff Severity Noted Reaction Type Reactions Aspirin  03/05/2010    Itching Contrast Agent [Iodine]  05/01/2015    Hives Dilaudid [Hydromorphone]  05/01/2015    Hives Metformin  07/03/2013    Nausea Only Reglan [Metoclopramide]  03/05/2010    Other (comments) Impaired speech Ritalin [Methylphenidate]  05/01/2015    Other (comments) Speech impairment Current Immunizations  Reviewed on 12/8/2015 Name Date Influenza Vaccine (Quad) PF 10/4/2016, 11/23/2015  9:10 AM  
 Tdap 12/8/2015  9:54 AM  
  
 Not reviewed this visit You Were Diagnosed With   
  
 Codes Comments Abnormal liver enzymes    -  Primary ICD-10-CM: R74.8 ICD-9-CM: 790.5 Type 2 diabetes mellitus with diabetic neuropathy, with long-term current use of insulin (HCC)     ICD-10-CM: E11.40, Z79.4 ICD-9-CM: 250.60, 357.2, V58.67 Obesity, morbid ([facility] [address]     ICD-10-CM: E66.01 
ICD-9-CM: 278.01 Encounter for long-term (current) use of insulin ([facility]     ICD-10-CM: Z79.4 ICD-9-CM: V58.67 Essential hypertension     ICD-10-CM: I10 
ICD-9-CM: 401.9 Noncompliance of patient with dietary regimen     ICD-10-CM: Z91.11 
ICD-9-CM: V15.81 NAFLD (nonalcoholic fatty liver disease)     ICD-10-CM: K76.0 ICD-9-CM: 571.8 Vitals BP Pulse Temp Height(growth percentile) Weight(growth percentile) 107/66 (BP 1 Location: Left arm, BP Patient Position: Sitting) 100 99.2 °F (37.3 °C) (Tympanic) 5' 2\" (1.575 m) 220 lb 3.2 oz (99.9 kg) LMP SpO2 BMI OB Status Smoking Status 04/02/2018 97% 40.28 kg/m2 Having regular periods Never Smoker BMI and BSA Data Body Mass Index Body Surface Area  
 40.28 kg/m 2 2.09 m 2 Preferred Pharmacy Pharmacy Name Phone [address] [phone] Your Updated Medication List  
  
   
This list is accurate as of 5/1/18  1:49 PM.  Always use your most recent med list.  
  
  
  
  
 albuterol 90 mcg/actuation inhaler Commonly known as:  VENTOLIN HFA Take 2 Puffs by inhalation every four (4) hours as needed for Wheezing. Indications: Acute Asthma Attack * APIDRA U-100 INSULIN 100 unit/mL injection Generic drug:  insulin glulisine U-100  
1 unit * insulin glulisine U-100 100 unit/mL pen Commonly known as:  APIDRA SOLOSTAR U-100 INSULIN  
 INJECT 30 UNITS SUBCUTANEOUSLY BEFORE BREAKFAST, LUNCH, AND DINNER plus sliding scale. Max daily dose 150 units. ARNUITY ELLIPTA 200 mcg/actuation Dsdv inhaler Generic drug:  fluticasone furoate Take 1 Puff by inhalation daily. atorvastatin 10 mg tablet Commonly known as:  LIPITOR Take 1 Tab by mouth daily. Blood-Glucose Meter Misc Commonly known as:  ACCU-CHEK FILEMON PLUS METER Test 4 times daily Dx Code: E11.65  
  
 carvedilol 3.125 mg tablet Commonly known as:  COREG  
TAKE ONE TABLET BY MOUTH TWICE DAILY WITH MEALS  
  
 gabapentin 300 mg capsule Commonly known as:  NEURONTIN Take 2 Caps by mouth three (3) times daily. glucose blood VI test strips strip Commonly known as:  blood glucose test  
Patient request the SISTERS OF  brand of meter and strips and to test QID. insulin glargine 100 unit/mL (3 mL) Inpn Commonly known as:  Janis Learn Inject 90 units at night. Insulin Needles (Disposable) 30 gauge x 1/3\" Sliding scale Insulin Needles (Disposable) 31 gauge x 5/16\" Ndle Use to inject Lantus and Apidra daily Dx Code: E11.65 Lancets Misc Bid for diabetes 250.00 Lot # [id number] Exp 7/2018 Man Brian Nordisk  
  
 losartan 100 mg tablet Commonly known as:  COZAAR Take 1 Tab by mouth daily. montelukast 10 mg tablet Commonly known as:  SINGULAIR  
TAKE ONE TABLET BY MOUTH ONCE DAILY Nebulizer & Compressor machine 1 Each by Does Not Apply route two (2) times daily as needed. sodium bicarbonate 325 mg tablet Take 325 mg by mouth two (2) times a day. VITAMIN D2 50,000 unit capsule Generic drug:  ergocalciferol Take 50,000 Units by mouth two (2) times a week. * Notice: This list has 2 medication(s) that are the same as other medications prescribed for you. Read the directions carefully, and ask your doctor or other care provider to review them with you. We Performed the Following IZA W/REFLEX [91141 CPT(R)] CHRONIC HEPATITIS PANEL [MLZ4882 Custom] FERRITIN [74808 CPT(R)] IRON PROFILE [id number] CPT(R)] MITOCHONDRIAL M2 AB [id number] CPT(R)] To-Do List   
 05/30/2018 8:30 AM  
  Appointment with Karishma Rosenberg NP at [place] [address] ([phone]) Introducing \[facility]\"" & HEALTH SERVICES! Annemarie Kirby introduces SendTask patient portal. Now you can access parts of your medical record, email your doctor's office, and request medication refills online. 1. In your internet browser, go to [email or web address] 2. Click on the First Time User? Click Here link in the Sign In box. You will see the New Member Sign Up page. 3. Enter your SendTask Access Code exactly as it appears below. You will not need to use this code after youve completed the sign-up process. If you do not sign up before the expiration date, you must request a new code. · SendTask Access Code: [email or web address] Expires: 7/30/2018  1:19 PM 
 
4. Enter the last four digits of your Social Security Number (xxxx) and Date of Birth (mm/dd/yyyy) as indicated and click Submit. You will be taken to the next sign-up page. 5. Create a SendTask ID. This will be your SendTask login ID and cannot be changed, so think of one that is secure and easy to remember. 6. Create a SendTask password. You can change your password at any time. 7. Enter your Password Reset Question and Answer. This can be used at a later time if you forget your password. 8. Enter your e-mail address. You will receive e-mail notification when new information is available in [address] 9. Click Sign Up. You can now view and download portions of your medical record. 10. Click the Download Summary menu link to download a portable copy of your medical information. If you have questions, please visit the Frequently Asked Questions section of the SendTask website.  Remember, SendTask is NOT to be used for urgent needs. For medical emergencies, dial 911. Now available from your iPhone and Android! Please provide this summary of care documentation to your next provider. Your primary care clinician is listed as Nevaeh Vyas. If you have any questions after today's visit, please call [phone].

## 2018-05-01 NOTE — PROGRESS NOTES
70 Apoorva Jacobson MD, [place], [place] Larissa Mondragon, [facility]       April G Kenny Levy, REYNA Hardwick, GINI Horton, [facility]   Marzena Collier, REYNA [place] [facility] 136    at [address]    [address] [place] [address], [address] Vinicius Armendariz  22.    [phone]    FAX: [address] [place] [address], [address], [address] [phone]    FAX: [phone]     Patient Care Team:  Goran Guido MD as PCP - General (Family Practice)  Kaylah Rojas MD as Physician (Obstetrics & Gynecology)  Yasir Hassan MD (Endocrinology)  Rudi Valente MD as Physician (Cardiology)    Patient Active Problem List   Diagnosis Code    Asthma J45.909    Diabetes mellitus ([address] E11.9    Avascular necrosis of bones of both hips ([address] M87.051, M87.052    Lumbar degenerative disc disease M51.36    LGSIL (low grade squamous intraepithelial dysplasia) [id number]    Non compliance with medical treatment Z91.19    Obesity E66.9    BMI 35.0-35.9,adult Z68.35    Essential hypertension I10    Noncompliance of patient with dietary regimen Z91.11    Ankle edema M25.473    Muscle spasms of neck M62.838    Recurrent streptococcal tonsillitis J03.01    Encounter for long-term (current) use of insulin ([address] Z79.4    Muscle spasm of back M62.830    Elevated serum creatinine R79.89    Type 2 diabetes mellitus with diabetic neuropathy ([address] E11.40    Obesity, morbid ([address] E66.01    Type 2 diabetes with nephropathy ([address] E11.21       The physicians listed above have asked me to see Brooks Carroll in consultation regarding suspected fatty liver disease and its management. No medical records were available for review when the patient was here for the appointment. There is useful information in the EMR that was reviewed. The patient is a 45 y.o.  female who is suspected to have fatty liver disease based upon ultrasound in October 2017 and elevated liver transaminases. An assessment of liver fibrosis with biopsy or elastography has not been performed. The most recent laboratory studies indicate that the liver transaminases are elevated, ALP is normal, tests of hepatic synthetic and metabolic function are normal, depressed, total bilirubin is normal, elevated, albumin is normal and the platelet count is normal.    The patient has no symptoms which could be attributed to the liver disorder. She does complain of ongoing pain in the lower back and legs since 2014. The patient has limitations in functional activities secondary to other medical problems that are not related to the liver disease. Today, patient denies abdominal pain, change in bowel habits, dark urine, pruritus and problems concentrating. ALLERGIES  Allergies   Allergen Reactions    Aspirin Itching    Contrast Agent [Iodine] Hives    Dilaudid [Hydromorphone] Hives    Metformin Nausea Only    Reglan [Metoclopramide] Other (comments)     Impaired speech    Ritalin [Methylphenidate] Other (comments)     Speech impairment       MEDICATIONS  Current Outpatient Prescriptions   Medication Sig    atorvastatin (LIPITOR) 10 mg tablet Take 1 Tab by mouth daily.  losartan (COZAAR) 100 mg tablet Take 1 Tab by mouth daily.  gabapentin (NEURONTIN) 300 mg capsule Take 2 Caps by mouth three (3) times daily.  montelukast (SINGULAIR) 10 mg tablet TAKE ONE TABLET BY MOUTH ONCE DAILY    carvedilol (COREG) 3.125 mg tablet TAKE ONE TABLET BY MOUTH TWICE DAILY WITH MEALS    insulin glulisine (APIDRA SOLOSTAR) 100 unit/mL pen INJECT 30 UNITS SUBCUTANEOUSLY BEFORE BREAKFAST, LUNCH, AND DINNER plus sliding scale. Max daily dose 150 units.     insulin glulisine (APIDRA) 100 unit/mL injection 1 unit    sodium bicarbonate 325 mg tablet Take 325 mg by mouth two (2) times a day.  insulin glargine (LANTUS,BASAGLAR) 100 unit/mL (3 mL) inpn Inject 90 units at night.  Blood-Glucose Meter (ACCU-CHEK FILEMON PLUS METER) misc Test 4 times daily Dx Code: E11.65    Insulin Needles, Disposable, 31 gauge x 5/16\" ndle Use to inject Lantus and Apidra daily Dx Code: E11.65    VITAMIN D2 50,000 unit capsule Take 50,000 Units by mouth two (2) times a week.  albuterol (VENTOLIN HFA) 90 mcg/actuation inhaler Take 2 Puffs by inhalation every four (4) hours as needed for Wheezing. Indications: Acute Asthma Attack    ARNUITY ELLIPTA 200 mcg/actuation dsdv inhaler Take 1 Puff by inhalation daily.  Nebulizer & Compressor machine 1 Each by Does Not Apply route two (2) times daily as needed.  glucose blood VI test strips (BLOOD GLUCOSE TEST) strip Patient request the SISTERS OF [facility] brand of meter and strips and to test QID.  Insulin Needles, Disposable, 30 x 1/3 \" Sliding scale    Lancets misc Bid for diabetes 250.00  Lot # [id number]  Exp 7/2018  Man Brian Nordisk     No current facility-administered medications for this visit. SYSTEM REVIEW NOT RELATED TO LIVER DISEASE OR REVIEWED ABOVE:  Constitution systems: Negative for fever, chills, weight gain, weight loss. Eyes: Negative for visual changes. ENT: Negative for sore throat, painful swallowing. Respiratory: Negative for cough, hemoptysis, SOB. Cardiology: Negative for chest pain, palpitations. GI:  Negative for constipation or diarrhea. : Negative for urinary frequency, dysuria, hematuria, nocturia. Skin: Negative for rash. Hematology: Negative for easy bruising, blood clots. Musculo-skelatal: Negative for back pain, muscle pain, weakness. Neurologic: Negative for headaches, dizziness, vertigo, memory problems not related to HE. Psychology: Negative for anxiety, depression.      FAMILY HISTORY:  The father has a history of liver disease. Patient is unsure of liver diagnosis. The mother  of a myocardial infarction. SOCIAL HISTORY:  The patient is . The patient has 2 children. The patient has never used tobacco products. The patient has never consumed significant amounts of alcohol. The patient does not work. The patient is applying for disability. PHYSICAL EXAMINATION:  Visit Vitals    /66 (BP 1 Location: Left arm, BP Patient Position: Sitting)    Pulse 100    Temp 99.2 °F (37.3 °C) (Tympanic)    Ht 5' 2\" (1.575 m)    Wt 220 lb 3.2 oz (99.9 kg)    LMP 2018    SpO2 97%    BMI 40.28 kg/m2     General: No acute distress. Eyes: Sclera anicteric. ENT: No oral lesions. Thyroid normal.  Nodes: No adenopathy. Skin: No spider angiomata. No jaundice. No palmar erythema. Respiratory: Lungs clear to auscultation. Cardiovascular: Regular heart rate. No murmurs. No JVD. Abdomen: Soft, tenderness upon palpation in the epigastric area and RUQ. Liver size normal to percussion/palpation. Spleen not palpable. No obvious ascites. Extremities: No edema. No muscle wasting. No gross arthritic changes. Neurologic: Alert and oriented. Cranial nerves grossly intact. No asterixis. LABORATORY STUDIES:  [facility] [address] & Units 2018 3/5/2018   WBC 3.4 - 10.8 x10E3/uL 7.8 5.1   ANC 1.8 - 8.0 K/UL  2.4   HGB 11.1 - 15.9 g/dL 12.0 12.1    - 379 x10E3/uL 359 330   AST 0 - 40 IU/L 65 (H) 68 (H)   ALT 0 - 32 IU/L 81 (H) 116 (H)   Alk Phos 39 - 117 IU/L 61 69   Bili, Total 0.0 - 1.2 mg/dL 0.2 0.3   Bili, Direct 0.00 - 0.40 mg/dL     Albumin 3.5 - 5.5 g/dL 3.5 2.9 (L)   BUN 6 - 20 mg/dL 15 11   Creat 0.57 - 1.00 mg/dL 1.05 (H) 1.13 (H)   Na 134 - 144 mmol/L 136 137   K 3.5 - 5.2 mmol/L 4.1 4.2   Cl 96 - 106 mmol/L 98 101   CO2 18 - 29 mmol/L 25 31   Glucose 65 - 99 mg/dL 296 (H) 380 (H)     SEROLOGIES:  Not available or performed.   Testing was performed today.    LIVER HISTOLOGY:  Not available or performed    ENDOSCOPIC PROCEDURES:  Not available or performed    RADIOLOGY:  10/2017. Ultrasound of liver. Persistently increased hepatic echogenicity, compatible with hepatic parenchymal disease, likely hepatic steatosis. Normal gallbladder. OTHER TESTING:  Not available or performed    ASSESSMENT AND PLAN:  Suspected fatty liver disease of unclear histologic severity. Liver transaminases are elevated. Alkaline phosphate is normal. Liver function is normal. The platelet count is normal.    Based upon laboratory studies and imaging, the patient does not appear to have cirrhosis. Will perform serologic and virologic studies to assess for other causes of chronic liver disease. Suspect the patient has fatty liver based upon ultrasound, an elevation in serum liver transaminases and co-morbidities/metabolic syndrome at this time. Only a liver biopsy can confirm is the patient does have fatty liver and differentiate benign steatosis from NERI. The treatment is the same; weight reduction. At this time, we will schedule patient for a FibroScan to assess liver disease non-invasively. If this shows significant fibrosis, a liver biopsy will be scheduled. Clinical trials were briefly discussed with patient. There is no reason to perform liver biopsy at this time. Liver chemistries will be monitored. The data regarding the potential although marginal effects of vitamin E were reviewed with the patient. It was recommended to start taking vitamin E at a dose of 400 IU BID. Vitamin E has only be tested in patients with NERI and it is not known if this is effective or even needed in benign steatosis. I did not recommend the patient take vitamin E.    DM II. Uncontrolled with last HgbA1c 12.3%. She refuses to see a nutritionist. Encouraged patient to take her medications as directed and continue regular follow up with her endocrinologist to manage this. The patient was directed to continue all current medications at the current dosages. There are no contraindications for the patient to take any medications that are necessary for treatment of other medical issues including medications for diabetes mellitus and hypercholesterolemia. The patient was counseled regarding alcohol consumption and that this could contribute to fatty liver disease. The need for vaccination against viral hepatitis A and B will be assessed with serologic and instituted as appropriate. All of the above issues were discussed with the patient. All questions were answered. The patient expressed a clear understanding of the above. [address] [place] [address] in 2-3 weeks to review all data and determine the treatment plan and to perform FibroScan.     Carrie Clinton [place]  [address] [address]  Ph: [phone]  Fax: [phone]

## 2018-05-02 ENCOUNTER — OFFICE VISIT (OUTPATIENT)
Dept: CARDIOLOGY CLINIC | Age: 38
End: 2018-05-02

## 2018-05-02 VITALS
SYSTOLIC BLOOD PRESSURE: 134 MMHG | WEIGHT: 221.8 LBS | HEIGHT: 62 IN | BODY MASS INDEX: 40.82 KG/M2 | RESPIRATION RATE: 20 BRPM | HEART RATE: 88 BPM | OXYGEN SATURATION: 99 % | DIASTOLIC BLOOD PRESSURE: 80 MMHG

## 2018-05-02 DIAGNOSIS — I10 ESSENTIAL HYPERTENSION: Primary | ICD-10-CM

## 2018-05-02 DIAGNOSIS — R06.00 DYSPNEA, UNSPECIFIED TYPE: ICD-10-CM

## 2018-05-02 DIAGNOSIS — R07.9 CHEST PAIN, UNSPECIFIED TYPE: ICD-10-CM

## 2018-05-02 DIAGNOSIS — E66.01 OBESITY, MORBID (HCC): ICD-10-CM

## 2018-05-02 DIAGNOSIS — E11.21 TYPE 2 DIABETES WITH NEPHROPATHY (HCC): ICD-10-CM

## 2018-05-02 LAB
ANA SER QL: NEGATIVE
FERRITIN SERPL-MCNC: 53 NG/ML (ref 15–150)
IRON SATN MFR SERPL: 23 % (ref 15–55)
IRON SERPL-MCNC: 96 UG/DL (ref 27–159)
MITOCHONDRIA M2 IGG SER-ACNC: 4.4 UNITS (ref 0–20)
TIBC SERPL-MCNC: 421 UG/DL (ref 250–450)
UIBC SERPL-MCNC: 325 UG/DL (ref 131–425)

## 2018-05-02 NOTE — PROGRESS NOTES
Florian Dumont MD    [id number] [facility] [place], [address] [facility]    Office [phone],XYF [phone]  Pager [phone]    Phyllistine July is a 45 y.o. female is here for f/u visit. Primary care physician:  Nevaeh Vyas MD    Patient Active Problem List   Diagnosis Code    Asthma J45.909    Diabetes mellitus ([facility] E11.9    Avascular necrosis of bones of both hips ([address] M87.051, M87.052    Lumbar degenerative disc disease M51.36    LGSIL (low grade squamous intraepithelial dysplasia) [id number]    Non compliance with medical treatment Z91.19    Obesity E66.9    BMI 35.0-35.9,adult Z68.35    Essential hypertension I10    Noncompliance of patient with dietary regimen Z91.11    Ankle edema M25.473    Muscle spasms of neck M62.838    Recurrent streptococcal tonsillitis J03.01    Encounter for long-term (current) use of insulin ([facility]) Z79.4    Muscle spasm of back M62.830    Elevated serum creatinine R79.89    Type 2 diabetes mellitus with diabetic neuropathy ([facility]) E11.40    Obesity, morbid ([facility] E66.01    Type 2 diabetes with nephropathy ([facility] [address] E11.21             Chief complaint:  Chief Complaint   Patient presents with    Chest Pain     comes/goes    Shortness of Breath       Assessment:  Dyspnea/CP  Edema-component of venous insufficiency  Palpitations/Tachycardia -sinus tachycardia on Holter monitor  DM - uncontrolled. Hb A1c 12.3 4/11/18  HTN  Hx of Asthma      Plan:     Patient's oxygen saturation did not drop with a walk test in the clinic previous visit. Review of Gayatri Lua as well as records did not indicate that the patient has had a recent CTA chest.  Will proceed with CTA chest with contrast.  Advised to follow with pulmonary. Diuretics did not help with the swelling and cause her sodium to decrease. The swelling decreases on elevation. She probably has venous insufficiency. Advised to wear compression stockings regularly.     Pt is allergic to ASA ( Hives) - has taken asa with benadryl and does  She has also allergic to dye. She is okay when she takes Benadryl with it. The allergy is itching. This with patient about right heart cath/left heart cath. Patient agrees to proceed. She will take Benadryl 25 mg 12 hours apart- 2 doses prior to the procedure. LHC +/- PCI/RHC consent    The risks (including but not limited to death, myocardial infarction, cerebrovascular accident, dysrhythmia, renal failure +/-dialysis, vascular complication, allergy, and/or need for emergency surgery), indications, benefits, and alternatives of cardiac catheterization +/- PCI have been explained in detail to the patient and family. Patient and family informed that if patient needs surgery  - it will be at [facility] as [facility] does not have onsite surgery. All questions answered to patient's satisfaction. Patient agrees to proceed with the procedure and  informed consent was obtained. ASA 3    AW 3    Roberto Rocha MD., [facility]      Patient understands the plan. All questions were answered to the patient's satisfaction. Medication Side Effects and Warnings were discussed with patient: yes  Patient Labs were reviewed and or requested:  yes  Patient Past Records were reviewed and or requested: yes    I appreciate the opportunity to be involved in Ms. Frost. See note below for details. Please do not hesitate to contact us with questions or concerns. Fely Dasilva MD    Cardiac Testing/ Procedures: A. Cardiac Cath/PCI:    B.ECHO/MARK: 5/16/2016-EF 45-78%, grade 1 diastolic dysfunction    C. StressNuclear/Stress ECHO/Stress test: May 12, 2016-7 minutes, normal exercise nuclear study. EF 70%    D. Vascular:    E. EP: 4/22/2016-Holter sinus tachycardia 99-1 63 bpm, no PACs, 6 PVCs. Diary entries of dyspnea, chest pain, dizziness correlates with sinus tachycardia.     F. Miscellaneous:    Subjective:  Elliott Tafoya is a 45 y.o. female who returns for follow up visit. Patient continues to have intermittent chest pain-left-sided, moderate intensity. Can occur with rest or with exertion. Continues to have significant dyspnea on exertion. Continues to have swelling lower extremities. Swelling of the lower extremities reduces on keeping the feet propped up. Patient was on a diuretic previously but her sodium dropped and she was taken off the medication. Currently taking  sodium supplements. She states that she has been checked for clots in her lower extremities at [facility] previously and she had no clots. ROS:  (bold if positive, if negative)    Palpitations         Medications before admission:    Current Outpatient Prescriptions   Medication Sig Dispense    atorvastatin (LIPITOR) 10 mg tablet Take 1 Tab by mouth daily. 90 Tab    losartan (COZAAR) 100 mg tablet Take 1 Tab by mouth daily. 90 Tab    gabapentin (NEURONTIN) 300 mg capsule Take 2 Caps by mouth three (3) times daily. 180 Cap    montelukast (SINGULAIR) 10 mg tablet TAKE ONE TABLET BY MOUTH ONCE DAILY 30 Tab    carvedilol (COREG) 3.125 mg tablet TAKE ONE TABLET BY MOUTH TWICE DAILY WITH MEALS 60 Tab    insulin glulisine (APIDRA SOLOSTAR) 100 unit/mL pen INJECT 30 UNITS SUBCUTANEOUSLY BEFORE BREAKFAST, LUNCH, AND DINNER plus sliding scale. Max daily dose 150 units. 45 mL    insulin glulisine (APIDRA) 100 unit/mL injection 1 unit     sodium bicarbonate 325 mg tablet Take 325 mg by mouth two (2) times a day.  insulin glargine (LANTUS,BASAGLAR) 100 unit/mL (3 mL) inpn Inject 90 units at night. 30 mL    VITAMIN D2 50,000 unit capsule Take 50,000 Units by mouth two (2) times a week.  albuterol (VENTOLIN HFA) 90 mcg/actuation inhaler Take 2 Puffs by inhalation every four (4) hours as needed for Wheezing. Indications: Acute Asthma Attack 1 Inhaler    ARNUITY ELLIPTA 200 mcg/actuation dsdv inhaler Take 1 Puff by inhalation daily.  1 Inhaler    Blood-Glucose Meter (ACCU-CHEK FILEMON PLUS METER) misc Test 4 times daily Dx Code: E11.65 1 Each    Insulin Needles, Disposable, 31 gauge x 5/16\" ndle Use to inject Lantus and Apidra daily Dx Code: E11.65 200 Pen Needle    Nebulizer & Compressor machine 1 Each by Does Not Apply route two (2) times daily as needed. 1 Each    glucose blood VI test strips (BLOOD GLUCOSE TEST) strip Patient request the [facility] brand of meter and strips and to test QID. 100 Strip    Insulin Needles, Disposable, 30 x 1/3 \" Sliding scale 4 Package    Lancets misc Bid for diabetes 250.00  Lot # [id number]  Exp 7/2018  Man Brian Nordisk 400 Package     No current facility-administered medications for this visit. Physical Exam:  Visit Vitals    /80 (BP 1 Location: Left arm, BP Patient Position: Sitting)    Pulse 88    Resp 20    Ht 5' 2\" (1.575 m)    Wt 221 lb 12.8 oz (100.6 kg)    LMP 04/02/2018    SpO2 99%    BMI 40.57 kg/m2          Gen: Well-developed, well-nourished, in no acute distress, walks with a cane  Neck: Supple,No JVD, No Carotid Bruit,   Resp: No accessory muscle use, Clear breath sounds, No rales or rhonchi  Card: Tachy,Reg Rythm,Normal S1, S2, No murmurs, rubs or gallop. No thrills.    Abd:  Soft, non-tender, non-distended,BS+,   MSK: No cyanosis  Skin: No rashes    Neuro: moving all four extremities , follows commands appropriately  Psych:  Good insight, oriented to person, place , alert, Nml Affect  LE: trace edema    EKG: SR/Nml axis/PRWP;  ant MI au      LABS:        Lab Results   Component Value Date/Time    WBC 7.8 04/11/2018 08:21 AM    HGB 12.0 04/11/2018 08:21 AM    HCT 36.9 04/11/2018 08:21 AM    PLATELET 709 99/79/8172 08:21 AM    MCV 97 04/11/2018 08:21 AM     Lab Results   Component Value Date/Time    Sodium 136 04/11/2018 08:21 AM    Potassium 4.1 04/11/2018 08:21 AM    Chloride 98 04/11/2018 08:21 AM    CO2 25 04/11/2018 08:21 AM    Anion gap 5 03/05/2018 09:47 AM    Glucose 296 (H) 04/11/2018 08:21 AM    BUN 15 04/11/2018 08:21 AM    Creatinine 1.05 (H) 04/11/2018 08:21 AM    BUN/Creatinine ratio 14 04/11/2018 08:21 AM    GFR est AA 78 04/11/2018 08:21 AM    GFR est non-AA 68 04/11/2018 08:21 AM    Calcium 8.7 04/11/2018 08:21 AM       No results found for: APTT  No results found for: INR, PTMR, PTP, PT1, PT2  No components found for: LAST LIPIDS        Shayna Chan MDdkayode

## 2018-05-03 ENCOUNTER — APPOINTMENT (OUTPATIENT)
Dept: CT IMAGING | Age: 38
End: 2018-05-03
Attending: INTERNAL MEDICINE
Payer: MEDICAID

## 2018-05-03 ENCOUNTER — HOSPITAL ENCOUNTER (OUTPATIENT)
Dept: CARDIAC CATH/INVASIVE PROCEDURES | Age: 38
Discharge: HOME | End: 2018-05-03
Attending: INTERNAL MEDICINE | Admitting: INTERNAL MEDICINE
Payer: MEDICAID

## 2018-05-03 VITALS
DIASTOLIC BLOOD PRESSURE: 82 MMHG | BODY MASS INDEX: 41.38 KG/M2 | HEIGHT: 62 IN | TEMPERATURE: 97.5 F | HEART RATE: 105 BPM | RESPIRATION RATE: 21 BRPM | SYSTOLIC BLOOD PRESSURE: 145 MMHG | WEIGHT: 224.87 LBS | OXYGEN SATURATION: 94 %

## 2018-05-03 LAB
COHGB MFR BLD: 0.7 % (ref 1–2)
COHGB MFR BLD: 1.3 % (ref 1–2)
COHGB MFR BLD: 1.3 % (ref 1–2)
COMMENT, 144067: NORMAL
GLUCOSE BLD STRIP.AUTO-MCNC: 275 MG/DL (ref 65–100)
HBV CORE AB SERPL QL IA: NEGATIVE
HBV CORE IGM SERPL QL IA: NEGATIVE
HBV E AB SERPL QL IA: NEGATIVE
HBV E AG SERPL QL IA: NEGATIVE
HBV SURFACE AB SER QL: NON REACTIVE
HBV SURFACE AG SERPL QL IA: NEGATIVE
HCV AB S/CO SERPL IA: <0.1 S/CO RATIO (ref 0–0.9)
HGB BLD OXIMETRY-MCNC: 11.4 G/DL (ref 14–17)
HGB BLD OXIMETRY-MCNC: 7.3 G/DL (ref 14–17)
HGB BLD OXIMETRY-MCNC: 7.6 G/DL (ref 14–17)
METHGB MFR BLD: 0.1 % (ref 0–1.4)
METHGB MFR BLD: 0.3 % (ref 0–1.4)
METHGB MFR BLD: 0.6 % (ref 0–1.4)
OXYHGB MFR BLD: 64.1 % (ref 94–97)
OXYHGB MFR BLD: 67.9 % (ref 94–97)
OXYHGB MFR BLD: 93.3 % (ref 94–97)
SAO2 % BLD: 65 % (ref 95–99)
SAO2 % BLD: 68 % (ref 95–99)
SAO2 % BLD: 95 % (ref 95–99)
SERVICE CMNT-IMP: ABNORMAL

## 2018-05-03 PROCEDURE — 74011250637 HC RX REV CODE- 250/637: Performed by: INTERNAL MEDICINE

## 2018-05-03 PROCEDURE — 82962 GLUCOSE BLOOD TEST: CPT

## 2018-05-03 PROCEDURE — 99153 MOD SED SAME PHYS/QHP EA: CPT

## 2018-05-03 PROCEDURE — C1769 GUIDE WIRE: HCPCS

## 2018-05-03 PROCEDURE — C1894 INTRO/SHEATH, NON-LASER: HCPCS

## 2018-05-03 PROCEDURE — 74176 CT ABD & PELVIS W/O CONTRAST: CPT

## 2018-05-03 PROCEDURE — 77030004522 HC CATH ANGI DX EXPO BSC -A

## 2018-05-03 PROCEDURE — 77030019569 HC BND COMPR RAD TERU -B

## 2018-05-03 PROCEDURE — 74011636320 HC RX REV CODE- 636/320: Performed by: INTERNAL MEDICINE

## 2018-05-03 PROCEDURE — 74011000250 HC RX REV CODE- 250: Performed by: INTERNAL MEDICINE

## 2018-05-03 PROCEDURE — 74011000250 HC RX REV CODE- 250

## 2018-05-03 PROCEDURE — 74011250636 HC RX REV CODE- 250/636

## 2018-05-03 PROCEDURE — C1751 CATH, INF, PER/CENT/MIDLINE: HCPCS

## 2018-05-03 PROCEDURE — 77030029065 HC DRSG HEMO QCLOT ZMED -B

## 2018-05-03 PROCEDURE — 82375 ASSAY CARBOXYHB QUANT: CPT | Performed by: INTERNAL MEDICINE

## 2018-05-03 PROCEDURE — 77030004532 HC CATH ANGI DX IMP BSC -A

## 2018-05-03 PROCEDURE — 74011250636 HC RX REV CODE- 250/636: Performed by: INTERNAL MEDICINE

## 2018-05-03 RX ORDER — GABAPENTIN 300 MG/1
300 CAPSULE ORAL ONCE
Status: COMPLETED | OUTPATIENT
Start: 2018-05-03 | End: 2018-05-03

## 2018-05-03 RX ORDER — FENTANYL CITRATE 50 UG/ML
25-200 INJECTION, SOLUTION INTRAMUSCULAR; INTRAVENOUS
Status: DISCONTINUED | OUTPATIENT
Start: 2018-05-03 | End: 2018-05-03 | Stop reason: HOSPADM

## 2018-05-03 RX ORDER — SODIUM CHLORIDE 0.9 % (FLUSH) 0.9 %
5-10 SYRINGE (ML) INJECTION EVERY 8 HOURS
Status: DISCONTINUED | OUTPATIENT
Start: 2018-05-03 | End: 2018-05-03 | Stop reason: HOSPADM

## 2018-05-03 RX ORDER — SODIUM CHLORIDE 0.9 % (FLUSH) 0.9 %
5-10 SYRINGE (ML) INJECTION AS NEEDED
Status: DISCONTINUED | OUTPATIENT
Start: 2018-05-03 | End: 2018-05-03 | Stop reason: HOSPADM

## 2018-05-03 RX ORDER — ISOSORBIDE MONONITRATE 30 MG/1
30 TABLET, EXTENDED RELEASE ORAL DAILY
Qty: 30 TAB | Refills: 3 | Status: SHIPPED | OUTPATIENT
Start: 2018-05-03 | End: 2018-08-30 | Stop reason: SDUPTHER

## 2018-05-03 RX ORDER — HYDRALAZINE HYDROCHLORIDE 20 MG/ML
INJECTION INTRAMUSCULAR; INTRAVENOUS
Status: COMPLETED
Start: 2018-05-03 | End: 2018-05-03

## 2018-05-03 RX ORDER — LIDOCAINE HYDROCHLORIDE 10 MG/ML
1-20 INJECTION INFILTRATION; PERINEURAL
Status: DISCONTINUED | OUTPATIENT
Start: 2018-05-03 | End: 2018-05-03 | Stop reason: HOSPADM

## 2018-05-03 RX ORDER — HEPARIN SODIUM 200 [USP'U]/100ML
500 INJECTION, SOLUTION INTRAVENOUS ONCE
Status: COMPLETED | OUTPATIENT
Start: 2018-05-03 | End: 2018-05-03

## 2018-05-03 RX ORDER — HEPARIN SODIUM 200 [USP'U]/100ML
500 INJECTION, SOLUTION INTRAVENOUS
Status: DISCONTINUED | OUTPATIENT
Start: 2018-05-03 | End: 2018-05-03 | Stop reason: HOSPADM

## 2018-05-03 RX ORDER — SODIUM CHLORIDE 9 MG/ML
75 INJECTION, SOLUTION INTRAVENOUS CONTINUOUS
Status: DISCONTINUED | OUTPATIENT
Start: 2018-05-03 | End: 2018-05-03 | Stop reason: HOSPADM

## 2018-05-03 RX ORDER — MIDAZOLAM HYDROCHLORIDE 1 MG/ML
.5-1 INJECTION, SOLUTION INTRAMUSCULAR; INTRAVENOUS
Status: DISCONTINUED | OUTPATIENT
Start: 2018-05-03 | End: 2018-05-03 | Stop reason: HOSPADM

## 2018-05-03 RX ORDER — VERAPAMIL HYDROCHLORIDE 2.5 MG/ML
INJECTION, SOLUTION INTRAVENOUS
Status: COMPLETED
Start: 2018-05-03 | End: 2018-05-03

## 2018-05-03 RX ORDER — HYDRALAZINE HYDROCHLORIDE 20 MG/ML
20 INJECTION INTRAMUSCULAR; INTRAVENOUS ONCE
Status: COMPLETED | OUTPATIENT
Start: 2018-05-03 | End: 2018-05-03

## 2018-05-03 RX ORDER — ACETAMINOPHEN 325 MG/1
650 TABLET ORAL ONCE
Status: COMPLETED | OUTPATIENT
Start: 2018-05-03 | End: 2018-05-03

## 2018-05-03 RX ORDER — VERAPAMIL HYDROCHLORIDE 2.5 MG/ML
2.5-5 INJECTION, SOLUTION INTRAVENOUS
Status: DISCONTINUED | OUTPATIENT
Start: 2018-05-03 | End: 2018-05-03 | Stop reason: HOSPADM

## 2018-05-03 RX ORDER — HEPARIN SODIUM 1000 [USP'U]/ML
INJECTION, SOLUTION INTRAVENOUS; SUBCUTANEOUS
Status: DISCONTINUED
Start: 2018-05-03 | End: 2018-05-03 | Stop reason: HOSPADM

## 2018-05-03 RX ORDER — HEPARIN SODIUM 1000 [USP'U]/ML
1000-5000 INJECTION, SOLUTION INTRAVENOUS; SUBCUTANEOUS ONCE
Status: DISCONTINUED | OUTPATIENT
Start: 2018-05-03 | End: 2018-05-03 | Stop reason: HOSPADM

## 2018-05-03 RX ORDER — ONDANSETRON 2 MG/ML
4 INJECTION INTRAMUSCULAR; INTRAVENOUS ONCE
Status: DISCONTINUED | OUTPATIENT
Start: 2018-05-03 | End: 2018-05-03 | Stop reason: HOSPADM

## 2018-05-03 RX ORDER — CLOPIDOGREL 300 MG/1
600 TABLET, FILM COATED ORAL ONCE
Status: CANCELLED | OUTPATIENT
Start: 2018-05-03 | End: 2018-05-03

## 2018-05-03 RX ORDER — DIPHENHYDRAMINE HYDROCHLORIDE 50 MG/ML
25 INJECTION, SOLUTION INTRAMUSCULAR; INTRAVENOUS ONCE
Status: COMPLETED | OUTPATIENT
Start: 2018-05-03 | End: 2018-05-03

## 2018-05-03 RX ADMIN — ACETAMINOPHEN 650 MG: 325 TABLET ORAL at 16:01

## 2018-05-03 RX ADMIN — MIDAZOLAM 1 MG: 1 INJECTION INTRAMUSCULAR; INTRAVENOUS at 10:48

## 2018-05-03 RX ADMIN — VERAPAMIL HYDROCHLORIDE 2.5 MG: 2.5 INJECTION INTRAVENOUS at 10:48

## 2018-05-03 RX ADMIN — IOPAMIDOL 60 ML: 755 INJECTION, SOLUTION INTRAVENOUS at 11:52

## 2018-05-03 RX ADMIN — HYDRALAZINE HYDROCHLORIDE 20 MG: 20 INJECTION INTRAMUSCULAR; INTRAVENOUS at 11:52

## 2018-05-03 RX ADMIN — GABAPENTIN 300 MG: 300 CAPSULE ORAL at 14:03

## 2018-05-03 RX ADMIN — VERAPAMIL HYDROCHLORIDE 2.5 MG: 2.5 INJECTION, SOLUTION INTRAVENOUS at 10:48

## 2018-05-03 RX ADMIN — DIPHENHYDRAMINE HYDROCHLORIDE 25 MG: 50 INJECTION, SOLUTION INTRAMUSCULAR; INTRAVENOUS at 09:39

## 2018-05-03 RX ADMIN — HEPARIN SODIUM 1000 UNITS: 200 INJECTION, SOLUTION INTRAVENOUS at 10:50

## 2018-05-03 RX ADMIN — MIDAZOLAM 1 MG: 1 INJECTION INTRAMUSCULAR; INTRAVENOUS at 10:40

## 2018-05-03 RX ADMIN — FENTANYL CITRATE 50 MCG: 50 INJECTION, SOLUTION INTRAMUSCULAR; INTRAVENOUS at 12:51

## 2018-05-03 RX ADMIN — HEPARIN SODIUM 1000 UNITS: 200 INJECTION, SOLUTION INTRAVENOUS at 10:49

## 2018-05-03 RX ADMIN — FENTANYL CITRATE 25 MCG: 50 INJECTION, SOLUTION INTRAMUSCULAR; INTRAVENOUS at 10:40

## 2018-05-03 RX ADMIN — LIDOCAINE HYDROCHLORIDE 5 ML: 10 INJECTION, SOLUTION INFILTRATION; PERINEURAL at 10:44

## 2018-05-03 RX ADMIN — NITROGLYCERIN 200 MCG: 5 INJECTION, SOLUTION INTRAVENOUS at 10:48

## 2018-05-03 RX ADMIN — METHYLPREDNISOLONE SODIUM SUCCINATE 125 MG: 125 INJECTION, POWDER, FOR SOLUTION INTRAMUSCULAR; INTRAVENOUS at 09:40

## 2018-05-03 RX ADMIN — FENTANYL CITRATE 50 MCG: 50 INJECTION, SOLUTION INTRAMUSCULAR; INTRAVENOUS at 12:41

## 2018-05-03 NOTE — PROGRESS NOTES
[id number]    Patient arrived. ID and allergies verified verbally with patient. Pt voices understanding of procedure to be performed. Consent obtained. Pt prepped for procedure. L and R heart cath      1005    TRANSFER - OUT REPORT:    Verbal report given to Maty Felder RN(name) on Piyush Eduardo  being transferred to cath (unit) for routine progression of care       Report consisted of patients Situation, Background, Assessment and   Recommendations(SBAR). Information from the following report(s) SBAR was reviewed with the receiving nurse. Lines:   Peripheral IV 05/03/18 Left Forearm (Active)        Opportunity for questions and clarification was provided. Patient transported with:   Registered Nurse        [id number]    TRANSFER - IN REPORT:    Verbal report received from Maty Felder RN(name) on Piyush Eduardo  being received from Cath(unit) for routine progression of care      Report consisted of patients Situation, Background, Assessment and   Recommendations(SBAR). Information from the following report(s) SBAR was reviewed with the receiving nurse. Opportunity for questions and clarification was provided. Assessment completed upon patients arrival to unit and care assumed. 1200    Pt voices understanding of post procedure bedrest instructions. 200    Pt co of \"shooting pain\" from groin to foot right side     Blood aspirated from sheath then  sheath pulled 7 Fr right Groin. Torri Pickerel applied. Manual pressure held      Blood aspirated from sheath then  sheath pulled 6 Fr  right Groin. Torri Pickerel applied. Manual pressure held     1225    Hemostasis achieved at right groin. Dressing applied. Pt voices understanding of post procedure bedrest instructions. 200    Pt c/o severe 10/10 pain right groin and flank going to get CT of pelvis and abdomen leaving the floor    1300    Ct completed no noted bleed via Dr. Tad Kocher     1330    2 ml air released from TR Band. No bleeding or hematoma noted. Radial and Ulnar pulse on right wrist palpable. Pt tolerated well. Will continue to monitor. 1335    2 ml air released from TR Band. No bleeding or hematoma noted. Radial and Ulnar pulse on right wrist palpable. Pt tolerated well. Will continue to monitor. 1340    2 ml air released from TR Band. No bleeding or hematoma noted. Radial and Ulnar pulse on right wrist palpable. Pt tolerated well. Will continue to monitor. 1345    3 ml air released from TR Band. No bleeding or hematoma noted. Radial and Ulnar pulse on right wrist palpable. Pt tolerated well. Will continue to monitor. 1350    Air release completed. TR Band removed from right wrist. No bleeding or  Hematoma. Dressing applied. Wrist immobilizer in place. Radial and ulnar pulse remain palpable on affected extremity. Pt tolerated well. Instructions given to pt regarding movement and activity restrictions. Pt voiced understanding. 1355    Pt c/o chronic shooting pain from hip to leg and numbness didn't take neurotin today received a verbal order to give it to her    1400    Patient HOB up 30 degrees. Right groin site dressing dry and intact. No bleeding or hematoma noted. Will continue to monitor. 1500    PT HOB up 60 Degrees. Right groin site dressing dry and intact. No bleeding or hematoma noted. Will continue to monitor. 0    Dr. Aman Mojica called to check up on pt take tylenol for 2 days and apply a heat pack    1545    Pt sat on side of bed then ambulated around unit and to restroom. Voided. Returned to chair. Right groin site dressing dry and intact. No bleeding or hematoma. Pt voices no complaints. 1605    Pt discharged via wheeled chair with spouse Personal belongings with patient upon discharge.

## 2018-05-03 NOTE — PROGRESS NOTES
Patient was having pain in the right lower quadrant of the abdomen. CT abdomen/pelvis-no evidence of retroperitoneal bleed. Discussed with radiologist.   updated. Juan Hatch.  MD, [facility]

## 2018-05-03 NOTE — PROGRESS NOTES
TRANSFER - OUT REPORT:    Verbal report given to ED on Liz Reyes  being transferred to [facility] (unit) for routine progression of care       Report consisted of patients Situation, Background, Assessment and   Recommendations(SBAR). Information from the following report(s) SBAR was reviewed with the receiving nurse. Lines:   Peripheral IV 05/03/18 Left Forearm (Active)        Opportunity for questions and clarification was provided.       Patient transported with:   Registered Nurse  Tech

## 2018-05-03 NOTE — PROGRESS NOTES
TRANSFER - IN REPORT:    Verbal report received from ED on Liz Sommer  being received from [facility] (unit) for routine progression of care      Report consisted of patients Situation, Background, Assessment and   Recommendations(SBAR). Information from the following report(s) SBAR was reviewed with the receiving nurse. Opportunity for questions and clarification was provided. Assessment completed upon patients arrival to unit and care assumed.

## 2018-05-03 NOTE — IP AVS SNAPSHOT
[address] [facility] 
 
 
 [address] 
[phone] Patient: Ivette Wooten MRN: [id number] About your hospitalization You were admitted on:  May 3, 2018 You last received care in the:  [facility] PACU You were discharged on:  May 3, 2018 Why you were hospitalized Your primary diagnosis was:  Not on File Follow-up Information Follow up With Details Comments Contact Info Judge Jasson MD   [address] [facility] D [address] [place] [address] 
[phone] Your Scheduled Appointments Wednesday May 30, 2018  8:30 AM EDT Fibroscan with April G Yoselin Jack NP [address] ([facility]) 200 [facility] [phone] [address] [place] [address]  
[phone] Tuesday June 05, 2018  9:40 AM EDT  
ESTABLISHED PATIENT with Yari Leo MD  
[facility] ([facility]) 320 [facility] [address] [place] [address]  
[phone] Tuesday June 12, 2018 10:30 AM EDT Follow Up with Zaida Marquez NP 1991 [facility] ([facility]) [address] [address] [phone] Discharge Orders None A check valeria indicates which time of day the medication should be taken. My Medications START taking these medications Instructions Each Dose to Equal  
 Morning Noon Evening Bedtime  
 isosorbide mononitrate ER 30 mg tablet Commonly known as:  IMDUR Your last dose was: Your next dose is: Take 1 Tab by mouth daily. 30 mg CONTINUE taking these medications Instructions Each Dose to Equal  
 Morning Noon Evening Bedtime  
 albuterol 90 mcg/actuation inhaler Commonly known as:  VENTOLIN HFA Your last dose was: Your next dose is: Take 2 Puffs by inhalation every four (4) hours as needed for Wheezing. Indications: Acute Asthma Attack 2 Puff * APIDRA U-100 INSULIN 100 unit/mL injection Generic drug:  insulin glulisine U-100 Your last dose was: Your next dose is:    
   
   
 1 unit * insulin glulisine U-100 100 unit/mL pen Commonly known as:  APIDRA SOLOSTAR U-100 INSULIN Your last dose was: Your next dose is: INJECT 30 UNITS SUBCUTANEOUSLY BEFORE BREAKFAST, LUNCH, AND DINNER plus sliding scale. Max daily dose 150 units. ARNUITY ELLIPTA 200 mcg/actuation Dsdv inhaler Generic drug:  fluticasone furoate Your last dose was: Your next dose is: Take 1 Puff by inhalation daily. 1 Puff  
    
   
   
   
  
 atorvastatin 10 mg tablet Commonly known as:  LIPITOR Your last dose was: Your next dose is: Take 1 Tab by mouth daily. 10 mg Blood-Glucose Meter Misc Commonly known as:  ACCU-CHEK FILEMON PLUS METER Your last dose was: Your next dose is:    
   
   
 Test 4 times daily Dx Code: E11.65  
     
   
   
   
  
 carvedilol 3.125 mg tablet Commonly known as:  Imelda Finder Your last dose was: Your next dose is: TAKE ONE TABLET BY MOUTH TWICE DAILY WITH MEALS  
     
   
   
   
  
 gabapentin 300 mg capsule Commonly known as:  NEURONTIN Your last dose was: Your next dose is: Take 2 Caps by mouth three (3) times daily. 600 mg  
    
   
   
   
  
 glucose blood VI test strips strip Commonly known as:  blood glucose test  
   
Your last dose was: Your next dose is:    
   
   
 Patient request the [facility] brand of meter and strips and to test QID. insulin glargine 100 unit/mL (3 mL) Inpn Commonly known as:  Mercedes Banuelos  
   
 Your last dose was: Your next dose is:    
   
   
 Inject 90 units at night. Insulin Needles (Disposable) 30 gauge x 1/3\" Your last dose was: Your next dose is:    
   
   
 Sliding scale Insulin Needles (Disposable) 31 gauge x 5/16\" Ndle Your last dose was: Your next dose is:    
   
   
 Use to inject Lantus and Apidra daily Dx Code: E11.65 Lancets Misc Your last dose was: Your next dose is:    
   
   
 Bid for diabetes 250.00 Lot # [id number] Exp 7/2018 Man Brian Nordisk  
     
   
   
   
  
 losartan 100 mg tablet Commonly known as:  COZAAR Your last dose was: Your next dose is: Take 1 Tab by mouth daily. 100 mg  
    
   
   
   
  
 montelukast 10 mg tablet Commonly known as:  SINGULAIR Your last dose was: Your next dose is: TAKE ONE TABLET BY MOUTH ONCE DAILY Nebulizer & Compressor machine Your last dose was: Your next dose is:    
   
   
 1 Each by Does Not Apply route two (2) times daily as needed. 1 Each  
    
   
   
   
  
 sodium bicarbonate 325 mg tablet Your last dose was: Your next dose is: Take 325 mg by mouth two (2) times a day. 325 mg  
    
   
   
   
  
 VITAMIN D2 50,000 unit capsule Generic drug:  ergocalciferol Your last dose was: Your next dose is: Take 50,000 Units by mouth two (2) times a week. [address] Units * Notice: This list has 2 medication(s) that are the same as other medications prescribed for you. Read the directions carefully, and ask your doctor or other care provider to review them with you. Where to Get Your Medications These medications were sent to [facility] [address] [address] Phone:  [phone]  
  isosorbide mononitrate ER 30 mg tablet Discharge Instructions None Introducing \[facility] [place] [facility]\"" & HEALTH SERVICES! [facility] introduces Neuropure patient portal. Now you can access parts of your medical record, email your doctor's office, and request medication refills online. 1. In your internet browser, go to [email or web address] 2. Click on the First Time User? Click Here link in the Sign In box. You will see the New Member Sign Up page. 3. Enter your Neuropure Access Code exactly as it appears below. You will not need to use this code after youve completed the sign-up process. If you do not sign up before the expiration date, you must request a new code. · Neuropure Access Code: [email or web address] Expires: 7/30/2018  1:19 PM 
 
4. Enter the last four digits of your Social Security Number (xxxx) and Date of Birth (mm/dd/yyyy) as indicated and click Submit. You will be taken to the next sign-up page. 5. Create a Neuropure ID. This will be your Neuropure login ID and cannot be changed, so think of one that is secure and easy to remember. 6. Create a Neuropure password. You can change your password at any time. 7. Enter your Password Reset Question and Answer. This can be used at a later time if you forget your password. 8. Enter your e-mail address. You will receive e-mail notification when new information is available in [address] 9. Click Sign Up. You can now view and download portions of your medical record. 10. Click the Download Summary menu link to download a portable copy of your medical information. If you have questions, please visit the Frequently Asked Questions section of the Neuropure website. Remember, Neuropure is NOT to be used for urgent needs. For medical emergencies, dial 911. Now available from your iPhone and Android! Introducing Jeremiah Morris As a Doctor kinetic patient, I wanted to make you aware of our electronic visit tool called Jeremiah DavisSinCola. Doctor kinetic 24/7 allows you to connect within minutes with a medical provider 24 hours a day, seven days a week via a mobile device or tablet or logging into a secure website from your computer. You can access AcuFocuskareemfin from anywhere in the [place]. A virtual visit might be right for you when you have a simple condition and feel like you just dont want to get out of bed, or cant get away from work for an appointment, when your regular [place] [place] provider is not available (evenings, weekends or holidays), or when youre out of town and need minor care. Electronic visits cost only $49 and if the Doctor kinetic 24/7 provider determines a prescription is needed to treat your condition, one can be electronically transmitted to a nearby pharmacy*. Please take a moment to enroll today if you have not already done so. The enrollment process is free and takes just a few minutes. To enroll, please download the [place] [place] 24/7 radha to your tablet or phone, or visit [email or web address] to enroll on your computer. And, as an [address] [place] [address] patient with a Ikro account, the results of your visits will be scanned into your electronic medical record and your primary care provider will be able to view the scanned results. We urge you to continue to see your regular [place] provider for your ongoing medical care. And while your primary care provider may not be the one available when you seek a Jeremiah Morris virtual visit, the peace of mind you get from getting a real diagnosis real time can be priceless. For more information on Jeremiah Motallykareemfin, view our Frequently Asked Questions (FAQs) at [email or web address]. Sincerely, 
 
Liseth Andres MD 
Chief Medical Officer Chai Lopez *:  certain medications cannot be prescribed via Jeremiah Morris Providers Seen During Your Hospitalization Provider Specialty Primary office phone Darion Anthony MD Cardiology [phone] Your Primary Care Physician (PCP) Primary Care Physician Office Phone Office Fax Sarah Beth Culver [phone] You are allergic to the following Allergen Reactions Aspirin Itching Contrast Agent (Iodine) Hives Dilaudid (Hydromorphone) Hives Metformin Nausea Only Reglan (Metoclopramide) Other (comments) Impaired speech Ritalin (Methylphenidate) Other (comments) Speech impairment Recent Documentation Height Weight Breastfeeding? BMI OB Status Smoking Status 1.575 m 102 kg No 41.13 kg/m2 Having regular periods Never Smoker Emergency Contacts Name Discharge Info Relation Home Work Mobile Amrik Hagan [address] [employer] [3] Other Relative [6] [phone] Patient Belongings The following personal items are in your possession at time of discharge: 
     Visual Aid: Glasses Discharge Instructions Attachments/References CORONARY ANGIOGRAM: POST-OP (ENGLISH) Patient Handouts Coronary Angiogram: What to Expect at [facility] Your Recovery A coronary angiogram is a test to examine the large blood vessel of your heart (coronary artery). The doctor inserted a thin, flexible tube (catheter) into a blood vessel in your groin. In some cases, the catheter is placed in a blood vessel in the arm. Your groin or arm may have a bruise and feel sore for a day or two after a coronary angiogram. You can do light activities around the house but nothing strenuous for several days. This care sheet gives you a general idea about how long it will take for you to recover. But each person recovers at a different pace.  Follow the steps below to feel better as quickly as possible. How can you care for yourself at home? Activity ? · If the doctor gave you a sedative: ¨ For 24 hours, don't do anything that requires attention to detail. It takes time for the medicine's effects to completely wear off. ¨ For your safety, do not drive or operate any machinery that could be dangerous. Wait until the medicine wears off and you can think clearly and react easily. ? · Do not do strenuous exercise and do not lift, pull, or push anything heavy until your doctor says it is okay. This may be for a day or two. You can walk around the house and do light activity, such as cooking. ? · If the catheter was placed in your groin, try not to walk up stairs for the first couple of days. ? · If the catheter was placed in your arm near your wrist, do not bend your wrist deeply for the first couple of days. Be careful using your hand to get into and out of a chair or bed. ? · If your doctor recommends it, get more exercise. Walking is a good choice. Bit by bit, increase the amount you walk every day. Try for at least 30 minutes on most days of the week. Diet ? · Drink plenty of fluids to help your body flush out the dye. If you have kidney, heart, or liver disease and have to limit fluids, talk with your doctor before you increase the amount of fluids you drink. ? · Keep eating a heart-healthy diet that has lots of fruits, vegetables, and whole grains. If you have not been eating this way, talk to your doctor. You also may want to talk to a dietitian. This expert can help you to learn about healthy foods and plan meals. Medicines ? · Your doctor will tell you if and when you can restart your medicines. He or she will also give you instructions about taking any new medicines. ? · If you take blood thinners, such as warfarin (Coumadin), clopidogrel (Plavix), or aspirin, be sure to talk to your doctor.  He or she will tell you if and when to start taking those medicines again. Make sure that you understand exactly what your doctor wants you to do.  
? · Your doctor may prescribe a blood-thinning medicine like aspirin or clopidogrel (Plavix). It is very important that you take these medicines exactly as directed in order to keep the coronary artery open and reduce your risk of a heart attack. Be safe with medicines. Call your doctor if you think you are having a problem with your medicine. ?Care of the catheter site ? · For 1 or 2 days, keep a bandage over the spot where the catheter was inserted. The bandage probably will fall off in this time. ? · Put ice or a cold pack on the area for 10 to 20 minutes at a time to help with soreness or swelling. Put a thin cloth between the ice and your skin. ? · You may shower 24 to 48 hours after the procedure, if your doctor okays it. Pat the incision dry. ? · Do not soak the catheter site until it is healed. Don't take a bath for 1 week, or until your doctor tells you it isokay. Follow-up care is a key part of your treatment and safety. Be sure to make and go to all appointments, and call your doctor if you are having problems. It's also a good idea to know your test results and keep a list of the medicines you take. When should you call for help? Call 911 anytime you think you may need emergency care. For example, call if: 
? · You passed out (lost consciousness). ? · You have severe trouble breathing. ? · You have sudden chest pain and shortness of breath, or you cough up blood. ? · You have symptoms of a heart attack. These may include: ¨ Chest pain or pressure, or a strange feeling in the chest. 
¨ Sweating. ¨ Shortness of breath. ¨ Nausea or vomiting. ¨ Pain, pressure, or a strange feeling in the back, neck, jaw, or upper belly, or in one or both shoulders or arms. ¨ Lightheadedness or sudden weakness. ¨ A fast or irregular heartbeat. After you call 911, the  may tel you to chew 1 adult-strength or 2 to 4 low-dose aspirin. Wait for an ambulance. Do not try to drive yourself. ? · You have been diagnosed with angina, and you have symptoms that do not go away with rest or are not getting better within 5 minutes after you take a dose of nitroglycerin. ?Call your doctor now or seek immediate medical care if: 
? · You are bleeding from the area where the catheter was put in your artery. ? · You have a fast-growing, painful lump at the catheter site. ? · You have signs of infection, such as: 
¨ Increased pain, swelling, warmth, or redness. ¨ Red streaks leading from the catheter site. ¨ Pus draining from the catheter site. ¨ A fever. ? · Your leg or arm looks blue or feels cold, numb, or tingly. ? Watch closely for changes in your health, and be sure to contact your doctor if you have any problems. Where can you learn more? Go to http://aziza-niyah.info/. Enter X583 in the search box to learn more about \"Coronary Angiogram: What to Expect at Home. \" Current as of: September 21, 2016 Content Version: 11.4 © 5392-0833 Healthwise, [employer]. Care instructions adapted under license by [employer] (which disclaims liability or warranty for this information). If you have questions about a medical condition or this instruction, always ask your healthcare professional. [place] [address] any warranty or liability for your use of this information. Please provide this summary of care documentation to your next provider. Signatures-by signing, you are acknowledging that this After Visit Summary has been reviewed with you and you have received a copy. Patient Signature:  ____________________________________________________________  Date:  ____________________________________________________________  
  
Leonardo Zamarripa    
    
 Provider Signature:  ____________________________________________________________ Date:  ____________________________________________________________

## 2018-05-03 NOTE — H&P (VIEW-ONLY)
Fely Dasilva MD    [id number] [facility] [place], [address] [facility]    Office [phone],IUY [phone]  Pager [phone]    Elliott Tafoya is a 45 y.o. female is here for f/u visit. Primary care physician:  Christen Smith MD    Patient Active Problem List   Diagnosis Code    Asthma J45.909    Diabetes mellitus ([facility] E11.9    Avascular necrosis of bones of both hips ([facility] M87.051, M87.052    Lumbar degenerative disc disease M51.36    LGSIL (low grade squamous intraepithelial dysplasia) [id number]    Non compliance with medical treatment Z91.19    Obesity E66.9    BMI 35.0-35.9,adult Z68.35    Essential hypertension I10    Noncompliance of patient with dietary regimen Z91.11    Ankle edema M25.473    Muscle spasms of neck M62.838    Recurrent streptococcal tonsillitis J03.01    Encounter for long-term (current) use of insulin ([facility]) Z79.4    Muscle spasm of back M62.830    Elevated serum creatinine R79.89    Type 2 diabetes mellitus with diabetic neuropathy ([facility]) E11.40    Obesity, morbid ([facility] E66.01    Type 2 diabetes with nephropathy ([facility] E11.21             Chief complaint:  Chief Complaint   Patient presents with    Chest Pain     comes/goes    Shortness of Breath       Assessment:  Dyspnea/CP  Edema-component of venous insufficiency  Palpitations/Tachycardia -sinus tachycardia on Holter monitor  DM - uncontrolled. Hb A1c 12.3 4/11/18  HTN  Hx of Asthma      Plan:     Patient's oxygen saturation did not drop with a walk test in the clinic previous visit. Review of Verneda Schooling as well as records did not indicate that the patient has had a recent CTA chest.  Will proceed with CTA chest with contrast.  Advised to follow with pulmonary. Diuretics did not help with the swelling and cause her sodium to decrease. The swelling decreases on elevation. She probably has venous insufficiency. Advised to wear compression stockings regularly.     Pt is allergic to ASA ( Hives) - has taken asa with benadryl and does  She has also allergic to dye. She is okay when she takes Benadryl with it. The allergy is itching. This with patient about right heart cath/left heart cath. Patient agrees to proceed. She will take Benadryl 25 mg 12 hours apart- 2 doses prior to the procedure. LHC +/- PCI/RHC consent    The risks (including but not limited to death, myocardial infarction, cerebrovascular accident, dysrhythmia, renal failure +/-dialysis, vascular complication, allergy, and/or need for emergency surgery), indications, benefits, and alternatives of cardiac catheterization +/- PCI have been explained in detail to the patient and family. Patient and family informed that if patient needs surgery  - it will be at [facility] as [address] [place] [address] does not have onsite surgery. All questions answered to patient's satisfaction. Patient agrees to proceed with the procedure and  informed consent was obtained. ASA 3    AW 3    Colette Antonio MD., [facility]      Patient understands the plan. All questions were answered to the patient's satisfaction. Medication Side Effects and Warnings were discussed with patient: yes  Patient Labs were reviewed and or requested:  yes  Patient Past Records were reviewed and or requested: yes    I appreciate the opportunity to be involved in Ms. Frost. See note below for details. Please do not hesitate to contact us with questions or concerns. Matt Kahn MD    Cardiac Testing/ Procedures: A. Cardiac Cath/PCI:    B.ECHO/MARK: 5/16/2016-EF 94-44%, grade 1 diastolic dysfunction    C. StressNuclear/Stress ECHO/Stress test: May 12, 2016-7 minutes, normal exercise nuclear study. EF 70%    D. Vascular:    E. EP: 4/22/2016-Holter sinus tachycardia 99-1 63 bpm, no PACs, 6 PVCs. Diary entries of dyspnea, chest pain, dizziness correlates with sinus tachycardia.     F. Miscellaneous:    Subjective:  Khushi Melendez is a 45 y.o. female who returns for follow up visit. Patient continues to have intermittent chest pain-left-sided, moderate intensity. Can occur with rest or with exertion. Continues to have significant dyspnea on exertion. Continues to have swelling lower extremities. Swelling of the lower extremities reduces on keeping the feet propped up. Patient was on a diuretic previously but her sodium dropped and she was taken off the medication. Currently taking  sodium supplements. She states that she has been checked for clots in her lower extremities at [facility] previously and she had no clots. ROS:  (bold if positive, if negative)    Palpitations         Medications before admission:    Current Outpatient Prescriptions   Medication Sig Dispense    atorvastatin (LIPITOR) 10 mg tablet Take 1 Tab by mouth daily. 90 Tab    losartan (COZAAR) 100 mg tablet Take 1 Tab by mouth daily. 90 Tab    gabapentin (NEURONTIN) 300 mg capsule Take 2 Caps by mouth three (3) times daily. 180 Cap    montelukast (SINGULAIR) 10 mg tablet TAKE ONE TABLET BY MOUTH ONCE DAILY 30 Tab    carvedilol (COREG) 3.125 mg tablet TAKE ONE TABLET BY MOUTH TWICE DAILY WITH MEALS 60 Tab    insulin glulisine (APIDRA SOLOSTAR) 100 unit/mL pen INJECT 30 UNITS SUBCUTANEOUSLY BEFORE BREAKFAST, LUNCH, AND DINNER plus sliding scale. Max daily dose 150 units. 45 mL    insulin glulisine (APIDRA) 100 unit/mL injection 1 unit     sodium bicarbonate 325 mg tablet Take 325 mg by mouth two (2) times a day.  insulin glargine (LANTUS,BASAGLAR) 100 unit/mL (3 mL) inpn Inject 90 units at night. 30 mL    VITAMIN D2 50,000 unit capsule Take 50,000 Units by mouth two (2) times a week.  albuterol (VENTOLIN HFA) 90 mcg/actuation inhaler Take 2 Puffs by inhalation every four (4) hours as needed for Wheezing. Indications: Acute Asthma Attack 1 Inhaler    ARNUITY ELLIPTA 200 mcg/actuation dsdv inhaler Take 1 Puff by inhalation daily.  1 Inhaler    Blood-Glucose Meter (ACCU-CHEK FILEMON PLUS METER) misc Test 4 times daily Dx Code: E11.65 1 Each    Insulin Needles, Disposable, 31 gauge x 5/16\" ndle Use to inject Lantus and Apidra daily Dx Code: E11.65 200 Pen Needle    Nebulizer & Compressor machine 1 Each by Does Not Apply route two (2) times daily as needed. 1 Each    glucose blood VI test strips (BLOOD GLUCOSE TEST) strip Patient request the [facility] brand of meter and strips and to test QID. 100 Strip    Insulin Needles, Disposable, 30 x 1/3 \" Sliding scale 4 Package    Lancets misc Bid for diabetes 250.00  Lot # [id number]  Exp 7/2018  Man Brian Nordisk 400 Package     No current facility-administered medications for this visit. Physical Exam:  Visit Vitals    /80 (BP 1 Location: Left arm, BP Patient Position: Sitting)    Pulse 88    Resp 20    Ht 5' 2\" (1.575 m)    Wt 221 lb 12.8 oz (100.6 kg)    LMP 04/02/2018    SpO2 99%    BMI 40.57 kg/m2          Gen: Well-developed, well-nourished, in no acute distress, walks with a cane  Neck: Supple,No JVD, No Carotid Bruit,   Resp: No accessory muscle use, Clear breath sounds, No rales or rhonchi  Card: Tachy,Reg Rythm,Normal S1, S2, No murmurs, rubs or gallop. No thrills.    Abd:  Soft, non-tender, non-distended,BS+,   MSK: No cyanosis  Skin: No rashes    Neuro: moving all four extremities , follows commands appropriately  Psych:  Good insight, oriented to person, place , alert, Nml Affect  LE: trace edema    EKG: SR/Nml axis/PRWP;  ant MI au      LABS:        Lab Results   Component Value Date/Time    WBC 7.8 04/11/2018 08:21 AM    HGB 12.0 04/11/2018 08:21 AM    HCT 36.9 04/11/2018 08:21 AM    PLATELET 783 67/42/7179 08:21 AM    MCV 97 04/11/2018 08:21 AM     Lab Results   Component Value Date/Time    Sodium 136 04/11/2018 08:21 AM    Potassium 4.1 04/11/2018 08:21 AM    Chloride 98 04/11/2018 08:21 AM    CO2 25 04/11/2018 08:21 AM    Anion gap 5 03/05/2018 09:47 AM    Glucose 296 (H) 04/11/2018 08:21 AM    BUN 15 04/11/2018 08:21 AM    Creatinine 1.05 (H) 04/11/2018 08:21 AM    BUN/Creatinine ratio 14 04/11/2018 08:21 AM    GFR est AA 78 04/11/2018 08:21 AM    GFR est non-AA 68 04/11/2018 08:21 AM    Calcium 8.7 04/11/2018 08:21 AM       No results found for: APTT  No results found for: INR, PTMR, PTP, PT1, PT2  No components found for: LAST LIPIDS        Dinora Alcazar, Ibeth

## 2018-05-03 NOTE — INTERVAL H&P NOTE
H&P Update:  Genesis Daniels was seen and examined. History and physical has been reviewed. The patient has been examined.  There have been no significant clinical changes since the completion of the originally dated History and Physical.    Signed By: Freddie La MD     May 3, 2018 9:08 AM

## 2018-05-09 DIAGNOSIS — E11.65 TYPE 2 DIABETES MELLITUS WITH HYPERGLYCEMIA, WITH LONG-TERM CURRENT USE OF INSULIN (HCC): ICD-10-CM

## 2018-05-09 DIAGNOSIS — Z79.4 TYPE 2 DIABETES MELLITUS WITH HYPERGLYCEMIA, WITH LONG-TERM CURRENT USE OF INSULIN (HCC): ICD-10-CM

## 2018-05-10 DIAGNOSIS — J45.20 MILD INTERMITTENT ASTHMA WITHOUT COMPLICATION: ICD-10-CM

## 2018-05-10 RX ORDER — ALBUTEROL SULFATE 90 UG/1
AEROSOL, METERED RESPIRATORY (INHALATION)
Qty: 1 INHALER | Refills: 3 | Status: SHIPPED | OUTPATIENT
Start: 2018-05-10 | End: 2018-11-06 | Stop reason: CLARIF

## 2018-05-10 RX ORDER — INSULIN GLULISINE 100 [IU]/ML
INJECTION, SOLUTION SUBCUTANEOUS
Qty: 45 ML | Refills: 11 | Status: SHIPPED | OUTPATIENT
Start: 2018-05-10 | End: 2018-09-08

## 2018-05-10 RX ORDER — FLUTICASONE FUROATE 200 UG/1
POWDER RESPIRATORY (INHALATION)
Qty: 1 INHALER | Refills: 2 | Status: SHIPPED | OUTPATIENT
Start: 2018-05-10 | End: 2019-01-01

## 2018-05-22 ENCOUNTER — TELEPHONE (OUTPATIENT)
Dept: ENDOCRINOLOGY | Age: 38
End: 2018-05-22

## 2018-05-23 NOTE — TELEPHONE ENCOUNTER
Asked pt to continue insulin per Dr Kristen Unger as David Nuñez has been discontinued. Pt verbalized understanding.

## 2018-05-30 ENCOUNTER — OFFICE VISIT (OUTPATIENT)
Dept: HEMATOLOGY | Age: 38
End: 2018-05-30

## 2018-05-30 VITALS
DIASTOLIC BLOOD PRESSURE: 75 MMHG | SYSTOLIC BLOOD PRESSURE: 126 MMHG | HEIGHT: 62 IN | TEMPERATURE: 98 F | WEIGHT: 218.2 LBS | BODY MASS INDEX: 40.15 KG/M2 | OXYGEN SATURATION: 99 % | HEART RATE: 96 BPM

## 2018-05-30 DIAGNOSIS — Z91.199 NON COMPLIANCE WITH MEDICAL TREATMENT: ICD-10-CM

## 2018-05-30 DIAGNOSIS — Z79.4 TYPE 2 DIABETES MELLITUS WITH DIABETIC NEUROPATHY, WITH LONG-TERM CURRENT USE OF INSULIN (HCC): ICD-10-CM

## 2018-05-30 DIAGNOSIS — E11.21 TYPE 2 DIABETES WITH NEPHROPATHY (HCC): ICD-10-CM

## 2018-05-30 DIAGNOSIS — E11.40 TYPE 2 DIABETES MELLITUS WITH DIABETIC NEUROPATHY, WITH LONG-TERM CURRENT USE OF INSULIN (HCC): ICD-10-CM

## 2018-05-30 DIAGNOSIS — E66.01 OBESITY, MORBID (HCC): Primary | ICD-10-CM

## 2018-05-30 DIAGNOSIS — Z79.4 ENCOUNTER FOR LONG-TERM (CURRENT) USE OF INSULIN (HCC): ICD-10-CM

## 2018-05-30 DIAGNOSIS — R74.8 ABNORMAL LIVER ENZYMES: ICD-10-CM

## 2018-05-30 DIAGNOSIS — K76.0 NAFLD (NONALCOHOLIC FATTY LIVER DISEASE): ICD-10-CM

## 2018-05-30 DIAGNOSIS — Z91.119 NONCOMPLIANCE OF PATIENT WITH DIETARY REGIMEN: ICD-10-CM

## 2018-05-30 NOTE — MR AVS SNAPSHOT
[address] [facility] [phone] [address] [place] [address] 
[phone] Patient: Margarita Montoya MRN: [id number] YPI:[id number] Visit Information Date & Time Provider Department Dept. Phone Encounter #  
 5/30/2018  8:30 AM April RALPH Benavides, [address] [facility] 219 [id number] Follow-up Instructions Return in about 6 months (around 11/30/2018). Your Appointments 6/5/2018  9:40 AM  
ESTABLISHED PATIENT with Dinora Alcazar MD  
[facility] ([address] [place] [address]) Appt Note: 1 mo fup  
 [address] [facility] [address] [facility] [address]  
[phone]  
  
   
 [address] [facility] [address] [place] [id number]  
  
    
 6/12/2018 10:30 AM  
Follow Up with Leighann Pruitt NP [address] [place] [address] ([address] [place] [address]) Appt Note: 2mo f/up migraine cr  
 [place] [address] [facility] [address] [facility] [address] [id number] [phone]  
  
   
 [place] [address] [facility] [address] [address] [address] 6/25/2018  2:45 PM  
ROUTINE CARE with Irwin Dawn MD  
[facility] [address] [place] [address]) Appt Note: pt called to make fu  
 [address] [place] [address] [facility] [address]  
[phone]  
  
   
 [address] [place] [address] Upcoming Health Maintenance Date Due Pneumococcal 19-64 Medium Risk (1 of 1 - PPSV23) 1/18/1999 EYE EXAM RETINAL OR DILATED Q1 5/19/2014 FOOT EXAM Q1 6/24/2017 Influenza Age 5 to Adult 8/1/2018 PAP AKA CERVICAL CYTOLOGY 10/1/2018 HEMOGLOBIN A1C Q6M 10/11/2018 MICROALBUMIN Q1 4/11/2019 LIPID PANEL Q1 4/11/2019 DTaP/Tdap/Td series (2 - Td) 12/8/2025 Allergies as of 5/30/2018  Review Complete On: 5/30/2018 By: Therese Mckeon LPN Severity Noted Reaction Type Reactions Aspirin  03/05/2010    Itching Contrast Agent [Iodine]  05/01/2015    Hives Dilaudid [Hydromorphone]  05/01/2015    Hives Metformin  07/03/2013    Nausea Only Reglan [Metoclopramide]  03/05/2010    Other (comments) Impaired speech Ritalin [Methylphenidate]  05/01/2015    Other (comments) No allergy per patient. 5/30/18 Current Immunizations  Reviewed on 12/8/2015 Name Date Influenza Vaccine (Quad) PF 10/4/2016, 11/23/2015  9:10 AM  
 Tdap 12/8/2015  9:54 AM  
  
 Not reviewed this visit Vitals BP Pulse Temp Height(growth percentile) Weight(growth percentile) SpO2  
 126/75 (BP 1 Location: Left arm, BP Patient Position: Sitting) 96 98 °F (36.7 °C) (Tympanic) 5' 2\" (1.575 m) 218 lb 3.2 oz (99 kg) 99% BMI OB Status Smoking Status 39.91 kg/m2 Having regular periods Never Smoker Vitals History BMI and BSA Data Body Mass Index Body Surface Area  
 39.91 kg/m 2 2.08 m 2 Preferred Pharmacy Pharmacy Name Phone [address] [phone] Your Updated Medication List  
  
   
This list is accurate as of 5/30/18  9:02 AM.  Always use your most recent med list.  
  
  
  
  
 APIDRA SOLOSTAR U-100 INSULIN 100 unit/mL pen Generic drug:  insulin glulisine U-100 INJECT 30 UNITS SUBCUTANEOUSLY BEFORE BREAKFAST, LUNCH  AND  DINNER  PLUS  SLIDING  SCALE. MAX  DAILY  DOSE  150  UNITS. ARNUITY ELLIPTA 200 mcg/actuation Dsdv inhaler Generic drug:  fluticasone furoate INHALE ONE PUFF BY MOUTH ONCE DAILY  
  
 atorvastatin 10 mg tablet Commonly known as:  LIPITOR Take 1 Tab by mouth daily. Blood-Glucose Meter Misc Commonly known as:  ACCU-CHEK FILEMON PLUS METER Test 4 times daily Dx Code: E11.65  
  
 carvedilol 3.125 mg tablet Commonly known as:  COREG  
TAKE ONE TABLET BY MOUTH TWICE DAILY WITH MEALS  
  
 gabapentin 300 mg capsule Commonly known as:  NEURONTIN Take 2 Caps by mouth three (3) times daily. glucose blood VI test strips strip Commonly known as:  blood glucose test  
Patient request the [facility] [employer] brand of meter and strips and to test QID. insulin glargine 100 unit/mL (3 mL) Inpn Commonly known as:  Elige Ivanoff Inject 90 units at night. Insulin Needles (Disposable) 30 gauge x 1/3\" Sliding scale Insulin Needles (Disposable) 31 gauge x 5/16\" Ndle Use to inject Lantus and Apidra daily Dx Code: E11.65  
  
 isosorbide mononitrate ER 30 mg tablet Commonly known as:  IMDUR Take 1 Tab by mouth daily. Lancets Misc Bid for diabetes 250.00 Lot # [id number] Exp 7/2018 Man Brian Nordisk  
  
 losartan 100 mg tablet Commonly known as:  COZAAR Take 1 Tab by mouth daily. montelukast 10 mg tablet Commonly known as:  SINGULAIR  
TAKE ONE TABLET BY MOUTH ONCE DAILY Nebulizer & Compressor machine 1 Each by Does Not Apply route two (2) times daily as needed. sodium bicarbonate 325 mg tablet Take 325 mg by mouth two (2) times a day. VENTOLIN HFA 90 mcg/actuation inhaler Generic drug:  albuterol INHALE TWO PUFFS BY MOUTH EVERY 4 HOURS AS NEEDED FOR  WHEEZING  
  
 VITAMIN D2 50,000 unit capsule Generic drug:  ergocalciferol Take 50,000 Units by mouth two (2) times a week. Follow-up Instructions Return in about 6 months (around 11/30/2018). Introducing [facility]! [facility] introduces [employer] patient portal. Now you can access parts of your medical record, email your doctor's office, and request medication refills online. 1. In your internet browser, go to [email or web address] 2. Click on the First Time User? Click Here link in the Sign In box. You will see the New Member Sign Up page. 3. Enter your [employer] Access Code exactly as it appears below. You will not need to use this code after youve completed the sign-up process. If you do not sign up before the expiration date, you must request a new code. · [employer] Access Code: [email or web address] Expires: 7/30/2018  1:19 PM 
 
4. Enter the last four digits of your Social Security Number (xxxx) and Date of Birth (mm/dd/yyyy) as indicated and click Submit. You will be taken to the next sign-up page. 5. Create a [employer] ID. This will be your [employer] login ID and cannot be changed, so think of one that is secure and easy to remember. 6. Create a [employer]omat password. You can change your password at any time. 7. Enter your Password Reset Question and Answer. This can be used at a later time if you forget your password. 8. Enter your e-mail address. You will receive e-mail notification when new information is available in [address] 9. Click Sign Up. You can now view and download portions of your medical record. 10. Click the Download Summary menu link to download a portable copy of your medical information. If you have questions, please visit the Frequently Asked Questions section of the [employer] website. Remember, [employer] is NOT to be used for urgent needs. For medical emergencies, dial 911. Now available from your iPhone and Android! Please provide this summary of care documentation to your next provider. Your primary care clinician is listed as Maria Dolores Mattson. If you have any questions after today's visit, please call [phone].

## 2018-05-30 NOTE — PROGRESS NOTES
70 Apoorva Jacobson MD, [address], [facility], [place]       April G Anay Cantu, REYNA Wetzel, GINI Vasquez, [facility]   Dasha Mederos, REYNA Jo, REYNA Rosas [facility] [address]    at [address], [facility] Vinicius Armendariz  22.    [phone]    FAX: [address] [place] [address], [address]    [phone]    FAX: [phone]     Patient Care Team:  Jigar Coffman MD as PCP - General (Family Practice)  Gerardo Lion MD as Physician (Obstetrics & Gynecology)  Ulices Jack MD (Endocrinology)  Matt Kahn MD as Physician (Cardiology)    Patient Active Problem List   Diagnosis Code    Asthma J45.909    Diabetes mellitus ([facility] E11.9    Avascular necrosis of bones of both hips ([facility] M87.051, M87.052    Lumbar degenerative disc disease M51.36    LGSIL (low grade squamous intraepithelial dysplasia) [id number]    Non compliance with medical treatment Z91.19    Obesity E66.9    BMI 35.0-35.9,adult Z68.35    Essential hypertension I10    Noncompliance of patient with dietary regimen Z91.11    Ankle edema M25.473    Muscle spasms of neck M62.838    Recurrent streptococcal tonsillitis J03.01    Encounter for long-term (current) use of insulin ([facility] Z79.4    Muscle spasm of back M62.830    Elevated serum creatinine R79.89    Type 2 diabetes mellitus with diabetic neuropathy ([facility] E11.40    Obesity, morbid ([facility] E66.01    Type 2 diabetes with nephropathy ([facility] E11.21       Khushi Melendez returns to the [place] [address] regarding suspected fatty liver disease and its management.  The active problem list, all pertinent past medical history, medications, radiologic findings and laboratory findings related to the liver disorder were reviewed with the patient. The patient is a 45 y.o.  female who is suspected to have fatty liver disease based upon ultrasound in October 2717, metabolic syndrome and elevated liver transaminases. An assessment of liver fibrosis with biopsy or elastography has not been performed. We attempted to get approval from her insurance company to perform a FibroScan today but she was denied. She returns for a regular follow up appointment to discuss her most recent lab results. Diet and exercise was discussed in detail with patient today. She states that she has made no changes since her last office visit. Fortunately, she has lost 6 lbs over the last 4 weeks. The patient has no symptoms which could be attributed to the liver disorder. She does complain of ongoing pain in the lower back and legs since 2014. The patient has limitations in functional activities secondary to other medical problems that are not related to the liver disease. Today, patient denies change in bowel habits, dark urine, pruritus and problems concentrating. ALLERGIES  Allergies   Allergen Reactions    Aspirin Itching    Contrast Agent [Iodine] Hives    Dilaudid [Hydromorphone] Hives    Metformin Nausea Only    Reglan [Metoclopramide] Other (comments)     Impaired speech    Ritalin [Methylphenidate] Other (comments)     No allergy per patient. 5/30/18       MEDICATIONS  Current Outpatient Prescriptions   Medication Sig    APIDRA SOLOSTAR U-100 INSULIN 100 unit/mL pen INJECT 30 UNITS SUBCUTANEOUSLY BEFORE BREAKFAST, LUNCH  AND  DINNER  PLUS  SLIDING  SCALE. MAX  DAILY  DOSE  150  UNITS.  ARNUITY ELLIPTA 200 mcg/actuation dsdv inhaler INHALE ONE PUFF BY MOUTH ONCE DAILY    VENTOLIN HFA 90 mcg/actuation inhaler INHALE TWO PUFFS BY MOUTH EVERY 4 HOURS AS NEEDED FOR  WHEEZING    isosorbide mononitrate ER (IMDUR) 30 mg tablet Take 1 Tab by mouth daily.     atorvastatin (LIPITOR) 10 mg tablet Take 1 Tab by mouth daily.  losartan (COZAAR) 100 mg tablet Take 1 Tab by mouth daily.  gabapentin (NEURONTIN) 300 mg capsule Take 2 Caps by mouth three (3) times daily.  montelukast (SINGULAIR) 10 mg tablet TAKE ONE TABLET BY MOUTH ONCE DAILY    carvedilol (COREG) 3.125 mg tablet TAKE ONE TABLET BY MOUTH TWICE DAILY WITH MEALS    sodium bicarbonate 325 mg tablet Take 325 mg by mouth two (2) times a day.  insulin glargine (LANTUS,BASAGLAR) 100 unit/mL (3 mL) inpn Inject 90 units at night.  Blood-Glucose Meter (ACCU-CHEK FILEMON PLUS METER) misc Test 4 times daily Dx Code: E11.65    Insulin Needles, Disposable, 31 gauge x 5/16\" ndle Use to inject Lantus and Apidra daily Dx Code: E11.65    VITAMIN D2 50,000 unit capsule Take 50,000 Units by mouth two (2) times a week.  Nebulizer & Compressor machine 1 Each by Does Not Apply route two (2) times daily as needed.  glucose blood VI test strips (BLOOD GLUCOSE TEST) strip Patient request the [facility] [employer] [facility] [employer] brand of meter and strips and to test QID.  Insulin Needles, Disposable, 30 x 1/3 \" Sliding scale    Lancets misc Bid for diabetes 250.00  Lot # [id number]  Exp 7/2018  Man Brian Nordisk     No current facility-administered medications for this visit. SYSTEM REVIEW NOT RELATED TO LIVER DISEASE OR REVIEWED ABOVE:  Constitution systems: Negative for fever, chills, weight gain, weight loss. Eyes: Negative for visual changes. ENT: Negative for sore throat, painful swallowing. Respiratory: Negative for cough, hemoptysis, SOB. Cardiology: Negative for chest pain, palpitations. GI:  Negative for constipation or diarrhea. : Negative for urinary frequency, dysuria, hematuria, nocturia. Skin: Negative for rash. Hematology: Negative for easy bruising, blood clots. Musculo-skelatal: Negative for back pain, muscle pain, weakness.   Neurologic: Negative for headaches, dizziness, vertigo, memory problems not related to HE. Psychology: Negative for anxiety, depression. FAMILY HISTORY:  The father has a history of liver disease. Patient is unsure of liver diagnosis. The mother  of a myocardial infarction. SOCIAL HISTORY:  The patient is . The patient has 2 children. The patient has never used tobacco products. The patient has never consumed significant amounts of alcohol. The patient does not work. The patient is applying for disability. PHYSICAL EXAMINATION:  Visit Vitals    /75 (BP 1 Location: Left arm, BP Patient Position: Sitting)    Pulse 96    Temp 98 °F (36.7 °C) (Tympanic)    Ht 5' 2\" (1.575 m)    Wt 218 lb 3.2 oz (99 kg)    SpO2 99%    BMI 39.91 kg/m2     General: No acute distress. Eyes: Sclera anicteric. ENT: No oral lesions. Thyroid normal.  Nodes: No adenopathy. Skin: No spider angiomata. No jaundice. No palmar erythema. Respiratory: Lungs clear to auscultation. Cardiovascular: Regular heart rate. No murmurs. No JVD. Abdomen: Soft, tenderness upon palpation in the epigastric area and RUQ. Liver size normal to percussion/palpation. Spleen not palpable. No obvious ascites. Extremities: No edema. No muscle wasting. No gross arthritic changes. Neurologic: Alert and oriented. Cranial nerves grossly intact. No asterixis.     LABORATORY STUDIES:  Liver [place] of [address] Units 2018 3/5/2018   WBC 3.4 - 10.8 x10E3/uL 7.8 5.1   ANC 1.8 - 8.0 K/UL  2.4   HGB 11.1 - 15.9 g/dL 12.0 12.1    - 379 x10E3/uL 359 330   AST 0 - 40 IU/L 65 (H) 68 (H)   ALT 0 - 32 IU/L 81 (H) 116 (H)   Alk Phos 39 - 117 IU/L 61 69   Bili, Total 0.0 - 1.2 mg/dL 0.2 0.3   Bili, Direct 0.00 - 0.40 mg/dL     Albumin 3.5 - 5.5 g/dL 3.5 2.9 (L)   BUN 6 - 20 mg/dL 15 11   Creat 0.57 - 1.00 mg/dL 1.05 (H) 1.13 (H)   Na 134 - 144 mmol/L 136 137   K 3.5 - 5.2 mmol/L 4.1 4.2   Cl 96 - 106 mmol/L 98 101   CO2 18 - 29 mmol/L 25 31   Glucose 65 - 99 mg/dL 296 (H) 380 (H)     SEROLOGIES:  Serologies Latest Ref Rng & Units 5/1/2018   Hep B Surface Ag Negative Negative   Hep B Core Ab, Total Negative Negative   Hep B Surface AB QL  Non Reactive   Hep C Ab 0.0 - 0.9 s/co ratio <0.1   Ferritin 15 - 150 ng/mL 53   Iron % Saturation 15 - 55 % 23   IZA Ab, Direct Negative Negative   M2 Ab 0.0 - 20.0 Units 4.4     LIVER HISTOLOGY:  Not available or performed    ENDOSCOPIC PROCEDURES:  Not available or performed    RADIOLOGY:  10/2017. Ultrasound of liver. Persistently increased hepatic echogenicity, compatible with hepatic parenchymal disease, likely hepatic steatosis. Normal gallbladder. OTHER TESTING:  Not available or performed    ASSESSMENT AND PLAN:  Suspected fatty liver disease of unclear histologic severity. Liver transaminases are elevated but liver function remains normal. The platelet count is normal. Based upon laboratory studies and imaging, the patient does not appear to have cirrhosis. Serologic and virologic studies were performed to assess for other causes of chronic liver disease. All results were negative or unremarkable. Suspect the patient has fatty liver based upon ultrasound, an elevation in serum liver transaminases and co-morbidities/metabolic syndrome. Only a liver biopsy can confirm is the patient does have fatty liver and differentiate benign steatosis from NERI. The treatment is the same; weight reduction. FibroScan was ordered to assess liver disease non-invasively. Unfortunately, her insurance would not approve this. She will be monitored periodically. There is no reason to perform liver biopsy at this time. Liver chemistries will be monitored. The data regarding the potential although marginal effects of vitamin E were reviewed with the patient. It was recommended to start taking vitamin E at a dose of 400 IU BID.   Vitamin E has only be tested in patients with NERI and it is not known if this is effective or even needed in benign steatosis. I did not recommend the patient take vitamin E.    DM II. Uncontrolled with last HgbA1c 12.3%. She refuses to see a nutritionist. Encouraged patient to take her medications as directed and continue regular follow up with her endocrinologist to manage this. Diet and exercise were recommended to patient again today. The patient was directed to continue all current medications at the current dosages. There are no contraindications for the patient to take any medications that are necessary for treatment of other medical issues including medications for diabetes mellitus and hypercholesterolemia. The patient was counseled regarding alcohol consumption and that this could contribute to fatty liver disease. Vaccination against viral hepatitis B is recommended since she has not been exposed or vaccinated in the past.    All of the above issues were discussed with the patient. All questions were answered. The patient expressed a clear understanding of the above. [address] [place] [address] in 6 months.     Yaya Tate NP  [address] [address]  Ph: [phone]  Fax: [phone]

## 2018-05-30 NOTE — PROGRESS NOTES
Chief Complaint   Patient presents with    Follow-up     No fibroscan. Insurance will not cover. Visit Vitals    /75 (BP 1 Location: Left arm, BP Patient Position: Sitting)    Pulse 96    Temp 98 °F (36.7 °C) (Tympanic)    Ht 5' 2\" (1.575 m)    Wt 218 lb 3.2 oz (99 kg)    SpO2 99%    BMI 39.91 kg/m2     PHQ over the last two weeks 5/30/2018   Little interest or pleasure in doing things Not at all   Feeling down, depressed or hopeless Not at all   Total Score PHQ 2 0     1. Have you been to the ER, urgent care clinic since your last visit? Hospitalized since your last visit? No    2. Have you seen or consulted any other health care providers outside of the [facility] since your last visit? Include any pap smears or colon screening.  No

## 2018-05-31 ENCOUNTER — TELEPHONE (OUTPATIENT)
Dept: NEUROLOGY | Age: 38
End: 2018-05-31

## 2018-05-31 DIAGNOSIS — E87.5 HYPERKALEMIA: Primary | ICD-10-CM

## 2018-05-31 LAB
ALBUMIN SERPL-MCNC: 4 G/DL (ref 3.5–5.5)
ALBUMIN/GLOB SERPL: 1 {RATIO} (ref 1.2–2.2)
ALP SERPL-CCNC: 59 IU/L (ref 39–117)
ALT SERPL-CCNC: 52 IU/L (ref 0–32)
AST SERPL-CCNC: 70 IU/L (ref 0–40)
BASOPHILS # BLD AUTO: 0 X10E3/UL (ref 0–0.2)
BASOPHILS NFR BLD AUTO: 0 %
BILIRUB SERPL-MCNC: 0.3 MG/DL (ref 0–1.2)
BUN SERPL-MCNC: 11 MG/DL (ref 6–20)
BUN/CREAT SERPL: 9 (ref 9–23)
CALCIUM SERPL-MCNC: 9.7 MG/DL (ref 8.7–10.2)
CHLORIDE SERPL-SCNC: 96 MMOL/L (ref 96–106)
CHOLEST SERPL-MCNC: 179 MG/DL (ref 100–199)
CO2 SERPL-SCNC: 25 MMOL/L (ref 18–29)
CREAT SERPL-MCNC: 1.16 MG/DL (ref 0.57–1)
EOSINOPHIL # BLD AUTO: 0.2 X10E3/UL (ref 0–0.4)
EOSINOPHIL NFR BLD AUTO: 3 %
ERYTHROCYTE [DISTWIDTH] IN BLOOD BY AUTOMATED COUNT: 14.4 % (ref 12.3–15.4)
EST. AVERAGE GLUCOSE BLD GHB EST-MCNC: 266 MG/DL
GFR SERPLBLD CREATININE-BSD FMLA CKD-EPI: 60 ML/MIN/1.73
GFR SERPLBLD CREATININE-BSD FMLA CKD-EPI: 69 ML/MIN/1.73
GLOBULIN SER CALC-MCNC: 3.9 G/DL (ref 1.5–4.5)
GLUCOSE SERPL-MCNC: 103 MG/DL (ref 65–99)
HBA1C MFR BLD: 10.9 % (ref 4.8–5.6)
HCT VFR BLD AUTO: 36.2 % (ref 34–46.6)
HDLC SERPL-MCNC: 52 MG/DL
HGB BLD-MCNC: 12 G/DL (ref 11.1–15.9)
IMM GRANULOCYTES # BLD: 0 X10E3/UL (ref 0–0.1)
IMM GRANULOCYTES NFR BLD: 0 %
LDLC SERPL CALC-MCNC: 92 MG/DL (ref 0–99)
LYMPHOCYTES # BLD AUTO: 2.5 X10E3/UL (ref 0.7–3.1)
LYMPHOCYTES NFR BLD AUTO: 40 %
MCH RBC QN AUTO: 32 PG (ref 26.6–33)
MCHC RBC AUTO-ENTMCNC: 33.1 G/DL (ref 31.5–35.7)
MCV RBC AUTO: 97 FL (ref 79–97)
MONOCYTES # BLD AUTO: 0.6 X10E3/UL (ref 0.1–0.9)
MONOCYTES NFR BLD AUTO: 9 %
NEUTROPHILS # BLD AUTO: 2.9 X10E3/UL (ref 1.4–7)
NEUTROPHILS NFR BLD AUTO: 48 %
PLATELET # BLD AUTO: 355 X10E3/UL (ref 150–379)
POTASSIUM SERPL-SCNC: 7.4 MMOL/L (ref 3.5–5.2)
PROT SERPL-MCNC: 7.9 G/DL (ref 6–8.5)
RBC # BLD AUTO: 3.75 X10E6/UL (ref 3.77–5.28)
SODIUM SERPL-SCNC: 136 MMOL/L (ref 134–144)
TRIGL SERPL-MCNC: 176 MG/DL (ref 0–149)
VLDLC SERPL CALC-MCNC: 35 MG/DL (ref 5–40)
WBC # BLD AUTO: 6.2 X10E3/UL (ref 3.4–10.8)

## 2018-05-31 NOTE — TELEPHONE ENCOUNTER
----- Message from Danyel Fatima sent at 5/31/2018  9:57 AM EDT -----  Regarding: REYNA Newell/telephone  Pt (p) [phone], Patient was returning the nurses call.

## 2018-06-02 LAB
ALBUMIN SERPL-MCNC: 3.7 G/DL (ref 3.5–5.5)
ALBUMIN/GLOB SERPL: 1.2 {RATIO} (ref 1.2–2.2)
ALP SERPL-CCNC: 52 IU/L (ref 39–117)
ALT SERPL-CCNC: 38 IU/L (ref 0–32)
AST SERPL-CCNC: 27 IU/L (ref 0–40)
BILIRUB SERPL-MCNC: 0.3 MG/DL (ref 0–1.2)
BUN SERPL-MCNC: 12 MG/DL (ref 6–20)
BUN/CREAT SERPL: 9 (ref 9–23)
CALCIUM SERPL-MCNC: 9.3 MG/DL (ref 8.7–10.2)
CHLORIDE SERPL-SCNC: 101 MMOL/L (ref 96–106)
CO2 SERPL-SCNC: 23 MMOL/L (ref 18–29)
CREAT SERPL-MCNC: 1.29 MG/DL (ref 0.57–1)
GFR SERPLBLD CREATININE-BSD FMLA CKD-EPI: 53 ML/MIN/1.73
GFR SERPLBLD CREATININE-BSD FMLA CKD-EPI: 61 ML/MIN/1.73
GLOBULIN SER CALC-MCNC: 3.2 G/DL (ref 1.5–4.5)
GLUCOSE SERPL-MCNC: 82 MG/DL (ref 65–99)
POTASSIUM SERPL-SCNC: 3.9 MMOL/L (ref 3.5–5.2)
PROT SERPL-MCNC: 6.9 G/DL (ref 6–8.5)
SODIUM SERPL-SCNC: 141 MMOL/L (ref 134–144)

## 2018-06-04 ENCOUNTER — TELEPHONE (OUTPATIENT)
Dept: NEUROLOGY | Age: 38
End: 2018-06-04

## 2018-06-04 NOTE — TELEPHONE ENCOUNTER
Pt is calling to see what her lab results are?      Please advise     Her Potassium was normal with the recheck per NP.     returned her call and explained to her the previous message sent from the NP.

## 2018-06-04 NOTE — TELEPHONE ENCOUNTER
----- Message from Suhas Enamorado sent at 6/1/2018  3:37 PM EDT -----  Regarding: REYNA Newell/Telephone  Pt requested a call from the nurse regarding her blood work. Best contact number  [phone].

## 2018-06-05 ENCOUNTER — OFFICE VISIT (OUTPATIENT)
Dept: CARDIOLOGY CLINIC | Age: 38
End: 2018-06-05

## 2018-06-05 VITALS
RESPIRATION RATE: 20 BRPM | HEIGHT: 62 IN | SYSTOLIC BLOOD PRESSURE: 138 MMHG | HEART RATE: 86 BPM | DIASTOLIC BLOOD PRESSURE: 84 MMHG | BODY MASS INDEX: 40.12 KG/M2 | WEIGHT: 218 LBS | OXYGEN SATURATION: 99 %

## 2018-06-05 DIAGNOSIS — I10 ESSENTIAL HYPERTENSION: Primary | ICD-10-CM

## 2018-06-05 NOTE — MR AVS SNAPSHOT
[address] [facility] [address] [facility] 
[phone] Patient: Tori Santos MRN: [id number] FKL:[id number]/85/[id number] Visit Information Date & Time Provider Department Dept. Phone Encounter #  
 6/5/2018  9:40 AM Darion Anthony MD [facility] Althea Naqvi [phone] [id number] Your Appointments 6/12/2018 10:30 AM  
Follow Up with Jefe Joaquin NP [address] [facility] ([facility]) Appt Note: 2mo f/up migraine cr  
 [place] [address] [facility] [id number] [phone]  
  
   
 [place] [address] [facility] [address] [address] [address] 6/25/2018  2:45 PM  
ROUTINE CARE with Violet Nava MD  
[facility]) Appt Note: pt called to make fu  
 [address] [place] [address] [facility] [address]  
[phone]  
  
   
 [phone]  
  
    
 11/27/2018  9:00 AM  
Follow Up with Karishma Angeles NP [address] ([facility]) Appt Note: Follow up [address] [facility] [phone] [facility] [phone]  
[address] [facility] [address] Upcoming Health Maintenance Date Due Pneumococcal 19-64 Medium Risk (1 of 1 - PPSV23) 1/18/1999 EYE EXAM RETINAL OR DILATED Q1 5/19/2014 FOOT EXAM Q1 6/24/2017 PAP AKA CERVICAL CYTOLOGY 10/1/2018 Influenza Age 5 to Adult 8/1/2018 HEMOGLOBIN A1C Q6M 11/30/2018 MICROALBUMIN Q1 4/11/2019 LIPID PANEL Q1 5/30/2019 DTaP/Tdap/Td series (2 - Td) 12/8/2025 Allergies as of 6/5/2018  Review Complete On: 6/5/2018 By: Noel Stein LPN Severity Noted Reaction Type Reactions Aspirin  03/05/2010    Itching Contrast Agent [Iodine]  05/01/2015    Hives Dilaudid [Hydromorphone]  05/01/2015    Hives Metformin  07/03/2013    Nausea Only Reglan [Metoclopramide]  03/05/2010    Other (comments) Impaired speech Ritalin [Methylphenidate]  05/01/2015    Other (comments) No allergy per patient. 5/30/18 Current Immunizations  Reviewed on 12/8/2015 Name Date Influenza Vaccine (Quad) PF 10/4/2016, 11/23/2015  9:10 AM  
 Tdap 12/8/2015  9:54 AM  
  
 Not reviewed this visit You Were Diagnosed With   
  
 Codes Comments Essential hypertension    -  Primary ICD-10-CM: I10 
ICD-9-CM: 401.9 BMI 35.0-35.9,adult     ICD-10-CM: I29.84 ICD-9-CM: V85.35 Vitals BP Pulse Resp Height(growth percentile) Weight(growth percentile) SpO2  
 138/84 (BP 1 Location: Left arm, BP Patient Position: Sitting) 86 20 5' 2\" (1.575 m) 218 lb (98.9 kg) 99% BMI OB Status Smoking Status 39.87 kg/m2 Having regular periods Never Smoker Vitals History BMI and BSA Data Body Mass Index Body Surface Area  
 39.87 kg/m 2 2.08 m 2 Preferred Pharmacy Pharmacy Name Phone [address] [phone] Your Updated Medication List  
  
   
This list is accurate as of 6/5/18 10:17 AM.  Always use your most recent med list.  
  
  
  
  
 APIDRA SOLOSTAR U-100 INSULIN 100 unit/mL pen Generic drug:  insulin glulisine U-100 INJECT 30 UNITS SUBCUTANEOUSLY BEFORE BREAKFAST, LUNCH  AND  DINNER  PLUS  SLIDING  SCALE. MAX  DAILY  DOSE  150  UNITS. ARNUITY ELLIPTA 200 mcg/actuation Dsdv inhaler Generic drug:  fluticasone furoate INHALE ONE PUFF BY MOUTH ONCE DAILY  
  
 atorvastatin 10 mg tablet Commonly known as:  LIPITOR Take 1 Tab by mouth daily. Blood-Glucose Meter Misc Commonly known as:  ACCU-CHEK FILEMON PLUS METER Test 4 times daily Dx Code: E11.65  
  
 carvedilol 3.125 mg tablet Commonly known as:  COREG  
TAKE ONE TABLET BY MOUTH TWICE DAILY WITH MEALS  
  
 gabapentin 300 mg capsule Commonly known as:  NEURONTIN  
 Take 2 Caps by mouth three (3) times daily. glucose blood VI test strips strip Commonly known as:  blood glucose test  
Patient request the [employer] [place] [facility] [employer] brand of meter and strips and to test QID. insulin glargine 100 unit/mL (3 mL) Inpn Commonly known as:  Gayatrielijah Sharp Inject 90 units at night. Insulin Needles (Disposable) 30 gauge x 1/3\" Sliding scale Insulin Needles (Disposable) 31 gauge x 5/16\" Ndle Use to inject Lantus and Apidra daily Dx Code: E11.65  
  
 isosorbide mononitrate ER 30 mg tablet Commonly known as:  IMDUR Take 1 Tab by mouth daily. Lancets Misc Bid for diabetes 250.00 Lot # [id number] Exp 7/2018 Man Brian Nordisk  
  
 losartan 100 mg tablet Commonly known as:  COZAAR Take 1 Tab by mouth daily. montelukast 10 mg tablet Commonly known as:  SINGULAIR  
TAKE ONE TABLET BY MOUTH ONCE DAILY Nebulizer & Compressor machine 1 Each by Does Not Apply route two (2) times daily as needed. sodium bicarbonate 325 mg tablet Take 325 mg by mouth two (2) times a day. VENTOLIN HFA 90 mcg/actuation inhaler Generic drug:  albuterol INHALE TWO PUFFS BY MOUTH EVERY 4 HOURS AS NEEDED FOR  WHEEZING  
  
 VITAMIN D2 50,000 unit capsule Generic drug:  ergocalciferol Take 50,000 Units by mouth two (2) times a week. Introducing [facility]! [employer] introduces [employer] patient portal. Now you can access parts of your medical record, email your doctor's office, and request medication refills online. 1. In your internet browser, go to [email or web address] 2. Click on the First Time User? Click Here link in the Sign In box. You will see the New Member Sign Up page. 3. Enter your [employer] Access Code exactly as it appears below. You will not need to use this code after youve completed the sign-up process. If you do not sign up before the expiration date, you must request a new code. · [employer] Access Code: [email or web address] Expires: 7/30/2018  1:19 PM 
 
4. Enter the last four digits of your Social Security Number (xxxx) and Date of Birth (mm/dd/yyyy) as indicated and click Submit. You will be taken to the next sign-up page. 5. Create a [employer] ID. This will be your [employer] login ID and cannot be changed, so think of one that is secure and easy to remember. 6. Create a [employer] password. You can change your password at any time. 7. Enter your Password Reset Question and Answer. This can be used at a later time if you forget your password. 8. Enter your e-mail address. You will receive e-mail notification when new information is available in [address] 9. Click Sign Up. You can now view and download portions of your medical record. 10. Click the Download Summary menu link to download a portable copy of your medical information. If you have questions, please visit the Frequently Asked Questions section of the [employer] website. Remember, [employer] is NOT to be used for urgent needs. For medical emergencies, dial 911. Now available from your iPhone and Android! Please provide this summary of care documentation to your next provider. Your primary care clinician is listed as Sherry Pino. If you have any questions after today's visit, please call [phone].

## 2018-06-05 NOTE — PROGRESS NOTES
Sultana Blank MD    [address] [facility] [address] [facility]    Office [phone],DGT [phone]  Pager [phone]    Jerson Ruiz is a 45 y.o. female is here for f/u visit. Primary care physician:  Olivia Favre, MD    Patient Active Problem List   Diagnosis Code    Asthma J45.909    Diabetes mellitus ([address] E11.9    Avascular necrosis of bones of both hips ([address] M87.051, M87.052    Lumbar degenerative disc disease M51.36    LGSIL (low grade squamous intraepithelial dysplasia) [id number]    Non compliance with medical treatment Z91.19    Obesity E66.9    BMI 35.0-35.9,adult Z68.35    Essential hypertension I10    Noncompliance of patient with dietary regimen Z91.11    Ankle edema M25.473    Muscle spasms of neck M62.838    Recurrent streptococcal tonsillitis J03.01    Encounter for long-term (current) use of insulin (HCC) Z79.4    Muscle spasm of back M62.830    Elevated serum creatinine R79.89    Type 2 diabetes mellitus with diabetic neuropathy (HCC) E11.40    Obesity, morbid ([address] E66.01    Type 2 diabetes with nephropathy ([address] E11.21    Abnormal liver enzymes R74.8    NAFLD (nonalcoholic fatty liver disease) K76.0             Chief complaint:  Chief Complaint   Patient presents with    Chest Pain     occasional since friday   not relieved by anything    Shortness of Breath     ongoing       Assessment:  Dyspnea/CP -cardiac cath 5/2018-nonobstructive branch vessel disease. Medical management. Normal EF. Edema-component of venous insufficiency  Palpitations/Tachycardia -sinus tachycardia on Holter monitor  DM - uncontrolled. Hb A1c 12.3 4/11/18  HTN  Hx of Asthma      Plan:       Patient's right heart catheterization showed normal right-sided pressures including wedge/pulmonary artery pressure. Her cardiac catheterization showed nonobstructive branch vessel disease.   She will take an extra tablet of MDR if she has significant chest pain if her blood pressure was within normal limits. Medical management for her coronary artery disease-aspirin/beta-blocker/Imdur/Cozaar/statin. Aggressive cardiovascular risk factor modification. Needs better control of her diabetes, needs to lose weight. Follow-up with 6 mths to 1 year or earlier as needed. Lilly Rodriguez MD., [facility]      Patient understands the plan. All questions were answered to the patient's satisfaction. Medication Side Effects and Warnings were discussed with patient: yes  Patient Labs were reviewed and or requested:  yes  Patient Past Records were reviewed and or requested: yes    I appreciate the opportunity to be involved in Ms. Frost. See note below for details. Please do not hesitate to contact us with questions or concerns. Pretty Dumont MD    Cardiac Testing/ Procedures: A. Cardiac Cath/PCI: 5/3/18 Tried Brachial vein access - unsuccessful      R radial acess - easy access  Difficulty gettting into asc aorta   R SCV angiogram - tortuous     R FV access - micropuncture  R CFA access - micropuncture        L Main: Long ; Large - Nml     LAD: Med - prox ; Small distal ; Mid 20%; D1 - Small     LCflex: OM1 - Med - Prox 30%; Mid- Small to med; 60%; Prob component of spasm     RCA: Small ; Prox catheter - 10%; PDA - very small     LVEDP: 14 mm Hg     LVEF: No assessed     No significant gradient across aortic valve.                 RHC findings:     RAPm=   7    mmHg  RVSP= 24     mmHg  PAPm=  19      mmHg  PCWPm=  10  mmHg  CO= 4.33         l/min  CI= 2.16 l/min/m2     Specimens Removed : None     Closure Device:  Manual    B.ECHO/MARK: 5/16/2016-EF 02-96%, grade 1 diastolic dysfunction    C. StressNuclear/Stress ECHO/Stress test: May 12, 2016-7 minutes, normal exercise nuclear study. EF 70%    D. Vascular:    E. EP: 4/22/2016-Holter sinus tachycardia 99-1 63 bpm, no PACs, 6 PVCs. Diary entries of dyspnea, chest pain, dizziness correlates with sinus tachycardia.     F. Miscellaneous:    Subjective:  Robinson Rizzo is a 45 y.o. female who returns for follow up visit. Continues to have intermittent atypical chest pain. No significant change in symptoms. She states that she saw pulmonary and they have done a workup and was told that \"everything was okay from a lung  standpoint\". Patient was on a diuretic previously but her sodium dropped and she was taken off the medication. Currently taking  sodium supplements. She states that she has been checked for clots in her lower extremities at [facility] previously and she had no clots. ROS:  (bold if positive, if negative)    Palpitations         Medications before admission:    Current Outpatient Prescriptions   Medication Sig Dispense    APIDRA SOLOSTAR U-100 INSULIN 100 unit/mL pen INJECT 30 UNITS SUBCUTANEOUSLY BEFORE BREAKFAST, LUNCH  AND  DINNER  PLUS  SLIDING  SCALE. MAX  DAILY  DOSE  150  UNITS. 45 mL    ARNUITY ELLIPTA 200 mcg/actuation dsdv inhaler INHALE ONE PUFF BY MOUTH ONCE DAILY 1 Inhaler    VENTOLIN HFA 90 mcg/actuation inhaler INHALE TWO PUFFS BY MOUTH EVERY 4 HOURS AS NEEDED FOR  WHEEZING 1 Inhaler    isosorbide mononitrate ER (IMDUR) 30 mg tablet Take 1 Tab by mouth daily. 30 Tab    atorvastatin (LIPITOR) 10 mg tablet Take 1 Tab by mouth daily. 90 Tab    losartan (COZAAR) 100 mg tablet Take 1 Tab by mouth daily. 90 Tab    gabapentin (NEURONTIN) 300 mg capsule Take 2 Caps by mouth three (3) times daily. 180 Cap    montelukast (SINGULAIR) 10 mg tablet TAKE ONE TABLET BY MOUTH ONCE DAILY 30 Tab    carvedilol (COREG) 3.125 mg tablet TAKE ONE TABLET BY MOUTH TWICE DAILY WITH MEALS 60 Tab    sodium bicarbonate 325 mg tablet Take 325 mg by mouth two (2) times a day.  insulin glargine (LANTUS,BASAGLAR) 100 unit/mL (3 mL) inpn Inject 90 units at night. 30 mL    VITAMIN D2 50,000 unit capsule Take 50,000 Units by mouth two (2) times a week.      Blood-Glucose Meter (ACCU-CHEK FILEMON PLUS METER) misc Test 4 times daily Dx Code: E11.65 1 Each    Insulin Needles, Disposable, 31 gauge x 5/16\" ndle Use to inject Lantus and Apidra daily Dx Code: E11.65 200 Pen Needle    Nebulizer & Compressor machine 1 Each by Does Not Apply route two (2) times daily as needed. 1 Each    glucose blood VI test strips (BLOOD GLUCOSE TEST) strip Patient request the [facility] [employer] brand of meter and strips and to test QID. 100 Strip    Insulin Needles, Disposable, 30 x 1/3 \" Sliding scale 4 Package    Lancets misc Bid for diabetes 250.00  Lot # [id number]  Exp 7/2018  Man Brian Nordisk 400 Package     No current facility-administered medications for this visit.    v    Physical Exam:  Visit Vitals    /84 (BP 1 Location: Left arm, BP Patient Position: Sitting)    Pulse 86    Resp 20    Ht 5' 2\" (1.575 m)    Wt 218 lb (98.9 kg)    SpO2 99%    BMI 39.87 kg/m2          Gen: Well-developed, well-nourished, in no acute distress, walks with a cane  Neck: Supple,No JVD, No Carotid Bruit,   Resp: No accessory muscle use, Clear breath sounds, No rales or rhonchi  Card: Tachy,Reg Rythm,Normal S1, S2, No murmurs, rubs or gallop. No thrills.    Abd:  Soft, non-tender, non-distended,BS+,   MSK: No cyanosis  Skin: No rashes    Neuro: moving all four extremities , follows commands appropriately  Psych:  Good insight, oriented to person, place , alert, Nml Affect  LE: trace edema    EKG: SR/Nml axis/PRWP;  ant MI au      LABS:        Lab Results   Component Value Date/Time    WBC 6.2 05/30/2018 10:43 AM    HGB 12.0 05/30/2018 10:43 AM    HCT 36.2 05/30/2018 10:43 AM    PLATELET 632 09/52/6009 10:43 AM    MCV 97 05/30/2018 10:43 AM     Lab Results   Component Value Date/Time    Sodium 141 06/01/2018 09:21 AM    Potassium 3.9 06/01/2018 09:21 AM    Chloride 101 06/01/2018 09:21 AM    CO2 23 06/01/2018 09:21 AM    Anion gap 5 03/05/2018 09:47 AM    Glucose 82 06/01/2018 09:21 AM    BUN 12 06/01/2018 09:21 AM    Creatinine 1.29 (H) 06/01/2018 09:21 AM    BUN/Creatinine ratio 9 06/01/2018 09:21 AM    GFR est AA 61 06/01/2018 09:21 AM    GFR est non-AA 53 (L) 06/01/2018 09:21 AM    Calcium 9.3 06/01/2018 09:21 AM       No results found for: APTT  No results found for: INR, PTMR, PTP, PT1, PT2  No components found for: LAST LIPIDS        Marsha Moy, MDdddd

## 2018-06-06 ENCOUNTER — OFFICE VISIT (OUTPATIENT)
Dept: FAMILY MEDICINE CLINIC | Age: 38
End: 2018-06-06

## 2018-06-06 VITALS
RESPIRATION RATE: 20 BRPM | BODY MASS INDEX: 40.12 KG/M2 | SYSTOLIC BLOOD PRESSURE: 115 MMHG | HEART RATE: 96 BPM | TEMPERATURE: 98.6 F | HEIGHT: 62 IN | DIASTOLIC BLOOD PRESSURE: 69 MMHG | OXYGEN SATURATION: 99 % | WEIGHT: 218 LBS

## 2018-06-06 DIAGNOSIS — M54.50 ACUTE RIGHT-SIDED LOW BACK PAIN WITHOUT SCIATICA: Primary | ICD-10-CM

## 2018-06-06 DIAGNOSIS — R10.31 RIGHT LOWER QUADRANT ABDOMINAL PAIN: ICD-10-CM

## 2018-06-06 LAB
BILIRUB UR QL STRIP: NEGATIVE
GLUCOSE UR-MCNC: NORMAL MG/DL
KETONES P FAST UR STRIP-MCNC: NEGATIVE MG/DL
PH UR STRIP: 5 [PH] (ref 4.6–8)
PROT UR QL STRIP: NORMAL
SP GR UR STRIP: 1.01 (ref 1–1.03)
UA UROBILINOGEN AMB POC: NORMAL (ref 0.2–1)
URINALYSIS CLARITY POC: CLEAR
URINALYSIS COLOR POC: NORMAL
URINE BLOOD POC: NORMAL
URINE LEUKOCYTES POC: NEGATIVE
URINE NITRITES POC: NEGATIVE

## 2018-06-06 RX ORDER — NITROFURANTOIN 25; 75 MG/1; MG/1
100 CAPSULE ORAL 2 TIMES DAILY
Qty: 10 CAP | Refills: 0 | Status: SHIPPED | OUTPATIENT
Start: 2018-06-06 | End: 2018-06-11

## 2018-06-06 NOTE — PROGRESS NOTES
Chief Complaint   Patient presents with    Abdominal Pain     RLQ pain since yesterday. dull pains. no n/v or diarrhea. no constipation     \"REVIEWED RECORD IN PREPARATION FOR VISIT AND HAVE OBTAINED THE NECESSARY DOCUMENTATION\"  1. Have you been to the ER, urgent care clinic since your last visit? Hospitalized since your last visit? No    2. Have you seen or consulted any other health care providers outside of the [address] [place] [address] since your last visit? Include any pap smears or colon screening. No  Patient does not have advanced directives.

## 2018-06-06 NOTE — PATIENT INSTRUCTIONS

## 2018-06-06 NOTE — PROGRESS NOTES
HISTORY OF PRESENT ILLNESS  Nomi Muller is a 45 y.o. female. HPI  Pt presents with \"back pain\"    Pt states that she has right sided, lower back pain. This started yesterday, and has continued  Pt describes the pain as a dull ache  She has some nausea with the pain  No vomiting, no diarrhea, no abdominal pain  No fever  Pain came on all of a sudden  She has not taken anything for the pain  No urinary symptoms, no blood in urine, etc.  Review of Systems   Constitutional: Negative for fever. HENT: Negative for congestion. Respiratory: Negative for cough. Gastrointestinal: Negative for abdominal pain, diarrhea, nausea and vomiting. Genitourinary: Positive for flank pain. Negative for dysuria, frequency and urgency. Physical Exam   Constitutional: She is oriented to person, place, and time. She appears well-developed and well-nourished. HENT:   Head: Normocephalic and atraumatic. Cardiovascular: Normal rate, regular rhythm and normal heart sounds. Pulmonary/Chest: Effort normal and breath sounds normal.   Abdominal: Soft. Bowel sounds are normal. She exhibits no distension and no mass. There is no tenderness. There is no rebound and no guarding. Musculoskeletal:        Back:    Neurological: She is alert and oriented to person, place, and time. Skin: Skin is warm and dry. Psychiatric: She has a normal mood and affect. Her behavior is normal.       ASSESSMENT and PLAN    ICD-10-CM ICD-9-CM    1. Acute right-sided low back pain without sciatica M54.5 724.2 CULTURE, URINE      nitrofurantoin, macrocrystal-monohydrate, (MACROBID) 100 mg capsule   2. Right lower quadrant abdominal pain R10.31 789.03 AMB POC URINALYSIS DIP STICK AUTO W/O MICRO     Informed patient that we will notify her when her culture returns, and she should take antibiotics as prescribed in the mean time. Educated about staying well hydrated, and voiding as often as she has the urge.   Educated that symptoms could be muscular in origin, but will treat empirically until we hear back from culture. Pt informed to return to office with worsening of symptoms, or PRN with any questions or concerns. Pt verbalizes understanding of plan of care and denies further questions or concerns at this time.

## 2018-06-06 NOTE — MR AVS SNAPSHOT
[address] [facility] 
 
 
 [address] [facility] 
[phone] Patient: Padmini Bhakta MRN: [id number] HKP:4/22/1262 Visit Information Date & Time Provider Department Dept. Phone Encounter #  
 6/6/2018  8:30 AM Jose Reyna [phone] [id number] Follow-up Instructions Return if symptoms worsen or fail to improve. Your Appointments 6/12/2018 10:30 AM  
Follow Up with Nick Henderson NP [address] [facility] ([address] [place] [address]) Appt Note: 2mo f/up migraine cr  
 [place] [address] [facility] [address] [phone]  
  
   
 [place] [address] [address] [address] 6/25/2018  2:45 PM  
ROUTINE CARE with Michel Pierce MD  
[facility] [address] [place] [address]) Appt Note: pt called to make fu  
 [address] [facility] [address] [facility] [address]  
[phone]  
  
   
 [phone]  
  
    
 11/27/2018  9:00 AM  
Follow Up with Karishma Stafford NP [facility] ([address] [place] [address]) Appt Note: Follow up [address] [facility] [address] [phone] [address] [place] [address]  
[phone]  
  
   
 [address] [facility] [address] [facility] [address]  
  
    
 12/11/2018  9:40 AM  
ESTABLISHED PATIENT with Abdifatah Aguilar MD  
[facility] ([address] [place] [address]) Appt Note: 6 mo fup  
 [address] [facility] [address] 100[address] [facility]  
[address] [facility] [place] [address] [address] Upcoming Health Maintenance Date Due Pneumococcal 19-64 Medium Risk (1 of 1 - PPSV23) 1/18/1999 EYE EXAM RETINAL OR DILATED Q1 5/19/2014 FOOT EXAM Q1 6/24/2017 PAP AKA CERVICAL CYTOLOGY 10/1/2018 Influenza Age 5 to Adult 8/1/2018 HEMOGLOBIN A1C Q6M 11/30/2018 MICROALBUMIN Q1 4/11/2019 LIPID PANEL Q1 5/30/2019 DTaP/Tdap/Td series (2 - Td) 12/8/2025 Allergies as of 6/6/2018  Review Complete On: 6/6/2018 By: Von Jiménez NP Severity Noted Reaction Type Reactions Aspirin  03/05/2010    Itching Contrast Agent [Iodine]  05/01/2015    Hives Dilaudid [Hydromorphone]  05/01/2015    Hives Metformin  07/03/2013    Nausea Only Reglan [Metoclopramide]  03/05/2010    Other (comments) Impaired speech Ritalin [Methylphenidate]  05/01/2015    Other (comments) No allergy per patient. 5/30/18 Current Immunizations  Reviewed on 12/8/2015 Name Date Influenza Vaccine (Quad) PF 10/4/2016, 11/23/2015  9:10 AM  
 Tdap 12/8/2015  9:54 AM  
  
 Not reviewed this visit You Were Diagnosed With   
  
 Codes Comments Acute right-sided low back pain without sciatica    -  Primary ICD-10-CM: M54.5 ICD-9-CM: 724.2 Right lower quadrant abdominal pain     ICD-10-CM: R10.31 ICD-9-CM: 789.03 Vitals BP Pulse Temp Resp Height(growth percentile) Weight(growth percentile)  
 115/69 96 98.6 °F (37 °C) (Oral) 20 5' 2\" (1.575 m) 218 lb (98.9 kg) LMP SpO2 BMI OB Status Smoking Status 05/23/2018 99% 39.87 kg/m2 Having regular periods Never Smoker BMI and BSA Data Body Mass Index Body Surface Area  
 39.87 kg/m 2 2.08 m 2 Preferred Pharmacy Pharmacy Name Phone [address] [phone] Your Updated Medication List  
  
   
This list is accurate as of 6/6/18  8:59 AM.  Always use your most recent med list.  
  
  
  
  
 APIDRA SOLOSTAR U-100 INSULIN 100 unit/mL pen Generic drug:  insulin glulisine U-100 INJECT 30 UNITS SUBCUTANEOUSLY BEFORE BREAKFAST, LUNCH  AND  DINNER  PLUS  SLIDING  SCALE. MAX  DAILY  DOSE  150  UNITS. ARNUITY ELLIPTA 200 mcg/actuation Dsdv inhaler Generic drug:  fluticasone furoate INHALE ONE PUFF BY MOUTH ONCE DAILY atorvastatin 10 mg tablet Commonly known as:  LIPITOR Take 1 Tab by mouth daily. Blood-Glucose Meter Misc Commonly known as:  ACCU-CHEK FILEMON PLUS METER Test 4 times daily Dx Code: E11.65  
  
 carvedilol 3.125 mg tablet Commonly known as:  COREG  
TAKE ONE TABLET BY MOUTH TWICE DAILY WITH MEALS  
  
 gabapentin 300 mg capsule Commonly known as:  NEURONTIN Take 2 Caps by mouth three (3) times daily. glucose blood VI test strips strip Commonly known as:  blood glucose test  
Patient request the [facility] [employer] brand of meter and strips and to test QID. insulin glargine 100 unit/mL (3 mL) Inpn Commonly known as:  Caye Huge Inject 90 units at night. Insulin Needles (Disposable) 30 gauge x 1/3\" Sliding scale Insulin Needles (Disposable) 31 gauge x 5/16\" Ndle Use to inject Lantus and Apidra daily Dx Code: E11.65  
  
 isosorbide mononitrate ER 30 mg tablet Commonly known as:  IMDUR Take 1 Tab by mouth daily. Lancets Misc Bid for diabetes 250.00 Lot # [id number] Exp 7/2018 Man Brian Nordisk  
  
 losartan 100 mg tablet Commonly known as:  COZAAR Take 1 Tab by mouth daily. montelukast 10 mg tablet Commonly known as:  SINGULAIR  
TAKE ONE TABLET BY MOUTH ONCE DAILY Nebulizer & Compressor machine 1 Each by Does Not Apply route two (2) times daily as needed. nitrofurantoin (macrocrystal-monohydrate) 100 mg capsule Commonly known as:  MACROBID Take 1 Cap by mouth two (2) times a day for 5 days. sodium bicarbonate 325 mg tablet Take 325 mg by mouth two (2) times a day. VENTOLIN HFA 90 mcg/actuation inhaler Generic drug:  albuterol INHALE TWO PUFFS BY MOUTH EVERY 4 HOURS AS NEEDED FOR  WHEEZING  
  
 VITAMIN D2 50,000 unit capsule Generic drug:  ergocalciferol Take 50,000 Units by mouth two (2) times a week. Prescriptions Sent to Pharmacy Refills nitrofurantoin, macrocrystal-monohydrate, (MACROBID) 100 mg capsule 0 Sig: Take 1 Cap by mouth two (2) times a day for 5 days. Class: Normal  
 Pharmacy: [address] [place]  #: [phone] Route: Oral  
  
We Performed the Following AMB POC URINALYSIS DIP STICK AUTO W/O MICRO [54550 CPT(R)] CULTURE, URINE [id number] CPT(R)] Follow-up Instructions Return if symptoms worsen or fail to improve. Patient Instructions Back Pain: Care Instructions Your Care Instructions Back pain has many possible causes. It is often related to problems with muscles and ligaments of the back. It may also be related to problems with the nerves, discs, or bones of the back. Moving, lifting, standing, sitting, or sleeping in an awkward way can strain the back. Sometimes you don't notice the injury until later. Arthritis is another common cause of back pain. Although it may hurt a lot, back pain usually improves on its own within several weeks. Most people recover in 12 weeks or less. Using good home treatment and being careful not to stress your back can help you feel better sooner. Follow-up care is a key part of your treatment and safety. Be sure to make and go to all appointments, and call your doctor if you are having problems. It's also a good idea to know your test results and keep a list of the medicines you take. How can you care for yourself at home? · Sit or lie in positions that are most comfortable and reduce your pain. Try one of these positions when you lie down: ¨ Lie on your back with your knees bent and supported by large pillows. ¨ Lie on the floor with your legs on the seat of a sofa or chair. Arn Closs on your side with your knees and hips bent and a pillow between your legs. ¨ Lie on your stomach if it does not make pain worse. · Do not sit up in bed, and avoid soft couches and twisted positions.  Bed rest can help relieve pain at first, but it delays healing. Avoid bed rest after the first day of back pain. · Change positions every 30 minutes. If you must sit for long periods of time, take breaks from sitting. Get up and walk around, or lie in a comfortable position. · Try using a heating pad on a low or medium setting for 15 to 20 minutes every 2 or 3 hours. Try a warm shower in place of one session with the heating pad. · You can also try an ice pack for 10 to 15 minutes every 2 to 3 hours. Put a thin cloth between the ice pack and your skin. · Take pain medicines exactly as directed. ¨ If the doctor gave you a prescription medicine for pain, take it as prescribed. ¨ If you are not taking a prescription pain medicine, ask your doctor if you can take an over-the-counter medicine. · Take short walks several times a day. You can start with 5 to 10 minutes, 3 or 4 times a day, and work up to longer walks. Walk on level surfaces and avoid hills and stairs until your back is better. · Return to work and other activities as soon as you can. Continued rest without activity is usually not good for your back. · To prevent future back pain, do exercises to stretch and strengthen your back and stomach. Learn how to use good posture, safe lifting techniques, and proper body mechanics. When should you call for help? Call your doctor now or seek immediate medical care if: 
? · You have new or worsening numbness in your legs. ? · You have new or worsening weakness in your legs. (This could make it hard to stand up.) ? · You lose control of your bladder or bowels. ? Watch closely for changes in your health, and be sure to contact your doctor if: 
? · Your pain gets worse. ? · You are not getting better after 2 weeks. Where can you learn more? Go to http://aziza-niyah.info/. Enter X438 in the search box to learn more about \"Back Pain: Care Instructions. \" Current as of: March 21, 2017 Content Version: 11.4 © 8039-0101 Healthwise, [employer]. Care instructions adapted under license by [employer] (which disclaims liability or warranty for this information). If you have questions about a medical condition or this instruction, always ask your healthcare professional. [address] any warranty or liability for your use of this information. Introducing [facility]! [facility] introduces ZALP patient portal. Now you can access parts of your medical record, email your doctor's office, and request medication refills online. 1. In your internet browser, go to [email or web address]. [employer] 2. Click on the First Time User? Click Here link in the Sign In box. You will see the New Member Sign Up page. 3. Enter your ZALP Access Code exactly as it appears below. You will not need to use this code after youve completed the sign-up process. If you do not sign up before the expiration date, you must request a new code. · ZALP Access Code: [email or web address] Expires: 7/30/2018  1:19 PM 
 
4. Enter the last four digits of your Social Security Number (xxxx) and Date of Birth (mm/dd/yyyy) as indicated and click Submit. You will be taken to the next sign-up page. 5. Create a ZALP ID. This will be your ZALP login ID and cannot be changed, so think of one that is secure and easy to remember. 6. Create a ZALP password. You can change your password at any time. 7. Enter your Password Reset Question and Answer. This can be used at a later time if you forget your password. 8. Enter your e-mail address. You will receive e-mail notification when new information is available in [address] 9. Click Sign Up. You can now view and download portions of your medical record. 10. Click the Download Summary menu link to download a portable copy of your medical information. If you have questions, please visit the Frequently Asked Questions section of the [employer] website. Remember, [employer] is NOT to be used for urgent needs. For medical emergencies, dial 911. Now available from your iPhone and Android! Please provide this summary of care documentation to your next provider. Your primary care clinician is listed as Goran Guido. If you have any questions after today's visit, please call [phone].

## 2018-06-09 LAB
BACTERIA UR CULT: NORMAL
BACTERIA UR CULT: NORMAL

## 2018-06-09 NOTE — PROGRESS NOTES
Please call patient and let her know that her urine culture returned negative, no UTI. Should return to office with continued symptoms. Thanks!

## 2018-06-12 ENCOUNTER — TELEPHONE (OUTPATIENT)
Dept: FAMILY MEDICINE CLINIC | Age: 38
End: 2018-06-12

## 2018-06-12 DIAGNOSIS — E11.42 DIABETIC POLYNEUROPATHY ASSOCIATED WITH TYPE 2 DIABETES MELLITUS (HCC): ICD-10-CM

## 2018-06-12 DIAGNOSIS — Z91.14 POOR COMPLIANCE WITH MEDICATION: ICD-10-CM

## 2018-06-12 DIAGNOSIS — E11.21 DIABETIC NEPHROPATHY ASSOCIATED WITH TYPE 2 DIABETES MELLITUS (HCC): ICD-10-CM

## 2018-06-13 NOTE — TELEPHONE ENCOUNTER
Spoke with patient regarding scheduling a new appointment with [facility] endo. I have scheduled her appointment for 11/9/18 (first available) and will be faxing over additional consult notes/labs for a sooner appointment. The patient did not know that she needed to see another provider aside from [address] [place] [address], as she already had an appointment scheduled with her for 6/25/18.

## 2018-06-13 NOTE — TELEPHONE ENCOUNTER
----- Message from Brenda Griffin MD sent at 6/11/2018  5:57 PM EDT -----  Regarding: RE: Referral   Thank you Dr Haley Phillips. She needs a referral from you to [facility].        ----- Message -----     From: Agustin Alexander MD     Sent: 6/11/2018   1:17 PM       To: Brenda Griffin MD  Subject: RE: Referral                                     Yes, I am in agreement to refer her to [address] [place] [address] [facility].    ----- Message -----     From: Brenda Griffin MD     Sent: 6/10/2018   9:40 PM       To: Agustin Alexander MD  Subject: RE: Referral                                     Dr Haley Phillips,    I was just following up on her , she has been chronically noncompliant and refuses to change diet . She also non specific sideeffects to insulin which is hard to say if  it is real given her history. She has an appointment with me and I am not sure if I can be of any help to her if she does not change her behavior. Sometimes changing the physician and atmosphere may help and hence [facility] would be a better option. Thanks         Thanks   ----- Message -----     From: Agustin Alexander MD     Sent: 10/29/2017   8:46 PM       To: Brenda Griffin MD  Subject: RE: Referral                                     Yes, I agree that referral to [facility] is warranted. We are not able to help her with chronic pain management either. Thank you for your vigilant effort to help this patient. Dr. Haley Phillips  ----- Message -----     From: Brenda Griffin MD     Sent: 10/29/2017   6:47 PM       To: Agustin Alexander MD  Subject: Referral                                         Dr Haley Phillips,     Ms Km Joshi has been very noncompliant with my recommendations and has non specific side effects to insulin which I cannot explain. She requested muscle relaxants and pain medications to help with muscle cramps post insulin injection which I did not think was necessary.  I have discussed with her and also NP who was taking care of her to refer her to [facility]  to understand her nonspecific symptoms. What do you think about this ?      Thanks    Liu Ramírez MD    Endocrinologist

## 2018-06-13 NOTE — TELEPHONE ENCOUNTER
Please set up appt with ENDO at [address] [place] [address] for poorly controlled diabetes. Send records, labs from Dr. Kaylin Pruitt.

## 2018-06-13 NOTE — TELEPHONE ENCOUNTER
----- Message from Jose Roberto Amaya NP sent at 6/9/2018  7:47 AM EDT -----  Please call patient and let her know that her urine culture returned negative, no UTI. Should return to office with continued symptoms. Thanks!

## 2018-06-13 NOTE — TELEPHONE ENCOUNTER
Since her appt at [facility] is so far away, I suggest she keep the appt with Dr. Erna Contreras for 6/25/18. It was Dr. Erna Contreras that felt pt needed to see another specialist to try to improve compliance. Let pt know to keep appt.

## 2018-06-22 DIAGNOSIS — Z79.4 TYPE 2 DIABETES MELLITUS WITH HYPERGLYCEMIA, WITH LONG-TERM CURRENT USE OF INSULIN (HCC): ICD-10-CM

## 2018-06-22 DIAGNOSIS — E11.65 TYPE 2 DIABETES MELLITUS WITH HYPERGLYCEMIA, WITH LONG-TERM CURRENT USE OF INSULIN (HCC): ICD-10-CM

## 2018-06-22 RX ORDER — INSULIN GLARGINE 100 [IU]/ML
INJECTION, SOLUTION SUBCUTANEOUS
Qty: 30 ML | Refills: 6 | Status: SHIPPED | OUTPATIENT
Start: 2018-06-22 | End: 2018-09-08

## 2018-07-03 ENCOUNTER — OFFICE VISIT (OUTPATIENT)
Dept: NEUROLOGY | Age: 38
End: 2018-07-03

## 2018-07-03 VITALS
RESPIRATION RATE: 20 BRPM | BODY MASS INDEX: 40.12 KG/M2 | HEIGHT: 62 IN | WEIGHT: 218 LBS | DIASTOLIC BLOOD PRESSURE: 86 MMHG | SYSTOLIC BLOOD PRESSURE: 140 MMHG

## 2018-07-03 DIAGNOSIS — E78.5 DYSLIPIDEMIA: ICD-10-CM

## 2018-07-03 DIAGNOSIS — E66.09 CLASS 2 OBESITY DUE TO EXCESS CALORIES WITHOUT SERIOUS COMORBIDITY WITH BODY MASS INDEX (BMI) OF 35.0 TO 35.9 IN ADULT: ICD-10-CM

## 2018-07-03 DIAGNOSIS — I10 ESSENTIAL HYPERTENSION: ICD-10-CM

## 2018-07-03 DIAGNOSIS — E11.40 TYPE 2 DIABETES MELLITUS WITH DIABETIC NEUROPATHY, WITH LONG-TERM CURRENT USE OF INSULIN (HCC): ICD-10-CM

## 2018-07-03 DIAGNOSIS — Z91.199 NON COMPLIANCE WITH MEDICAL TREATMENT: ICD-10-CM

## 2018-07-03 DIAGNOSIS — Z79.4 TYPE 2 DIABETES MELLITUS WITH DIABETIC NEUROPATHY, WITH LONG-TERM CURRENT USE OF INSULIN (HCC): ICD-10-CM

## 2018-07-03 NOTE — PROGRESS NOTES
Date:  18     Name:  Tay Saldaña  :  1980  MRN:  [id number]     PCP:  Attila Santacruz MD    Chief Complaint   Patient presents with    Migraine     HISTORY OF PRESENT ILLNESS: Follow up for migraines. Since her last office visit, she has followed up with her PCP and has had her labs drawn. Her HgbA1C was still elevated at her last lab draw, 12.3% and lipids were not goal. States her HTN has been well controlled and only getting the swelling when it is hot or when she is particularly active. She indicates that she is following up with her other healthcare providers more consistently. With regard to her headaches, these continue to occur 2-3 times per week lasting all day until she goes to bed despite the gabapentin. Except as noted above, denies  fever, chills, cough. No CP or SOB. No dysuria, loss of bowel or bladder control. No Weight loss. Appetite good. Sleeping well. No sweats. No edema. No bruising or bleeding. No nausea or vomit. No diarrhea. No frequency, urgency, No depressive sxs. No anxiety. Denies sore throat, nasal congestion, nasal discharge, epistaxis, tinnitus, hearing loss, back pain, muscle pain, or joint pain. Current Outpatient Prescriptions   Medication Sig    LANTUS SOLOSTAR U-100 INSULIN 100 unit/mL (3 mL) inpn INJECT 90 UNITS SUBCUTANEOUSLY AT NIGHT    APIDRA SOLOSTAR U-100 INSULIN 100 unit/mL pen INJECT 30 UNITS SUBCUTANEOUSLY BEFORE BREAKFAST, LUNCH  AND  DINNER  PLUS  SLIDING  SCALE. MAX  DAILY  DOSE  150  UNITS.  ARNUITY ELLIPTA 200 mcg/actuation dsdv inhaler INHALE ONE PUFF BY MOUTH ONCE DAILY    VENTOLIN HFA 90 mcg/actuation inhaler INHALE TWO PUFFS BY MOUTH EVERY 4 HOURS AS NEEDED FOR  WHEEZING    isosorbide mononitrate ER (IMDUR) 30 mg tablet Take 1 Tab by mouth daily.  atorvastatin (LIPITOR) 10 mg tablet Take 1 Tab by mouth daily.  losartan (COZAAR) 100 mg tablet Take 1 Tab by mouth daily.     gabapentin (NEURONTIN) 300 mg capsule Take 2 Caps by mouth three (3) times daily.  montelukast (SINGULAIR) 10 mg tablet TAKE ONE TABLET BY MOUTH ONCE DAILY    carvedilol (COREG) 3.125 mg tablet TAKE ONE TABLET BY MOUTH TWICE DAILY WITH MEALS    sodium bicarbonate 325 mg tablet Take 325 mg by mouth two (2) times a day.  Blood-Glucose Meter (ACCU-CHEK FILEMON PLUS METER) misc Test 4 times daily Dx Code: E11.65    Insulin Needles, Disposable, 31 gauge x 5/16\" ndle Use to inject Lantus and Apidra daily Dx Code: E11.65    VITAMIN D2 50,000 unit capsule Take 50,000 Units by mouth two (2) times a week.  Nebulizer & Compressor machine 1 Each by Does Not Apply route two (2) times daily as needed.  glucose blood VI test strips (BLOOD GLUCOSE TEST) strip Patient request the [employer] [facility] [employer] brand of meter and strips and to test QID.  Insulin Needles, Disposable, 30 x 1/3 \" Sliding scale    Lancets misc Bid for diabetes 250.00  Lot # [id number]  Exp 2018  Man Brian Nordisk     No current facility-administered medications for this visit. Allergies   Allergen Reactions    Aspirin Itching    Contrast Agent [Iodine] Hives    Dilaudid [Hydromorphone] Hives    Metformin Nausea Only    Reglan [Metoclopramide] Other (comments)     Impaired speech    Ritalin [Methylphenidate] Other (comments)     No allergy per patient.  18     Past Medical History:   Diagnosis Date    Abnormal pap 2012 10/5/13     hx w/Colpo     Arthritis     Asthma     Diabetes ([address]     Headache(784.0)     Hypertension     Joint pain     Pap smear for cervical cancer screening 10/1/15 ASCUS HPV NEG RP 3 YEARS    Ringing in ears      Past Surgical History:   Procedure Laterality Date    HX  SECTION  01 & 08    HX COLPOSCOPY  2012 neg    HX HEENT      cataract R eye    HX TUBAL LIGATION       Social History     Social History    Marital status:      Spouse name: N/A    Number of children: 2    Years of education: N/A Occupational History    --      Social History Main Topics    Smoking status: Never Smoker    Smokeless tobacco: Never Used    Alcohol use No    Drug use: No    Sexual activity: Yes     Partners: Male     Birth control/ protection: Surgical     Other Topics Concern     Service No    Blood Transfusions No    Caffeine Concern No    Occupational Exposure No    Hobby Hazards No    Sleep Concern Yes    Stress Concern Yes    Weight Concern Yes    Special Diet Yes     DM    Back Care Yes    Exercise Yes    Bike Helmet No    Seat Belt Yes    Self-Exams Yes     Social History Narrative     Family History   Problem Relation Age of Onset    Diabetes Mother     Heart Disease Mother     Hypertension Mother     Stroke Mother     Hypertension Father     Stroke Father     Heart Disease Father     Breast Cancer Neg Hx        PHYSICAL EXAMINATION:    Visit Vitals    /86    Resp 20    Ht 5' 2\" (1.575 m)    Wt 98.9 kg (218 lb)    BMI 39.87 kg/m2     General:  Well defined, nourished, and groomed individual in no acute distress.    Neck:             Supple, nontender, no bruits, no pain with resistance to active range of motion.    Heart:            Regular rate and rhythm, no murmurs, rub, or gallop.  Normal S1S2. Lungs:  Clear to auscultation bilaterally with equal chest expansion, no cough, no wheeze  Musculoskeletal:  Extremities revealed no edema and had full range of motion of joints.    Psych:  Good mood and bright affect      NEUROLOGICAL EXAMINATION:     Mental Status:   Alert and oriented to person, place, and time with recent and remote memory intact.  Attention span and concentration are normal. Speech is fluent with a full fund of knowledge.        Cranial Nerves:    II, III, IV, VI:  Visual acuity grossly intact.  Visual fields are normal.    Pupils are equal, round, and reactive to light and accommodation.    Extra-ocular movements are full and fluid.  Fundoscopic exam was benign, no ptosis. Positive nystagmus noted in the right eye with the fast phase to the left which may relate to a lazy eye.    V-XII:             Hearing is grossly intact.  Facial features are symmetric, with normal sensation and strength.  The palate rises symmetrically and the tongue protrudes midline.  Sternocleidomastoids 5/5.        Motor Examination:               Normal tone, bulk, and strength, 5/5 muscle strength throughout.        Coordination:  Finger to nose was normal.   No resting or intention tremor      Gait and Station: [employer] while walking.  Normal arm swing.  No pronator drift.   No muscle wasting or fasiculations noted.        Reflexes:  DTRs 2+ throughout.      ASSESSMENT AND PLAN    ICD-10-CM ICD-9-CM    1. Chronic migraine G43.709 346.70    2. Type 2 diabetes mellitus with diabetic neuropathy, with long-term current use of insulin (HCC) E11.40 250.60     Z79.4 357.2      V58.67    3. Essential hypertension I10 401.9    4. Dyslipidemia E78.5 272.4    5. Class 2 obesity due to excess calories without serious comorbidity with body mass index (BMI) of 35.0 to 35.9 in adult E66.09 278.00     Z68.35 V85.35    6. Non compliance with medical treatment Z91.19 V15.81      Given frequency of migraine, will try Aimovig. Purpose and potential side effects as well as dosage, storage, and administration were discussed and she has verbalized understanding. With regard to her cardiovascular and cerebrovascular risk, she should continue following up with her other healthcare providers. Again, her LDL should be less than 75. Her blood pressure today was still suboptimal and she should discuss this with her PCP. Follow up in 8 weeks. Charlotte Lucas

## 2018-07-03 NOTE — PATIENT INSTRUCTIONS

## 2018-07-03 NOTE — PROGRESS NOTES
Migraines- they are still pretty bad   Every 2-3 days, lasting until she goes to sleep and when she wakes up they are gone

## 2018-07-03 NOTE — MR AVS SNAPSHOT
[address] [place] [address] [facility] [address] [facility] [id number] [phone] Patient: Jamilah Recio MRN: [id number] CIB:[id number] Visit Information Date & Time Provider Department Dept. Phone Encounter #  
 7/3/2018 10:00 AM REYNA Hernandez Neurology [facility] [phone] [id number] Your Appointments 11/27/2018  9:00 AM  
Follow Up with REYNA Shultz 75 ([facility]) Appt Note: Follow up 200 [facility] [address] [phone] [address] [place] [address]  
[phone]  
  
   
 200 [facility] [address] 505 [facility] [address]  
  
    
 12/11/2018  9:40 AM  
ESTABLISHED PATIENT with Felton Darden MD  
[facility] ([facility]) Appt Note: 6 mo fup  
 320 [facility] [address] 600 1900 [facility]  
54 [facility] [address] [address] Upcoming Health Maintenance Date Due Pneumococcal 19-64 Medium Risk (1 of 1 - PPSV23) 1/18/1999 EYE EXAM RETINAL OR DILATED Q1 5/19/2014 FOOT EXAM Q1 6/24/2017 PAP AKA CERVICAL CYTOLOGY 10/1/2018 Influenza Age 5 to Adult 8/1/2018 HEMOGLOBIN A1C Q6M 11/30/2018 MICROALBUMIN Q1 4/11/2019 LIPID PANEL Q1 5/30/2019 DTaP/Tdap/Td series (2 - Td) 12/8/2025 Allergies as of 7/3/2018  Review Complete On: 7/3/2018 By: Diallo Montemayor NP Severity Noted Reaction Type Reactions Aspirin  03/05/2010    Itching Contrast Agent [Iodine]  05/01/2015    Hives Dilaudid [Hydromorphone]  05/01/2015    Hives Metformin  07/03/2013    Nausea Only Reglan [Metoclopramide]  03/05/2010    Other (comments) Impaired speech Ritalin [Methylphenidate]  05/01/2015    Other (comments) No allergy per patient. 5/30/18 Current Immunizations  Reviewed on 12/8/2015 Name Date  Influenza High Dose Vaccine PF 9/21/2017 12:00 AM, 9/1/2016 12:00 AM  
 Influenza Vaccine (Quad) PF 10/4/2016, 11/23/2015  9:10 AM  
 Pneumococcal Conjugate (PCV-13) 1/1/2015 12:00 AM  
 Tdap 12/8/2015  9:54 AM  
  
 Not reviewed this visit You Were Diagnosed With   
  
 Codes Comments Chronic migraine    -  Primary ICD-10-CM: J69.162 ICD-9-CM: 346.70 Type 2 diabetes mellitus with diabetic neuropathy, with long-term current use of insulin (HCC)     ICD-10-CM: E11.40, Z79.4 ICD-9-CM: 250.60, 357.2, V58.67 Essential hypertension     ICD-10-CM: I10 
ICD-9-CM: 401.9 Dyslipidemia     ICD-10-CM: E78.5 ICD-9-CM: 272.4 Class 2 obesity due to excess calories without serious comorbidity with body mass index (BMI) of 35.0 to 35.9 in adult     ICD-10-CM: E66.09, Z68.35 ICD-9-CM: 278.00, V85.35 Vitals BP Resp Height(growth percentile) Weight(growth percentile) BMI OB Status 140/86 20 5' 2\" (1.575 m) 218 lb (98.9 kg) 39.87 kg/m2 Having regular periods Smoking Status Never Smoker Vitals History BMI and BSA Data Body Mass Index Body Surface Area  
 39.87 kg/m 2 2.08 m 2 Preferred Pharmacy Pharmacy Name Phone [address] [phone] Your Updated Medication List  
  
   
This list is accurate as of 7/3/18 11:07 AM.  Always use your most recent med list.  
  
  
  
  
 APIDRA SOLOSTAR U-100 INSULIN 100 unit/mL pen Generic drug:  insulin glulisine U-100 INJECT 30 UNITS SUBCUTANEOUSLY BEFORE BREAKFAST, LUNCH  AND  DINNER  PLUS  SLIDING  SCALE. MAX  DAILY  DOSE  150  UNITS. ARNUITY ELLIPTA 200 mcg/actuation Dsdv inhaler Generic drug:  fluticasone furoate INHALE ONE PUFF BY MOUTH ONCE DAILY  
  
 atorvastatin 10 mg tablet Commonly known as:  LIPITOR Take 1 Tab by mouth daily. Blood-Glucose Meter Misc Commonly known as:  ACCU-CHEK FILEMON PLUS METER Test 4 times daily Dx Code: E11.65  
  
 carvedilol 3.125 mg tablet Commonly known as:  COREG  
TAKE ONE TABLET BY MOUTH TWICE DAILY WITH MEALS  
  
 gabapentin 300 mg capsule Commonly known as:  NEURONTIN Take 2 Caps by mouth three (3) times daily. glucose blood VI test strips strip Commonly known as:  blood glucose test  
Patient request the [employer] [facility] [employer] brand of meter and strips and to test QID. Insulin Needles (Disposable) 30 gauge x 1/3\" Sliding scale Insulin Needles (Disposable) 31 gauge x 5/16\" Ndle Use to inject Lantus and Apidra daily Dx Code: E11.65  
  
 isosorbide mononitrate ER 30 mg tablet Commonly known as:  IMDUR Take 1 Tab by mouth daily. Lancets Misc Bid for diabetes 250.00 Lot # [id number] Exp 7/2018 [facility] LANTUS SOLOSTAR U-100 INSULIN 100 unit/mL (3 mL) Inpn Generic drug:  insulin glargine INJECT 90 UNITS SUBCUTANEOUSLY AT NIGHT  
  
 losartan 100 mg tablet Commonly known as:  COZAAR Take 1 Tab by mouth daily. montelukast 10 mg tablet Commonly known as:  SINGULAIR  
TAKE ONE TABLET BY MOUTH ONCE DAILY Nebulizer & Compressor machine 1 Each by Does Not Apply route two (2) times daily as needed. sodium bicarbonate 325 mg tablet Take 325 mg by mouth two (2) times a day. VENTOLIN HFA 90 mcg/actuation inhaler Generic drug:  albuterol INHALE TWO PUFFS BY MOUTH EVERY 4 HOURS AS NEEDED FOR  WHEEZING  
  
 VITAMIN D2 50,000 unit capsule Generic drug:  ergocalciferol Take 50,000 Units by mouth two (2) times a week. Patient Instructions A Healthy Lifestyle: Care Instructions Your Care Instructions A healthy lifestyle can help you feel good, stay at a healthy weight, and have plenty of energy for both work and play. A healthy lifestyle is something you can share with your whole family. A healthy lifestyle also can lower your risk for serious health problems, such as high blood pressure, heart disease, and diabetes. You can follow a few steps listed below to improve your health and the health of your family. Follow-up care is a key part of your treatment and safety. Be sure to make and go to all appointments, and call your doctor if you are having problems. It's also a good idea to know your test results and keep a list of the medicines you take. How can you care for yourself at home? · Do not eat too much sugar, fat, or fast foods. You can still have dessert and treats now and then. The goal is moderation. · Start small to improve your eating habits. Pay attention to portion sizes, drink less juice and soda pop, and eat more fruits and vegetables. ¨ Eat a healthy amount of food. A 3-ounce serving of meat, for example, is about the size of a deck of cards. Fill the rest of your plate with vegetables and whole grains. ¨ Limit the amount of soda and sports drinks you have every day. Drink more water when you are thirsty. ¨ Eat at least 5 servings of fruits and vegetables every day. It may seem like a lot, but it is not hard to reach this goal. A serving or helping is 1 piece of fruit, 1 cup of vegetables, or 2 cups of leafy, raw vegetables. Have an apple or some carrot sticks as an afternoon snack instead of a candy bar. Try to have fruits and/or vegetables at every meal. 
· Make exercise part of your daily routine. You may want to start with simple activities, such as walking, bicycling, or slow swimming. Try to be active 30 to 60 minutes every day. You do not need to do all 30 to 60 minutes all at once. For example, you can exercise 3 times a day for 10 or 20 minutes. Moderate exercise is safe for most people, but it is always a good idea to talk to your doctor before starting an exercise program. 
· Keep moving. Thao Limb the lawn, work in the garden, or [employer]. Take the stairs instead of the elevator at work. · If you smoke, quit.  People who smoke have an increased risk for heart attack, stroke, cancer, and other lung illnesses. Quitting is hard, but there are ways to boost your chance of quitting tobacco for good. ¨ Use nicotine gum, patches, or lozenges. ¨ Ask your doctor about stop-smoking programs and medicines. ¨ Keep trying. In addition to reducing your risk of diseases in the future, you will notice some benefits soon after you stop using tobacco. If you have shortness of breath or asthma symptoms, they will likely get better within a few weeks after you quit. · Limit how much alcohol you drink. Moderate amounts of alcohol (up to 2 drinks a day for men, 1 drink a day for women) are okay. But drinking too much can lead to liver problems, high blood pressure, and other health problems. Family health If you have a family, there are many things you can do together to improve your health. · Eat meals together as a family as often as possible. · Eat healthy foods. This includes fruits, vegetables, lean meats and dairy, and whole grains. · Include your family in your fitness plan. Most people think of activities such as jogging or tennis as the way to fitness, but there are many ways you and your family can be more active. Anything that makes you breathe hard and gets your heart pumping is exercise. Here are some tips: 
¨ Walk to do errands or to take your child to school or the bus. ¨ Go for a family bike ride after dinner instead of watching TV. Where can you learn more? Go to http://aziza-niyah.info/. Enter N937 in the search box to learn more about \"A Healthy Lifestyle: Care Instructions. \" Current as of: May 12, 2017 Content Version: 11.4 © 1664-0491 3LM. Care instructions adapted under license by [employer] (which disclaims liability or warranty for this information).  If you have questions about a medical condition or this instruction, always ask your healthcare professional. [employer] disclaims any warranty or liability for your use of this information. Introducing \[facility]\"" & HEALTH SERVICES! [employer] introduces [employer] patient portal. Now you can access parts of your medical record, email your doctor's office, and request medication refills online. 1. In your internet browser, go to [email or web address] 2. Click on the First Time User? Click Here link in the Sign In box. You will see the New Member Sign Up page. 3. Enter your Lexity Access Code exactly as it appears below. You will not need to use this code after youve completed the sign-up process. If you do not sign up before the expiration date, you must request a new code. · Lexity Access Code: [email or web address] Expires: 7/30/2018  1:19 PM 
 
4. Enter the last four digits of your Social Security Number (xxxx) and Date of Birth (mm/dd/yyyy) as indicated and click Submit. You will be taken to the next sign-up page. 5. Create a Lexity ID. This will be your Lexity login ID and cannot be changed, so think of one that is secure and easy to remember. 6. Create a Lexity password. You can change your password at any time. 7. Enter your Password Reset Question and Answer. This can be used at a later time if you forget your password. 8. Enter your e-mail address. You will receive e-mail notification when new information is available in [address] 9. Click Sign Up. You can now view and download portions of your medical record. 10. Click the Download Summary menu link to download a portable copy of your medical information. If you have questions, please visit the Frequently Asked Questions section of the [employer]ity website. Remember, [employer]ity is NOT to be used for urgent needs. For medical emergencies, dial 911. Now available from your iPhone and Android! Please provide this summary of care documentation to your next provider. Your primary care clinician is listed as Onel Zeng. If you have any questions after today's visit, please call [phone].

## 2018-07-09 DIAGNOSIS — E78.5 HYPERLIPIDEMIA, UNSPECIFIED HYPERLIPIDEMIA TYPE: ICD-10-CM

## 2018-07-09 RX ORDER — ATORVASTATIN CALCIUM 10 MG/1
TABLET, FILM COATED ORAL
Qty: 90 TAB | Refills: 0 | Status: SHIPPED | OUTPATIENT
Start: 2018-07-09 | End: 2018-09-27 | Stop reason: SDUPTHER

## 2018-07-09 RX ORDER — CARVEDILOL 3.12 MG/1
TABLET ORAL
Qty: 60 TAB | Refills: 3 | Status: SHIPPED | OUTPATIENT
Start: 2018-07-09 | End: 2018-11-03 | Stop reason: SDUPTHER

## 2018-08-12 DIAGNOSIS — G43.709 CHRONIC MIGRAINE WITHOUT AURA WITHOUT STATUS MIGRAINOSUS, NOT INTRACTABLE: ICD-10-CM

## 2018-08-15 ENCOUNTER — HOSPITAL ENCOUNTER (EMERGENCY)
Age: 38
Discharge: HOME | End: 2018-08-15
Attending: EMERGENCY MEDICINE
Payer: MEDICAID

## 2018-08-15 VITALS
RESPIRATION RATE: 20 BRPM | OXYGEN SATURATION: 94 % | WEIGHT: 219.14 LBS | HEIGHT: 62 IN | TEMPERATURE: 98.4 F | BODY MASS INDEX: 40.33 KG/M2 | SYSTOLIC BLOOD PRESSURE: 121 MMHG | DIASTOLIC BLOOD PRESSURE: 69 MMHG | HEART RATE: 96 BPM

## 2018-08-15 DIAGNOSIS — R11.2 NON-INTRACTABLE VOMITING WITH NAUSEA, UNSPECIFIED VOMITING TYPE: ICD-10-CM

## 2018-08-15 DIAGNOSIS — R51.9 ACUTE NONINTRACTABLE HEADACHE, UNSPECIFIED HEADACHE TYPE: Primary | ICD-10-CM

## 2018-08-15 DIAGNOSIS — B34.9 VIRAL ILLNESS: ICD-10-CM

## 2018-08-15 LAB
ALBUMIN SERPL-MCNC: 2.8 G/DL (ref 3.5–5)
ALBUMIN/GLOB SERPL: 0.6 {RATIO} (ref 1.1–2.2)
ALP SERPL-CCNC: 56 U/L (ref 45–117)
ALT SERPL-CCNC: 54 U/L (ref 12–78)
ANION GAP SERPL CALC-SCNC: 7 MMOL/L (ref 5–15)
AST SERPL-CCNC: 35 U/L (ref 15–37)
BASOPHILS # BLD: 0 K/UL (ref 0–0.1)
BASOPHILS NFR BLD: 0 % (ref 0–1)
BILIRUB SERPL-MCNC: 0.3 MG/DL (ref 0.2–1)
BUN SERPL-MCNC: 11 MG/DL (ref 6–20)
BUN/CREAT SERPL: 8 (ref 12–20)
CALCIUM SERPL-MCNC: 8.3 MG/DL (ref 8.5–10.1)
CHLORIDE SERPL-SCNC: 101 MMOL/L (ref 97–108)
CO2 SERPL-SCNC: 28 MMOL/L (ref 21–32)
COMMENT, HOLDF: NORMAL
CREAT SERPL-MCNC: 1.3 MG/DL (ref 0.55–1.02)
DIFFERENTIAL METHOD BLD: ABNORMAL
EOSINOPHIL # BLD: 0.1 K/UL (ref 0–0.4)
EOSINOPHIL NFR BLD: 2 % (ref 0–7)
ERYTHROCYTE [DISTWIDTH] IN BLOOD BY AUTOMATED COUNT: 12.7 % (ref 11.5–14.5)
GLOBULIN SER CALC-MCNC: 4.5 G/DL (ref 2–4)
GLUCOSE BLD STRIP.AUTO-MCNC: 271 MG/DL (ref 65–100)
GLUCOSE BLD STRIP.AUTO-MCNC: 315 MG/DL (ref 65–100)
GLUCOSE SERPL-MCNC: 388 MG/DL (ref 65–100)
HCT VFR BLD AUTO: 34.3 % (ref 35–47)
HGB BLD-MCNC: 11.9 G/DL (ref 11.5–16)
LYMPHOCYTES # BLD: 2.4 K/UL (ref 0.8–3.5)
LYMPHOCYTES NFR BLD: 44 % (ref 12–49)
MCH RBC QN AUTO: 32.8 PG (ref 26–34)
MCHC RBC AUTO-ENTMCNC: 34.7 G/DL (ref 30–36.5)
MCV RBC AUTO: 94.5 FL (ref 80–99)
MONOCYTES # BLD: 0.7 K/UL (ref 0–1)
MONOCYTES NFR BLD: 12 % (ref 5–13)
NEUTS SEG # BLD: 2.3 K/UL (ref 1.8–8)
NEUTS SEG NFR BLD: 42 % (ref 32–75)
PLATELET # BLD AUTO: 332 K/UL (ref 150–400)
PMV BLD AUTO: 10.3 FL (ref 8.9–12.9)
POTASSIUM SERPL-SCNC: 3.7 MMOL/L (ref 3.5–5.1)
PROT SERPL-MCNC: 7.3 G/DL (ref 6.4–8.2)
RBC # BLD AUTO: 3.63 M/UL (ref 3.8–5.2)
SAMPLES BEING HELD,HOLD: NORMAL
SERVICE CMNT-IMP: ABNORMAL
SERVICE CMNT-IMP: ABNORMAL
SODIUM SERPL-SCNC: 136 MMOL/L (ref 136–145)
WBC # BLD AUTO: 5.5 K/UL (ref 3.6–11)
XXWBCSUS: 0

## 2018-08-15 PROCEDURE — 96361 HYDRATE IV INFUSION ADD-ON: CPT

## 2018-08-15 PROCEDURE — 74011250636 HC RX REV CODE- 250/636: Performed by: EMERGENCY MEDICINE

## 2018-08-15 PROCEDURE — 82962 GLUCOSE BLOOD TEST: CPT

## 2018-08-15 PROCEDURE — 36415 COLL VENOUS BLD VENIPUNCTURE: CPT | Performed by: EMERGENCY MEDICINE

## 2018-08-15 PROCEDURE — 96375 TX/PRO/DX INJ NEW DRUG ADDON: CPT

## 2018-08-15 PROCEDURE — 74011636637 HC RX REV CODE- 636/637: Performed by: EMERGENCY MEDICINE

## 2018-08-15 PROCEDURE — 80053 COMPREHEN METABOLIC PANEL: CPT | Performed by: EMERGENCY MEDICINE

## 2018-08-15 PROCEDURE — 96374 THER/PROPH/DIAG INJ IV PUSH: CPT

## 2018-08-15 PROCEDURE — 99284 EMERGENCY DEPT VISIT MOD MDM: CPT

## 2018-08-15 PROCEDURE — 85025 COMPLETE CBC W/AUTO DIFF WBC: CPT | Performed by: EMERGENCY MEDICINE

## 2018-08-15 RX ORDER — ONDANSETRON 4 MG/1
4 TABLET, ORALLY DISINTEGRATING ORAL
Qty: 12 TAB | Refills: 0 | Status: SHIPPED | OUTPATIENT
Start: 2018-08-15 | End: 2018-09-08

## 2018-08-15 RX ORDER — KETOROLAC TROMETHAMINE 30 MG/ML
30 INJECTION, SOLUTION INTRAMUSCULAR; INTRAVENOUS
Status: COMPLETED | OUTPATIENT
Start: 2018-08-15 | End: 2018-08-15

## 2018-08-15 RX ORDER — DIPHENHYDRAMINE HYDROCHLORIDE 50 MG/ML
25 INJECTION, SOLUTION INTRAMUSCULAR; INTRAVENOUS ONCE
Status: COMPLETED | OUTPATIENT
Start: 2018-08-15 | End: 2018-08-15

## 2018-08-15 RX ORDER — PROCHLORPERAZINE EDISYLATE 5 MG/ML
10 INJECTION INTRAMUSCULAR; INTRAVENOUS
Status: COMPLETED | OUTPATIENT
Start: 2018-08-15 | End: 2018-08-15

## 2018-08-15 RX ADMIN — PROCHLORPERAZINE EDISYLATE 10 MG: 5 INJECTION INTRAMUSCULAR; INTRAVENOUS at 17:06

## 2018-08-15 RX ADMIN — SODIUM CHLORIDE 1000 ML: 900 INJECTION, SOLUTION INTRAVENOUS at 17:05

## 2018-08-15 RX ADMIN — DIPHENHYDRAMINE HYDROCHLORIDE 25 MG: 50 INJECTION, SOLUTION INTRAMUSCULAR; INTRAVENOUS at 17:06

## 2018-08-15 RX ADMIN — KETOROLAC TROMETHAMINE 30 MG: 30 INJECTION, SOLUTION INTRAMUSCULAR at 17:05

## 2018-08-15 RX ADMIN — HUMAN INSULIN 8 UNITS: 100 INJECTION, SOLUTION SUBCUTANEOUS at 17:46

## 2018-08-15 NOTE — ED PROVIDER NOTES
HPI Comments: Hx DM, asthma, HTN, arthritis; presents with a 5 hour hx of diffuse HA, N, V, myalgias, cough; she states it feels different than her typical migraines with all of the associated sx's; no F; no known sick contacts. No congestion. Past Medical History:   Diagnosis Date    Abnormal pap 2012 10/5/13     hx w/Colpo     Arthritis     Asthma     Diabetes ([address]     Headache(784.0)     Hypertension     Joint pain     Pap smear for cervical cancer screening 10/1/15 ASCUS HPV NEG RP 3 YEARS    Ringing in ears        Past Surgical History:   Procedure Laterality Date    HX  SECTION  01 & 08    HX COLPOSCOPY  2012 neg    HX HEENT      cataract R eye    HX TUBAL LIGATION           Family History:   Problem Relation Age of Onset    Diabetes Mother     Heart Disease Mother     Hypertension Mother     Stroke Mother     Hypertension Father     Stroke Father     Heart Disease Father     Breast Cancer Neg Hx        Social History     Social History    Marital status:      Spouse name: N/A    Number of children: 2    Years of education: N/A     Occupational History    --      Social History Main Topics    Smoking status: Never Smoker    Smokeless tobacco: Never Used    Alcohol use No    Drug use: No    Sexual activity: Yes     Partners: Male     Birth control/ protection: Surgical     Other Topics Concern     Service No    Blood Transfusions No    Caffeine Concern No    Occupational Exposure No    Hobby Hazards No    Sleep Concern Yes    Stress Concern Yes    Weight Concern Yes    Special Diet Yes     DM    Back Care Yes    Exercise Yes    Bike Helmet No    Seat Belt Yes    Self-Exams Yes     Social History Narrative         ALLERGIES: Aspirin; Contrast agent [iodine]; Dilaudid [hydromorphone];  Metformin; Reglan [metoclopramide]; and Ritalin [methylphenidate]    Review of Systems   All other systems reviewed and are negative. There were no vitals filed for this visit. Physical Exam   Constitutional: She is oriented to person, place, and time. She appears well-developed and well-nourished. Obese; mildly ill-appearing   HENT:   Head: Normocephalic and atraumatic. Eyes: Conjunctivae are normal. No scleral icterus. Neck: Neck supple. No tracheal deviation present. Cardiovascular: Regular rhythm, normal heart sounds and intact distal pulses. Exam reveals no gallop and no friction rub. No murmur heard. tachycardic   Pulmonary/Chest: Effort normal and breath sounds normal. She has no wheezes. She has no rales. Abdominal: Soft. She exhibits no distension. There is no tenderness. There is no rebound and no guarding. Musculoskeletal: She exhibits no edema. Neurological: She is alert and oriented to person, place, and time. Skin: Skin is warm and dry. No rash noted. Psychiatric: She has a normal mood and affect. Nursing note and vitals reviewed. [facility]      ED Course       Procedures    Progress Note:  Results, treatment, and follow up plan have been discussed with patient. Questions were answered. Karine Forrest MD  6:28 PM      A/P: HA, N, V, myalgias, cough - suspect viral infection; fluids, migraine cocktail in ED with some relief; reassuring appearance and exam; VSS; CBC, CMP ok; home with PCP f/u.   Karine Forrest MD  6:29 PM

## 2018-08-15 NOTE — ED TRIAGE NOTES
Pt reports that she woke up this am and went to the movies and felt fine. Pt reports that she suddenly developed body aches and vomiting.

## 2018-08-15 NOTE — DISCHARGE INSTRUCTIONS
Headache: Care Instructions  Your Care Instructions    Headaches have many possible causes. Most headaches aren't a sign of a more serious problem, and they will get better on their own. Home treatment may help you feel better faster. The doctor has checked you carefully, but problems can develop later. If you notice any problems or new symptoms, get medical treatment right away. Follow-up care is a key part of your treatment and safety. Be sure to make and go to all appointments, and call your doctor if you are having problems. It's also a good idea to know your test results and keep a list of the medicines you take. How can you care for yourself at home? · Do not drive if you have taken a prescription pain medicine. · Rest in a quiet, dark room until your headache is gone. Close your eyes and try to relax or go to sleep. Don't watch TV or read. · Put a cold, moist cloth or cold pack on the painful area for 10 to 20 minutes at a time. Put a thin cloth between the cold pack and your skin. · Use a warm, moist towel or a heating pad set on low to relax tight shoulder and neck muscles. · Have someone gently massage your neck and shoulders. · Take pain medicines exactly as directed. ¨ If the doctor gave you a prescription medicine for pain, take it as prescribed. ¨ If you are not taking a prescription pain medicine, ask your doctor if you can take an over-the-counter medicine. · Be careful not to take pain medicine more often than the instructions allow, because you may get worse or more frequent headaches when the medicine wears off. · Do not ignore new symptoms that occur with a headache, such as a fever, weakness or numbness, vision changes, or confusion. These may be signs of a more serious problem. To prevent headaches  · Keep a headache diary so you can figure out what triggers your headaches. Avoiding triggers may help you prevent headaches.  Record when each headache began, how long it lasted, and what the pain was like (throbbing, aching, stabbing, or dull). Write down any other symptoms you had with the headache, such as nausea, flashing lights or dark spots, or sensitivity to bright light or loud noise. Note if the headache occurred near your period. List anything that might have triggered the headache, such as certain foods (chocolate, cheese, wine) or odors, smoke, bright light, stress, or lack of sleep. · Find healthy ways to deal with stress. Headaches are most common during or right after stressful times. Take time to relax before and after you do something that has caused a headache in the past.  · Try to keep your muscles relaxed by keeping good posture. Check your jaw, face, neck, and shoulder muscles for tension, and try relaxing them. When sitting at a desk, change positions often, and stretch for 30 seconds each hour. · Get plenty of sleep and exercise. · Eat regularly and well. Long periods without food can trigger a headache. · Treat yourself to a massage. Some people find that regular massages are very helpful in relieving tension. · Limit caffeine by not drinking too much coffee, tea, or soda. But don't quit caffeine suddenly, because that can also give you headaches. · Reduce eyestrain from computers by blinking frequently and looking away from the computer screen every so often. Make sure you have proper eyewear and that your monitor is set up properly, about an arm's length away. · Seek help if you have depression or anxiety. Your headaches may be linked to these conditions. Treatment can both prevent headaches and help with symptoms of anxiety or depression. When should you call for help? Call 911 anytime you think you may need emergency care. For example, call if:    · You have signs of a stroke. These may include:  ¨ Sudden numbness, paralysis, or weakness in your face, arm, or leg, especially on only one side of your body. ¨ Sudden vision changes.   ¨ Sudden trouble speaking. ¨ Sudden confusion or trouble understanding simple statements. ¨ Sudden problems with walking or balance. ¨ A sudden, severe headache that is different from past headaches.    Call your doctor now or seek immediate medical care if:    · You have a new or worse headache.     · Your headache gets much worse. Where can you learn more? Go to http://aziza-niyah.info/. Enter M271 in the search box to learn more about \"Headache: Care Instructions. \"  Current as of: October 9, 2017  Content Version: 11.7  © 2814-3443 [employer]. Care instructions adapted under license by [employer] (which disclaims liability or warranty for this information). If you have questions about a medical condition or this instruction, always ask your healthcare professional. [address] any warranty or liability for your use of this information. Nausea and Vomiting: Care Instructions  Your Care Instructions    When you are nauseated, you may feel weak and sweaty and notice a lot of saliva in your mouth. Nausea often leads to vomiting. Most of the time you do not need to worry about nausea and vomiting, but they can be signs of other illnesses. Two common causes of nausea and vomiting are stomach flu and food poisoning. Nausea and vomiting from viral stomach flu will usually start to improve within 24 hours. Nausea and vomiting from food poisoning may last from 12 to 48 hours. The doctor has checked you carefully, but problems can develop later. If you notice any problems or new symptoms, get medical treatment right away. Follow-up care is a key part of your treatment and safety. Be sure to make and go to all appointments, and call your doctor if you are having problems. It's also a good idea to know your test results and keep a list of the medicines you take. How can you care for yourself at home?   · To prevent dehydration, drink plenty of fluids, enough so that your urine is light yellow or clear like water. Choose water and other caffeine-free clear liquids until you feel better. If you have kidney, heart, or liver disease and have to limit fluids, talk with your doctor before you increase the amount of fluids you drink. · Rest in bed until you feel better. · When you are able to eat, try clear soups, mild foods, and liquids until all symptoms are gone for 12 to 48 hours. Other good choices include dry toast, crackers, cooked cereal, and gelatin dessert, such as Jell-O. When should you call for help? Call 911 anytime you think you may need emergency care. For example, call if:    · You passed out (lost consciousness).    Call your doctor now or seek immediate medical care if:    · You have symptoms of dehydration, such as:  ¨ Dry eyes and a dry mouth. ¨ Passing only a little dark urine. ¨ Feeling thirstier than usual.     · You have new or worsening belly pain.     · You have a new or higher fever.     · You vomit blood or what looks like coffee grounds.    Watch closely for changes in your health, and be sure to contact your doctor if:    · You have ongoing nausea and vomiting.     · Your vomiting is getting worse.     · Your vomiting lasts longer than 2 days.     · You are not getting better as expected. Where can you learn more? Go to http://aziza-niyah.info/. Enter 25 561037 in the search box to learn more about \"Nausea and Vomiting: Care Instructions. \"  Current as of: November 20, 2017  Content Version: 11.7  © 2485-4821 [employer]. Care instructions adapted under license by [employer] (which disclaims liability or warranty for this information). If you have questions about a medical condition or this instruction, always ask your healthcare professional. [place] [address] any warranty or liability for your use of this information.          Viral Infections: Care Instructions  Your Care Instructions    You don't feel well, but it's not clear what's causing it. You may have a viral infection. Viruses cause many illnesses, such as the common cold, influenza, fever, rashes, and the diarrhea, nausea, and vomiting that are often called \"stomach flu. \" You may wonder if antibiotic medicines could make you feel better. But antibiotics only treat infections caused by bacteria. They don't work on viruses. The good news is that viral infections usually aren't serious. Most will go away in a few days without medical treatment. In the meantime, there are a few things you can do to make yourself more comfortable. Follow-up care is a key part of your treatment and safety. Be sure to make and go to all appointments, and call your doctor if you are having problems. It's also a good idea to know your test results and keep a list of the medicines you take. How can you care for yourself at home? · Get plenty of rest if you feel tired. · Take an over-the-counter pain medicine if needed, such as acetaminophen (Tylenol), ibuprofen (Advil, Motrin), or naproxen (Aleve). Read and follow all instructions on the label. · Be careful when taking over-the-counter cold or flu medicines and Tylenol at the same time. Many of these medicines have acetaminophen, which is Tylenol. Read the labels to make sure that you are not taking more than the recommended dose. Too much acetaminophen (Tylenol) can be harmful. · Drink plenty of fluids, enough so that your urine is light yellow or clear like water. If you have kidney, heart, or liver disease and have to limit fluids, talk with your doctor before you increase the amount of fluids you drink. · Stay home from work, school, and other public places while you have a fever. When should you call for help? Call 911 anytime you think you may need emergency care.  For example, call if:    · You have severe trouble breathing.     · You passed out (lost consciousness).    Call your doctor now or seek immediate medical care if:    · You seem to be getting much sicker.     · You have a new or higher fever.     · You have blood in your stools.     · You have new belly pain, or your pain gets worse.     · You have a new rash.    Watch closely for changes in your health, and be sure to contact your doctor if:    · You start to get better and then get worse.     · You do not get better as expected. Where can you learn more? Go to http://aziza-niyah.info/. Enter D160 in the search box to learn more about \"Viral Infections: Care Instructions. \"  Current as of: November 18, 2017  Content Version: 11.7  © 6756-1149 [employer]. Care instructions adapted under license by [employer] (which disclaims liability or warranty for this information). If you have questions about a medical condition or this instruction, always ask your healthcare professional. [address] any warranty or liability for your use of this information.

## 2018-08-16 RX ORDER — GABAPENTIN 300 MG/1
CAPSULE ORAL
Qty: 180 CAP | Refills: 3 | Status: SHIPPED | OUTPATIENT
Start: 2018-08-16 | End: 2019-01-01 | Stop reason: SDUPTHER

## 2018-08-27 ENCOUNTER — HOSPITAL ENCOUNTER (OUTPATIENT)
Dept: PHYSICAL THERAPY | Age: 38
Discharge: HOME | End: 2018-08-27
Payer: MEDICAID

## 2018-08-27 PROCEDURE — 97110 THERAPEUTIC EXERCISES: CPT | Performed by: PHYSICAL THERAPIST

## 2018-08-27 PROCEDURE — 97014 ELECTRIC STIMULATION THERAPY: CPT | Performed by: PHYSICAL THERAPIST

## 2018-08-27 PROCEDURE — 97162 PT EVAL MOD COMPLEX 30 MIN: CPT | Performed by: PHYSICAL THERAPIST

## 2018-08-27 NOTE — PROGRESS NOTES
[address] [facility], [address] [place] [address] Drive  Phone: [phone]  Fax: [phone]    Plan of Care/ Statement of Necessity for Physical Therapy Services 2-15    Patient name: Yoan Pal  : 1980  Provider#: [id number]  Referral source: Christine Kate MD      Medical/Treatment Diagnosis: Low back pain [M54.5]     Prior Hospitalization: see medical history     Comorbidities: DM, migraines  Prior Level of Function: see initial eval  Medications: Verified on Patient Summary List    Start of Care: 18      Onset Date: Many years ago       The Plan of Care and following information is based on the information from the initial evaluation. Assessment/ key information: Patient presents with chronic low back pain with a gradual worsening in functional capacity over the past several years. She demonstrated significant impairments in ROM, lumbopelvic stability, and reduced tolerance to standing and walking. Evaluation Complexity History MEDIUM  Complexity : 1-2 comorbidities / personal factors will impact the outcome/ POC ; Examination MEDIUM Complexity : 3 Standardized tests and measures addressing body structure, function, activity limitation and / or participation in recreation  ;Presentation MEDIUM Complexity : Evolving with changing characteristics  ; Clinical Decision Making MEDIUM Complexity : FOTO score of 26-74  Overall Complexity Rating: MEDIUM    Problem List: pain affecting function, decrease ROM, decrease strength, impaired gait/ balance, decrease ADL/ functional abilitiies, decrease activity tolerance, decrease flexibility/ joint mobility and decrease transfer abilities   Treatment Plan may include any combination of the following: Therapeutic exercise, Therapeutic activities, Neuromuscular re-education, Physical agent/modality, Gait/balance training, Manual therapy, Patient education, Self Care training, Functional mobility training, Home safety training and Stair training  Patient / Family readiness to learn indicated by: asking questions, trying to perform skills and interest  Persons(s) to be included in education: patient (P)  Barriers to Learning/Limitations: None  Patient Goal (s): I want to be able to stand without increasing my pain.   Patient Self Reported Health Status: excellent  Rehabilitation Potential: excellent    Short Term Goals: To be accomplished in 8 treatments:  1. Patient will be able to walk for two blocks with <5/10 low back pain. 2. Patient will be able to bend forward and tie her shoes with <5/10 low back pain. 3. Patient will be able to get in and out of a car with <5/10 low back pain. Long Term Goals: To be accomplished in 16 treatments:  1. Patient will be able to walk 1/4 mile with <3/10 low back pain  2. Patient will be able to stand for 30 minutes with <3/10 low back pain  3. Patient will be able to roll over in bed in either direction with <3/10 low back pain. Frequency / Duration: Patient to be seen 2 times per week for 8 weeks. Patient/ Caregiver education and instruction: self care, activity modification and exercises    [x]  Plan of care has been reviewed with ELLIOTT Miller, PT , DPT, OCS, Cert. NADYA   8/27/2018 12:34 PM    ________________________________________________________________________    I certify that the above Therapy Services are being furnished while the patient is under my care. I agree with the treatment plan and certify that this therapy is necessary.     [de-identified] Signature:____________________  Date:____________Time: _________

## 2018-08-27 NOTE — PROGRESS NOTES
PT INITIAL EVALUATION NOTE 2-15    Patient Name: Phan Amin  Date:2018  : 1980  [x]  Patient  Verified  Payor: BLUE CROSS MEDICAID / Plan: [facility] [email or web address] PLUS / Product Type: Managed Care Medicaid /    In time:9:00 AM  Out time:10:00 AM  Total Treatment Time (min): 60  Visit #: 1     Treatment Area: Low back pain [M54.5]    SUBJECTIVE  Pain Level (0-10 scale): 10  Any medication changes, allergies to medications, adverse drug reactions, diagnosis change, or new procedure performed?: [] No    [x] Yes (see summary sheet for update)  Subjective:     Chronic low back pain  PLOF: Significantly limited in standing and walking, but the patient reports a gradual worsening over the past several years. Mechanism of Injury: Insidious, pain is located along the base of the lumbar spine with radiation into both legs  Previous Treatment/Compliance: She has had PT before, but reports no success. She was referred to this location for PT by her pain management physician.   PMHx/Surgical Hx: DM, migraines  Work Hx: n/a  Living Situation: lives at home with family  Pt Goals: to reduce pain and improve mobility  Barriers: chronicity, severity  Motivation: very motivated  Substance use: none   FABQ Score: n/a  Cognition: A & O x 3        OBJECTIVE/EXAMINATION  Gait and Functional Mobility:    Gait: Antalgic gait pattern, patient ambulates with SPC  Transfers: Significant reliance on UE support, independent of assistance  Bed mobility: Rolling in both directions causes significant pain  Palpation: TTP along both the right and left lumbar paraspinals  Joint Mobility:NT        Lumbar AROM:        R  L  Flexion  Limited by  50%        Extension   25%        Side Bending   50%  25%        Rotation   50%  50%          Aberrant movements:  Painful arc: [x] Yes  [] No  Instability catch: [] Yes  [x] No  Difficulty returning from flexion: [x] Yes  [] No  Reversal of lumbopelvic rhythm: [] Yes  [x] No    MMT:  All LE myotomes: >4/5  Neurological: Sensation: Intact  Special Tests:        Slump: Positive for low back pain    SLR:Positive for low back pain   PHILLIP: Positive for low back pain             Modality rationale: decrease pain and increase tissue extensibility to improve the patients ability to walk without pain   Min Type Additional Details   15 [x] Estim: []Att   [x]Unatt        []TENS instruct                  [x]IFC  []Premod   []NMES                     []Other:  []w/US   []w/ice   [x]w/heat  Position: supine  Location: low back    []  Traction: [] Cervical       []Lumbar                       [] Prone          []Supine                       []Intermittent   []Continuous Lbs:  [] before manual  [] after manual  []w/heat    []  Ultrasound: []Continuous   [] Pulsed at:                            []1MHz   []3MHz Location:  W/cm2:    []  Paraffin         Location:  []w/heat    []  Ice     []  Heat  []  Ice massage Position:  Location:    []  Laser  []  Other: Position:  Location:    []  Vasopneumatic Device Pressure:       [] lo [] med [] hi   Temperature:    [x] Skin assessment post-treatment:  [x]intact []redness- no adverse reaction    []redness  adverse reaction:     15 min Therapeutic Exercise:  [x] See flow sheet :   Rationale: increase ROM, increase strength and improve coordination to improve the patients ability to walk without pain    With   [] TE   [] TA   [] neuro   [] other: Patient Education: [x] Review HEP    [] Progressed/Changed HEP based on:   [] positioning   [] body mechanics   [] transfers   [] heat/ice application    [] other:        Other Objective/Functional Measures:    Pain Level (0-10 scale) post treatment: 5      ASSESSMENT:      [x]  See Plan of [employer], PT , DPT, OCS, Cert.  DN   8/27/2018  12:33 PM

## 2018-08-28 PROCEDURE — 74011250636 HC RX REV CODE- 250/636

## 2018-08-28 PROCEDURE — 74011250637 HC RX REV CODE- 250/637

## 2018-08-31 RX ORDER — ISOSORBIDE MONONITRATE 30 MG/1
TABLET, EXTENDED RELEASE ORAL
Qty: 90 TAB | Refills: 0 | Status: SHIPPED | OUTPATIENT
Start: 2018-08-31 | End: 2018-01-01 | Stop reason: SDUPTHER

## 2018-08-31 NOTE — TELEPHONE ENCOUNTER
Last OV 6/15/18  Next OV 12/11/18    Refill per VO Dr. Chayo Álvarez.     Requested Prescriptions     Pending Prescriptions Disp Refills    isosorbide mononitrate ER (IMDUR) 30 mg tablet [Pharmacy Med Name: ISOSORB MONO ER 30MGTAB] 90 Tab 0     Sig: TAKE 1 TABLET BY MOUTH ONCE DAILY

## 2018-09-06 ENCOUNTER — APPOINTMENT (OUTPATIENT)
Dept: PHYSICAL THERAPY | Age: 38
End: 2018-09-06
Payer: MEDICAID

## 2018-09-08 ENCOUNTER — HOSPITAL ENCOUNTER (EMERGENCY)
Age: 38
Discharge: HOME | End: 2018-09-08
Attending: EMERGENCY MEDICINE
Payer: MEDICAID

## 2018-09-08 VITALS
WEIGHT: 218.7 LBS | SYSTOLIC BLOOD PRESSURE: 123 MMHG | RESPIRATION RATE: 18 BRPM | DIASTOLIC BLOOD PRESSURE: 76 MMHG | TEMPERATURE: 97.7 F | BODY MASS INDEX: 40 KG/M2 | HEART RATE: 97 BPM | OXYGEN SATURATION: 95 %

## 2018-09-08 DIAGNOSIS — R51.9 ACUTE NONINTRACTABLE HEADACHE, UNSPECIFIED HEADACHE TYPE: Primary | ICD-10-CM

## 2018-09-08 LAB
ANION GAP SERPL CALC-SCNC: 8 MMOL/L (ref 5–15)
BASOPHILS # BLD: 0 K/UL (ref 0–0.1)
BASOPHILS NFR BLD: 0 % (ref 0–1)
BUN SERPL-MCNC: 13 MG/DL (ref 6–20)
BUN/CREAT SERPL: 12 (ref 12–20)
CALCIUM SERPL-MCNC: 8.4 MG/DL (ref 8.5–10.1)
CHLORIDE SERPL-SCNC: 103 MMOL/L (ref 97–108)
CO2 SERPL-SCNC: 27 MMOL/L (ref 21–32)
CREAT SERPL-MCNC: 1.12 MG/DL (ref 0.55–1.02)
DIFFERENTIAL METHOD BLD: ABNORMAL
EOSINOPHIL # BLD: 0.2 K/UL (ref 0–0.4)
EOSINOPHIL NFR BLD: 3 % (ref 0–7)
ERYTHROCYTE [DISTWIDTH] IN BLOOD BY AUTOMATED COUNT: 13.1 % (ref 11.5–14.5)
GLUCOSE BLD STRIP.AUTO-MCNC: 161 MG/DL (ref 65–100)
GLUCOSE SERPL-MCNC: 154 MG/DL (ref 65–100)
HCT VFR BLD AUTO: 30.9 % (ref 35–47)
HGB BLD-MCNC: 10.7 G/DL (ref 11.5–16)
LYMPHOCYTES # BLD: 2.5 K/UL (ref 0.8–3.5)
LYMPHOCYTES NFR BLD: 42 % (ref 12–49)
MCH RBC QN AUTO: 32.6 PG (ref 26–34)
MCHC RBC AUTO-ENTMCNC: 34.6 G/DL (ref 30–36.5)
MCV RBC AUTO: 94.2 FL (ref 80–99)
MONOCYTES # BLD: 0.8 K/UL (ref 0–1)
MONOCYTES NFR BLD: 13 % (ref 5–13)
NEUTS SEG # BLD: 2.4 K/UL (ref 1.8–8)
NEUTS SEG NFR BLD: 42 % (ref 32–75)
PLATELET # BLD AUTO: 372 K/UL (ref 150–400)
PMV BLD AUTO: 10.2 FL (ref 8.9–12.9)
POTASSIUM SERPL-SCNC: 3.5 MMOL/L (ref 3.5–5.1)
RBC # BLD AUTO: 3.28 M/UL (ref 3.8–5.2)
SERVICE CMNT-IMP: ABNORMAL
SODIUM SERPL-SCNC: 138 MMOL/L (ref 136–145)
WBC # BLD AUTO: 5.8 K/UL (ref 3.6–11)
XXWBCSUS: 0

## 2018-09-08 PROCEDURE — 96361 HYDRATE IV INFUSION ADD-ON: CPT

## 2018-09-08 PROCEDURE — 74011250636 HC RX REV CODE- 250/636: Performed by: EMERGENCY MEDICINE

## 2018-09-08 PROCEDURE — 36415 COLL VENOUS BLD VENIPUNCTURE: CPT | Performed by: EMERGENCY MEDICINE

## 2018-09-08 PROCEDURE — 82962 GLUCOSE BLOOD TEST: CPT

## 2018-09-08 PROCEDURE — 96374 THER/PROPH/DIAG INJ IV PUSH: CPT

## 2018-09-08 PROCEDURE — 80048 BASIC METABOLIC PNL TOTAL CA: CPT | Performed by: EMERGENCY MEDICINE

## 2018-09-08 PROCEDURE — 99284 EMERGENCY DEPT VISIT MOD MDM: CPT

## 2018-09-08 PROCEDURE — 85025 COMPLETE CBC W/AUTO DIFF WBC: CPT | Performed by: EMERGENCY MEDICINE

## 2018-09-08 PROCEDURE — 96375 TX/PRO/DX INJ NEW DRUG ADDON: CPT

## 2018-09-08 RX ORDER — PROCHLORPERAZINE EDISYLATE 5 MG/ML
10 INJECTION INTRAMUSCULAR; INTRAVENOUS
Status: COMPLETED | OUTPATIENT
Start: 2018-09-08 | End: 2018-09-08

## 2018-09-08 RX ORDER — DIPHENHYDRAMINE HYDROCHLORIDE 50 MG/ML
25 INJECTION, SOLUTION INTRAMUSCULAR; INTRAVENOUS
Status: COMPLETED | OUTPATIENT
Start: 2018-09-08 | End: 2018-09-08

## 2018-09-08 RX ORDER — KETOROLAC TROMETHAMINE 30 MG/ML
30 INJECTION, SOLUTION INTRAMUSCULAR; INTRAVENOUS
Status: COMPLETED | OUTPATIENT
Start: 2018-09-08 | End: 2018-09-08

## 2018-09-08 RX ADMIN — KETOROLAC TROMETHAMINE 30 MG: 30 INJECTION, SOLUTION INTRAMUSCULAR at 11:55

## 2018-09-08 RX ADMIN — PROCHLORPERAZINE EDISYLATE 10 MG: 5 INJECTION INTRAMUSCULAR; INTRAVENOUS at 11:56

## 2018-09-08 RX ADMIN — DIPHENHYDRAMINE HYDROCHLORIDE 25 MG: 50 INJECTION, SOLUTION INTRAMUSCULAR; INTRAVENOUS at 11:56

## 2018-09-08 RX ADMIN — SODIUM CHLORIDE 1000 ML: 900 INJECTION, SOLUTION INTRAVENOUS at 11:55

## 2018-09-08 NOTE — ED NOTES
Patient discharged home after receiving discharge instructions from MD/PA. Patient and daughter voiced understanding and doesn't have any questions at this time. Patient in no distress at this time.  Pt ambulated out of the ER with daughter and has ride home, refused w/c

## 2018-09-08 NOTE — ED NOTES
AIDET communication provided and informed of purposeful rounding to include collaboration of entire care team; patient acknowledged understanding. IVF infusing without difficulty. Pt on continuous pulse ox and BP. Daughter at bedside. Lights dimmed for comfort. Call bell within reach. Will continue to monitor.

## 2018-09-08 NOTE — ED NOTES
AIDET communication provided and informed of purposeful rounding to include collaboration of entire care team; patient acknowledged understanding. IVF infused without difficulty. Pt has been sleeping per daughter. Pt awakens to voice, but appears sleepy. Remains on continuous pulse ox and BP. Call bell within reach. Dr. Brent Patterson will re-eval pt. Will continue to monitor

## 2018-09-08 NOTE — ED TRIAGE NOTES
\"my sugar is up, which is causing me to have headaches. \"  Pt states she checked her BS around 0730 and it was 600. Normally it is 96 per pt. No changes in diet or meds. C/o global headache and blurry vision. Pt has been drinking a lot of water and had a banana for breakfast. Daughter at bedside. Been off insulin x 3 months because sugar was \"under control. \"  BS is 161 at bedside.

## 2018-09-08 NOTE — DISCHARGE INSTRUCTIONS

## 2018-09-08 NOTE — ED PROVIDER NOTES
HPI  
 
29-year-old female with a history of chronic migraines here with headache. Patient reports her blood sugar was 600 this morning.  She has been off insulin for 3 months and currently reported to be under control according to the patient.  She complains of a frontal headache with blurred vision. Last similar HA during the summer . She has been drinking a lot of water and had a banana for breakfast.  Denies any focal weakness, numbness, fevers, neck stiffness.  No medications prior to arrival for her symptoms.  Patient is followed by neurology for her chronic headaches.  She was told that some of her past headaches are rebound in nature related to over-the-counter medications.  Positive photophobia without phonophobia.  Denies chest pain, shortness breath or other complaints. SHX:  Nonsmoaker. No etoh or drug use.   
 
  
   
 
 
 
Past Medical History:  
Diagnosis Date  Abnormal pap 2012 10/5/13   
 hx w/Colpo  Arthritis  Asthma  Diabetes ([facility] [address] type 2  
 Headache(784.0)  Hypertension  Joint pain  Pap smear for cervical cancer screening 10/1/15 ASCUS HPV NEG RP 3 YEARS  Ringing in ears Past Surgical History:  
Procedure Laterality Date  HX  SECTION  01 & 08  HX COLPOSCOPY  2012 neg  HX HEENT    
 cataract R eye  HX TUBAL LIGATION Family History:  
Problem Relation Age of Onset  Diabetes Mother  Heart Disease Mother  Hypertension Mother  Stroke Mother  Hypertension Father  Stroke Father  Heart Disease Father  Breast Cancer Neg Hx Social History Social History  Marital status:  Spouse name: N/A  
 Number of children: 2  
 Years of education: N/A Occupational History  -- Social History Main Topics  Smoking status: Never Smoker  Smokeless tobacco: Never Used  Alcohol use No  
 Drug use: No  
 Sexual activity: Yes  
 Partners: Male Birth control/ protection: Surgical  
 
Other Topics Concern   Service No  
 Blood Transfusions No  
 Caffeine Concern No  
 Occupational Exposure No  
 Hobby Hazards No  
 Sleep Concern Yes  Stress Concern Yes  Weight Concern Yes  Special Diet Yes DM  Back Care Yes  Exercise Yes  Bike Helmet No  
 Seat Belt Yes  Self-Exams Yes Social History Narrative ALLERGIES: Aspirin; Contrast agent [iodine]; Dilaudid [hydromorphone]; Metformin; Reglan [metoclopramide]; and Ritalin [methylphenidate] Review of Systems Constitutional: Negative for fever. HENT: Negative for congestion. Eyes: Positive for photophobia and visual disturbance. Musculoskeletal: Negative for neck pain and neck stiffness. Neurological: Positive for headaches. Negative for syncope, weakness and numbness. All other systems reviewed and are negative. Vitals:  
 09/08/18 1109 09/08/18 1203 09/08/18 1204 BP: 131/78 136/75 Pulse: 98 89 Resp: 18 18 Temp: 97.7 °F (36.5 °C) SpO2: 95% 95% 95% Weight: 99.2 kg (218 lb 11.1 oz) Physical Exam  
 
Nursing note and vitals reviewed. Constitutional: appears well-developed and well-nourished. No distress. HENT:  
Head: Normocephalic and atraumatic. Sclera anicteric Nose: No rhinorrhea Mouth/Throat: Oropharynx is clear and moist. Pharynx normal 
Eyes: Conjunctivae are normal. Pupils are equal, round, and reactive to light. Right eye exhibits no discharge. Left eye exhibits no discharge. No scleral icterus. Neck: Painless normal range of motion. Supple. NO MENINGISMUS. Cardiovascular: Normal rate, regular rhythm, normal heart sounds and intact distal pulses. Exam reveals no gallop and no friction rub. No murmur heard. Pulmonary/Chest: Effort normal and breath sounds normal. No respiratory distress. no wheezes. no rales. Abdominal: Soft. Bowel sounds are normal. Exhibits no distension and no mass. No tenderness. No guarding. Musculoskeletal: Normal range of motion. no tenderness. No edema Lymphadenopathy:   No cervical adenopathy. Neurological:  Alert and oriented to person, place, and time. Coordination normal. CN 2-12 intact. Moving all extremities. Skin: Skin is warm and dry. No rash noted. No pallor. MDM 
20-year-old female with a history of multiple medical problems including asthma, headaches, hypertension, diabetes and others here with headache without any focal neurologic deficit. Hollie Dunn has had past headaches similar to this with last one during the summertime.  This is not the worst headache of her life. Tayler Pries was gradual in onset.  Low clinical suspicion for subarachnoid hemorrhage at this time. Mercer Aissatou had a negative head CT in March of this year. Carisa Kadi also had a negative LP at that time. Moise Smalls check basic labs including basic metabolic panel given her reported hyperglycemia at home now only 161, CBC, give Toradol, Compazine, Benadryl and IV fluids. ED Course Procedures 1:15 PM 
HA improved to 6/10. Agreeable to discharge. Pt sleeping when I entered room and easily awakened. 1:16 PM 
Patient's results have been reviewed with them. Patient and/or family have verbally conveyed their understanding and agreement of the patient's signs, symptoms, diagnosis, treatment and prognosis and additionally agree to follow up as recommended or return to the Emergency Room should their condition change prior to follow-up. Discharge instructions have also been provided to the patient with some educational information regarding their diagnosis as well a list of reasons why they would want to return to the ER prior to their follow-up appointment should their condition change. Recent Results (from the past 24 hour(s)) GLUCOSE, POC  Collection Time: 09/08/18 11:05 AM  
 Result Value Ref Range Glucose (POC) 161 (H) 65 - 100 mg/dL Performed by Daily Yane Lakhani (tech) CBC WITH AUTOMATED DIFF Collection Time: 09/08/18 12:04 PM  
Result Value Ref Range WBC 5.8 3.6 - 11.0 K/uL  
 RBC 3.28 (L) 3.80 - 5.20 M/uL  
 HGB 10.7 (L) 11.5 - 16.0 g/dL HCT 30.9 (L) 35.0 - 47.0 % MCV 94.2 80.0 - 99.0 FL  
 MCH 32.6 26.0 - 34.0 PG  
 MCHC 34.6 30.0 - 36.5 g/dL  
 RDW 13.1 11.5 - 14.5 % PLATELET 181 299 - 571 K/uL MPV 10.2 8.9 - 12.9 FL  
 NEUTROPHILS 42 32 - 75 % LYMPHOCYTES 42 12 - 49 % MONOCYTES 13 5 - 13 % EOSINOPHILS 3 0 - 7 % BASOPHILS 0 0 - 1 %  
 ABS. NEUTROPHILS 2.4 1.8 - 8.0 K/UL  
 ABS. LYMPHOCYTES 2.5 0.8 - 3.5 K/UL  
 ABS. MONOCYTES 0.8 0.0 - 1.0 K/UL  
 ABS. EOSINOPHILS 0.2 0.0 - 0.4 K/UL  
 ABS. BASOPHILS 0.0 0.0 - 0.1 K/UL  
 DF AUTOMATED XXWBCSUS 0    
METABOLIC PANEL, BASIC Collection Time: 09/08/18 12:04 PM  
Result Value Ref Range Sodium 138 136 - 145 mmol/L Potassium 3.5 3.5 - 5.1 mmol/L Chloride 103 97 - 108 mmol/L  
 CO2 27 21 - 32 mmol/L Anion gap 8 5 - 15 mmol/L Glucose 154 (H) 65 - 100 mg/dL BUN 13 6 - 20 MG/DL Creatinine 1.12 (H) 0.55 - 1.02 MG/DL  
 BUN/Creatinine ratio 12 12 - 20 GFR est AA >60 >60 ml/min/1.73m2 GFR est non-AA 54 (L) >60 ml/min/1.73m2 Calcium 8.4 (L) 8.5 - 10.1 MG/DL No results found.

## 2018-09-13 DIAGNOSIS — E11.65 TYPE 2 DIABETES MELLITUS WITH HYPERGLYCEMIA, WITH LONG-TERM CURRENT USE OF INSULIN (HCC): ICD-10-CM

## 2018-09-13 DIAGNOSIS — Z79.4 TYPE 2 DIABETES MELLITUS WITH HYPERGLYCEMIA, WITH LONG-TERM CURRENT USE OF INSULIN (HCC): ICD-10-CM

## 2018-09-13 RX ORDER — PEN NEEDLE, DIABETIC 30 GX3/16"
NEEDLE, DISPOSABLE MISCELLANEOUS
Qty: 100 PEN NEEDLE | Refills: 23 | Status: SHIPPED | OUTPATIENT
Start: 2018-09-13 | End: 2019-01-01

## 2018-09-14 ENCOUNTER — HOSPITAL ENCOUNTER (OUTPATIENT)
Dept: PHYSICAL THERAPY | Age: 38
Discharge: HOME | End: 2018-09-14
Payer: MEDICAID

## 2018-09-14 PROCEDURE — 97014 ELECTRIC STIMULATION THERAPY: CPT | Performed by: PHYSICAL THERAPIST

## 2018-09-14 PROCEDURE — 97110 THERAPEUTIC EXERCISES: CPT | Performed by: PHYSICAL THERAPIST

## 2018-09-14 NOTE — PROGRESS NOTES
PT DAILY TREATMENT NOTE - [facility] 2-15 Patient Name: Jackson Salcedo Date:2018 : 1980 [x]  Patient  Verified Payor: [employer] / Plan: [address] [place] [address] / Product Type: Managed Care Medicaid / In time:9:15 AM  Out time:10:00 AM 
Total Treatment Time (min): 45 Total Timed Codes (min): 30 
1:1 Treatment Time (MC only): 45 Visit #: 2 Treatment Area: Low back pain [M54.5] SUBJECTIVE Pain Level (0-10 scale): 9 Any medication changes, allergies to medications, adverse drug reactions, diagnosis change, or new procedure performed?: [x] No    [] Yes (see summary sheet for update) Subjective functional status/changes:   [] No changes reported Patient reports that she has been in a lot of pain over the past several days and has had difficulty performing her HEP. OBJECTIVE Modality rationale: decrease pain and increase tissue extensibility to improve the patients ability to walk without pain Min Type Additional Details 15 [x] Estim: []Att   [x]Unatt        []TENS instruct [x]IFC  []Premod   []NMES []Other:  []w/US   []w/ice   [x]w/heat Position: supine Location: low back  
 []  Traction: [] Cervical       []Lumbar 
                     [] Prone          []Supine []Intermittent   []Continuous Lbs: 
[] before manual 
[] after manual 
[]w/heat  
 []  Ultrasound: []Continuous   [] Pulsed at: 
                         []1MHz   []3MHz Location: 
W/cm2:  
 [] Paraffin Location:  
[]w/heat  
 []  Ice     []  Heat 
[]  Ice massage Position: Location:  
 []  Laser 
[]  Other: Position: Location:  
 
 []  Vasopneumatic Device Pressure:       [] lo [] med [] hi  
Temperature:   
 
[x] Skin assessment post-treatment:  [x]intact []redness- no adverse reaction 
  []redness  adverse reaction:  
 
30 min Therapeutic Exercise:  [x] See flow sheet :  
 Rationale: increase ROM, increase strength and improve coordination to improve the patients ability to walk without pain With 
 [] TE 
 [] TA 
 [] neuro 
 [] other: Patient Education: [x] Review HEP [] Progressed/Changed HEP based on:  
[] positioning   [] body mechanics   [] transfers   [] heat/ice application   
[] other:   
 
Other Objective/Functional Measures:   
 
Pain Level (0-10 scale) post treatment: 7 ASSESSMENT/Changes in Function:  
 
Patient will continue to benefit from skilled PT services to modify and progress therapeutic interventions, address functional mobility deficits, address ROM deficits, address strength deficits, analyze and address soft tissue restrictions, analyze and cue movement patterns, analyze and modify body mechanics/ergonomics and assess and modify postural abnormalities to attain remaining goals. []  See Plan of Care 
[]  See progress note/recertification 
[]  See Discharge Summary Progress towards goals / Updated goals: 
Patient tolerated today's progression of therapeutic exercises well without an increase in pain, but the patient has yet to see significant carryover in between visits. PLAN [x]  Upgrade activities as tolerated     [x]  Continue plan of care 
[]  Update interventions per flow sheet      
[]  Discharge due to:_ 
[]  Other:_   
 
Ezio Mohan, PT , DPT, OCS, Cert.  DN 
 9/14/2018  9:28 AM

## 2018-09-21 ENCOUNTER — HOSPITAL ENCOUNTER (OUTPATIENT)
Dept: PHYSICAL THERAPY | Age: 38
Discharge: HOME | End: 2018-09-21
Payer: MEDICAID

## 2018-09-21 PROCEDURE — 97110 THERAPEUTIC EXERCISES: CPT | Performed by: PHYSICAL THERAPIST

## 2018-09-21 PROCEDURE — 97014 ELECTRIC STIMULATION THERAPY: CPT | Performed by: PHYSICAL THERAPIST

## 2018-09-21 NOTE — PROGRESS NOTES
PT DAILY TREATMENT NOTE - [facility] 2-15 Patient Name: Jada Sweeney Date:2018 : 1980 [x]  Patient  Verified Payor: [employer] / Plan: [address] [place] [address] / Product Type: Managed Care Medicaid / In time:9:00 AM  Out time:9:45 Total Treatment Time (min): 45 Total Timed Codes (min): 30 
1:1 Treatment Time ( only): 30 Visit #: 3 Treatment Area: Low back pain [M54.5] SUBJECTIVE Pain Level (0-10 scale): 9 Any medication changes, allergies to medications, adverse drug reactions, diagnosis change, or new procedure performed?: [x] No    [] Yes (see summary sheet for update) Subjective functional status/changes:   [x] No changes reported OBJECTIVE Modality rationale: decrease pain and increase tissue extensibility to improve the patients ability to walk without pain Min Type Additional Details 15 [x] Estim: []Att   [x]Unatt        []TENS instruct [x]IFC  []Premod   []NMES []Other:  []w/US   []w/ice   [x]w/heat Position: supine Location: low back  
 []  Traction: [] Cervical       []Lumbar 
                     [] Prone          []Supine []Intermittent   []Continuous Lbs: 
[] before manual 
[] after manual 
[]w/heat  
 []  Ultrasound: []Continuous   [] Pulsed at: 
                         []1MHz   []3MHz Location: 
W/cm2:  
 [] Paraffin Location:  
[]w/heat  
 []  Ice     []  Heat 
[]  Ice massage Position: Location:  
 []  Laser 
[]  Other: Position: Location:  
 
 []  Vasopneumatic Device Pressure:       [] lo [] med [] hi  
Temperature:   
 
[x] Skin assessment post-treatment:  [x]intact []redness- no adverse reaction 
  []redness  adverse reaction:  
 
30 min Therapeutic Exercise:  [x] See flow sheet :  
Rationale: increase ROM, increase strength and improve coordination to improve the patients ability to walk without pain With 
 [] TE 
 [] TA 
 [] neuro [] other: Patient Education: [x] Review HEP [] Progressed/Changed HEP based on:  
[] positioning   [] body mechanics   [] transfers   [] heat/ice application   
[] other:   
 
Other Objective/Functional Measures:   
 
Pain Level (0-10 scale) post treatment: 7 ASSESSMENT/Changes in Function:  
 
Patient will continue to benefit from skilled PT services to modify and progress therapeutic interventions, address functional mobility deficits, address ROM deficits, address strength deficits, analyze and address soft tissue restrictions, analyze and cue movement patterns, analyze and modify body mechanics/ergonomics and assess and modify postural abnormalities to attain remaining goals. []  See Plan of Care 
[]  See progress note/recertification 
[]  See Discharge Summary Progress towards goals / Updated goals: 
Patient continues to see little progress in between visits and will look to assess FOTO and short term goals next visit. PLAN [x]  Upgrade activities as tolerated     [x]  Continue plan of care 
[]  Update interventions per flow sheet      
[]  Discharge due to:_ 
[]  Other:_   
 
Rachel Jorgensen, PT , DPT, OCS, Cert.  DN 
 9/21/2018  9:28 AM

## 2018-09-22 ENCOUNTER — HOSPITAL ENCOUNTER (EMERGENCY)
Age: 38
Discharge: HOME | End: 2018-09-22
Attending: EMERGENCY MEDICINE
Payer: MEDICAID

## 2018-09-22 ENCOUNTER — APPOINTMENT (OUTPATIENT)
Dept: GENERAL RADIOLOGY | Age: 38
End: 2018-09-22
Attending: EMERGENCY MEDICINE
Payer: MEDICAID

## 2018-09-22 VITALS
HEIGHT: 62 IN | DIASTOLIC BLOOD PRESSURE: 87 MMHG | WEIGHT: 203 LBS | SYSTOLIC BLOOD PRESSURE: 149 MMHG | RESPIRATION RATE: 18 BRPM | OXYGEN SATURATION: 97 % | TEMPERATURE: 99.3 F | BODY MASS INDEX: 37.36 KG/M2 | HEART RATE: 92 BPM

## 2018-09-22 DIAGNOSIS — R73.9 HYPERGLYCEMIA: ICD-10-CM

## 2018-09-22 DIAGNOSIS — J06.9 ACUTE UPPER RESPIRATORY INFECTION: ICD-10-CM

## 2018-09-22 DIAGNOSIS — R53.83 FATIGUE, UNSPECIFIED TYPE: Primary | ICD-10-CM

## 2018-09-22 LAB
ALBUMIN SERPL-MCNC: 3.2 G/DL (ref 3.5–5)
ALBUMIN/GLOB SERPL: 0.7 {RATIO} (ref 1.1–2.2)
ALP SERPL-CCNC: 64 U/L (ref 45–117)
ALT SERPL-CCNC: 42 U/L (ref 12–78)
ANION GAP SERPL CALC-SCNC: 9 MMOL/L (ref 5–15)
APPEARANCE UR: CLEAR
AST SERPL-CCNC: 24 U/L (ref 15–37)
BACTERIA URNS QL MICRO: ABNORMAL /HPF
BASOPHILS # BLD: 0 K/UL (ref 0–0.1)
BASOPHILS NFR BLD: 0 % (ref 0–1)
BILIRUB SERPL-MCNC: 0.5 MG/DL (ref 0.2–1)
BILIRUB UR QL: NEGATIVE
BUN SERPL-MCNC: 9 MG/DL (ref 6–20)
BUN/CREAT SERPL: 7 (ref 12–20)
CALCIUM SERPL-MCNC: 8.9 MG/DL (ref 8.5–10.1)
CHLORIDE SERPL-SCNC: 103 MMOL/L (ref 97–108)
CO2 SERPL-SCNC: 27 MMOL/L (ref 21–32)
COLOR UR: ABNORMAL
CREAT SERPL-MCNC: 1.23 MG/DL (ref 0.55–1.02)
DIFFERENTIAL METHOD BLD: NORMAL
EOSINOPHIL # BLD: 0.2 K/UL (ref 0–0.4)
EOSINOPHIL NFR BLD: 3 % (ref 0–7)
EPITH CASTS URNS QL MICRO: ABNORMAL /LPF
ERYTHROCYTE [DISTWIDTH] IN BLOOD BY AUTOMATED COUNT: 13.6 % (ref 11.5–14.5)
GLOBULIN SER CALC-MCNC: 4.9 G/DL (ref 2–4)
GLUCOSE SERPL-MCNC: 168 MG/DL (ref 65–100)
GLUCOSE UR STRIP.AUTO-MCNC: NEGATIVE MG/DL
HCT VFR BLD AUTO: 36.3 % (ref 35–47)
HGB BLD-MCNC: 12.7 G/DL (ref 11.5–16)
HGB UR QL STRIP: ABNORMAL
KETONES UR QL STRIP.AUTO: NEGATIVE MG/DL
LEUKOCYTE ESTERASE UR QL STRIP.AUTO: NEGATIVE
LIPASE SERPL-CCNC: 283 U/L (ref 73–393)
LYMPHOCYTES # BLD: 2.9 K/UL (ref 0.8–3.5)
LYMPHOCYTES NFR BLD: 37 % (ref 12–49)
MAGNESIUM SERPL-MCNC: 1.5 MG/DL (ref 1.6–2.4)
MCH RBC QN AUTO: 33 PG (ref 26–34)
MCHC RBC AUTO-ENTMCNC: 35 G/DL (ref 30–36.5)
MCV RBC AUTO: 94.3 FL (ref 80–99)
MONOCYTES # BLD: 1 K/UL (ref 0–1)
MONOCYTES NFR BLD: 13 % (ref 5–13)
NEUTS SEG # BLD: 3.7 K/UL (ref 1.8–8)
NEUTS SEG NFR BLD: 47 % (ref 32–75)
NITRITE UR QL STRIP.AUTO: NEGATIVE
PH UR STRIP: 6.5 [PH] (ref 5–8)
PLATELET # BLD AUTO: 370 K/UL (ref 150–400)
PMV BLD AUTO: 10.2 FL (ref 8.9–12.9)
POTASSIUM SERPL-SCNC: 3.6 MMOL/L (ref 3.5–5.1)
PROT SERPL-MCNC: 8.1 G/DL (ref 6.4–8.2)
PROT UR STRIP-MCNC: 100 MG/DL
RBC # BLD AUTO: 3.85 M/UL (ref 3.8–5.2)
RBC #/AREA URNS HPF: ABNORMAL /HPF (ref 0–5)
SODIUM SERPL-SCNC: 139 MMOL/L (ref 136–145)
SP GR UR REFRACTOMETRY: 1.02 (ref 1–1.03)
UA: UC IF INDICATED,UAUC: ABNORMAL
UROBILINOGEN UR QL STRIP.AUTO: 0.2 EU/DL (ref 0.2–1)
WBC # BLD AUTO: 7.8 K/UL (ref 3.6–11)
WBC URNS QL MICRO: ABNORMAL /HPF (ref 0–4)
XXWBCSUS: 0

## 2018-09-22 PROCEDURE — 85025 COMPLETE CBC W/AUTO DIFF WBC: CPT | Performed by: EMERGENCY MEDICINE

## 2018-09-22 PROCEDURE — 83690 ASSAY OF LIPASE: CPT | Performed by: EMERGENCY MEDICINE

## 2018-09-22 PROCEDURE — 74011250637 HC RX REV CODE- 250/637: Performed by: EMERGENCY MEDICINE

## 2018-09-22 PROCEDURE — 96374 THER/PROPH/DIAG INJ IV PUSH: CPT

## 2018-09-22 PROCEDURE — 36415 COLL VENOUS BLD VENIPUNCTURE: CPT | Performed by: EMERGENCY MEDICINE

## 2018-09-22 PROCEDURE — 83735 ASSAY OF MAGNESIUM: CPT | Performed by: EMERGENCY MEDICINE

## 2018-09-22 PROCEDURE — 99283 EMERGENCY DEPT VISIT LOW MDM: CPT

## 2018-09-22 PROCEDURE — 96361 HYDRATE IV INFUSION ADD-ON: CPT

## 2018-09-22 PROCEDURE — 96375 TX/PRO/DX INJ NEW DRUG ADDON: CPT

## 2018-09-22 PROCEDURE — 74011250636 HC RX REV CODE- 250/636: Performed by: EMERGENCY MEDICINE

## 2018-09-22 PROCEDURE — 80053 COMPREHEN METABOLIC PANEL: CPT | Performed by: EMERGENCY MEDICINE

## 2018-09-22 PROCEDURE — 71046 X-RAY EXAM CHEST 2 VIEWS: CPT

## 2018-09-22 PROCEDURE — 87086 URINE CULTURE/COLONY COUNT: CPT | Performed by: EMERGENCY MEDICINE

## 2018-09-22 PROCEDURE — 81001 URINALYSIS AUTO W/SCOPE: CPT | Performed by: EMERGENCY MEDICINE

## 2018-09-22 RX ORDER — DIPHENHYDRAMINE HYDROCHLORIDE 50 MG/ML
25 INJECTION, SOLUTION INTRAMUSCULAR; INTRAVENOUS
Status: COMPLETED | OUTPATIENT
Start: 2018-09-22 | End: 2018-09-22

## 2018-09-22 RX ORDER — KETOROLAC TROMETHAMINE 30 MG/ML
15 INJECTION, SOLUTION INTRAMUSCULAR; INTRAVENOUS
Status: COMPLETED | OUTPATIENT
Start: 2018-09-22 | End: 2018-09-22

## 2018-09-22 RX ORDER — LANOLIN ALCOHOL/MO/W.PET/CERES
400 CREAM (GRAM) TOPICAL
Status: COMPLETED | OUTPATIENT
Start: 2018-09-22 | End: 2018-09-22

## 2018-09-22 RX ORDER — PROCHLORPERAZINE EDISYLATE 5 MG/ML
10 INJECTION INTRAMUSCULAR; INTRAVENOUS
Status: COMPLETED | OUTPATIENT
Start: 2018-09-22 | End: 2018-09-22

## 2018-09-22 RX ADMIN — KETOROLAC TROMETHAMINE 15 MG: 30 INJECTION, SOLUTION INTRAMUSCULAR at 11:57

## 2018-09-22 RX ADMIN — DIPHENHYDRAMINE HYDROCHLORIDE 25 MG: 50 INJECTION, SOLUTION INTRAMUSCULAR; INTRAVENOUS at 11:51

## 2018-09-22 RX ADMIN — PROCHLORPERAZINE EDISYLATE 10 MG: 5 INJECTION INTRAMUSCULAR; INTRAVENOUS at 11:51

## 2018-09-22 RX ADMIN — MAGNESIUM GLUCONATE 500 MG ORAL TABLET 400 MG: 500 TABLET ORAL at 13:47

## 2018-09-22 RX ADMIN — SODIUM CHLORIDE 1000 ML: 900 INJECTION, SOLUTION INTRAVENOUS at 11:44

## 2018-09-22 NOTE — ED NOTES
[facility] communication provided and informed of purposeful rounding to include collaboration of entire care team; patient acknowledged understanding. Pt states \"I don't feel any better. \"  Notified Dr. Daniel Hooper. No new orders received at this time.

## 2018-09-22 NOTE — DISCHARGE INSTRUCTIONS
Learning About High Blood Sugar  What is high blood sugar? Your body turns the food you eat into glucose (sugar), which it uses for energy. But if your body isn't able to use the sugar right away, it can build up in your blood and lead to high blood sugar. When the amount of sugar in your blood stays too high for too much of the time, you may have diabetes. Diabetes is a disease that can cause serious health problems. The good news is that lifestyle changes may help you get your blood sugar back to normal and avoid or delay diabetes. What causes high blood sugar? Sugar (glucose) can build up in your blood if you:  · Are overweight. · Have a family history of diabetes. · Take certain medicines, such as steroids. What are the symptoms? Having high blood sugar may not cause any symptoms at all. Or it may make you feel very thirsty or very hungry. You may also urinate more often than usual, have blurry vision, or lose weight without trying. How is high blood sugar treated? You can take steps to lower your blood sugar level if you understand what makes it get higher. Your doctor may want you to learn how to test your blood sugar level at home. Then you can see how illness, stress, or different kinds of food or medicine raise or lower your blood sugar level. Other tests may be needed to see if you have diabetes. How can you prevent high blood sugar? · Watch your weight. If you're overweight, losing just a small amount of weight may help. Reducing fat around your waist is most important. · Limit the amount of calories, sweets, and unhealthy fat you eat. Ask your doctor if a dietitian can help you. A registered dietitian can help you create meal plans that fit your lifestyle. · Get at least 30 minutes of exercise on most days of the week. Exercise helps control your blood sugar. It also helps you maintain a healthy weight. Walking is a good choice.  You also may want to do other activities, such as running, swimming, cycling, or playing tennis or team sports. · If your doctor prescribed medicines, take them exactly as prescribed. Call your doctor if you think you are having a problem with your medicine. You will get more details on the specific medicines your doctor prescribes. Follow-up care is a key part of your treatment and safety. Be sure to make and go to all appointments, and call your doctor if you are having problems. It's also a good idea to know your test results and keep a list of the medicines you take. Where can you learn more? Go to http://aziza-niyah.info/. Enter O108 in the search box to learn more about \"Learning About High Blood Sugar. \"  Current as of: December 7, 2017  Content Version: 11.7  © 20068774-1215 [employer]. Care instructions adapted under license by [employer] (which disclaims liability or warranty for this information). If you have questions about a medical condition or this instruction, always ask your healthcare professional. [address] any warranty or liability for your use of this information. Fatigue: Care Instructions  Your Care Instructions    Fatigue is a feeling of tiredness, exhaustion, or lack of energy. You may feel fatigue because of too much or not enough activity. It can also come from stress, lack of sleep, boredom, and poor diet. Many medical problems, such as viral infections, can cause fatigue. Emotional problems, especially depression, are often the cause of fatigue. Fatigue is most often a symptom of another problem. Treatment for fatigue depends on the cause. For example, if you have fatigue because you have a certain health problem, treating this problem also treats your fatigue. If depression or anxiety is the cause, treatment may help. Follow-up care is a key part of your treatment and safety.  Be sure to make and go to all appointments, and call your doctor if you are having problems. It's also a good idea to know your test results and keep a list of the medicines you take. How can you care for yourself at home? · Get regular exercise. But don't overdo it. Go back and forth between rest and exercise. · Get plenty of rest.  · Eat a healthy diet. Do not skip meals, especially breakfast.  · Reduce your use of caffeine, tobacco, and alcohol. Caffeine is most often found in coffee, tea, cola drinks, and chocolate. · Limit medicines that can cause fatigue. This includes tranquilizers and cold and allergy medicines. When should you call for help? Watch closely for changes in your health, and be sure to contact your doctor if:    · You have new symptoms such as fever or a rash.     · Your fatigue gets worse.     · You have been feeling down, depressed, or hopeless. Or you may have lost interest in things that you usually enjoy.     · You are not getting better as expected. Where can you learn more? Go to http://aziza-niyah.info/. Enter L237 in the search box to learn more about \"Fatigue: Care Instructions. \"  Current as of: November 20, 2017  Content Version: 11.7  © 5923-4997 [employer]. Care instructions adapted under license by [employer] (which disclaims liability or warranty for this information). If you have questions about a medical condition or this instruction, always ask your healthcare professional. [place] [address] any warranty or liability for your use of this information. Upper Respiratory Infection (Cold): Care Instructions  Your Care Instructions    An upper respiratory infection, or URI, is an infection of the nose, sinuses, or throat. URIs are spread by coughs, sneezes, and direct contact. The common cold is the most frequent kind of URI. The flu and sinus infections are other kinds of URIs. Almost all URIs are caused by viruses. Antibiotics won't cure them.  But you can treat most infections with home care. This may include drinking lots of fluids and taking over-the-counter pain medicine. You will probably feel better in 4 to 10 days. The doctor has checked you carefully, but problems can develop later. If you notice any problems or new symptoms, get medical treatment right away. Follow-up care is a key part of your treatment and safety. Be sure to make and go to all appointments, and call your doctor if you are having problems. It's also a good idea to know your test results and keep a list of the medicines you take. How can you care for yourself at home? · To prevent dehydration, drink plenty of fluids, enough so that your urine is light yellow or clear like water. Choose water and other caffeine-free clear liquids until you feel better. If you have kidney, heart, or liver disease and have to limit fluids, talk with your doctor before you increase the amount of fluids you drink. · Take an over-the-counter pain medicine, such as acetaminophen (Tylenol), ibuprofen (Advil, Motrin), or naproxen (Aleve). Read and follow all instructions on the label. · Before you use cough and cold medicines, check the label. These medicines may not be safe for young children or for people with certain health problems. · Be careful when taking over-the-counter cold or flu medicines and Tylenol at the same time. Many of these medicines have acetaminophen, which is Tylenol. Read the labels to make sure that you are not taking more than the recommended dose. Too much acetaminophen (Tylenol) can be harmful. · Get plenty of rest.  · Do not smoke or allow others to smoke around you. If you need help quitting, talk to your doctor about stop-smoking programs and medicines. These can increase your chances of quitting for good. When should you call for help? Call 911 anytime you think you may need emergency care.  For example, call if:    · You have severe trouble breathing.    Call your doctor now or seek immediate medical care if:    · You seem to be getting much sicker.     · You have new or worse trouble breathing.     · You have a new or higher fever.     · You have a new rash.    Watch closely for changes in your health, and be sure to contact your doctor if:    · You have a new symptom, such as a sore throat, an earache, or sinus pain.     · You cough more deeply or more often, especially if you notice more mucus or a change in the color of your mucus.     · You do not get better as expected. Where can you learn more? Go to http://aziza-niyah.info/. Enter C294 in the search box to learn more about \"Upper Respiratory Infection (Cold): Care Instructions. \"  Current as of: December 6, 2017  Content Version: 11.7  © 3930-4193 [employer]. Care instructions adapted under license by [employer] (which disclaims liability or warranty for this information). If you have questions about a medical condition or this instruction, always ask your healthcare professional. [address] any warranty or liability for your use of this information. We hope that we have addressed all of your medical concerns. The examination and treatment you received in the Emergency Department were for an emergent problem and were not intended as complete care. It is important that you follow up with your healthcare provider(s) for ongoing care. If your symptoms worsen or do not improve as expected, and you are unable to reach your usual health care provider(s), you should return to the Emergency Department. Today's healthcare is undergoing tremendous change, and patient satisfaction surveys are one of the many tools to assess the quality of medical care. You may receive a survey from the [employer] organization regarding your experience in the Emergency Department. I hope that your experience has been completely positive, particularly the medical care that I provided.   As such, please participate in the survey; anything less than excellent does not meet my expectations or intentions. [facility] and [facility] participate in nationally recognized quality of care measures. If your blood pressure is greater than 120/80, as reported below, we urge that you seek medical care to address the potential of high blood pressure, commonly known as hypertension. Hypertension can be hereditary or can be caused by certain medical conditions, pain, stress, or \"white coat syndrome. \"       Please make an appointment with your health care provider(s) for follow up of your Emergency Department visit. VITALS:   Patient Vitals for the past 8 hrs:   Temp Pulse Resp BP SpO2   09/22/18 1238 - - - - 99 %   09/22/18 1237 - 92 - 156/87 -   09/22/18 1200 - 90 - (!) 167/94 99 %   09/22/18 1159 - - - - 100 %   09/22/18 1104 99.3 °F (37.4 °C) 91 18 159/83 97 %          Thank you for allowing us to provide you with medical care today. We realize that you have many choices for your emergency care needs. Please choose us in the future for any continued health care needs. Fabrizio Quezada, [address] [place], [place] [address]y [address].   Office: [phone]            Recent Results (from the past 24 hour(s))   CBC WITH AUTOMATED DIFF    Collection Time: 09/22/18 11:43 AM   Result Value Ref Range    WBC 7.8 3.6 - 11.0 K/uL    RBC 3.85 3.80 - 5.20 M/uL    HGB 12.7 11.5 - 16.0 g/dL    HCT 36.3 35.0 - 47.0 %    MCV 94.3 80.0 - 99.0 FL    MCH 33.0 26.0 - 34.0 PG    MCHC 35.0 30.0 - 36.5 g/dL    RDW 13.6 11.5 - 14.5 %    PLATELET 632 021 - 436 K/uL    MPV 10.2 8.9 - 12.9 FL    NEUTROPHILS 47 32 - 75 %    LYMPHOCYTES 37 12 - 49 %    MONOCYTES 13 5 - 13 %    EOSINOPHILS 3 0 - 7 %    BASOPHILS 0 0 - 1 %    ABS. NEUTROPHILS 3.7 1.8 - 8.0 K/UL    ABS. LYMPHOCYTES 2.9 0.8 - 3.5 K/UL    ABS. MONOCYTES 1.0 0.0 - 1.0 K/UL    ABS.  EOSINOPHILS 0.2 0.0 - 0.4 K/UL ABS. BASOPHILS 0.0 0.0 - 0.1 K/UL    DF AUTOMATED      XXWBCSUS 0     METABOLIC PANEL, COMPREHENSIVE    Collection Time: 09/22/18 11:43 AM   Result Value Ref Range    Sodium 139 136 - 145 mmol/L    Potassium 3.6 3.5 - 5.1 mmol/L    Chloride 103 97 - 108 mmol/L    CO2 27 21 - 32 mmol/L    Anion gap 9 5 - 15 mmol/L    Glucose 168 (H) 65 - 100 mg/dL    BUN 9 6 - 20 MG/DL    Creatinine 1.23 (H) 0.55 - 1.02 MG/DL    BUN/Creatinine ratio 7 (L) 12 - 20      GFR est AA 59 (L) >60 ml/min/1.73m2    GFR est non-AA 49 (L) >60 ml/min/1.73m2    Calcium 8.9 8.5 - 10.1 MG/DL    Bilirubin, total 0.5 0.2 - 1.0 MG/DL    ALT (SGPT) 42 12 - 78 U/L    AST (SGOT) 24 15 - 37 U/L    Alk. phosphatase 64 45 - 117 U/L    Protein, total 8.1 6.4 - 8.2 g/dL    Albumin 3.2 (L) 3.5 - 5.0 g/dL    Globulin 4.9 (H) 2.0 - 4.0 g/dL    A-G Ratio 0.7 (L) 1.1 - 2.2     MAGNESIUM    Collection Time: 09/22/18 11:43 AM   Result Value Ref Range    Magnesium 1.5 (L) 1.6 - 2.4 mg/dL   LIPASE    Collection Time: 09/22/18 11:43 AM   Result Value Ref Range    Lipase 283 73 - 393 U/L       Xr Chest Pa Lat    Result Date: 9/22/2018  EXAM:  XR CHEST PA LAT INDICATION:   Chest Pain COMPARISON: Chest radiograph 7/11/2016. FINDINGS: PA and lateral radiographs of the chest were obtained. No evidence of focal consolidation. No pleural effusion or pneumothorax. Heart, mirella, mediastinum are within normal limits. No acute osseous abnormalities. IMPRESSION: No acute cardiopulmonary process.

## 2018-09-22 NOTE — ED TRIAGE NOTES
Pt c/o rib pain, started yesterday after vomiting. Pt is going to PT for lower back pain. Chills all night. Decreased appetite. No vomiting today, but nauseated. Urinating ok. Last BM yesterday and normal. Cough is productive with white sputum, + runny nose. Pt ambulated to room

## 2018-09-22 NOTE — ED NOTES
AIDET communication provided and informed of purposeful rounding to include collaboration of entire care team; patient acknowledged understanding. IVF infusing without difficulty. Pt on continuous pulse ox and BP. Family at bedside. Call bell within reach. Lights dimmed for comfort. Will continue to monitor.

## 2018-09-22 NOTE — ED PROVIDER NOTES
Patient is a 45 y.o. female presenting with rib pain. Rib Pain Associated symptoms include rhinorrhea and vomiting. Pertinent negatives include no fever, no headaches, no sore throat, no neck pain, no cough, no chest pain, no abdominal pain and no rash. 44 yo AAF presents with bodyaches, nausea, vomiting (last episode yesterday, nonbilious/nonbloody) onset yesterday morning. Denies fever, chills, abdominal pain, diarrhea, constipation. +cough, runny nose, congestion. Cough productive of white phlegm. C/o left lower rib pain, worse with movement. LMP-last month, denies pregnancy Past Medical History:  
Diagnosis Date  Abnormal pap 2012 10/5/13   
 hx w/Colpo  Arthritis  Asthma  Diabetes ([address] type 2  
 Headache(784.0)  Hypertension  Joint pain  Pap smear for cervical cancer screening 10/1/15 ASCUS HPV NEG RP 3 YEARS  Ringing in ears Past Surgical History:  
Procedure Laterality Date  HX  SECTION  01 & 08  HX COLPOSCOPY  2012 neg  HX HEENT    
 cataract R eye  HX TUBAL LIGATION Family History:  
Problem Relation Age of Onset  Diabetes Mother  Heart Disease Mother  Hypertension Mother  Stroke Mother  Hypertension Father  Stroke Father  Heart Disease Father  Breast Cancer Neg Hx Social History Social History  Marital status:  Spouse name: N/A  
 Number of children: 2  
 Years of education: N/A Occupational History  -- Social History Main Topics  Smoking status: Never Smoker  Smokeless tobacco: Never Used  Alcohol use No  
 Drug use: No  
 Sexual activity: Yes  
  Partners: Male Birth control/ protection: Surgical  
 
Other Topics Concern   Service No  
 Blood Transfusions No  
 Caffeine Concern No  
 Occupational Exposure No  
 Hobby Hazards No  
 Sleep Concern Yes  Stress Concern Yes  Weight Concern Yes  Special Diet Yes DM  Back Care Yes  Exercise Yes  Bike Helmet No  
 Seat Belt Yes  Self-Exams Yes Social History Narrative ALLERGIES: Aspirin; Contrast agent [iodine]; Dilaudid [hydromorphone]; Metformin; Reglan [metoclopramide]; and Ritalin [methylphenidate] Review of Systems Constitutional: Positive for fatigue. Negative for activity change, appetite change, chills and fever. HENT: Positive for congestion and rhinorrhea. Negative for sore throat and trouble swallowing. Respiratory: Negative for cough and shortness of breath. Cardiovascular: Negative for chest pain. Gastrointestinal: Positive for nausea and vomiting. Negative for abdominal pain, constipation and diarrhea. Genitourinary: Negative for dysuria and hematuria. Musculoskeletal: Negative for neck pain and neck stiffness. Skin: Negative for rash. Neurological: Negative for headaches. All other systems reviewed and are negative. Vitals:  
 09/22/18 1104 BP: 159/83 Pulse: 91  
Resp: 18 Temp: 99.3 °F (37.4 °C) SpO2: 97% Weight: 92.1 kg (203 lb) Height: 5' 2\" (1.575 m) Physical Exam  
Physical Examination: General appearance - alert, well appearing, and in no distress, oriented to person, place, and time and normal appearing weight Eyes - pupils equal and reactive, extraocular eye movements intact Neck - supple, no significant adenopathy Chest - clear to auscultation, no wheezes, rales or rhonchi, symmetric air entry Heart - normal rate, regular rhythm, normal S1, S2, no murmurs, rubs, clicks or gallops Abdomen - soft, nontender, nondistended, no masses or organomegaly Back exam - full range of motion, no tenderness, palpable spasm or pain on motion Neurological - alert, oriented, normal speech, no focal findings or movement disorder noted Musculoskeletal - no joint tenderness, deformity or swelling Extremities - peripheral pulses normal, no pedal edema, no clubbing or cyanosis Skin - normal coloration and turgor, no rashes, no suspicious skin lesions noted MDM Number of Diagnoses or Management Options Amount and/or Complexity of Data Reviewed Clinical lab tests: ordered and reviewed Decide to obtain previous medical records or to obtain history from someone other than the patient: yes Obtain history from someone other than the patient: yes (family) Review and summarize past medical records: yes Independent visualization of images, tracings, or specimens: yes Patient Progress Patient progress: improved ED Course Procedures Pt afebrile, nontoxic, VSS. Will d/c with pcp f/u. Pain to b/l lower ribs reproducible on exam and with movement.

## 2018-09-22 NOTE — ED NOTES
Patient discharged home after receiving discharge instructions from MD.  Patient and family voiced understanding and doesn't have any questions at this time. Patient in no distress at this time. Wheeled pt out to car and has a ride home.

## 2018-09-23 LAB
BACTERIA SPEC CULT: NORMAL
CC UR VC: NORMAL
SERVICE CMNT-IMP: NORMAL

## 2018-09-27 DIAGNOSIS — E78.5 HYPERLIPIDEMIA, UNSPECIFIED HYPERLIPIDEMIA TYPE: ICD-10-CM

## 2018-09-27 RX ORDER — ATORVASTATIN CALCIUM 10 MG/1
TABLET, FILM COATED ORAL
Qty: 90 TAB | Refills: 0 | Status: SHIPPED | OUTPATIENT
Start: 2018-09-27 | End: 2018-01-01 | Stop reason: DRUGHIGH

## 2018-09-28 ENCOUNTER — APPOINTMENT (OUTPATIENT)
Dept: PHYSICAL THERAPY | Age: 38
End: 2018-09-28
Payer: MEDICAID

## 2018-10-12 ENCOUNTER — APPOINTMENT (OUTPATIENT)
Dept: PHYSICAL THERAPY | Age: 38
End: 2018-10-12
Payer: MEDICAID

## 2018-10-22 ENCOUNTER — HOSPITAL ENCOUNTER (OUTPATIENT)
Dept: PHYSICAL THERAPY | Age: 38
Discharge: HOME | End: 2018-10-22
Payer: MEDICAID

## 2018-10-22 PROCEDURE — 97014 ELECTRIC STIMULATION THERAPY: CPT

## 2018-10-22 PROCEDURE — 97110 THERAPEUTIC EXERCISES: CPT

## 2018-10-22 NOTE — PROGRESS NOTES
PT DAILY TREATMENT NOTE - [facility] 2-15 Patient Name: Krystle Tony Date:10/22/2018 : 1980 [x]  Patient  Verified Payor: [employer]ICA[employer] / Plan: [address] [place] [address] / Product Type: Managed Care Medicaid / In time:2:30p  Out time: 3:30p Total Treatment Time (min): 60 Total Timed Codes (min): 45 
1:1 Treatment Time ( only): 45 Visit #: 4 Treatment Area: Low back pain [M54.5] SUBJECTIVE Pain Level (0-10 scale): 10 Any medication changes, allergies to medications, adverse drug reactions, diagnosis change, or new procedure performed?: [x] No    [] Yes (see summary sheet for update) Subjective functional status/changes:   [x] No changes reported Patient reports pain has gotten gradually worse since last visit as she has not been here for a month. OBJECTIVE Modality rationale: decrease pain and increase tissue extensibility to improve the patients ability to walk without pain Min Type Additional Details 15 [x] Estim: []Att   [x]Unatt        []TENS instruct [x]IFC  []Premod   []NMES []Other:  []w/US   []w/ice   [x]w/heat Position: supine Location: low back  
 []  Traction: [] Cervical       []Lumbar 
                     [] Prone          []Supine []Intermittent   []Continuous Lbs: 
[] before manual 
[] after manual 
[]w/heat  
 []  Ultrasound: []Continuous   [] Pulsed at: 
                         []1MHz   []3MHz Location: 
W/cm2:  
 [] Paraffin Location:  
[]w/heat  
 []  Ice     []  Heat 
[]  Ice massage Position: Location:  
 []  Laser 
[]  Other: Position: Location:  
 
 []  Vasopneumatic Device Pressure:       [] lo [] med [] hi  
Temperature:   
 
[x] Skin assessment post-treatment:  [x]intact []redness- no adverse reaction 
  []redness  adverse reaction:  
 
45 min Therapeutic Exercise:  [x] See flow sheet :  
Rationale: increase ROM, increase strength and improve coordination to improve the patients ability to walk without pain With 
 [] TE 
 [] TA 
 [] neuro 
 [] other: Patient Education: [x] Review HEP [] Progressed/Changed HEP based on:  
[] positioning   [] body mechanics   [] transfers   [] heat/ice application   
[x] other:   
 
Other Objective/Functional Measures:  Pt slow to move through interventions and through supine to/from sit, sit to/from stand transfers Pain Level (0-10 scale) post treatment: 6 
 
ASSESSMENT/Changes in Function:  Patient reports compliance with HEP, however has not made any gains in function and little progress towards goals. Spoke with patient about importance of maintaining consistent attendance to PT to achieve functional gains and progress towards goals. Patient reports understanding and is to schedule another session. Patient will continue to benefit from skilled PT services to  to attain remaining goals. []  See Plan of Care 
[]  See progress note/recertification 
[]  See Discharge Summary Progress towards goals / Updated goals: 
Patient demonstrates poor tolerance for interventions with mod fatigue noted. Patient continues to required mod to max verbal cues for proper exercise reproduction and is making slow progress towards goals. Short Term Goals: To be accomplished in 8 treatments: 1. Patient will be able to walk for two blocks with <5/10 low back pain. 2. Patient will be able to bend forward and tie her shoes with <5/10 low back pain. 3. Patient will be able to get in and out of a car with <5/10 low back pain. Long Term Goals: To be accomplished in 16 treatments: 1. Patient will be able to walk 1/4 mile with <3/10 low back pain 2. Patient will be able to stand for 30 minutes with <3/10 low back pain 3. Patient will be able to roll over in bed in either direction with <3/10 low back pain. Frequency / Duration: Patient to be seen 2 times per week for 8 weeks.  
 
 
PLAN 
 [x]  Upgrade activities as tolerated     [x]  Continue plan of care 
[]  Update interventions per flow sheet      
[]  Discharge due to:_ 
[]  Other:_ Vertis Number , PTA   10/22/2018  2:38 AM

## 2018-10-25 ENCOUNTER — APPOINTMENT (OUTPATIENT)
Dept: PHYSICAL THERAPY | Age: 38
End: 2018-10-25
Payer: MEDICAID

## 2018-10-30 ENCOUNTER — TELEPHONE (OUTPATIENT)
Dept: NEUROLOGY | Age: 38
End: 2018-10-30

## 2018-10-30 NOTE — TELEPHONE ENCOUNTER
----- Message from Amrik Hagan [id number] sent at 10/29/2018  3:42 PM EDT -----  Regarding: REYNA Newell/ tawny navarrete, from [address] [place] [address], called to verify the prior authorization has been completed, and she is requesting that information be sent to them. Best contact number is [phone];  [phone]

## 2018-11-05 RX ORDER — CARVEDILOL 3.12 MG/1
TABLET ORAL
Qty: 60 TAB | Refills: 1 | Status: SHIPPED | OUTPATIENT
Start: 2018-11-05 | End: 2019-01-01 | Stop reason: SDUPTHER

## 2018-11-05 NOTE — TELEPHONE ENCOUNTER
Last OV 6/5/18  Next OV 12/11/18    Refill per VO Dr. Yordan Donnelly.     Requested Prescriptions     Pending Prescriptions Disp Refills    carvedilol (COREG) 3.125 mg tablet [Pharmacy Med Name: CARVEDILOL 3.125MG  TAB] 60 Tab 1     Sig: TAKE 1 TABLET BY MOUTH TWICE DAILY WITH MEALS

## 2018-11-06 ENCOUNTER — TELEPHONE (OUTPATIENT)
Dept: FAMILY MEDICINE CLINIC | Age: 38
End: 2018-11-06

## 2018-11-06 RX ORDER — ALBUTEROL SULFATE 90 UG/1
2 AEROSOL, METERED RESPIRATORY (INHALATION)
Qty: 1 INHALER | Refills: 2 | Status: SHIPPED | OUTPATIENT
Start: 2018-11-06 | End: 2019-01-01 | Stop reason: CLARIF

## 2018-11-06 NOTE — TELEPHONE ENCOUNTER
We rec'd a PA request for pt's ventolin inhaler which was filled back in May. Can you please put in new order as the only inhaler listed in the script is Ventolin and it is not on pt's formulary.

## 2018-12-07 NOTE — PROGRESS NOTES
Identified pt with two pt identifiers(name and ). Chief Complaint   Patient presents with    Pre-op Exam     surgery on her retina on         Health Maintenance Due   Topic    Pneumococcal 19-64 Medium Risk (1 of 1 - PPSV23)    EYE EXAM RETINAL OR DILATED     FOOT EXAM Q1     Influenza Age 5 to Adult     PAP AKA CERVICAL CYTOLOGY     HEMOGLOBIN A1C Q6M        Wt Readings from Last 3 Encounters:   18 210 lb (95.3 kg)   18 203 lb (92.1 kg)   18 218 lb 11.1 oz (99.2 kg)     Temp Readings from Last 3 Encounters:   18 97.7 °F (36.5 °C) (Oral)   18 99.3 °F (37.4 °C)   18 97.7 °F (36.5 °C)     BP Readings from Last 3 Encounters:   18 137/79   18 149/87   18 123/76     Pulse Readings from Last 3 Encounters:   18 96   18 92   18 97         Learning Assessment:  :     Learning Assessment 7/3/2018 4/10/2018 1/10/2018 2016 2015 10/6/2015 2014   PRIMARY LEARNER Patient Patient Patient Patient Patient Patient Patient   HIGHEST LEVEL OF EDUCATION - PRIMARY LEARNER  - - - - - DID NOT GRADUATE Cheryl  LEARNER - - - - [facility] - - - - - No -   PRIMARY LANGUAGE [facility]   LEARNER PREFERENCE PRIMARY READING READING READING LISTENING DEMONSTRATION DEMONSTRATION LISTENING   ANSWERED BY Fuentes Mattson patient Patient patient patient   RELATIONSHIP SELF SELF SELF SELF SELF SELF SELF       Depression Screening:  :     PHQ over the last two weeks 2018   Little interest or pleasure in doing things Not at all   Feeling down, depressed, irritable, or hopeless Not at all   Total Score PHQ 2 0       Fall Risk Assessment:  :     Fall Risk Assessment, last 12 mths 8/15/2017   Able to walk? Yes   Fall in past 12 months?  No       Abuse Screening:  :     Abuse Screening Questionnaire 2018 8/15/2016 2014 2014   Do you ever feel afraid of your partner? N N N N   Are you in a relationship with someone who physically or mentally threatens you? N N N N   Is it safe for you to go home? Y Y Y Y       Coordination of Care Questionnaire:  :     1) Have you been to an emergency room, urgent care clinic since your last visit? no   Hospitalized since your last visit? no             2) Have you seen or consulted any other health care providers outside of [address] [place] [address] since your last visit? Yes, eye surgeon    3) Do you have an Advance Directive on file? no  Are you interested in receiving information about Advance Directives? no    Patient is accompanied by self. Reviewed record in preparation for visit and have obtained necessary documentation. Medication reconciliation up to date and corrected with patient at this time.

## 2018-12-07 NOTE — LETTER
12/7/2018 3:24 PM 
 
[id number] Current Outpatient Medications:  
  bumetanide (BUMEX) 0.5 mg tablet, Take 0.5 mg by mouth once over twenty-four (24) hours. , Disp: , Rfl:  
  sodium bicarbonate 650 mg tablet, Take 650 mg by mouth two (2) times a day., Disp: , Rfl:   APIDRA SOLOSTAR U-100 INSULIN 100 unit/mL pen, 80 Units by SubCUTAneous route Before breakfast, lunch, and dinner., Disp: , Rfl:  
  insulin glargine (LANTUS SOLOSTAR U-100 INSULIN) 100 unit/mL (3 mL) inpn, 30 Units by SubCUTAneous route two (2) times a day., Disp: , Rfl:  
  atorvastatin (LIPITOR) 20 mg tablet, Take 20 mg by mouth once over twenty-four (24) hours. , Disp: , Rfl:  
  isosorbide mononitrate ER (IMDUR) 30 mg tablet, TAKE 1 TABLET BY MOUTH ONCE DAILY, Disp: 30 Tab, Rfl: 0 
  albuterol (PROVENTIL HFA, VENTOLIN HFA, PROAIR HFA) 90 mcg/actuation inhaler, Take 2 Puffs by inhalation every four (4) hours as needed for Wheezing., Disp: 1 Inhaler, Rfl: 2   carvedilol (COREG) 3.125 mg tablet, TAKE 1 TABLET BY MOUTH TWICE DAILY WITH MEALS, Disp: 60 Tab, Rfl: 1 
  Insulin Needles, Disposable, 31 gauge x 5/16\" ndle, USE TO INJECT LANTUS AND APIDRA DAILY. , Disp: 100 Pen Needle, Rfl: 23 
  gabapentin (NEURONTIN) 300 mg capsule, TAKE 2 CAPSULES BY MOUTH THREE TIMES DAILY, Disp: 180 Cap, Rfl: 3 
  ARNUITY ELLIPTA 200 mcg/actuation dsdv inhaler, INHALE ONE PUFF BY MOUTH ONCE DAILY, Disp: 1 Inhaler, Rfl: 2 
  losartan (COZAAR) 100 mg tablet, Take 1 Tab by mouth daily. , Disp: 90 Tab, Rfl: 1 
  montelukast (SINGULAIR) 10 mg tablet, TAKE ONE TABLET BY MOUTH ONCE DAILY, Disp: 30 Tab, Rfl: 2 
  VITAMIN D2 50,000 unit capsule, Take 50,000 Units by mouth two (2) times a week., Disp: , Rfl:  
  glucose blood VI test strips (BLOOD GLUCOSE TEST) strip, Patient request the [facility] brand of meter and strips and to test QID., Disp: 100 Strip, Rfl: 5   Lancets misc, Bid for diabetes 250.00 Lot # [id number] Exp 7/2018 Man Brian Nordisk, Disp: 400 Package, Rfl: 0 
  prednisoLONE acetate (PRED FORTE) 1 % ophthalmic suspension, , Disp: , Rfl:  
  ofloxacin (FLOXIN) 0.3 % ophthalmic solution, , Disp: , Rfl:  
 
 
 
Sincerely, Mago Buchanan NP

## 2018-12-07 NOTE — PROGRESS NOTES
Preoperative Evaluation    Date of Exam: 2018    Cesario Kingsley is a 45 y.o. female (:1980) who presents for preoperative evaluation. Procedure/Surgery:retinal detachment  Date of Procedure/Surgery: 18  Surgeon: Dr Chen Clayton of [facility] Facility: [address] [place] [address]  Primary Physician: Rob Coronel MD  Latex Allergy: no    Problem List:     Patient Active Problem List    Diagnosis Date Noted    Hyperlipidemia     DOC (obstructive sleep apnea)     Abnormal liver enzymes 2018    NAFLD (nonalcoholic fatty liver disease) 2018    Type 2 diabetes with nephropathy ([address] 2018    Obesity, morbid ([address] 04/10/2018    Type 2 diabetes mellitus with diabetic neuropathy ([address] 01/10/2018    Elevated serum creatinine 10/20/2016    Muscle spasm of back 2016    Encounter for long-term (current) use of insulin ([address] 2016    Recurrent streptococcal tonsillitis 2016    Muscle spasms of neck 2016    Ankle edema 2016    Obesity 2015    BMI 35.0-35.9,adult 2015    Essential hypertension 2015    Noncompliance of patient with dietary regimen 2015    Non compliance with medical treatment 2014    LGSIL (low grade squamous intraepithelial dysplasia) 2013    Lumbar degenerative disc disease 2013    Avascular necrosis of bones of both hips ([address] 10/14/2013    Asthma 2010    Diabetes mellitus ([address] 2010     Medical History:     Past Medical History:   Diagnosis Date    Abnormal pap 2012 10/5/13     hx w/Colpo     Arthritis     Asthma     Diabetes ([address]     type 2    Headache(784.0)     Hyperlipidemia     Hypertension     Joint pain     DOC (obstructive sleep apnea)     Pap smear for cervical cancer screening 10/1/15 ASCUS HPV NEG RP 3 YEARS    Ringing in ears      Allergies:      Allergies   Allergen Reactions    Aspirin Itching    Contrast Agent [Iodine] Hives     IV only, can tolerate oral contrast    Dilaudid [Hydromorphone] Hives    Iodinated Contrast- Oral And Iv Dye Itching    Lisinopril Other (comments)    Metformin Nausea Only    Reglan  [Metoclopramide Hcl] Other (comments)    Reglan [Metoclopramide] Other (comments)     Impaired speech/LOWER EXTREMITY EDEMA-RECENTLY D/C'D BY MD    Ritalin [Methylphenidate] Other (comments)     No allergy per patient. 5/30/18      Medications:     Current Outpatient Medications   Medication Sig    bumetanide (BUMEX) 0.5 mg tablet Take 0.5 mg by mouth once over twenty-four (24) hours.  sodium bicarbonate 650 mg tablet Take 650 mg by mouth two (2) times a day.  APIDRA SOLOSTAR U-100 INSULIN 100 unit/mL pen 80 Units by SubCUTAneous route Before breakfast, lunch, and dinner.  insulin glargine (LANTUS SOLOSTAR U-100 INSULIN) 100 unit/mL (3 mL) inpn 30 Units by SubCUTAneous route two (2) times a day.  atorvastatin (LIPITOR) 20 mg tablet Take 20 mg by mouth once over twenty-four (24) hours.  isosorbide mononitrate ER (IMDUR) 30 mg tablet TAKE 1 TABLET BY MOUTH ONCE DAILY    albuterol (PROVENTIL HFA, VENTOLIN HFA, PROAIR HFA) 90 mcg/actuation inhaler Take 2 Puffs by inhalation every four (4) hours as needed for Wheezing.  carvedilol (COREG) 3.125 mg tablet TAKE 1 TABLET BY MOUTH TWICE DAILY WITH MEALS    Insulin Needles, Disposable, 31 gauge x 5/16\" ndle USE TO INJECT LANTUS AND APIDRA DAILY.  gabapentin (NEURONTIN) 300 mg capsule TAKE 2 CAPSULES BY MOUTH THREE TIMES DAILY    ARNUITY ELLIPTA 200 mcg/actuation dsdv inhaler INHALE ONE PUFF BY MOUTH ONCE DAILY    losartan (COZAAR) 100 mg tablet Take 1 Tab by mouth daily.  montelukast (SINGULAIR) 10 mg tablet TAKE ONE TABLET BY MOUTH ONCE DAILY    VITAMIN D2 50,000 unit capsule Take 50,000 Units by mouth two (2) times a week.     glucose blood VI test strips (BLOOD GLUCOSE TEST) strip Patient request the [facility] brand of meter and strips and to test FLORENTINOD.  Lancets misc Bid for diabetes 250.00  Lot # [id number]  Exp 2018  Man Brian Nordisk    prednisoLONE acetate (PRED FORTE) 1 % ophthalmic suspension     ofloxacin (FLOXIN) 0.3 % ophthalmic solution      No current facility-administered medications for this visit. Surgical History:     Past Surgical History:   Procedure Laterality Date    HX  SECTION  01 & 08    HX COLPOSCOPY  2012 neg    HX HEENT      cataract R eye    HX TUBAL LIGATION       Social History:     Social History     Socioeconomic History    Marital status:      Spouse name: Not on file    Number of children: 2    Years of education: Not on file    Highest education level: Not on file   Occupational History    Occupation: --   Tobacco Use    Smoking status: Never Smoker    Smokeless tobacco: Never Used   Substance and Sexual Activity    Alcohol use: No    Drug use: No    Sexual activity: Yes     Partners: Male     Birth control/protection: Surgical   Other Topics Concern     Service No    Blood Transfusions No    Caffeine Concern No    Occupational Exposure No    Hobby Hazards No    Sleep Concern Yes    Stress Concern Yes    Weight Concern Yes    Special Diet Yes     Comment: DM    Back Care Yes    Exercise Yes    Bike Helmet No    Seat Belt Yes    Self-Exams Yes       Recent use of: No recent use of aspirin (ASA), NSAIDS or steroids    Tetanus up to date: last tetanus booster within 10 years      Anesthesia Complications: None  History of abnormal bleeding : None  History of Blood Transfusions: no  Health Care Directive or Living Will: no    REVIEW OF SYSTEMS:  A comprehensive review of systems was negative.     EXAM:   Visit Vitals  /79 (BP 1 Location: Right arm, BP Patient Position: Sitting)   Pulse 96   Temp 97.7 °F (36.5 °C) (Oral)   Resp 18   Ht 5' 2\" (1.575 m)   Wt 210 lb (95.3 kg)   LMP 2018 (Exact Date)   SpO2 100%   BMI 38.41 kg/m²     General appearance - alert, well appearing, and in no distress  Mental status - alert, oriented to person, place, and time  Eyes - pupils equal and reactive, extraocular eye movements intact  Ears - bilateral TM's and external ear canals normal  Nose - normal and patent, no erythema, discharge or polyps  Mouth - mucous membranes moist, pharynx normal without lesions  Neck - supple, no significant adenopathy  Lymphatics - no palpable lymphadenopathy, no hepatosplenomegaly  Chest - clear to auscultation, no wheezes, rales or rhonchi, symmetric air entry  Heart - normal rate, regular rhythm, normal S1, S2, no murmurs, rubs, clicks or gallops  Abdomen - soft, nontender, nondistended, no masses or organomegaly  Neurological - alert, oriented, normal speech, no focal findings or movement disorder noted  Musculoskeletal - no joint tenderness, deformity or swelling  Extremities - peripheral pulses normal, no pedal edema, no clubbing or cyanosis  Skin - normal coloration and turgor, no rashes, no suspicious skin lesions noted      DIAGNOSTICS:   1. EKG: EKG FINDINGS - normal EKG, normal sinus rhythm, sinus tachycardia  2. Labs: ordered today    IMPRESSION:   Diabetes mellitus  No contraindications to planned surgery    Geofm Innocent, NP   12/7/2018    HISTORY OF PRESENT ILLNESS  Radha Kan is a 45 y.o. female. HPI    ROS    Physical Exam    ASSESSMENT and PLAN    ICD-10-CM ICD-9-CM    1. Pre-op evaluation Z01.818 V72.84 AMB POC EKG ROUTINE W/ 12 LEADS, INTER & REP      CBC WITH AUTOMATED DIFF      METABOLIC PANEL, COMPREHENSIVE     Cleared for surgery. Educated about taking medications as prescribed. Pt informed to return to office with worsening of symptoms, or PRN with any questions or concerns. Pt verbalizes understanding of plan of care and denies further questions or concerns at this time.

## 2018-12-07 NOTE — PATIENT INSTRUCTIONS

## 2018-12-10 NOTE — TELEPHONE ENCOUNTER
Patient called returning phone call from earlier. I advised message was relayed to her  on [facility]. Patient understood and wanted to know if she could come and  the pre op form. I let her know it was faxed along with lab results but she requested a copy for herself.     Best contact: [phone]

## 2018-12-12 NOTE — TELEPHONE ENCOUNTER
Attempted to return call to pt to advise her the script for her insulin needles needs to come from her endocrinologist, however, pt does not have voicemail available. If she should return call please advise her to contact her endocrinologist for refill. Thank you.

## 2018-12-28 NOTE — TELEPHONE ENCOUNTER
She said she went to a crazy endo at [facility] and she did not like her. I asked about Dr Antoine Fear released her due to non-complaince. She said she had to come back to us. I explained our last A1C was over 12. She said the one at [address] [place] [address] was 8. I told we did not have it. I explained she had to see ENDO. She is looking for another one. She is out of Apidra.

## 2018-12-28 NOTE — TELEPHONE ENCOUNTER
Forwarded from call center. ..     Pt is requesting a refill on medication Apidra (PT is out of this medication) Pharmacy [facility] (on file) Best Contact [phone]

## 2018-12-31 NOTE — TELEPHONE ENCOUNTER
I sent refill for Apidra. She NEEDS TO BE UNDER CARE OF ENDO. I will place referral in system. Please schedule appt for pt. Also request record of visit with ENDOCRINOLOGY clinic at [facility].

## 2018-12-31 NOTE — PROGRESS NOTES
[facility] [address], [address] [place], [address] [place]. Phone: [phone]  Fax: [phone] Discharge Summary  2-15 Patient name: Dahiana He  : 1980  Provider#: [id number] Referral source: Charles Lo MD     
Medical/Treatment Diagnosis: Low back pain [M54.5] Prior Hospitalization: see medical history Comorbidities: See Plan of Care Prior Level of Function:See Plan of Care Medications: Verified on Patient Summary List 
 
Start of Care: 18      Onset Date: Many years ago Visits from Start of Care: 4     Missed Visits: 0 Reporting Period : 18 to 10/22/18 ASSESSMENT/SUMMARY OF CARE: Ms. Shamika Cordon was seen for 4 PT visits over the course of two months and therapy focused on gentle therapeutic exercises and modalities. She did not show appreciable progress towards goals and on her last visit she requested discharge from PT. 
 
  
Short Term Goals: To be accomplished in 8 treatments: 1. Patient will be able to walk for two blocks with <5/10 low back pain. Not met. 2. Patient will be able to bend forward and tie her shoes with <5/10 low back pain. Not met. 3. Patient will be able to get in and out of a car with <5/10 low back pain. Not met. Long Term Goals: To be accomplished in 16 treatments: 1. Patient will be able to walk 1/4 mile with <3/10 low back painNot met. 2. Patient will be able to stand for 30 minutes with <3/10 low back painNot met. 3. Patient will be able to roll over in bed in either direction with <3/10 low back pain. Not met. RECOMMENDATIONS: 
[x]Discontinue therapy: []Patient has reached or is progressing toward set goals []Patient is non-compliant or has abdicated 
    [x]Due to lack of appreciable progress towards set goals []Jaret Phan PT 2018 8:51 AM

## 2019-01-01 ENCOUNTER — OFFICE VISIT (OUTPATIENT)
Dept: CARDIOLOGY CLINIC | Age: 39
End: 2019-01-01

## 2019-01-01 ENCOUNTER — APPOINTMENT (OUTPATIENT)
Dept: PHYSICAL THERAPY | Age: 39
End: 2019-01-01
Payer: MEDICAID

## 2019-01-01 ENCOUNTER — OFFICE VISIT (OUTPATIENT)
Dept: FAMILY MEDICINE CLINIC | Age: 39
End: 2019-01-01

## 2019-01-01 ENCOUNTER — APPOINTMENT (OUTPATIENT)
Dept: GENERAL RADIOLOGY | Age: 39
DRG: 250 | End: 2019-01-01
Attending: EMERGENCY MEDICINE
Payer: MEDICARE

## 2019-01-01 ENCOUNTER — APPOINTMENT (OUTPATIENT)
Dept: CT IMAGING | Age: 39
End: 2019-01-01
Attending: NURSE PRACTITIONER
Payer: MEDICAID

## 2019-01-01 ENCOUNTER — TELEPHONE (OUTPATIENT)
Dept: FAMILY MEDICINE CLINIC | Age: 39
End: 2019-01-01

## 2019-01-01 ENCOUNTER — HOSPITAL ENCOUNTER (EMERGENCY)
Age: 39
Discharge: HOME | End: 2019-03-27
Attending: EMERGENCY MEDICINE
Payer: MEDICAID

## 2019-01-01 ENCOUNTER — APPOINTMENT (OUTPATIENT)
Dept: NON INVASIVE DIAGNOSTICS | Age: 39
DRG: 250 | End: 2019-01-01
Attending: STUDENT IN AN ORGANIZED HEALTH CARE EDUCATION/TRAINING PROGRAM
Payer: MEDICARE

## 2019-01-01 ENCOUNTER — APPOINTMENT (OUTPATIENT)
Dept: CT IMAGING | Age: 39
End: 2019-01-01
Attending: EMERGENCY MEDICINE
Payer: MEDICAID

## 2019-01-01 ENCOUNTER — HOSPITAL ENCOUNTER (OUTPATIENT)
Age: 39
Setting detail: OUTPATIENT SURGERY
Discharge: HOME | End: 2019-05-09
Attending: INTERNAL MEDICINE | Admitting: INTERNAL MEDICINE
Payer: MEDICAID

## 2019-01-01 ENCOUNTER — APPOINTMENT (OUTPATIENT)
Dept: ULTRASOUND IMAGING | Age: 39
End: 2019-01-01
Attending: INTERNAL MEDICINE
Payer: MEDICAID

## 2019-01-01 ENCOUNTER — HOSPITAL ENCOUNTER (EMERGENCY)
Age: 39
Discharge: HOME | End: 2019-01-25
Attending: STUDENT IN AN ORGANIZED HEALTH CARE EDUCATION/TRAINING PROGRAM
Payer: MEDICAID

## 2019-01-01 ENCOUNTER — HOSPITAL ENCOUNTER (OUTPATIENT)
Dept: PHYSICAL THERAPY | Age: 39
Discharge: HOME | End: 2019-05-24
Payer: MEDICAID

## 2019-01-01 ENCOUNTER — HOSPITAL ENCOUNTER (OUTPATIENT)
Dept: PHYSICAL THERAPY | Age: 39
Discharge: HOME | End: 2019-07-09
Payer: MEDICAID

## 2019-01-01 ENCOUNTER — HOSPITAL ENCOUNTER (INPATIENT)
Age: 39
LOS: 1 days | Discharge: HOME | DRG: 250 | End: 2019-11-15
Attending: EMERGENCY MEDICINE | Admitting: FAMILY MEDICINE
Payer: MEDICARE

## 2019-01-01 ENCOUNTER — OFFICE VISIT (OUTPATIENT)
Dept: HEMATOLOGY | Age: 39
End: 2019-01-01

## 2019-01-01 ENCOUNTER — APPOINTMENT (OUTPATIENT)
Dept: GENERAL RADIOLOGY | Age: 39
End: 2019-01-01
Attending: EMERGENCY MEDICINE
Payer: MEDICAID

## 2019-01-01 ENCOUNTER — HOSPITAL ENCOUNTER (OUTPATIENT)
Dept: PHYSICAL THERAPY | Age: 39
Discharge: HOME | End: 2019-06-27
Payer: MEDICAID

## 2019-01-01 ENCOUNTER — HOSPITAL ENCOUNTER (OUTPATIENT)
Dept: PHYSICAL THERAPY | Age: 39
Discharge: HOME | End: 2019-05-17
Payer: MEDICAID

## 2019-01-01 ENCOUNTER — HOSPITAL ENCOUNTER (OUTPATIENT)
Dept: PHYSICAL THERAPY | Age: 39
Discharge: HOME | End: 2019-07-02
Payer: MEDICAID

## 2019-01-01 ENCOUNTER — APPOINTMENT (OUTPATIENT)
Dept: GENERAL RADIOLOGY | Age: 39
End: 2019-01-01
Attending: STUDENT IN AN ORGANIZED HEALTH CARE EDUCATION/TRAINING PROGRAM
Payer: MEDICAID

## 2019-01-01 ENCOUNTER — HOSPITAL ENCOUNTER (OUTPATIENT)
Dept: PHYSICAL THERAPY | Age: 39
End: 2019-01-01
Payer: MEDICAID

## 2019-01-01 ENCOUNTER — HOSPITAL ENCOUNTER (EMERGENCY)
Age: 39
Discharge: HOME | End: 2019-03-05
Attending: EMERGENCY MEDICINE
Payer: MEDICAID

## 2019-01-01 ENCOUNTER — HOSPITAL ENCOUNTER (EMERGENCY)
Age: 39
Discharge: HOME | End: 2019-06-12
Attending: EMERGENCY MEDICINE
Payer: MEDICAID

## 2019-01-01 ENCOUNTER — HOSPITAL ENCOUNTER (EMERGENCY)
Age: 39
Discharge: HOME | End: 2019-02-14
Attending: EMERGENCY MEDICINE
Payer: MEDICAID

## 2019-01-01 ENCOUNTER — TELEPHONE (OUTPATIENT)
Dept: HEMATOLOGY | Age: 39
End: 2019-01-01

## 2019-01-01 ENCOUNTER — OFFICE VISIT (OUTPATIENT)
Dept: ENDOCRINOLOGY | Age: 39
End: 2019-01-01

## 2019-01-01 ENCOUNTER — APPOINTMENT (OUTPATIENT)
Dept: VASCULAR SURGERY | Age: 39
DRG: 250 | End: 2019-01-01
Attending: NURSE PRACTITIONER
Payer: MEDICARE

## 2019-01-01 ENCOUNTER — OFFICE VISIT (OUTPATIENT)
Dept: NEUROLOGY | Age: 39
End: 2019-01-01

## 2019-01-01 ENCOUNTER — TELEPHONE (OUTPATIENT)
Dept: NEUROLOGY | Age: 39
End: 2019-01-01

## 2019-01-01 VITALS
WEIGHT: 208 LBS | HEART RATE: 106 BPM | SYSTOLIC BLOOD PRESSURE: 128 MMHG | DIASTOLIC BLOOD PRESSURE: 77 MMHG | BODY MASS INDEX: 38.28 KG/M2 | TEMPERATURE: 98.4 F | OXYGEN SATURATION: 98 % | HEIGHT: 62 IN | RESPIRATION RATE: 16 BRPM

## 2019-01-01 VITALS
DIASTOLIC BLOOD PRESSURE: 78 MMHG | RESPIRATION RATE: 18 BRPM | BODY MASS INDEX: 39.75 KG/M2 | HEART RATE: 107 BPM | WEIGHT: 216 LBS | TEMPERATURE: 98.2 F | OXYGEN SATURATION: 98 % | SYSTOLIC BLOOD PRESSURE: 122 MMHG | HEIGHT: 62 IN

## 2019-01-01 VITALS
SYSTOLIC BLOOD PRESSURE: 148 MMHG | HEART RATE: 96 BPM | WEIGHT: 215 LBS | SYSTOLIC BLOOD PRESSURE: 117 MMHG | DIASTOLIC BLOOD PRESSURE: 98 MMHG | HEIGHT: 62 IN | RESPIRATION RATE: 18 BRPM | OXYGEN SATURATION: 100 % | DIASTOLIC BLOOD PRESSURE: 78 MMHG | HEART RATE: 100 BPM | BODY MASS INDEX: 39.56 KG/M2 | OXYGEN SATURATION: 98 % | HEIGHT: 62 IN | TEMPERATURE: 97 F | TEMPERATURE: 98 F | BODY MASS INDEX: 39.2 KG/M2 | RESPIRATION RATE: 18 BRPM | WEIGHT: 213 LBS

## 2019-01-01 VITALS
HEART RATE: 98 BPM | OXYGEN SATURATION: 98 % | BODY MASS INDEX: 39.56 KG/M2 | HEIGHT: 62 IN | WEIGHT: 215 LBS | SYSTOLIC BLOOD PRESSURE: 132 MMHG | DIASTOLIC BLOOD PRESSURE: 74 MMHG | RESPIRATION RATE: 19 BRPM | TEMPERATURE: 98 F

## 2019-01-01 VITALS
BODY MASS INDEX: 40.96 KG/M2 | HEART RATE: 104 BPM | DIASTOLIC BLOOD PRESSURE: 98 MMHG | SYSTOLIC BLOOD PRESSURE: 148 MMHG | WEIGHT: 222.6 LBS | OXYGEN SATURATION: 98 % | RESPIRATION RATE: 18 BRPM | HEIGHT: 62 IN

## 2019-01-01 VITALS
HEART RATE: 97 BPM | SYSTOLIC BLOOD PRESSURE: 138 MMHG | BODY MASS INDEX: 41.22 KG/M2 | RESPIRATION RATE: 18 BRPM | OXYGEN SATURATION: 100 % | HEIGHT: 62 IN | TEMPERATURE: 98.6 F | DIASTOLIC BLOOD PRESSURE: 92 MMHG | WEIGHT: 224 LBS

## 2019-01-01 VITALS
TEMPERATURE: 97.5 F | BODY MASS INDEX: 39.47 KG/M2 | HEART RATE: 89 BPM | DIASTOLIC BLOOD PRESSURE: 89 MMHG | OXYGEN SATURATION: 100 % | RESPIRATION RATE: 18 BRPM | SYSTOLIC BLOOD PRESSURE: 151 MMHG | WEIGHT: 214.51 LBS | HEIGHT: 62 IN

## 2019-01-01 VITALS
SYSTOLIC BLOOD PRESSURE: 130 MMHG | RESPIRATION RATE: 18 BRPM | HEIGHT: 62 IN | DIASTOLIC BLOOD PRESSURE: 70 MMHG | BODY MASS INDEX: 38.83 KG/M2 | HEART RATE: 80 BPM | WEIGHT: 211 LBS

## 2019-01-01 VITALS
TEMPERATURE: 98.6 F | WEIGHT: 215 LBS | DIASTOLIC BLOOD PRESSURE: 90 MMHG | BODY MASS INDEX: 39.56 KG/M2 | SYSTOLIC BLOOD PRESSURE: 158 MMHG | OXYGEN SATURATION: 98 % | HEIGHT: 62 IN | HEART RATE: 100 BPM | RESPIRATION RATE: 18 BRPM

## 2019-01-01 VITALS
WEIGHT: 211 LBS | DIASTOLIC BLOOD PRESSURE: 94 MMHG | OXYGEN SATURATION: 98 % | HEART RATE: 96 BPM | HEIGHT: 62 IN | RESPIRATION RATE: 18 BRPM | SYSTOLIC BLOOD PRESSURE: 136 MMHG | BODY MASS INDEX: 38.83 KG/M2

## 2019-01-01 VITALS
HEIGHT: 62 IN | BODY MASS INDEX: 41.41 KG/M2 | HEART RATE: 87 BPM | DIASTOLIC BLOOD PRESSURE: 87 MMHG | OXYGEN SATURATION: 97 % | TEMPERATURE: 98.3 F | SYSTOLIC BLOOD PRESSURE: 122 MMHG | RESPIRATION RATE: 18 BRPM | WEIGHT: 225 LBS

## 2019-01-01 VITALS
HEART RATE: 93 BPM | RESPIRATION RATE: 14 BRPM | DIASTOLIC BLOOD PRESSURE: 54 MMHG | SYSTOLIC BLOOD PRESSURE: 98 MMHG | WEIGHT: 216 LBS | HEIGHT: 62 IN | BODY MASS INDEX: 39.75 KG/M2 | TEMPERATURE: 97 F | OXYGEN SATURATION: 99 %

## 2019-01-01 VITALS
TEMPERATURE: 98.5 F | HEART RATE: 99 BPM | HEIGHT: 62 IN | BODY MASS INDEX: 40.63 KG/M2 | SYSTOLIC BLOOD PRESSURE: 121 MMHG | OXYGEN SATURATION: 99 % | DIASTOLIC BLOOD PRESSURE: 70 MMHG | WEIGHT: 220.8 LBS

## 2019-01-01 VITALS
HEART RATE: 90 BPM | SYSTOLIC BLOOD PRESSURE: 155 MMHG | DIASTOLIC BLOOD PRESSURE: 93 MMHG | WEIGHT: 201 LBS | BODY MASS INDEX: 36.99 KG/M2 | RESPIRATION RATE: 18 BRPM | HEIGHT: 62 IN | TEMPERATURE: 97.9 F | OXYGEN SATURATION: 91 %

## 2019-01-01 VITALS
HEART RATE: 109 BPM | BODY MASS INDEX: 39.93 KG/M2 | RESPIRATION RATE: 14 BRPM | HEIGHT: 62 IN | DIASTOLIC BLOOD PRESSURE: 69 MMHG | WEIGHT: 217 LBS | SYSTOLIC BLOOD PRESSURE: 129 MMHG | OXYGEN SATURATION: 97 %

## 2019-01-01 VITALS
BODY MASS INDEX: 39.45 KG/M2 | WEIGHT: 214.4 LBS | HEIGHT: 62 IN | TEMPERATURE: 98.2 F | SYSTOLIC BLOOD PRESSURE: 146 MMHG | OXYGEN SATURATION: 98 % | RESPIRATION RATE: 20 BRPM | DIASTOLIC BLOOD PRESSURE: 90 MMHG | HEART RATE: 106 BPM

## 2019-01-01 VITALS
BODY MASS INDEX: 38.64 KG/M2 | RESPIRATION RATE: 18 BRPM | HEART RATE: 110 BPM | DIASTOLIC BLOOD PRESSURE: 72 MMHG | SYSTOLIC BLOOD PRESSURE: 100 MMHG | TEMPERATURE: 98.7 F | OXYGEN SATURATION: 98 % | WEIGHT: 210 LBS | HEIGHT: 62 IN

## 2019-01-01 VITALS
HEIGHT: 62 IN | HEART RATE: 78 BPM | OXYGEN SATURATION: 98 % | SYSTOLIC BLOOD PRESSURE: 122 MMHG | BODY MASS INDEX: 39.56 KG/M2 | WEIGHT: 215 LBS | RESPIRATION RATE: 18 BRPM | TEMPERATURE: 98.5 F | DIASTOLIC BLOOD PRESSURE: 80 MMHG

## 2019-01-01 VITALS
BODY MASS INDEX: 40.14 KG/M2 | SYSTOLIC BLOOD PRESSURE: 133 MMHG | TEMPERATURE: 100 F | OXYGEN SATURATION: 97 % | WEIGHT: 218.1 LBS | RESPIRATION RATE: 29 BRPM | HEART RATE: 100 BPM | HEIGHT: 62 IN | DIASTOLIC BLOOD PRESSURE: 76 MMHG

## 2019-01-01 VITALS
OXYGEN SATURATION: 98 % | TEMPERATURE: 97.7 F | SYSTOLIC BLOOD PRESSURE: 168 MMHG | HEIGHT: 62 IN | WEIGHT: 212.08 LBS | RESPIRATION RATE: 12 BRPM | BODY MASS INDEX: 39.03 KG/M2 | DIASTOLIC BLOOD PRESSURE: 85 MMHG | HEART RATE: 95 BPM

## 2019-01-01 VITALS
DIASTOLIC BLOOD PRESSURE: 72 MMHG | WEIGHT: 214.4 LBS | OXYGEN SATURATION: 98 % | TEMPERATURE: 99.2 F | HEIGHT: 62 IN | HEART RATE: 100 BPM | SYSTOLIC BLOOD PRESSURE: 130 MMHG | BODY MASS INDEX: 39.45 KG/M2

## 2019-01-01 VITALS
OXYGEN SATURATION: 100 % | HEIGHT: 62 IN | TEMPERATURE: 99 F | RESPIRATION RATE: 18 BRPM | BODY MASS INDEX: 40.12 KG/M2 | SYSTOLIC BLOOD PRESSURE: 126 MMHG | HEART RATE: 70 BPM | WEIGHT: 218 LBS | DIASTOLIC BLOOD PRESSURE: 72 MMHG

## 2019-01-01 VITALS
RESPIRATION RATE: 20 BRPM | BODY MASS INDEX: 38.96 KG/M2 | DIASTOLIC BLOOD PRESSURE: 92 MMHG | HEIGHT: 62 IN | SYSTOLIC BLOOD PRESSURE: 144 MMHG | OXYGEN SATURATION: 99 % | HEART RATE: 95 BPM

## 2019-01-01 VITALS
RESPIRATION RATE: 18 BRPM | SYSTOLIC BLOOD PRESSURE: 135 MMHG | BODY MASS INDEX: 39.93 KG/M2 | TEMPERATURE: 98.8 F | WEIGHT: 217 LBS | HEART RATE: 99 BPM | OXYGEN SATURATION: 97 % | DIASTOLIC BLOOD PRESSURE: 72 MMHG | HEIGHT: 62 IN

## 2019-01-01 DIAGNOSIS — E66.01 MORBID OBESITY (HCC): ICD-10-CM

## 2019-01-01 DIAGNOSIS — M79.651 ACUTE PAIN OF RIGHT THIGH: ICD-10-CM

## 2019-01-01 DIAGNOSIS — E11.40 TYPE 2 DIABETES MELLITUS WITH DIABETIC NEUROPATHY, WITH LONG-TERM CURRENT USE OF INSULIN (HCC): Primary | ICD-10-CM

## 2019-01-01 DIAGNOSIS — Z79.4 TYPE 2 DIABETES MELLITUS WITH HYPERGLYCEMIA, WITH LONG-TERM CURRENT USE OF INSULIN (HCC): Primary | ICD-10-CM

## 2019-01-01 DIAGNOSIS — M54.2 CERVICAL MUSCLE PAIN: ICD-10-CM

## 2019-01-01 DIAGNOSIS — E11.40 TYPE 2 DIABETES MELLITUS WITH DIABETIC NEUROPATHY, WITH LONG-TERM CURRENT USE OF INSULIN (HCC): ICD-10-CM

## 2019-01-01 DIAGNOSIS — R93.5 ABNORMAL ABDOMINAL CT SCAN: Primary | ICD-10-CM

## 2019-01-01 DIAGNOSIS — G43.709 CHRONIC MIGRAINE WITHOUT AURA WITHOUT STATUS MIGRAINOSUS, NOT INTRACTABLE: ICD-10-CM

## 2019-01-01 DIAGNOSIS — M54.2 CERVICAL MUSCLE PAIN: Primary | ICD-10-CM

## 2019-01-01 DIAGNOSIS — M54.50 LUMBAR BACK PAIN: ICD-10-CM

## 2019-01-01 DIAGNOSIS — R68.83 CHILLS: ICD-10-CM

## 2019-01-01 DIAGNOSIS — M62.838 MUSCLE SPASM: ICD-10-CM

## 2019-01-01 DIAGNOSIS — I10 ESSENTIAL HYPERTENSION: ICD-10-CM

## 2019-01-01 DIAGNOSIS — J02.0 STREP THROAT: Primary | ICD-10-CM

## 2019-01-01 DIAGNOSIS — R07.9 ACUTE CHEST PAIN: Primary | ICD-10-CM

## 2019-01-01 DIAGNOSIS — E11.65 TYPE 2 DIABETES MELLITUS WITH HYPERGLYCEMIA, WITH LONG-TERM CURRENT USE OF INSULIN (HCC): Primary | ICD-10-CM

## 2019-01-01 DIAGNOSIS — L83 ACANTHOSIS NIGRICANS: ICD-10-CM

## 2019-01-01 DIAGNOSIS — R07.9 CHEST PAIN, UNSPECIFIED TYPE: ICD-10-CM

## 2019-01-01 DIAGNOSIS — L30.8 OTHER ECZEMA: Primary | ICD-10-CM

## 2019-01-01 DIAGNOSIS — E78.5 HYPERLIPIDEMIA, UNSPECIFIED HYPERLIPIDEMIA TYPE: ICD-10-CM

## 2019-01-01 DIAGNOSIS — Z79.4 TYPE 2 DIABETES MELLITUS WITH DIABETIC NEUROPATHY, WITH LONG-TERM CURRENT USE OF INSULIN (HCC): ICD-10-CM

## 2019-01-01 DIAGNOSIS — Z88.9 H/O MULTIPLE ALLERGIES: ICD-10-CM

## 2019-01-01 DIAGNOSIS — R10.9 FLANK PAIN: Primary | ICD-10-CM

## 2019-01-01 DIAGNOSIS — J01.90 ACUTE RHINOSINUSITIS: Primary | ICD-10-CM

## 2019-01-01 DIAGNOSIS — Z79.4 TYPE 2 DIABETES MELLITUS WITH DIABETIC NEUROPATHY, WITH LONG-TERM CURRENT USE OF INSULIN (HCC): Primary | ICD-10-CM

## 2019-01-01 DIAGNOSIS — R07.9 CHEST PAIN, UNSPECIFIED TYPE: Primary | ICD-10-CM

## 2019-01-01 DIAGNOSIS — M62.9 MUSCULOSKELETAL DISORDER INVOLVING UPPER TRAPEZIUS MUSCLE: ICD-10-CM

## 2019-01-01 DIAGNOSIS — K76.0 NAFL (NONALCOHOLIC FATTY LIVER): Primary | ICD-10-CM

## 2019-01-01 DIAGNOSIS — K76.0 NAFLD (NONALCOHOLIC FATTY LIVER DISEASE): Primary | ICD-10-CM

## 2019-01-01 DIAGNOSIS — I21.4 NSTEMI (NON-ST ELEVATED MYOCARDIAL INFARCTION) (HCC): ICD-10-CM

## 2019-01-01 DIAGNOSIS — G43.709 CHRONIC MIGRAINE WITHOUT AURA WITHOUT STATUS MIGRAINOSUS, NOT INTRACTABLE: Primary | ICD-10-CM

## 2019-01-01 DIAGNOSIS — J30.89 ENVIRONMENTAL AND SEASONAL ALLERGIES: ICD-10-CM

## 2019-01-01 DIAGNOSIS — R21 FACIAL RASH: Primary | ICD-10-CM

## 2019-01-01 DIAGNOSIS — Z86.39 HISTORY OF HYPOGLYCEMIA: ICD-10-CM

## 2019-01-01 DIAGNOSIS — K76.0 FATTY LIVER: ICD-10-CM

## 2019-01-01 DIAGNOSIS — E11.21 TYPE 2 DIABETES WITH NEPHROPATHY (HCC): Primary | ICD-10-CM

## 2019-01-01 DIAGNOSIS — L30.8 OTHER ECZEMA: ICD-10-CM

## 2019-01-01 DIAGNOSIS — I10 ESSENTIAL HYPERTENSION: Primary | ICD-10-CM

## 2019-01-01 DIAGNOSIS — R39.9 UTI SYMPTOMS: ICD-10-CM

## 2019-01-01 DIAGNOSIS — E11.21 TYPE 2 DIABETES WITH NEPHROPATHY (HCC): ICD-10-CM

## 2019-01-01 DIAGNOSIS — J45.20 MILD INTERMITTENT ASTHMA WITHOUT COMPLICATION: ICD-10-CM

## 2019-01-01 DIAGNOSIS — K55.069 OMENTAL INFARCTION (HCC): Primary | ICD-10-CM

## 2019-01-01 DIAGNOSIS — R09.81 NASAL CONGESTION: ICD-10-CM

## 2019-01-01 DIAGNOSIS — R68.89 OTHER GENERAL SYMPTOMS AND SIGNS: ICD-10-CM

## 2019-01-01 DIAGNOSIS — E11.65 TYPE 2 DIABETES MELLITUS WITH HYPERGLYCEMIA, WITH LONG-TERM CURRENT USE OF INSULIN (HCC): ICD-10-CM

## 2019-01-01 DIAGNOSIS — R52 PAIN: Primary | ICD-10-CM

## 2019-01-01 DIAGNOSIS — M25.551 ACUTE PAIN OF RIGHT HIP: ICD-10-CM

## 2019-01-01 DIAGNOSIS — Z01.818 PREOPERATIVE EXAMINATION: ICD-10-CM

## 2019-01-01 DIAGNOSIS — H26.9 CATARACT OF LEFT EYE, UNSPECIFIED CATARACT TYPE: Primary | ICD-10-CM

## 2019-01-01 DIAGNOSIS — Z79.4 TYPE 2 DIABETES MELLITUS WITH HYPERGLYCEMIA, WITH LONG-TERM CURRENT USE OF INSULIN (HCC): ICD-10-CM

## 2019-01-01 DIAGNOSIS — R11.0 NAUSEA: ICD-10-CM

## 2019-01-01 DIAGNOSIS — M87.051 AVASCULAR NECROSIS OF BONE OF HIP, RIGHT (HCC): Primary | ICD-10-CM

## 2019-01-01 DIAGNOSIS — M79.10 MYALGIA: Primary | ICD-10-CM

## 2019-01-01 DIAGNOSIS — R06.00 DYSPNEA, UNSPECIFIED TYPE: ICD-10-CM

## 2019-01-01 DIAGNOSIS — R77.8 ELEVATED TROPONIN: ICD-10-CM

## 2019-01-01 DIAGNOSIS — M87.052 AVASCULAR NECROSIS OF BONE OF HIP, LEFT (HCC): ICD-10-CM

## 2019-01-01 DIAGNOSIS — Z87.09 HISTORY OF STREP PHARYNGITIS: ICD-10-CM

## 2019-01-01 DIAGNOSIS — N39.0 URINARY TRACT INFECTION WITHOUT HEMATURIA, SITE UNSPECIFIED: Primary | ICD-10-CM

## 2019-01-01 LAB
ACT BLD: 367 SECS (ref 79–138)
ALBUMIN SERPL-MCNC: 2.7 G/DL (ref 3.5–5)
ALBUMIN SERPL-MCNC: 2.8 G/DL (ref 3.5–5)
ALBUMIN SERPL-MCNC: 2.9 G/DL (ref 3.5–5)
ALBUMIN SERPL-MCNC: 2.9 G/DL (ref 3.5–5)
ALBUMIN SERPL-MCNC: 3 G/DL (ref 3.5–5)
ALBUMIN SERPL-MCNC: 3.5 G/DL (ref 3.5–5.5)
ALBUMIN UR QL STRIP: 150 MG/L
ALBUMIN/GLOB SERPL: 0.6 {RATIO} (ref 1.1–2.2)
ALBUMIN/GLOB SERPL: 1 {RATIO} (ref 1.2–2.2)
ALP SERPL-CCNC: 64 U/L (ref 45–117)
ALP SERPL-CCNC: 64 U/L (ref 45–117)
ALP SERPL-CCNC: 65 U/L (ref 45–117)
ALP SERPL-CCNC: 70 U/L (ref 45–117)
ALP SERPL-CCNC: 73 IU/L (ref 39–117)
ALP SERPL-CCNC: 79 U/L (ref 45–117)
ALT SERPL-CCNC: 28 IU/L (ref 0–32)
ALT SERPL-CCNC: 28 U/L (ref 12–78)
ALT SERPL-CCNC: 30 U/L (ref 12–78)
ALT SERPL-CCNC: 38 U/L (ref 12–78)
ALT SERPL-CCNC: 39 U/L (ref 12–78)
ALT SERPL-CCNC: 43 U/L (ref 12–78)
ANION GAP SERPL CALC-SCNC: 10 MMOL/L (ref 5–15)
ANION GAP SERPL CALC-SCNC: 14 MMOL/L (ref 5–15)
ANION GAP SERPL CALC-SCNC: 7 MMOL/L (ref 5–15)
ANION GAP SERPL CALC-SCNC: 8 MMOL/L (ref 5–15)
APPEARANCE UR: ABNORMAL
APPEARANCE UR: CLEAR
APPEARANCE UR: CLEAR
ARTERIAL PATENCY WRIST A: YES
AST SERPL-CCNC: 21 U/L (ref 15–37)
AST SERPL-CCNC: 21 U/L (ref 15–37)
AST SERPL-CCNC: 23 U/L (ref 15–37)
AST SERPL-CCNC: 25 IU/L (ref 0–40)
AST SERPL-CCNC: 31 U/L (ref 15–37)
AST SERPL-CCNC: 46 U/L (ref 15–37)
ATRIAL RATE: 103 BPM
ATRIAL RATE: 105 BPM
ATRIAL RATE: 108 BPM
ATRIAL RATE: 108 BPM
ATRIAL RATE: 110 BPM
ATRIAL RATE: 110 BPM
ATRIAL RATE: 112 BPM
ATRIAL RATE: 115 BPM
ATRIAL RATE: 117 BPM
AV VELOCITY RATIO: 0.79
BACTERIA SPEC CULT: NORMAL
BACTERIA UR CULT: NORMAL
BACTERIA URNS QL MICRO: ABNORMAL /HPF
BACTERIA URNS QL MICRO: ABNORMAL /HPF
BACTERIA URNS QL MICRO: NEGATIVE /HPF
BASE DEFICIT BLD-SCNC: 3 MMOL/L
BASOPHILS # BLD: 0 K/UL (ref 0–0.1)
BASOPHILS NFR BLD: 0 % (ref 0–1)
BASOPHILS NFR BLD: 1 % (ref 0–1)
BDY SITE: ABNORMAL
BILIRUB SERPL-MCNC: 0.2 MG/DL (ref 0.2–1)
BILIRUB SERPL-MCNC: 0.3 MG/DL (ref 0.2–1)
BILIRUB SERPL-MCNC: 0.3 MG/DL (ref 0–1.2)
BILIRUB SERPL-MCNC: 0.4 MG/DL (ref 0.2–1)
BILIRUB SERPL-MCNC: 0.4 MG/DL (ref 0.2–1)
BILIRUB SERPL-MCNC: 0.5 MG/DL (ref 0.2–1)
BILIRUB UR QL STRIP: NEGATIVE
BILIRUB UR QL STRIP: NEGATIVE
BILIRUB UR QL: NEGATIVE
BNP SERPL-MCNC: 24 PG/ML (ref 0–125)
BUN SERPL-MCNC: 12 MG/DL (ref 6–20)
BUN SERPL-MCNC: 13 MG/DL (ref 6–20)
BUN SERPL-MCNC: 16 MG/DL (ref 6–20)
BUN SERPL-MCNC: 23 MG/DL (ref 6–20)
BUN SERPL-MCNC: 32 MG/DL (ref 6–20)
BUN SERPL-MCNC: 7 MG/DL (ref 6–20)
BUN SERPL-MCNC: 9 MG/DL (ref 6–20)
BUN/CREAT SERPL: 10 (ref 12–20)
BUN/CREAT SERPL: 10 (ref 9–23)
BUN/CREAT SERPL: 12 (ref 12–20)
BUN/CREAT SERPL: 12 (ref 12–20)
BUN/CREAT SERPL: 17 (ref 12–20)
BUN/CREAT SERPL: 6 (ref 12–20)
BUN/CREAT SERPL: 8 (ref 12–20)
BUN/CREAT SERPL: 9 (ref 12–20)
BUN/CREAT SERPL: 9 (ref 12–20)
CALCIUM SERPL-MCNC: 7.9 MG/DL (ref 8.5–10.1)
CALCIUM SERPL-MCNC: 8.2 MG/DL (ref 8.5–10.1)
CALCIUM SERPL-MCNC: 8.5 MG/DL (ref 8.5–10.1)
CALCIUM SERPL-MCNC: 8.6 MG/DL (ref 8.5–10.1)
CALCIUM SERPL-MCNC: 8.7 MG/DL (ref 8.5–10.1)
CALCIUM SERPL-MCNC: 8.9 MG/DL (ref 8.5–10.1)
CALCIUM SERPL-MCNC: 9.1 MG/DL (ref 8.7–10.2)
CALCIUM SERPL-MCNC: 9.2 MG/DL (ref 8.5–10.1)
CALCIUM SERPL-MCNC: 9.5 MG/DL (ref 8.5–10.1)
CALCULATED P AXIS, ECG09: 43 DEGREES
CALCULATED P AXIS, ECG09: 44 DEGREES
CALCULATED P AXIS, ECG09: 47 DEGREES
CALCULATED P AXIS, ECG09: 49 DEGREES
CALCULATED P AXIS, ECG09: 52 DEGREES
CALCULATED P AXIS, ECG09: 58 DEGREES
CALCULATED P AXIS, ECG09: 59 DEGREES
CALCULATED P AXIS, ECG09: 67 DEGREES
CALCULATED R AXIS, ECG10: 1 DEGREES
CALCULATED R AXIS, ECG10: 12 DEGREES
CALCULATED R AXIS, ECG10: 13 DEGREES
CALCULATED R AXIS, ECG10: 13 DEGREES
CALCULATED R AXIS, ECG10: 15 DEGREES
CALCULATED R AXIS, ECG10: 2 DEGREES
CALCULATED R AXIS, ECG10: 25 DEGREES
CALCULATED R AXIS, ECG10: 4 DEGREES
CALCULATED T AXIS, ECG11: 101 DEGREES
CALCULATED T AXIS, ECG11: 112 DEGREES
CALCULATED T AXIS, ECG11: 28 DEGREES
CALCULATED T AXIS, ECG11: 28 DEGREES
CALCULATED T AXIS, ECG11: 45 DEGREES
CALCULATED T AXIS, ECG11: 66 DEGREES
CALCULATED T AXIS, ECG11: 68 DEGREES
CALCULATED T AXIS, ECG11: 80 DEGREES
CALCULATED T AXIS, ECG11: 92 DEGREES
CC UR VC: NORMAL
CHLORIDE SERPL-SCNC: 100 MMOL/L (ref 97–108)
CHLORIDE SERPL-SCNC: 100 MMOL/L (ref 97–108)
CHLORIDE SERPL-SCNC: 101 MMOL/L (ref 96–106)
CHLORIDE SERPL-SCNC: 101 MMOL/L (ref 97–108)
CHLORIDE SERPL-SCNC: 101 MMOL/L (ref 97–108)
CHLORIDE SERPL-SCNC: 104 MMOL/L (ref 97–108)
CHLORIDE SERPL-SCNC: 106 MMOL/L (ref 97–108)
CHLORIDE SERPL-SCNC: 106 MMOL/L (ref 97–108)
CHLORIDE SERPL-SCNC: 108 MMOL/L (ref 97–108)
CHOLEST SERPL-MCNC: 175 MG/DL (ref 100–199)
CK SERPL-CCNC: 288 U/L (ref 26–192)
CO2 SERPL-SCNC: 19 MMOL/L (ref 21–32)
CO2 SERPL-SCNC: 20 MMOL/L (ref 20–29)
CO2 SERPL-SCNC: 20 MMOL/L (ref 21–32)
CO2 SERPL-SCNC: 26 MMOL/L (ref 21–32)
CO2 SERPL-SCNC: 26 MMOL/L (ref 21–32)
CO2 SERPL-SCNC: 27 MMOL/L (ref 21–32)
CO2 SERPL-SCNC: 28 MMOL/L (ref 21–32)
CO2 SERPL-SCNC: 28 MMOL/L (ref 21–32)
CO2 SERPL-SCNC: 30 MMOL/L (ref 21–32)
COLOR UR: ABNORMAL
COMMENT, HOLDF: NORMAL
CREAT SERPL-MCNC: 0.92 MG/DL (ref 0.57–1)
CREAT SERPL-MCNC: 1.08 MG/DL (ref 0.55–1.02)
CREAT SERPL-MCNC: 1.24 MG/DL (ref 0.55–1.02)
CREAT SERPL-MCNC: 1.27 MG/DL (ref 0.55–1.02)
CREAT SERPL-MCNC: 1.34 MG/DL (ref 0.55–1.02)
CREAT SERPL-MCNC: 1.35 MG/DL (ref 0.55–1.02)
CREAT SERPL-MCNC: 1.54 MG/DL (ref 0.55–1.02)
CREAT SERPL-MCNC: 1.83 MG/DL (ref 0.55–1.02)
CREAT SERPL-MCNC: 1.9 MG/DL (ref 0.55–1.02)
CREATININE, URINE POC: 200 MG/DL
D DIMER PPP FEU-MCNC: 0.42 MG/L FEU (ref 0–0.65)
D DIMER PPP FEU-MCNC: 0.56 MG/L FEU (ref 0–0.65)
DIAGNOSIS, 93000: NORMAL
DIFFERENTIAL METHOD BLD: ABNORMAL
DIFFERENTIAL METHOD BLD: NORMAL
ECHO AO ROOT DIAM: 2.64 CM
ECHO AV AREA PEAK VELOCITY: 2.2 CM2
ECHO AV PEAK GRADIENT: 7.6 MMHG
ECHO AV PEAK VELOCITY: 138.23 CM/S
ECHO LA MAJOR AXIS: 3.68 CM
ECHO LA TO AORTIC ROOT RATIO: 1.39
ECHO LA VOL 2C: 48.6 ML (ref 22–52)
ECHO LA VOL 4C: 28.27 ML (ref 22–52)
ECHO LA VOL BP: 40.79 ML (ref 22–52)
ECHO LA VOL/BSA BIPLANE: 20.49 ML/M2 (ref 16–28)
ECHO LA VOLUME INDEX A2C: 24.42 ML/M2 (ref 16–28)
ECHO LA VOLUME INDEX A4C: 14.2 ML/M2 (ref 16–28)
ECHO LV INTERNAL DIMENSION DIASTOLIC: 3.64 CM (ref 3.9–5.3)
ECHO LV INTERNAL DIMENSION SYSTOLIC: 2.46 CM
ECHO LV IVSD: 1.33 CM (ref 0.6–0.9)
ECHO LV MASS 2D: 132.3 G (ref 67–162)
ECHO LV MASS INDEX 2D: 66.5 G/M2 (ref 43–95)
ECHO LV POSTERIOR WALL DIASTOLIC: 0.76 CM (ref 0.6–0.9)
ECHO LVOT DIAM: 1.87 CM
ECHO LVOT PEAK GRADIENT: 4.7 MMHG
ECHO LVOT PEAK VELOCITY: 108.73 CM/S
ECHO MV REGURGITANT PEAK GRADIENT: 113.8 MMHG
ECHO MV REGURGITANT PEAK VELOCITY: 533.39 CM/S
ECHO PV MAX VELOCITY: 76.21 CM/S
ECHO PV PEAK GRADIENT: 2.3 MMHG
ECHO RV INTERNAL DIMENSION: 2.54 CM
ECHO TV REGURGITANT MAX VELOCITY: 261.7 CM/S
ECHO TV REGURGITANT PEAK GRADIENT: 27.4 MMHG
EOSINOPHIL # BLD: 0.1 K/UL (ref 0–0.4)
EOSINOPHIL # BLD: 0.2 K/UL (ref 0–0.4)
EOSINOPHIL # BLD: 0.3 K/UL (ref 0–0.4)
EOSINOPHIL NFR BLD: 1 % (ref 0–7)
EOSINOPHIL NFR BLD: 2 % (ref 0–7)
EOSINOPHIL NFR BLD: 4 % (ref 0–7)
EPITH CASTS URNS QL MICRO: ABNORMAL /LPF
ERYTHROCYTE [DISTWIDTH] IN BLOOD BY AUTOMATED COUNT: 12.8 % (ref 11.5–14.5)
ERYTHROCYTE [DISTWIDTH] IN BLOOD BY AUTOMATED COUNT: 12.9 % (ref 11.5–14.5)
ERYTHROCYTE [DISTWIDTH] IN BLOOD BY AUTOMATED COUNT: 13.3 % (ref 11.5–14.5)
ERYTHROCYTE [DISTWIDTH] IN BLOOD BY AUTOMATED COUNT: 13.4 % (ref 11.5–14.5)
ERYTHROCYTE [DISTWIDTH] IN BLOOD BY AUTOMATED COUNT: 13.5 % (ref 11.5–14.5)
ERYTHROCYTE [DISTWIDTH] IN BLOOD BY AUTOMATED COUNT: 13.5 % (ref 11.5–14.5)
ERYTHROCYTE [DISTWIDTH] IN BLOOD BY AUTOMATED COUNT: 14.1 % (ref 11.5–14.5)
ERYTHROCYTE [DISTWIDTH] IN BLOOD BY AUTOMATED COUNT: 14.1 % (ref 11.5–14.5)
EST. AVERAGE GLUCOSE BLD GHB EST-MCNC: 214 MG/DL
GAS FLOW.O2 O2 DELIVERY SYS: ABNORMAL L/MIN
GLOBULIN SER CALC-MCNC: 3.6 G/DL (ref 1.5–4.5)
GLOBULIN SER CALC-MCNC: 4.2 G/DL (ref 2–4)
GLOBULIN SER CALC-MCNC: 4.6 G/DL (ref 2–4)
GLOBULIN SER CALC-MCNC: 4.8 G/DL (ref 2–4)
GLOBULIN SER CALC-MCNC: 5 G/DL (ref 2–4)
GLOBULIN SER CALC-MCNC: 5.4 G/DL (ref 2–4)
GLUCOSE BLD STRIP.AUTO-MCNC: 222 MG/DL (ref 65–100)
GLUCOSE BLD STRIP.AUTO-MCNC: 223 MG/DL (ref 65–100)
GLUCOSE BLD STRIP.AUTO-MCNC: 276 MG/DL (ref 65–100)
GLUCOSE BLD STRIP.AUTO-MCNC: 279 MG/DL (ref 65–100)
GLUCOSE BLD STRIP.AUTO-MCNC: 319 MG/DL (ref 65–100)
GLUCOSE BLD STRIP.AUTO-MCNC: 323 MG/DL (ref 65–100)
GLUCOSE BLD STRIP.AUTO-MCNC: 328 MG/DL (ref 65–100)
GLUCOSE BLD STRIP.AUTO-MCNC: 356 MG/DL (ref 65–100)
GLUCOSE BLD STRIP.AUTO-MCNC: 399 MG/DL (ref 65–100)
GLUCOSE BLD STRIP.AUTO-MCNC: 403 MG/DL (ref 65–100)
GLUCOSE BLD STRIP.AUTO-MCNC: 417 MG/DL (ref 65–100)
GLUCOSE BLD STRIP.AUTO-MCNC: 485 MG/DL (ref 65–100)
GLUCOSE BLD STRIP.AUTO-MCNC: 488 MG/DL (ref 65–100)
GLUCOSE BLD STRIP.AUTO-MCNC: 521 MG/DL (ref 65–100)
GLUCOSE BLD STRIP.AUTO-MCNC: 572 MG/DL (ref 65–100)
GLUCOSE BLD STRIP.AUTO-MCNC: >600 MG/DL (ref 65–100)
GLUCOSE POC: 140 MG/DL
GLUCOSE SERPL-MCNC: 108 MG/DL (ref 65–100)
GLUCOSE SERPL-MCNC: 124 MG/DL (ref 65–100)
GLUCOSE SERPL-MCNC: 150 MG/DL (ref 65–100)
GLUCOSE SERPL-MCNC: 203 MG/DL (ref 65–100)
GLUCOSE SERPL-MCNC: 253 MG/DL (ref 65–100)
GLUCOSE SERPL-MCNC: 275 MG/DL (ref 65–99)
GLUCOSE SERPL-MCNC: 320 MG/DL (ref 65–100)
GLUCOSE SERPL-MCNC: 366 MG/DL (ref 65–100)
GLUCOSE SERPL-MCNC: 431 MG/DL (ref 65–100)
GLUCOSE UR STRIP.AUTO-MCNC: 500 MG/DL
GLUCOSE UR STRIP.AUTO-MCNC: NEGATIVE MG/DL
GLUCOSE UR STRIP.AUTO-MCNC: NEGATIVE MG/DL
GLUCOSE UR-MCNC: ABNORMAL MG/DL
GLUCOSE UR-MCNC: NEGATIVE MG/DL
HBA1C MFR BLD HPLC: 8.3 %
HBA1C MFR BLD: 9.1 % (ref 4.8–5.6)
HCG SERPL QL: NEGATIVE
HCG UR QL: NEGATIVE
HCG UR QL: NEGATIVE
HCO3 BLD-SCNC: 22.5 MMOL/L (ref 22–26)
HCT VFR BLD AUTO: 33 % (ref 35–47)
HCT VFR BLD AUTO: 33.9 % (ref 35–47)
HCT VFR BLD AUTO: 34.1 % (ref 35–47)
HCT VFR BLD AUTO: 34.2 % (ref 35–47)
HCT VFR BLD AUTO: 35.2 % (ref 35–47)
HCT VFR BLD AUTO: 36.8 % (ref 35–47)
HCT VFR BLD AUTO: 37.9 % (ref 35–47)
HCT VFR BLD AUTO: 38.8 % (ref 35–47)
HDLC SERPL-MCNC: 52 MG/DL
HGB BLD-MCNC: 10.9 G/DL (ref 11.5–16)
HGB BLD-MCNC: 11.2 G/DL (ref 11.5–16)
HGB BLD-MCNC: 11.3 G/DL (ref 11.5–16)
HGB BLD-MCNC: 11.7 G/DL (ref 11.5–16)
HGB BLD-MCNC: 11.7 G/DL (ref 11.5–16)
HGB BLD-MCNC: 11.9 G/DL (ref 11.5–16)
HGB BLD-MCNC: 12.5 G/DL (ref 11.5–16)
HGB BLD-MCNC: 13.1 G/DL (ref 11.5–16)
HGB UR QL STRIP: ABNORMAL
HYALINE CASTS URNS QL MICRO: ABNORMAL /LPF (ref 0–5)
IMM GRANULOCYTES # BLD AUTO: 0 K/UL (ref 0–0.04)
IMM GRANULOCYTES # BLD AUTO: 0 K/UL (ref 0–0.04)
IMM GRANULOCYTES NFR BLD AUTO: 0 % (ref 0–0.5)
IMM GRANULOCYTES NFR BLD AUTO: 1 % (ref 0–0.5)
INR PPP: 0.9 (ref 0.8–1.2)
INTERPRETATION, 910389: NORMAL
KETONES P FAST UR STRIP-MCNC: NEGATIVE MG/DL
KETONES P FAST UR STRIP-MCNC: NEGATIVE MG/DL
KETONES UR QL STRIP.AUTO: NEGATIVE MG/DL
LDLC SERPL CALC-MCNC: 95 MG/DL (ref 0–99)
LEFT ABI: 0.95
LEFT ANTERIOR TIBIAL: 110 MMHG
LEFT ARM BP: 126 MMHG
LEFT POSTERIOR TIBIAL: 120 MMHG
LEUKOCYTE ESTERASE UR QL STRIP.AUTO: ABNORMAL
LEUKOCYTE ESTERASE UR QL STRIP.AUTO: NEGATIVE
LEUKOCYTE ESTERASE UR QL STRIP.AUTO: NEGATIVE
LVFS 2D: 32.59 %
LYMPHOCYTES # BLD: 2 K/UL (ref 0.8–3.5)
LYMPHOCYTES # BLD: 2.3 K/UL (ref 0.8–3.5)
LYMPHOCYTES # BLD: 2.4 K/UL (ref 0.8–3.5)
LYMPHOCYTES # BLD: 2.6 K/UL (ref 0.8–3.5)
LYMPHOCYTES # BLD: 2.6 K/UL (ref 0.8–3.5)
LYMPHOCYTES # BLD: 2.8 K/UL (ref 0.8–3.5)
LYMPHOCYTES NFR BLD: 26 % (ref 12–49)
LYMPHOCYTES NFR BLD: 29 % (ref 12–49)
LYMPHOCYTES NFR BLD: 36 % (ref 12–49)
LYMPHOCYTES NFR BLD: 36 % (ref 12–49)
LYMPHOCYTES NFR BLD: 37 % (ref 12–49)
LYMPHOCYTES NFR BLD: 38 % (ref 12–49)
Lab: NORMAL
MAGNESIUM SERPL-MCNC: 1.1 MG/DL (ref 1.6–2.4)
MCH RBC QN AUTO: 31.1 PG (ref 26–34)
MCH RBC QN AUTO: 31.2 PG (ref 26–34)
MCH RBC QN AUTO: 31.3 PG (ref 26–34)
MCH RBC QN AUTO: 31.6 PG (ref 26–34)
MCH RBC QN AUTO: 31.9 PG (ref 26–34)
MCH RBC QN AUTO: 32 PG (ref 26–34)
MCHC RBC AUTO-ENTMCNC: 32.1 G/DL (ref 30–36.5)
MCHC RBC AUTO-ENTMCNC: 32.3 G/DL (ref 30–36.5)
MCHC RBC AUTO-ENTMCNC: 32.7 G/DL (ref 30–36.5)
MCHC RBC AUTO-ENTMCNC: 33 G/DL (ref 30–36.5)
MCHC RBC AUTO-ENTMCNC: 33 G/DL (ref 30–36.5)
MCHC RBC AUTO-ENTMCNC: 33.8 G/DL (ref 30–36.5)
MCHC RBC AUTO-ENTMCNC: 34.3 G/DL (ref 30–36.5)
MCHC RBC AUTO-ENTMCNC: 34.5 G/DL (ref 30–36.5)
MCV RBC AUTO: 91.6 FL (ref 80–99)
MCV RBC AUTO: 92.9 FL (ref 80–99)
MCV RBC AUTO: 93.7 FL (ref 80–99)
MCV RBC AUTO: 94.8 FL (ref 80–99)
MCV RBC AUTO: 95 FL (ref 80–99)
MCV RBC AUTO: 96.6 FL (ref 80–99)
MCV RBC AUTO: 96.9 FL (ref 80–99)
MCV RBC AUTO: 98.3 FL (ref 80–99)
MICROALBUMIN/CREAT RATIO POC: >300 MG/G
MONOCYTES # BLD: 0.6 K/UL (ref 0–1)
MONOCYTES # BLD: 0.8 K/UL (ref 0–1)
MONOCYTES # BLD: 0.8 K/UL (ref 0–1)
MONOCYTES # BLD: 0.9 K/UL (ref 0–1)
MONOCYTES # BLD: 0.9 K/UL (ref 0–1)
MONOCYTES # BLD: 1 K/UL (ref 0–1)
MONOCYTES NFR BLD: 11 % (ref 5–13)
MONOCYTES NFR BLD: 11 % (ref 5–13)
MONOCYTES NFR BLD: 12 % (ref 5–13)
MONOCYTES NFR BLD: 12 % (ref 5–13)
MONOCYTES NFR BLD: 14 % (ref 5–13)
MONOCYTES NFR BLD: 9 % (ref 5–13)
MUCOUS THREADS URNS QL MICRO: ABNORMAL /LPF
NEUTS SEG # BLD: 3.3 K/UL (ref 1.8–8)
NEUTS SEG # BLD: 3.4 K/UL (ref 1.8–8)
NEUTS SEG # BLD: 3.4 K/UL (ref 1.8–8)
NEUTS SEG # BLD: 3.6 K/UL (ref 1.8–8)
NEUTS SEG # BLD: 4.4 K/UL (ref 1.8–8)
NEUTS SEG # BLD: 4.5 K/UL (ref 1.8–8)
NEUTS SEG NFR BLD: 46 % (ref 32–75)
NEUTS SEG NFR BLD: 50 % (ref 32–75)
NEUTS SEG NFR BLD: 50 % (ref 32–75)
NEUTS SEG NFR BLD: 52 % (ref 32–75)
NEUTS SEG NFR BLD: 58 % (ref 32–75)
NEUTS SEG NFR BLD: 59 % (ref 32–75)
NITRITE UR QL STRIP.AUTO: NEGATIVE
NRBC # BLD: 0 K/UL (ref 0–0.01)
NRBC BLD-RTO: 0 PER 100 WBC
O2/TOTAL GAS SETTING VFR VENT: 21 %
OTHER,OTHU: ABNORMAL
P-R INTERVAL, ECG05: 134 MS
P-R INTERVAL, ECG05: 136 MS
P-R INTERVAL, ECG05: 140 MS
P-R INTERVAL, ECG05: 146 MS
P-R INTERVAL, ECG05: 146 MS
P-R INTERVAL, ECG05: 340 MS
PCO2 BLD: 41.1 MMHG (ref 35–45)
PH BLD: 7.35 [PH] (ref 7.35–7.45)
PH UR STRIP: 6 [PH] (ref 4.6–8)
PH UR STRIP: 6 [PH] (ref 5–8)
PH UR STRIP: 7 [PH] (ref 4.6–8)
PLATELET # BLD AUTO: 289 K/UL (ref 150–400)
PLATELET # BLD AUTO: 312 K/UL (ref 150–400)
PLATELET # BLD AUTO: 313 K/UL (ref 150–400)
PLATELET # BLD AUTO: 353 K/UL (ref 150–400)
PLATELET # BLD AUTO: 364 K/UL (ref 150–400)
PLATELET # BLD AUTO: 367 K/UL (ref 150–400)
PLATELET # BLD AUTO: 377 K/UL (ref 150–400)
PLATELET # BLD AUTO: 415 K/UL (ref 150–400)
PMV BLD AUTO: 10.2 FL (ref 8.9–12.9)
PMV BLD AUTO: 10.2 FL (ref 8.9–12.9)
PMV BLD AUTO: 10.5 FL (ref 8.9–12.9)
PMV BLD AUTO: 9.6 FL (ref 8.9–12.9)
PMV BLD AUTO: 9.6 FL (ref 8.9–12.9)
PMV BLD AUTO: 9.7 FL (ref 8.9–12.9)
PMV BLD AUTO: 9.8 FL (ref 8.9–12.9)
PMV BLD AUTO: 9.9 FL (ref 8.9–12.9)
PO2 BLD: 71 MMHG (ref 80–100)
POTASSIUM SERPL-SCNC: 3.6 MMOL/L (ref 3.5–5.1)
POTASSIUM SERPL-SCNC: 3.7 MMOL/L (ref 3.5–5.1)
POTASSIUM SERPL-SCNC: 3.8 MMOL/L (ref 3.5–5.1)
POTASSIUM SERPL-SCNC: 3.9 MMOL/L (ref 3.5–5.1)
POTASSIUM SERPL-SCNC: 4.2 MMOL/L (ref 3.5–5.1)
POTASSIUM SERPL-SCNC: 4.4 MMOL/L (ref 3.5–5.1)
POTASSIUM SERPL-SCNC: 4.4 MMOL/L (ref 3.5–5.1)
POTASSIUM SERPL-SCNC: 4.4 MMOL/L (ref 3.5–5.2)
POTASSIUM SERPL-SCNC: 4.5 MMOL/L (ref 3.5–5.1)
PROT SERPL-MCNC: 6.9 G/DL (ref 6.4–8.2)
PROT SERPL-MCNC: 7.1 G/DL (ref 6–8.5)
PROT SERPL-MCNC: 7.5 G/DL (ref 6.4–8.2)
PROT SERPL-MCNC: 7.6 G/DL (ref 6.4–8.2)
PROT SERPL-MCNC: 7.9 G/DL (ref 6.4–8.2)
PROT SERPL-MCNC: 8.4 G/DL (ref 6.4–8.2)
PROT UR QL STRIP: ABNORMAL
PROT UR QL STRIP: ABNORMAL
PROT UR STRIP-MCNC: 100 MG/DL
PROTHROMBIN TIME: 9.8 SEC (ref 9.1–12)
PV END DIASTOLIC VELOCITY: 1.3 MMHG
Q-T INTERVAL, ECG07: 248 MS
Q-T INTERVAL, ECG07: 316 MS
Q-T INTERVAL, ECG07: 324 MS
Q-T INTERVAL, ECG07: 326 MS
Q-T INTERVAL, ECG07: 332 MS
Q-T INTERVAL, ECG07: 334 MS
Q-T INTERVAL, ECG07: 348 MS
Q-T INTERVAL, ECG07: 370 MS
Q-T INTERVAL, ECG07: 398 MS
QRS DURATION, ECG06: 50 MS
QRS DURATION, ECG06: 56 MS
QRS DURATION, ECG06: 60 MS
QRS DURATION, ECG06: 64 MS
QRS DURATION, ECG06: 68 MS
QRS DURATION, ECG06: 74 MS
QRS DURATION, ECG06: 74 MS
QRS DURATION, ECG06: 76 MS
QRS DURATION, ECG06: 78 MS
QTC CALCULATION (BEZET), ECG08: 335 MS
QTC CALCULATION (BEZET), ECG08: 428 MS
QTC CALCULATION (BEZET), ECG08: 431 MS
QTC CALCULATION (BEZET), ECG08: 434 MS
QTC CALCULATION (BEZET), ECG08: 441 MS
QTC CALCULATION (BEZET), ECG08: 447 MS
QTC CALCULATION (BEZET), ECG08: 453 MS
QTC CALCULATION (BEZET), ECG08: 457 MS
QTC CALCULATION (BEZET), ECG08: 473 MS
RBC # BLD AUTO: 3.48 M/UL (ref 3.8–5.2)
RBC # BLD AUTO: 3.58 M/UL (ref 3.8–5.2)
RBC # BLD AUTO: 3.6 M/UL (ref 3.8–5.2)
RBC # BLD AUTO: 3.67 M/UL (ref 3.8–5.2)
RBC # BLD AUTO: 3.7 M/UL (ref 3.8–5.2)
RBC # BLD AUTO: 3.81 M/UL (ref 3.8–5.2)
RBC # BLD AUTO: 3.91 M/UL (ref 3.8–5.2)
RBC # BLD AUTO: 4.14 M/UL (ref 3.8–5.2)
RBC #/AREA URNS HPF: >100 /HPF (ref 0–5)
RBC #/AREA URNS HPF: ABNORMAL /HPF (ref 0–5)
RBC #/AREA URNS HPF: ABNORMAL /HPF (ref 0–5)
RIGHT ABI: 0.89
RIGHT ANTERIOR TIBIAL: 112 MMHG
RIGHT CFA PROX SYS PSV: 149.3 CM/S
RIGHT POSTERIOR TIBIAL: 110 MMHG
RIGHT PROX PFA A PSV: 76.3 CM/S
RIGHT SFA DIST VEL RATIO: 0.93
RIGHT SFA MID VEL RATIO: 0.8
RIGHT SFA PROX VEL RATIO: 0.9
RIGHT SUPER FEMORAL DIST SYS PSV: 102.2 CM/S
RIGHT SUPER FEMORAL MID SYS PSV: 110.3 CM/S
RIGHT SUPER FEMORAL PROX SYS PSV: 137.7 CM/S
S PYO AG THROAT QL: POSITIVE
SAMPLES BEING HELD,HOLD: NORMAL
SAO2 % BLD: 93 % (ref 92–97)
SERVICE CMNT-IMP: ABNORMAL
SERVICE CMNT-IMP: NORMAL
SODIUM SERPL-SCNC: 136 MMOL/L (ref 136–145)
SODIUM SERPL-SCNC: 136 MMOL/L (ref 136–145)
SODIUM SERPL-SCNC: 137 MMOL/L (ref 136–145)
SODIUM SERPL-SCNC: 138 MMOL/L (ref 134–144)
SODIUM SERPL-SCNC: 138 MMOL/L (ref 136–145)
SODIUM SERPL-SCNC: 138 MMOL/L (ref 136–145)
SODIUM SERPL-SCNC: 139 MMOL/L (ref 136–145)
SODIUM SERPL-SCNC: 139 MMOL/L (ref 136–145)
SODIUM SERPL-SCNC: 140 MMOL/L (ref 136–145)
SP GR UR REFRACTOMETRY: 1.01 (ref 1–1.03)
SP GR UR STRIP: 1 (ref 1–1.03)
SP GR UR STRIP: 1.02 (ref 1–1.03)
SPECIMEN TYPE: ABNORMAL
TOTAL RESP. RATE, ITRR: 22
TRIGL SERPL-MCNC: 140 MG/DL (ref 0–149)
TROPONIN I BLD-MCNC: 0.2 NG/ML (ref 0–0.08)
TROPONIN I BLD-MCNC: <0.04 NG/ML (ref 0–0.08)
TROPONIN I SERPL-MCNC: 0.56 NG/ML
TROPONIN I SERPL-MCNC: 14.1 NG/ML
TROPONIN I SERPL-MCNC: 30.3 NG/ML
TROPONIN I SERPL-MCNC: 33.5 NG/ML
TROPONIN I SERPL-MCNC: 39.7 NG/ML
TROPONIN I SERPL-MCNC: <0.05 NG/ML
UA UROBILINOGEN AMB POC: ABNORMAL (ref 0.2–1)
UA UROBILINOGEN AMB POC: ABNORMAL (ref 0.2–1)
UA: UC IF INDICATED,UAUC: ABNORMAL
UR CULT HOLD, URHOLD: NORMAL
UR CULT HOLD, URHOLD: NORMAL
URINALYSIS CLARITY POC: CLEAR
URINALYSIS CLARITY POC: CLEAR
URINALYSIS COLOR POC: YELLOW
URINALYSIS COLOR POC: YELLOW
URINE BLOOD POC: ABNORMAL
URINE BLOOD POC: ABNORMAL
URINE LEUKOCYTES POC: ABNORMAL
URINE LEUKOCYTES POC: NEGATIVE
URINE NITRITES POC: NEGATIVE
URINE NITRITES POC: NEGATIVE
UROBILINOGEN UR QL STRIP.AUTO: 0.2 EU/DL (ref 0.2–1)
VENTRICULAR RATE, ECG03: 103 BPM
VENTRICULAR RATE, ECG03: 105 BPM
VENTRICULAR RATE, ECG03: 108 BPM
VENTRICULAR RATE, ECG03: 110 BPM
VENTRICULAR RATE, ECG03: 110 BPM
VENTRICULAR RATE, ECG03: 111 BPM
VENTRICULAR RATE, ECG03: 112 BPM
VENTRICULAR RATE, ECG03: 78 BPM
VENTRICULAR RATE, ECG03: 92 BPM
VLDLC SERPL CALC-MCNC: 28 MG/DL (ref 5–40)
WBC # BLD AUTO: 12.5 K/UL (ref 3.6–11)
WBC # BLD AUTO: 19.5 K/UL (ref 3.6–11)
WBC # BLD AUTO: 6.5 K/UL (ref 3.6–11)
WBC # BLD AUTO: 7 K/UL (ref 3.6–11)
WBC # BLD AUTO: 7.3 K/UL (ref 3.6–11)
WBC # BLD AUTO: 7.3 K/UL (ref 3.6–11)
WBC # BLD AUTO: 7.6 K/UL (ref 3.6–11)
WBC # BLD AUTO: 7.8 K/UL (ref 3.6–11)
WBC URNS QL MICRO: ABNORMAL /HPF (ref 0–4)
XXWBCSUS: 0
XXWBCSUS: 0

## 2019-01-01 PROCEDURE — 82803 BLOOD GASES ANY COMBINATION: CPT

## 2019-01-01 PROCEDURE — 96374 THER/PROPH/DIAG INJ IV PUSH: CPT

## 2019-01-01 PROCEDURE — 74176 CT ABD & PELVIS W/O CONTRAST: CPT

## 2019-01-01 PROCEDURE — 80053 COMPREHEN METABOLIC PANEL: CPT

## 2019-01-01 PROCEDURE — 88307 TISSUE EXAM BY PATHOLOGIST: CPT

## 2019-01-01 PROCEDURE — C1725 CATH, TRANSLUMIN NON-LASER: HCPCS | Performed by: INTERNAL MEDICINE

## 2019-01-01 PROCEDURE — 97110 THERAPEUTIC EXERCISES: CPT | Performed by: PHYSICAL MEDICINE & REHABILITATION

## 2019-01-01 PROCEDURE — 71046 X-RAY EXAM CHEST 2 VIEWS: CPT

## 2019-01-01 PROCEDURE — 74011000250 HC RX REV CODE- 250: Performed by: STUDENT IN AN ORGANIZED HEALTH CARE EDUCATION/TRAINING PROGRAM

## 2019-01-01 PROCEDURE — 74011000250 HC RX REV CODE- 250: Performed by: INTERNAL MEDICINE

## 2019-01-01 PROCEDURE — 74011250636 HC RX REV CODE- 250/636: Performed by: INTERNAL MEDICINE

## 2019-01-01 PROCEDURE — 96365 THER/PROPH/DIAG IV INF INIT: CPT

## 2019-01-01 PROCEDURE — 96372 THER/PROPH/DIAG INJ SC/IM: CPT

## 2019-01-01 PROCEDURE — 74011250637 HC RX REV CODE- 250/637: Performed by: EMERGENCY MEDICINE

## 2019-01-01 PROCEDURE — 96376 TX/PRO/DX INJ SAME DRUG ADON: CPT

## 2019-01-01 PROCEDURE — 97110 THERAPEUTIC EXERCISES: CPT

## 2019-01-01 PROCEDURE — 93005 ELECTROCARDIOGRAM TRACING: CPT

## 2019-01-01 PROCEDURE — 87086 URINE CULTURE/COLONY COUNT: CPT

## 2019-01-01 PROCEDURE — 74011250637 HC RX REV CODE- 250/637: Performed by: INTERNAL MEDICINE

## 2019-01-01 PROCEDURE — 83880 ASSAY OF NATRIURETIC PEPTIDE: CPT

## 2019-01-01 PROCEDURE — 85379 FIBRIN DEGRADATION QUANT: CPT

## 2019-01-01 PROCEDURE — 97014 ELECTRIC STIMULATION THERAPY: CPT | Performed by: PHYSICAL THERAPIST

## 2019-01-01 PROCEDURE — 85025 COMPLETE CBC W/AUTO DIFF WBC: CPT

## 2019-01-01 PROCEDURE — C8929 TTE W OR WO FOL WCON,DOPPLER: HCPCS

## 2019-01-01 PROCEDURE — 81025 URINE PREGNANCY TEST: CPT

## 2019-01-01 PROCEDURE — 81001 URINALYSIS AUTO W/SCOPE: CPT

## 2019-01-01 PROCEDURE — 74011250636 HC RX REV CODE- 250/636: Performed by: EMERGENCY MEDICINE

## 2019-01-01 PROCEDURE — 92920 PRQ TRLUML C ANGIOP 1ART&/BR: CPT | Performed by: INTERNAL MEDICINE

## 2019-01-01 PROCEDURE — 97110 THERAPEUTIC EXERCISES: CPT | Performed by: PHYSICAL THERAPIST

## 2019-01-01 PROCEDURE — 96375 TX/PRO/DX INJ NEW DRUG ADDON: CPT

## 2019-01-01 PROCEDURE — 74011250636 HC RX REV CODE- 250/636: Performed by: STUDENT IN AN ORGANIZED HEALTH CARE EDUCATION/TRAINING PROGRAM

## 2019-01-01 PROCEDURE — 84484 ASSAY OF TROPONIN QUANT: CPT

## 2019-01-01 PROCEDURE — 71045 X-RAY EXAM CHEST 1 VIEW: CPT

## 2019-01-01 PROCEDURE — 3E03317 INTRODUCTION OF OTHER THROMBOLYTIC INTO PERIPHERAL VEIN, PERCUTANEOUS APPROACH: ICD-10-PCS | Performed by: INTERNAL MEDICINE

## 2019-01-01 PROCEDURE — 74011636637 HC RX REV CODE- 636/637: Performed by: FAMILY MEDICINE

## 2019-01-01 PROCEDURE — 74011250637 HC RX REV CODE- 250/637: Performed by: STUDENT IN AN ORGANIZED HEALTH CARE EDUCATION/TRAINING PROGRAM

## 2019-01-01 PROCEDURE — 65660000000 HC RM CCU STEPDOWN

## 2019-01-01 PROCEDURE — 4A023N7 MEASUREMENT OF CARDIAC SAMPLING AND PRESSURE, LEFT HEART, PERCUTANEOUS APPROACH: ICD-10-PCS | Performed by: INTERNAL MEDICINE

## 2019-01-01 PROCEDURE — 80048 BASIC METABOLIC PNL TOTAL CA: CPT

## 2019-01-01 PROCEDURE — 74011000250 HC RX REV CODE- 250: Performed by: EMERGENCY MEDICINE

## 2019-01-01 PROCEDURE — B2151ZZ FLUOROSCOPY OF LEFT HEART USING LOW OSMOLAR CONTRAST: ICD-10-PCS | Performed by: INTERNAL MEDICINE

## 2019-01-01 PROCEDURE — 74011250637 HC RX REV CODE- 250/637: Performed by: NURSE PRACTITIONER

## 2019-01-01 PROCEDURE — 77030013715 HC INFL SYS MRTM -B: Performed by: INTERNAL MEDICINE

## 2019-01-01 PROCEDURE — 94760 N-INVAS EAR/PLS OXIMETRY 1: CPT

## 2019-01-01 PROCEDURE — 77030013797 HC KT TRNSDUC PRSSR EDWD -A: Performed by: INTERNAL MEDICINE

## 2019-01-01 PROCEDURE — 99218 HC RM OBSERVATION: CPT

## 2019-01-01 PROCEDURE — 74011250636 HC RX REV CODE- 250/636: Performed by: FAMILY MEDICINE

## 2019-01-01 PROCEDURE — 82962 GLUCOSE BLOOD TEST: CPT

## 2019-01-01 PROCEDURE — 85347 COAGULATION TIME ACTIVATED: CPT

## 2019-01-01 PROCEDURE — 71275 CT ANGIOGRAPHY CHEST: CPT

## 2019-01-01 PROCEDURE — 77030029684 HC NEB SM VOL KT MONA -A

## 2019-01-01 PROCEDURE — 36600 WITHDRAWAL OF ARTERIAL BLOOD: CPT

## 2019-01-01 PROCEDURE — 94640 AIRWAY INHALATION TREATMENT: CPT

## 2019-01-01 PROCEDURE — 99285 EMERGENCY DEPT VISIT HI MDM: CPT

## 2019-01-01 PROCEDURE — 74011000258 HC RX REV CODE- 258: Performed by: INTERNAL MEDICINE

## 2019-01-01 PROCEDURE — 74011250636 HC RX REV CODE- 250/636: Performed by: NURSE PRACTITIONER

## 2019-01-01 PROCEDURE — 85027 COMPLETE CBC AUTOMATED: CPT

## 2019-01-01 PROCEDURE — 83735 ASSAY OF MAGNESIUM: CPT

## 2019-01-01 PROCEDURE — C1769 GUIDE WIRE: HCPCS | Performed by: INTERNAL MEDICINE

## 2019-01-01 PROCEDURE — 97014 ELECTRIC STIMULATION THERAPY: CPT

## 2019-01-01 PROCEDURE — 36415 COLL VENOUS BLD VENIPUNCTURE: CPT

## 2019-01-01 PROCEDURE — 97014 ELECTRIC STIMULATION THERAPY: CPT | Performed by: PHYSICAL MEDICINE & REHABILITATION

## 2019-01-01 PROCEDURE — 77030014115: Performed by: INTERNAL MEDICINE

## 2019-01-01 PROCEDURE — 77030008543 HC TBNG MON PRSS MRTM -A: Performed by: INTERNAL MEDICINE

## 2019-01-01 PROCEDURE — 93458 L HRT ARTERY/VENTRICLE ANGIO: CPT | Performed by: INTERNAL MEDICINE

## 2019-01-01 PROCEDURE — C1874 STENT, COATED/COV W/DEL SYS: HCPCS | Performed by: INTERNAL MEDICINE

## 2019-01-01 PROCEDURE — 74011000258 HC RX REV CODE- 258: Performed by: STUDENT IN AN ORGANIZED HEALTH CARE EDUCATION/TRAINING PROGRAM

## 2019-01-01 PROCEDURE — 93926 LOWER EXTREMITY STUDY: CPT

## 2019-01-01 PROCEDURE — C1887 CATHETER, GUIDING: HCPCS | Performed by: INTERNAL MEDICINE

## 2019-01-01 PROCEDURE — 84703 CHORIONIC GONADOTROPIN ASSAY: CPT

## 2019-01-01 PROCEDURE — 74011636637 HC RX REV CODE- 636/637: Performed by: STUDENT IN AN ORGANIZED HEALTH CARE EDUCATION/TRAINING PROGRAM

## 2019-01-01 PROCEDURE — 77030029065 HC DRSG HEMO QCLOT ZMED -B

## 2019-01-01 PROCEDURE — 77030004532 HC CATH ANGI DX IMP BSC -A: Performed by: INTERNAL MEDICINE

## 2019-01-01 PROCEDURE — 96361 HYDRATE IV INFUSION ADD-ON: CPT

## 2019-01-01 PROCEDURE — 88313 SPECIAL STAINS GROUP 2: CPT

## 2019-01-01 PROCEDURE — 76942 ECHO GUIDE FOR BIOPSY: CPT

## 2019-01-01 PROCEDURE — 02703ZZ DILATION OF CORONARY ARTERY, ONE ARTERY, PERCUTANEOUS APPROACH: ICD-10-PCS | Performed by: INTERNAL MEDICINE

## 2019-01-01 PROCEDURE — 02JA3ZZ INSPECTION OF HEART, PERCUTANEOUS APPROACH: ICD-10-PCS | Performed by: INTERNAL MEDICINE

## 2019-01-01 PROCEDURE — 74011636320 HC RX REV CODE- 636/320: Performed by: EMERGENCY MEDICINE

## 2019-01-01 PROCEDURE — 77030018842 HC SOL IRR SOD CL 9% BAXT -A: Performed by: INTERNAL MEDICINE

## 2019-01-01 PROCEDURE — B2111ZZ FLUOROSCOPY OF MULTIPLE CORONARY ARTERIES USING LOW OSMOLAR CONTRAST: ICD-10-PCS | Performed by: INTERNAL MEDICINE

## 2019-01-01 PROCEDURE — 96367 TX/PROPH/DG ADDL SEQ IV INF: CPT

## 2019-01-01 PROCEDURE — C1894 INTRO/SHEATH, NON-LASER: HCPCS | Performed by: INTERNAL MEDICINE

## 2019-01-01 PROCEDURE — 77030013826 HC NDL BIOP MAXCOR BARD -B: Performed by: INTERNAL MEDICINE

## 2019-01-01 PROCEDURE — 97162 PT EVAL MOD COMPLEX 30 MIN: CPT | Performed by: PHYSICAL THERAPIST

## 2019-01-01 PROCEDURE — 99283 EMERGENCY DEPT VISIT LOW MDM: CPT

## 2019-01-01 PROCEDURE — 82550 ASSAY OF CK (CPK): CPT

## 2019-01-01 PROCEDURE — 74011636320 HC RX REV CODE- 636/320: Performed by: INTERNAL MEDICINE

## 2019-01-01 PROCEDURE — 77030013140 HC MSK NEB VYRM -A

## 2019-01-01 PROCEDURE — 74022 RADEX COMPL AQT ABD SERIES: CPT

## 2019-01-01 PROCEDURE — 76040000007: Performed by: INTERNAL MEDICINE

## 2019-01-01 DEVICE — XIENCE SIERRA™ EVEROLIMUS ELUTING CORONARY STENT SYSTEM 2.25 MM X 15 MM / RAPID-EXCHANGE
Type: IMPLANTABLE DEVICE | Status: FUNCTIONAL
Brand: XIENCE SIERRA™

## 2019-01-01 DEVICE — XIENCE SIERRA™ EVEROLIMUS ELUTING CORONARY STENT SYSTEM 2.25 MM X 08 MM / RAPID-EXCHANGE
Type: IMPLANTABLE DEVICE | Status: FUNCTIONAL
Brand: XIENCE SIERRA™

## 2019-01-01 RX ORDER — SODIUM CHLORIDE 9 MG/ML
100 INJECTION, SOLUTION INTRAVENOUS CONTINUOUS
Status: DISPENSED | OUTPATIENT
Start: 2019-01-01 | End: 2019-01-01

## 2019-01-01 RX ORDER — DOXYCYCLINE 100 MG/1
100 CAPSULE ORAL 2 TIMES DAILY
Qty: 20 CAP | Refills: 0 | Status: SHIPPED | OUTPATIENT
Start: 2019-01-01 | End: 2019-01-01

## 2019-01-01 RX ORDER — ATORVASTATIN CALCIUM 80 MG/1
80 TABLET, FILM COATED ORAL DAILY
Qty: 30 TAB | Refills: 0 | Status: SHIPPED | OUTPATIENT
Start: 2019-01-01

## 2019-01-01 RX ORDER — ISOSORBIDE MONONITRATE 30 MG/1
30 TABLET, EXTENDED RELEASE ORAL
Qty: 90 TAB | Refills: 1 | Status: SHIPPED | OUTPATIENT
Start: 2019-01-01 | End: 2019-01-01 | Stop reason: SDUPTHER

## 2019-01-01 RX ORDER — DEXTROSE MONOHYDRATE 100 MG/ML
0-250 INJECTION, SOLUTION INTRAVENOUS AS NEEDED
Status: DISCONTINUED | OUTPATIENT
Start: 2019-01-01 | End: 2019-01-01 | Stop reason: HOSPADM

## 2019-01-01 RX ORDER — MAGNESIUM SULFATE 100 %
4 CRYSTALS MISCELLANEOUS AS NEEDED
Status: DISCONTINUED | OUTPATIENT
Start: 2019-01-01 | End: 2019-01-01 | Stop reason: HOSPADM

## 2019-01-01 RX ORDER — LIDOCAINE 50 MG/G
PATCH TOPICAL
Qty: 1 PACKAGE | Refills: 0 | Status: SHIPPED | OUTPATIENT
Start: 2019-01-01 | End: 2019-01-01

## 2019-01-01 RX ORDER — AZITHROMYCIN 250 MG/1
TABLET, FILM COATED ORAL
Qty: 6 TAB | Refills: 0 | Status: SHIPPED | OUTPATIENT
Start: 2019-01-01 | End: 2019-01-01

## 2019-01-01 RX ORDER — INSULIN LISPRO 100 [IU]/ML
15 INJECTION, SOLUTION INTRAVENOUS; SUBCUTANEOUS
Status: DISCONTINUED | OUTPATIENT
Start: 2019-01-01 | End: 2019-01-01 | Stop reason: HOSPADM

## 2019-01-01 RX ORDER — TRAMADOL HYDROCHLORIDE 50 MG/1
50 TABLET ORAL
Qty: 12 TAB | Refills: 0
Start: 2019-01-01 | End: 2019-01-01

## 2019-01-01 RX ORDER — CYCLOBENZAPRINE HCL 5 MG
TABLET ORAL
Qty: 30 TAB | Refills: 0 | Status: SHIPPED | OUTPATIENT
Start: 2019-01-01 | End: 2019-01-01 | Stop reason: ALTCHOICE

## 2019-01-01 RX ORDER — KETOROLAC TROMETHAMINE 30 MG/ML
30 INJECTION, SOLUTION INTRAMUSCULAR; INTRAVENOUS
Status: COMPLETED | OUTPATIENT
Start: 2019-01-01 | End: 2019-01-01

## 2019-01-01 RX ORDER — HYDROCODONE BITARTRATE AND ACETAMINOPHEN 5; 325 MG/1; MG/1
2 TABLET ORAL
Status: COMPLETED | OUTPATIENT
Start: 2019-01-01 | End: 2019-01-01

## 2019-01-01 RX ORDER — SODIUM CHLORIDE 0.9 % (FLUSH) 0.9 %
5-40 SYRINGE (ML) INJECTION AS NEEDED
Status: DISCONTINUED | OUTPATIENT
Start: 2019-01-01 | End: 2019-01-01 | Stop reason: HOSPADM

## 2019-01-01 RX ORDER — MORPHINE SULFATE 4 MG/ML
4 INJECTION, SOLUTION INTRAMUSCULAR; INTRAVENOUS ONCE
Status: COMPLETED | OUTPATIENT
Start: 2019-01-01 | End: 2019-01-01

## 2019-01-01 RX ORDER — CYCLOBENZAPRINE HCL 5 MG
5 TABLET ORAL
Qty: 30 TAB | Refills: 0 | Status: SHIPPED | OUTPATIENT
Start: 2019-01-01 | End: 2019-01-01 | Stop reason: SDUPTHER

## 2019-01-01 RX ORDER — CLOPIDOGREL BISULFATE 75 MG/1
75 TABLET ORAL DAILY
Status: DISCONTINUED | OUTPATIENT
Start: 2019-01-01 | End: 2019-01-01 | Stop reason: HOSPADM

## 2019-01-01 RX ORDER — FENTANYL CITRATE 50 UG/ML
25 INJECTION, SOLUTION INTRAMUSCULAR; INTRAVENOUS
Status: ACTIVE | OUTPATIENT
Start: 2019-01-01 | End: 2019-01-01

## 2019-01-01 RX ORDER — MORPHINE SULFATE 4 MG/ML
6 INJECTION INTRAVENOUS
Status: COMPLETED | OUTPATIENT
Start: 2019-01-01 | End: 2019-01-01

## 2019-01-01 RX ORDER — ONDANSETRON 2 MG/ML
4 INJECTION INTRAMUSCULAR; INTRAVENOUS
Status: DISCONTINUED | OUTPATIENT
Start: 2019-01-01 | End: 2019-01-01 | Stop reason: HOSPADM

## 2019-01-01 RX ORDER — ISOSORBIDE MONONITRATE 30 MG/1
TABLET, EXTENDED RELEASE ORAL
Qty: 30 TAB | Refills: 0 | Status: SHIPPED | OUTPATIENT
Start: 2019-01-01 | End: 2019-01-01 | Stop reason: SDUPTHER

## 2019-01-01 RX ORDER — DIPHENHYDRAMINE HYDROCHLORIDE 50 MG/ML
50 INJECTION, SOLUTION INTRAMUSCULAR; INTRAVENOUS
Status: COMPLETED | OUTPATIENT
Start: 2019-01-01 | End: 2019-01-01

## 2019-01-01 RX ORDER — INSULIN LISPRO 100 [IU]/ML
8 INJECTION, SOLUTION INTRAVENOUS; SUBCUTANEOUS ONCE
Status: COMPLETED | OUTPATIENT
Start: 2019-01-01 | End: 2019-01-01

## 2019-01-01 RX ORDER — PANTOPRAZOLE SODIUM 40 MG/1
40 TABLET, DELAYED RELEASE ORAL
Status: DISCONTINUED | OUTPATIENT
Start: 2019-01-01 | End: 2019-01-01 | Stop reason: HOSPADM

## 2019-01-01 RX ORDER — INSULIN ASPART 100 [IU]/ML
INJECTION, SOLUTION INTRAVENOUS; SUBCUTANEOUS
Qty: 30 ML | Refills: 5 | Status: SHIPPED | OUTPATIENT
Start: 2019-01-01

## 2019-01-01 RX ORDER — INSULIN GLARGINE 100 [IU]/ML
35 INJECTION, SOLUTION SUBCUTANEOUS 2 TIMES DAILY
Status: DISCONTINUED | OUTPATIENT
Start: 2019-01-01 | End: 2019-01-01 | Stop reason: HOSPADM

## 2019-01-01 RX ORDER — LOSARTAN POTASSIUM 100 MG/1
TABLET ORAL
Qty: 90 TAB | Refills: 1 | Status: SHIPPED | OUTPATIENT
Start: 2019-01-01 | End: 2019-01-01

## 2019-01-01 RX ORDER — LOSARTAN POTASSIUM 25 MG/1
12.5 TABLET ORAL DAILY
Status: DISCONTINUED | OUTPATIENT
Start: 2019-01-01 | End: 2019-01-01 | Stop reason: HOSPADM

## 2019-01-01 RX ORDER — ISOSORBIDE MONONITRATE 30 MG/1
30 TABLET, EXTENDED RELEASE ORAL DAILY
Qty: 30 TAB | Refills: 0 | Status: SHIPPED | OUTPATIENT
Start: 2019-01-01

## 2019-01-01 RX ORDER — INSULIN LISPRO 100 [IU]/ML
10 INJECTION, SOLUTION INTRAVENOUS; SUBCUTANEOUS ONCE
Status: ACTIVE | OUTPATIENT
Start: 2019-01-01 | End: 2019-01-01

## 2019-01-01 RX ORDER — ISOSORBIDE MONONITRATE 30 MG/1
30 TABLET, EXTENDED RELEASE ORAL DAILY
Status: DISCONTINUED | OUTPATIENT
Start: 2019-01-01 | End: 2019-01-01

## 2019-01-01 RX ORDER — HEPARIN SODIUM 200 [USP'U]/100ML
INJECTION, SOLUTION INTRAVENOUS
Status: COMPLETED | OUTPATIENT
Start: 2019-01-01 | End: 2019-01-01

## 2019-01-01 RX ORDER — CYCLOBENZAPRINE HCL 5 MG
TABLET ORAL
Qty: 30 TAB | Refills: 0 | Status: SHIPPED | OUTPATIENT
Start: 2019-01-01 | End: 2019-01-01 | Stop reason: SDUPTHER

## 2019-01-01 RX ORDER — INSULIN LISPRO 100 [IU]/ML
10 INJECTION, SOLUTION INTRAVENOUS; SUBCUTANEOUS ONCE
Status: COMPLETED | OUTPATIENT
Start: 2019-01-01 | End: 2019-01-01

## 2019-01-01 RX ORDER — MAGNESIUM SULFATE HEPTAHYDRATE 40 MG/ML
2 INJECTION, SOLUTION INTRAVENOUS ONCE
Status: COMPLETED | OUTPATIENT
Start: 2019-01-01 | End: 2019-01-01

## 2019-01-01 RX ORDER — GABAPENTIN 300 MG/1
CAPSULE ORAL
Qty: 180 CAP | Refills: 3 | Status: SHIPPED | OUTPATIENT
Start: 2019-01-01 | End: 2019-01-01 | Stop reason: SDUPTHER

## 2019-01-01 RX ORDER — MONTELUKAST SODIUM 10 MG/1
10 TABLET ORAL
COMMUNITY
End: 2019-01-01 | Stop reason: SDUPTHER

## 2019-01-01 RX ORDER — CARVEDILOL 3.12 MG/1
TABLET ORAL
Qty: 60 TAB | Refills: 1 | Status: SHIPPED | OUTPATIENT
Start: 2019-01-01 | End: 2019-01-01 | Stop reason: SDUPTHER

## 2019-01-01 RX ORDER — ISOSORBIDE MONONITRATE 60 MG/1
60 TABLET, EXTENDED RELEASE ORAL
Status: DISCONTINUED | OUTPATIENT
Start: 2019-01-01 | End: 2019-01-01

## 2019-01-01 RX ORDER — SODIUM CHLORIDE 0.9 % (FLUSH) 0.9 %
5-40 SYRINGE (ML) INJECTION EVERY 8 HOURS
Status: DISCONTINUED | OUTPATIENT
Start: 2019-01-01 | End: 2019-01-01 | Stop reason: HOSPADM

## 2019-01-01 RX ORDER — HYDROMORPHONE HYDROCHLORIDE 1 MG/ML
1 INJECTION, SOLUTION INTRAMUSCULAR; INTRAVENOUS; SUBCUTANEOUS
Status: DISCONTINUED | OUTPATIENT
Start: 2019-01-01 | End: 2019-01-01 | Stop reason: HOSPADM

## 2019-01-01 RX ORDER — NITROGLYCERIN 0.4 MG/1
0.4 TABLET SUBLINGUAL
Status: COMPLETED | OUTPATIENT
Start: 2019-01-01 | End: 2019-01-01

## 2019-01-01 RX ORDER — TRIAMCINOLONE ACETONIDE 1 MG/G
CREAM TOPICAL 2 TIMES DAILY
Qty: 15 G | Refills: 2 | Status: SHIPPED | OUTPATIENT
Start: 2019-01-01 | End: 2019-01-01

## 2019-01-01 RX ORDER — MORPHINE SULFATE 2 MG/ML
2 INJECTION, SOLUTION INTRAMUSCULAR; INTRAVENOUS
Status: COMPLETED | OUTPATIENT
Start: 2019-01-01 | End: 2019-01-01

## 2019-01-01 RX ORDER — HYDROCODONE BITARTRATE AND ACETAMINOPHEN 5; 325 MG/1; MG/1
1 TABLET ORAL
Qty: 6 TAB | Refills: 0 | Status: SHIPPED | OUTPATIENT
Start: 2019-01-01 | End: 2019-01-01

## 2019-01-01 RX ORDER — CARVEDILOL 3.12 MG/1
TABLET ORAL
Qty: 90 TAB | Refills: 1 | Status: SHIPPED | OUTPATIENT
Start: 2019-01-01

## 2019-01-01 RX ORDER — HEPARIN SODIUM 5000 [USP'U]/ML
5000 INJECTION, SOLUTION INTRAVENOUS; SUBCUTANEOUS EVERY 8 HOURS
Status: DISCONTINUED | OUTPATIENT
Start: 2019-01-01 | End: 2019-01-01 | Stop reason: HOSPADM

## 2019-01-01 RX ORDER — NAPROXEN 500 MG/1
500 TABLET ORAL 2 TIMES DAILY WITH MEALS
Qty: 20 TAB | Refills: 0 | Status: SHIPPED | OUTPATIENT
Start: 2019-01-01 | End: 2019-01-01

## 2019-01-01 RX ORDER — FLUTICASONE FUROATE 200 UG/1
POWDER RESPIRATORY (INHALATION)
Qty: 1 INHALER | Refills: 3 | Status: SHIPPED | OUTPATIENT
Start: 2019-01-01

## 2019-01-01 RX ORDER — TRAMADOL HYDROCHLORIDE 50 MG/1
50 TABLET ORAL
Qty: 20 TAB | Refills: 0 | Status: SHIPPED | OUTPATIENT
Start: 2019-01-01 | End: 2019-01-01

## 2019-01-01 RX ORDER — IPRATROPIUM BROMIDE AND ALBUTEROL SULFATE 2.5; .5 MG/3ML; MG/3ML
3 SOLUTION RESPIRATORY (INHALATION)
Status: COMPLETED | OUTPATIENT
Start: 2019-01-01 | End: 2019-01-01

## 2019-01-01 RX ORDER — CEPHALEXIN 500 MG/1
1 CAPSULE ORAL DAILY
COMMUNITY
Start: 2019-01-01 | End: 2019-01-01 | Stop reason: SINTOL

## 2019-01-01 RX ORDER — FENTANYL CITRATE 50 UG/ML
50 INJECTION, SOLUTION INTRAMUSCULAR; INTRAVENOUS
Status: DISCONTINUED | OUTPATIENT
Start: 2019-01-01 | End: 2019-01-01 | Stop reason: HOSPADM

## 2019-01-01 RX ORDER — KETOROLAC TROMETHAMINE 30 MG/ML
15 INJECTION, SOLUTION INTRAMUSCULAR; INTRAVENOUS
Status: COMPLETED | OUTPATIENT
Start: 2019-01-01 | End: 2019-01-01

## 2019-01-01 RX ORDER — ATORVASTATIN CALCIUM 20 MG/1
20 TABLET, FILM COATED ORAL DAILY
Status: DISCONTINUED | OUTPATIENT
Start: 2019-01-01 | End: 2019-01-01

## 2019-01-01 RX ORDER — MONTELUKAST SODIUM 10 MG/1
TABLET ORAL
Qty: 90 TAB | Refills: 3 | Status: SHIPPED | OUTPATIENT
Start: 2019-01-01

## 2019-01-01 RX ORDER — TIZANIDINE 2 MG/1
TABLET ORAL
Qty: 30 TAB | Refills: 0 | Status: SHIPPED | OUTPATIENT
Start: 2019-01-01 | End: 2019-01-01

## 2019-01-01 RX ORDER — INSULIN ASPART 100 [IU]/ML
80 INJECTION, SOLUTION INTRAVENOUS; SUBCUTANEOUS
Qty: 25 PEN | Refills: 3 | OUTPATIENT
Start: 2019-01-01 | End: 2019-01-01 | Stop reason: SDUPTHER

## 2019-01-01 RX ORDER — AMOXICILLIN AND CLAVULANATE POTASSIUM 875; 125 MG/1; MG/1
1 TABLET, FILM COATED ORAL 2 TIMES DAILY
Qty: 14 TAB | Refills: 0 | Status: SHIPPED | OUTPATIENT
Start: 2019-01-01 | End: 2019-01-01

## 2019-01-01 RX ORDER — FENTANYL CITRATE 50 UG/ML
25 INJECTION, SOLUTION INTRAMUSCULAR; INTRAVENOUS
Status: DISCONTINUED | OUTPATIENT
Start: 2019-01-01 | End: 2019-01-01 | Stop reason: HOSPADM

## 2019-01-01 RX ORDER — INSULIN ASPART 100 [IU]/ML
INJECTION, SOLUTION INTRAVENOUS; SUBCUTANEOUS
COMMUNITY
End: 2019-01-01 | Stop reason: DRUGHIGH

## 2019-01-01 RX ORDER — GABAPENTIN 300 MG/1
CAPSULE ORAL
Qty: 180 CAP | Refills: 1 | Status: SHIPPED | OUTPATIENT
Start: 2019-01-01 | End: 2019-01-01 | Stop reason: SDUPTHER

## 2019-01-01 RX ORDER — OFLOXACIN 3 MG/ML
SOLUTION/ DROPS OPHTHALMIC
Refills: 1 | COMMUNITY
Start: 2019-01-01 | End: 2019-01-01 | Stop reason: ALTCHOICE

## 2019-01-01 RX ORDER — LIDOCAINE 50 MG/G
PATCH TOPICAL
Qty: 1 PACKAGE | Refills: 0 | Status: SHIPPED | OUTPATIENT
Start: 2019-01-01 | End: 2019-01-01 | Stop reason: SDUPTHER

## 2019-01-01 RX ORDER — MORPHINE SULFATE 4 MG/ML
4 INJECTION INTRAVENOUS ONCE
Status: COMPLETED | OUTPATIENT
Start: 2019-01-01 | End: 2019-01-01

## 2019-01-01 RX ORDER — INSULIN LISPRO 100 [IU]/ML
INJECTION, SOLUTION INTRAVENOUS; SUBCUTANEOUS
Status: DISCONTINUED | OUTPATIENT
Start: 2019-01-01 | End: 2019-01-01 | Stop reason: HOSPADM

## 2019-01-01 RX ORDER — FENTANYL CITRATE 50 UG/ML
INJECTION, SOLUTION INTRAMUSCULAR; INTRAVENOUS AS NEEDED
Status: DISCONTINUED | OUTPATIENT
Start: 2019-01-01 | End: 2019-01-01 | Stop reason: HOSPADM

## 2019-01-01 RX ORDER — BUTALBITAL, ACETAMINOPHEN AND CAFFEINE 50; 325; 40 MG/1; MG/1; MG/1
1 TABLET ORAL
Status: COMPLETED | OUTPATIENT
Start: 2019-01-01 | End: 2019-01-01

## 2019-01-01 RX ORDER — DIPHENHYDRAMINE HCL 25 MG
25 CAPSULE ORAL
Status: COMPLETED | OUTPATIENT
Start: 2019-01-01 | End: 2019-01-01

## 2019-01-01 RX ORDER — HYDROCORTISONE 25 MG/G
CREAM TOPICAL 2 TIMES DAILY
Qty: 30 G | Refills: 0 | Status: SHIPPED | OUTPATIENT
Start: 2019-01-01 | End: 2019-01-01

## 2019-01-01 RX ORDER — ISOSORBIDE MONONITRATE 60 MG/1
60 TABLET, EXTENDED RELEASE ORAL
Qty: 90 TAB | Refills: 3 | Status: SHIPPED | OUTPATIENT
Start: 2019-01-01 | End: 2019-01-01

## 2019-01-01 RX ORDER — EMPAGLIFLOZIN 10 MG/1
1 TABLET, FILM COATED ORAL DAILY
Refills: 1 | COMMUNITY
Start: 2019-01-01 | End: 2019-01-01 | Stop reason: ALTCHOICE

## 2019-01-01 RX ORDER — ACETAMINOPHEN 325 MG/1
650 TABLET ORAL
Status: DISCONTINUED | OUTPATIENT
Start: 2019-01-01 | End: 2019-01-01 | Stop reason: HOSPADM

## 2019-01-01 RX ORDER — CARVEDILOL 3.12 MG/1
3.12 TABLET ORAL 2 TIMES DAILY WITH MEALS
Status: CANCELLED | OUTPATIENT
Start: 2019-01-01

## 2019-01-01 RX ORDER — MONTELUKAST SODIUM 10 MG/1
10 TABLET ORAL
Status: DISCONTINUED | OUTPATIENT
Start: 2019-01-01 | End: 2019-01-01 | Stop reason: HOSPADM

## 2019-01-01 RX ORDER — METOPROLOL TARTRATE 50 MG/1
50 TABLET ORAL 2 TIMES DAILY
Status: DISCONTINUED | OUTPATIENT
Start: 2019-01-01 | End: 2019-01-01 | Stop reason: HOSPADM

## 2019-01-01 RX ORDER — INSULIN GLARGINE 100 [IU]/ML
60 INJECTION, SOLUTION SUBCUTANEOUS
Status: DISCONTINUED | OUTPATIENT
Start: 2019-01-01 | End: 2019-01-01

## 2019-01-01 RX ORDER — CYCLOBENZAPRINE HCL 5 MG
5 TABLET ORAL
Qty: 20 TAB | Refills: 0 | Status: SHIPPED | OUTPATIENT
Start: 2019-01-01 | End: 2019-01-01

## 2019-01-01 RX ORDER — CLOPIDOGREL 300 MG/1
TABLET, FILM COATED ORAL AS NEEDED
Status: DISCONTINUED | OUTPATIENT
Start: 2019-01-01 | End: 2019-01-01 | Stop reason: HOSPADM

## 2019-01-01 RX ORDER — GABAPENTIN 300 MG/1
CAPSULE ORAL
Qty: 180 CAP | Refills: 0 | Status: SHIPPED | OUTPATIENT
Start: 2019-01-01 | End: 2019-01-01 | Stop reason: SDUPTHER

## 2019-01-01 RX ORDER — CLOPIDOGREL BISULFATE 75 MG/1
75 TABLET ORAL DAILY
Qty: 30 TAB | Refills: 0 | Status: SHIPPED | OUTPATIENT
Start: 2019-01-01

## 2019-01-01 RX ORDER — CEPHALEXIN 500 MG/1
500 CAPSULE ORAL 2 TIMES DAILY
Qty: 14 CAP | Refills: 0 | Status: SHIPPED | OUTPATIENT
Start: 2019-01-01 | End: 2019-01-01

## 2019-01-01 RX ORDER — PREDNISOLONE ACETATE 10 MG/ML
SUSPENSION/ DROPS OPHTHALMIC
Refills: 1 | COMMUNITY
Start: 2019-01-01 | End: 2019-01-01 | Stop reason: ALTCHOICE

## 2019-01-01 RX ORDER — LIDOCAINE HYDROCHLORIDE 10 MG/ML
INJECTION INFILTRATION; PERINEURAL AS NEEDED
Status: DISCONTINUED | OUTPATIENT
Start: 2019-01-01 | End: 2019-01-01 | Stop reason: HOSPADM

## 2019-01-01 RX ORDER — LORATADINE 10 MG/1
10 TABLET ORAL DAILY
Qty: 90 TAB | Refills: 3 | Status: SHIPPED | OUTPATIENT
Start: 2019-01-01

## 2019-01-01 RX ORDER — CARVEDILOL 3.12 MG/1
TABLET ORAL
Qty: 90 TAB | Refills: 1 | Status: SHIPPED | OUTPATIENT
Start: 2019-01-01 | End: 2019-01-01 | Stop reason: SDUPTHER

## 2019-01-01 RX ORDER — PROCHLORPERAZINE EDISYLATE 5 MG/ML
10 INJECTION INTRAMUSCULAR; INTRAVENOUS
Status: COMPLETED | OUTPATIENT
Start: 2019-01-01 | End: 2019-01-01

## 2019-01-01 RX ORDER — ONDANSETRON 4 MG/1
4 TABLET, ORALLY DISINTEGRATING ORAL
Qty: 20 TAB | Refills: 0 | Status: SHIPPED | OUTPATIENT
Start: 2019-01-01 | End: 2019-01-01

## 2019-01-01 RX ORDER — MAGNESIUM SULFATE HEPTAHYDRATE 40 MG/ML
2 INJECTION, SOLUTION INTRAVENOUS
Status: COMPLETED | OUTPATIENT
Start: 2019-01-01 | End: 2019-01-01

## 2019-01-01 RX ORDER — LIDOCAINE 50 MG/G
PATCH TOPICAL
Qty: 1 PACKAGE | Refills: 2 | Status: SHIPPED | OUTPATIENT
Start: 2019-01-01 | End: 2019-01-01 | Stop reason: ALTCHOICE

## 2019-01-01 RX ORDER — INSULIN GLARGINE 100 [IU]/ML
30 INJECTION, SOLUTION SUBCUTANEOUS
Status: DISCONTINUED | OUTPATIENT
Start: 2019-01-01 | End: 2019-01-01

## 2019-01-01 RX ORDER — INSULIN LISPRO 100 [IU]/ML
6 INJECTION, SOLUTION INTRAVENOUS; SUBCUTANEOUS ONCE
Status: COMPLETED | OUTPATIENT
Start: 2019-01-01 | End: 2019-01-01

## 2019-01-01 RX ORDER — ATORVASTATIN CALCIUM 20 MG/1
80 TABLET, FILM COATED ORAL DAILY
Status: DISCONTINUED | OUTPATIENT
Start: 2019-01-01 | End: 2019-01-01 | Stop reason: HOSPADM

## 2019-01-01 RX ORDER — SODIUM CHLORIDE 9 MG/ML
50 INJECTION, SOLUTION INTRAVENOUS CONTINUOUS
Status: DISCONTINUED | OUTPATIENT
Start: 2019-01-01 | End: 2019-01-01

## 2019-01-01 RX ORDER — INSULIN GLARGINE 100 [IU]/ML
30 INJECTION, SOLUTION SUBCUTANEOUS 2 TIMES DAILY
Status: DISCONTINUED | OUTPATIENT
Start: 2019-01-01 | End: 2019-01-01

## 2019-01-01 RX ORDER — METOPROLOL TARTRATE 50 MG/1
50 TABLET ORAL 2 TIMES DAILY
Qty: 60 TAB | Refills: 0 | Status: SHIPPED | OUTPATIENT
Start: 2019-01-01

## 2019-01-01 RX ORDER — METOPROLOL TARTRATE 5 MG/5ML
INJECTION INTRAVENOUS AS NEEDED
Status: DISCONTINUED | OUTPATIENT
Start: 2019-01-01 | End: 2019-01-01 | Stop reason: HOSPADM

## 2019-01-01 RX ORDER — LORATADINE 10 MG/1
10 TABLET ORAL DAILY
Status: DISCONTINUED | OUTPATIENT
Start: 2019-01-01 | End: 2019-01-01 | Stop reason: HOSPADM

## 2019-01-01 RX ORDER — INSULIN LISPRO 100 [IU]/ML
10 INJECTION, SOLUTION INTRAVENOUS; SUBCUTANEOUS
Status: DISCONTINUED | OUTPATIENT
Start: 2019-01-01 | End: 2019-01-01

## 2019-01-01 RX ORDER — LOSARTAN POTASSIUM 100 MG/1
TABLET ORAL
Qty: 90 TAB | Refills: 1 | Status: SHIPPED | OUTPATIENT
Start: 2019-01-01 | End: 2019-01-01 | Stop reason: SDUPTHER

## 2019-01-01 RX ORDER — ASPIRIN 325 MG
325 TABLET ORAL ONCE
Status: DISCONTINUED | OUTPATIENT
Start: 2019-01-01 | End: 2019-01-01

## 2019-01-01 RX ORDER — LOSARTAN POTASSIUM 25 MG/1
12.5 TABLET ORAL DAILY
Qty: 30 TAB | Refills: 0 | Status: SHIPPED | OUTPATIENT
Start: 2019-01-01

## 2019-01-01 RX ORDER — CEFDINIR 300 MG/1
300 CAPSULE ORAL 2 TIMES DAILY
Qty: 20 CAP | Refills: 0 | Status: SHIPPED | OUTPATIENT
Start: 2019-01-01 | End: 2019-01-01

## 2019-01-01 RX ORDER — BUDESONIDE 0.5 MG/2ML
500 INHALANT ORAL
Status: DISCONTINUED | OUTPATIENT
Start: 2019-01-01 | End: 2019-01-01 | Stop reason: HOSPADM

## 2019-01-01 RX ORDER — ATORVASTATIN CALCIUM 20 MG/1
20 TABLET, FILM COATED ORAL DAILY
COMMUNITY
End: 2019-01-01

## 2019-01-01 RX ORDER — SODIUM CHLORIDE 9 MG/ML
140 INJECTION, SOLUTION INTRAVENOUS CONTINUOUS
Status: DISCONTINUED | OUTPATIENT
Start: 2019-01-01 | End: 2019-01-01

## 2019-01-01 RX ORDER — FENTANYL CITRATE 50 UG/ML
50 INJECTION, SOLUTION INTRAMUSCULAR; INTRAVENOUS
Status: COMPLETED | OUTPATIENT
Start: 2019-01-01 | End: 2019-01-01

## 2019-01-01 RX ORDER — LIDOCAINE 50 MG/G
PATCH TOPICAL
Qty: 1 PACKAGE | Refills: 2 | Status: SHIPPED | OUTPATIENT
Start: 2019-01-01 | End: 2019-01-01 | Stop reason: SDUPTHER

## 2019-01-01 RX ORDER — PEN NEEDLE, DIABETIC 30 GX3/16"
NEEDLE, DISPOSABLE MISCELLANEOUS
Qty: 100 PEN NEEDLE | Refills: 23 | Status: SHIPPED | OUTPATIENT
Start: 2019-01-01

## 2019-01-01 RX ORDER — INSULIN ASPART 100 [IU]/ML
INJECTION, SOLUTION INTRAVENOUS; SUBCUTANEOUS
Qty: 75 ML | Refills: 3 | Status: SHIPPED | OUTPATIENT
Start: 2019-01-01 | End: 2019-01-01 | Stop reason: SDUPTHER

## 2019-01-01 RX ORDER — DIPHENHYDRAMINE HYDROCHLORIDE 50 MG/ML
INJECTION, SOLUTION INTRAMUSCULAR; INTRAVENOUS AS NEEDED
Status: DISCONTINUED | OUTPATIENT
Start: 2019-01-01 | End: 2019-01-01 | Stop reason: HOSPADM

## 2019-01-01 RX ORDER — METOPROLOL TARTRATE 5 MG/5ML
2.5 INJECTION INTRAVENOUS EVERY 6 HOURS
Status: DISCONTINUED | OUTPATIENT
Start: 2019-01-01 | End: 2019-01-01

## 2019-01-01 RX ORDER — INSULIN GLULISINE 100 [IU]/ML
80 INJECTION, SOLUTION SUBCUTANEOUS
Qty: 30 ML | Refills: 2 | Status: CANCELLED | OUTPATIENT
Start: 2019-01-01

## 2019-01-01 RX ORDER — ALBUTEROL SULFATE 0.83 MG/ML
2.5 SOLUTION RESPIRATORY (INHALATION)
Status: DISPENSED | OUTPATIENT
Start: 2019-01-01 | End: 2019-01-01

## 2019-01-01 RX ORDER — METOPROLOL TARTRATE 25 MG/1
25 TABLET, FILM COATED ORAL EVERY 12 HOURS
Status: DISCONTINUED | OUTPATIENT
Start: 2019-01-01 | End: 2019-01-01

## 2019-01-01 RX ORDER — INSULIN GLARGINE 100 [IU]/ML
INJECTION, SOLUTION SUBCUTANEOUS
Qty: 60 ML | Refills: 4 | Status: SHIPPED | OUTPATIENT
Start: 2019-01-01

## 2019-01-01 RX ORDER — ALBUTEROL SULFATE 90 UG/1
2 AEROSOL, METERED RESPIRATORY (INHALATION)
Qty: 1 INHALER | Refills: 3 | Status: SHIPPED | OUTPATIENT
Start: 2019-01-01

## 2019-01-01 RX ORDER — PEN NEEDLE, DIABETIC 30 GX3/16"
NEEDLE, DISPOSABLE MISCELLANEOUS
Refills: 23 | COMMUNITY
Start: 2019-01-01

## 2019-01-01 RX ORDER — ISOSORBIDE MONONITRATE 30 MG/1
30 TABLET, EXTENDED RELEASE ORAL DAILY
Status: DISCONTINUED | OUTPATIENT
Start: 2019-01-01 | End: 2019-01-01 | Stop reason: HOSPADM

## 2019-01-01 RX ORDER — CARVEDILOL 3.12 MG/1
3.12 TABLET ORAL 2 TIMES DAILY WITH MEALS
Status: DISCONTINUED | OUTPATIENT
Start: 2019-01-01 | End: 2019-01-01

## 2019-01-01 RX ORDER — GABAPENTIN 300 MG/1
CAPSULE ORAL
Qty: 180 CAP | Refills: 0 | Status: SHIPPED | OUTPATIENT
Start: 2019-01-01

## 2019-01-01 RX ORDER — ALBUTEROL SULFATE 0.83 MG/ML
2.5 SOLUTION RESPIRATORY (INHALATION)
Status: DISCONTINUED | OUTPATIENT
Start: 2019-01-01 | End: 2019-01-01 | Stop reason: HOSPADM

## 2019-01-01 RX ORDER — PROCHLORPERAZINE EDISYLATE 5 MG/ML
10 INJECTION INTRAMUSCULAR; INTRAVENOUS ONCE
Status: DISCONTINUED | OUTPATIENT
Start: 2019-01-01 | End: 2019-01-01

## 2019-01-01 RX ORDER — LIDOCAINE HYDROCHLORIDE 10 MG/ML
10 INJECTION INFILTRATION; PERINEURAL ONCE
Status: DISCONTINUED | OUTPATIENT
Start: 2019-01-01 | End: 2019-01-01 | Stop reason: HOSPADM

## 2019-01-01 RX ORDER — INSULIN LISPRO 100 [IU]/ML
INJECTION, SOLUTION INTRAVENOUS; SUBCUTANEOUS
Qty: 30 ML | Refills: 3 | Status: SHIPPED | OUTPATIENT
Start: 2019-01-01 | End: 2019-01-01 | Stop reason: ALTCHOICE

## 2019-01-01 RX ORDER — DICLOFENAC SODIUM 75 MG/1
75 TABLET, DELAYED RELEASE ORAL DAILY
COMMUNITY
Start: 2019-01-01 | End: 2019-01-01

## 2019-01-01 RX ORDER — FENTANYL CITRATE 50 UG/ML
50 INJECTION, SOLUTION INTRAMUSCULAR; INTRAVENOUS
Status: DISCONTINUED | OUTPATIENT
Start: 2019-01-01 | End: 2019-01-01

## 2019-01-01 RX ADMIN — KETOROLAC TROMETHAMINE 15 MG: 30 INJECTION, SOLUTION INTRAMUSCULAR at 08:41

## 2019-01-01 RX ADMIN — HEPARIN SODIUM 5000 UNITS: 5000 INJECTION INTRAVENOUS; SUBCUTANEOUS at 21:12

## 2019-01-01 RX ADMIN — SODIUM CHLORIDE 100 ML/HR: 900 INJECTION, SOLUTION INTRAVENOUS at 12:03

## 2019-01-01 RX ADMIN — IPRATROPIUM BROMIDE AND ALBUTEROL SULFATE 3 ML: .5; 3 SOLUTION RESPIRATORY (INHALATION) at 08:33

## 2019-01-01 RX ADMIN — INSULIN LISPRO 10 UNITS: 100 INJECTION, SOLUTION INTRAVENOUS; SUBCUTANEOUS at 08:27

## 2019-01-01 RX ADMIN — SODIUM CHLORIDE 1000 ML: 900 INJECTION, SOLUTION INTRAVENOUS at 08:41

## 2019-01-01 RX ADMIN — MAGNESIUM SULFATE HEPTAHYDRATE 2 G: 40 INJECTION, SOLUTION INTRAVENOUS at 10:34

## 2019-01-01 RX ADMIN — INSULIN LISPRO 15 UNITS: 100 INJECTION, SOLUTION INTRAVENOUS; SUBCUTANEOUS at 16:43

## 2019-01-01 RX ADMIN — ALBUTEROL SULFATE 2.5 MG: 2.5 SOLUTION RESPIRATORY (INHALATION) at 16:47

## 2019-01-01 RX ADMIN — Medication 10 ML: at 13:12

## 2019-01-01 RX ADMIN — NITROGLYCERIN 1 INCH: 20 OINTMENT TOPICAL at 09:32

## 2019-01-01 RX ADMIN — Medication 10 ML: at 12:05

## 2019-01-01 RX ADMIN — ALBUTEROL SULFATE 2.5 MG: 2.5 SOLUTION RESPIRATORY (INHALATION) at 20:44

## 2019-01-01 RX ADMIN — INSULIN LISPRO 8 UNITS: 100 INJECTION, SOLUTION INTRAVENOUS; SUBCUTANEOUS at 12:02

## 2019-01-01 RX ADMIN — METOPROLOL TARTRATE 2.5 MG: 5 INJECTION INTRAVENOUS at 05:27

## 2019-01-01 RX ADMIN — Medication 10 ML: at 05:15

## 2019-01-01 RX ADMIN — ALBUTEROL SULFATE 2.5 MG: 2.5 SOLUTION RESPIRATORY (INHALATION) at 08:47

## 2019-01-01 RX ADMIN — BUTALBITAL, ACETAMINOPHEN, AND CAFFEINE 1 TABLET: 50; 325; 40 TABLET ORAL at 11:04

## 2019-01-01 RX ADMIN — Medication 10 ML: at 05:35

## 2019-01-01 RX ADMIN — INSULIN LISPRO 10 UNITS: 100 INJECTION, SOLUTION INTRAVENOUS; SUBCUTANEOUS at 12:01

## 2019-01-01 RX ADMIN — KETOROLAC TROMETHAMINE 30 MG: 30 INJECTION, SOLUTION INTRAMUSCULAR at 10:45

## 2019-01-01 RX ADMIN — METHYLPREDNISOLONE SODIUM SUCCINATE 125 MG: 125 INJECTION, POWDER, FOR SOLUTION INTRAMUSCULAR; INTRAVENOUS at 13:08

## 2019-01-01 RX ADMIN — BUDESONIDE 500 MCG: 0.5 INHALANT RESPIRATORY (INHALATION) at 08:07

## 2019-01-01 RX ADMIN — INSULIN LISPRO 15 UNITS: 100 INJECTION, SOLUTION INTRAVENOUS; SUBCUTANEOUS at 09:06

## 2019-01-01 RX ADMIN — ALBUTEROL SULFATE 2.5 MG: 2.5 SOLUTION RESPIRATORY (INHALATION) at 08:07

## 2019-01-01 RX ADMIN — ACETAMINOPHEN 650 MG: 325 TABLET ORAL at 21:10

## 2019-01-01 RX ADMIN — PANTOPRAZOLE SODIUM 40 MG: 40 TABLET, DELAYED RELEASE ORAL at 05:23

## 2019-01-01 RX ADMIN — INSULIN GLARGINE 35 UNITS: 100 INJECTION, SOLUTION SUBCUTANEOUS at 09:06

## 2019-01-01 RX ADMIN — Medication 10 ML: at 23:06

## 2019-01-01 RX ADMIN — MORPHINE SULFATE 2 MG: 2 INJECTION, SOLUTION INTRAMUSCULAR; INTRAVENOUS at 10:02

## 2019-01-01 RX ADMIN — METHYLPREDNISOLONE SODIUM SUCCINATE 125 MG: 125 INJECTION, POWDER, FOR SOLUTION INTRAMUSCULAR; INTRAVENOUS at 10:04

## 2019-01-01 RX ADMIN — SODIUM CHLORIDE 1000 ML: 900 INJECTION, SOLUTION INTRAVENOUS at 10:34

## 2019-01-01 RX ADMIN — PROCHLORPERAZINE EDISYLATE 10 MG: 5 INJECTION INTRAMUSCULAR; INTRAVENOUS at 10:29

## 2019-01-01 RX ADMIN — ATORVASTATIN CALCIUM 20 MG: 20 TABLET, FILM COATED ORAL at 08:22

## 2019-01-01 RX ADMIN — LORATADINE 10 MG: 10 TABLET ORAL at 09:08

## 2019-01-01 RX ADMIN — METOPROLOL TARTRATE 25 MG: 25 TABLET ORAL at 11:04

## 2019-01-01 RX ADMIN — METOPROLOL TARTRATE 2.5 MG: 5 INJECTION INTRAVENOUS at 01:40

## 2019-01-01 RX ADMIN — Medication 10 ML: at 13:28

## 2019-01-01 RX ADMIN — INSULIN LISPRO 8 UNITS: 100 INJECTION, SOLUTION INTRAVENOUS; SUBCUTANEOUS at 22:58

## 2019-01-01 RX ADMIN — INSULIN LISPRO 10 UNITS: 100 INJECTION, SOLUTION INTRAVENOUS; SUBCUTANEOUS at 17:09

## 2019-01-01 RX ADMIN — FENTANYL CITRATE 25 MCG: 50 INJECTION, SOLUTION INTRAMUSCULAR; INTRAVENOUS at 12:26

## 2019-01-01 RX ADMIN — INSULIN GLARGINE 30 UNITS: 100 INJECTION, SOLUTION SUBCUTANEOUS at 08:22

## 2019-01-01 RX ADMIN — VALPROATE SODIUM 1000 MG: 100 INJECTION, SOLUTION INTRAVENOUS at 17:55

## 2019-01-01 RX ADMIN — MORPHINE SULFATE 2 MG: 2 INJECTION, SOLUTION INTRAMUSCULAR; INTRAVENOUS at 09:34

## 2019-01-01 RX ADMIN — INSULIN GLARGINE 30 UNITS: 100 INJECTION, SOLUTION SUBCUTANEOUS at 23:01

## 2019-01-01 RX ADMIN — FENTANYL CITRATE 25 MCG: 50 INJECTION, SOLUTION INTRAMUSCULAR; INTRAVENOUS at 12:04

## 2019-01-01 RX ADMIN — METHYLPREDNISOLONE SODIUM SUCCINATE 125 MG: 125 INJECTION, POWDER, FOR SOLUTION INTRAMUSCULAR; INTRAVENOUS at 08:44

## 2019-01-01 RX ADMIN — HEPARIN SODIUM 5000 UNITS: 5000 INJECTION INTRAVENOUS; SUBCUTANEOUS at 22:59

## 2019-01-01 RX ADMIN — HEPARIN SODIUM 5000 UNITS: 5000 INJECTION INTRAVENOUS; SUBCUTANEOUS at 13:26

## 2019-01-01 RX ADMIN — FENTANYL CITRATE 50 MCG: 50 INJECTION, SOLUTION INTRAMUSCULAR; INTRAVENOUS at 10:51

## 2019-01-01 RX ADMIN — ATORVASTATIN CALCIUM 80 MG: 20 TABLET, FILM COATED ORAL at 09:07

## 2019-01-01 RX ADMIN — IPRATROPIUM BROMIDE AND ALBUTEROL SULFATE 3 ML: .5; 3 SOLUTION RESPIRATORY (INHALATION) at 08:40

## 2019-01-01 RX ADMIN — ISOSORBIDE MONONITRATE 30 MG: 30 TABLET, EXTENDED RELEASE ORAL at 09:08

## 2019-01-01 RX ADMIN — INSULIN LISPRO 3 UNITS: 100 INJECTION, SOLUTION INTRAVENOUS; SUBCUTANEOUS at 16:42

## 2019-01-01 RX ADMIN — METOPROLOL TARTRATE 50 MG: 50 TABLET, FILM COATED ORAL at 09:07

## 2019-01-01 RX ADMIN — INSULIN LISPRO 6 UNITS: 100 INJECTION, SOLUTION INTRAVENOUS; SUBCUTANEOUS at 06:11

## 2019-01-01 RX ADMIN — FENTANYL CITRATE 25 MCG: 50 INJECTION, SOLUTION INTRAMUSCULAR; INTRAVENOUS at 12:12

## 2019-01-01 RX ADMIN — HEPARIN SODIUM 5000 UNITS: 5000 INJECTION INTRAVENOUS; SUBCUTANEOUS at 12:03

## 2019-01-01 RX ADMIN — SODIUM CHLORIDE 100 ML/HR: 900 INJECTION, SOLUTION INTRAVENOUS at 15:46

## 2019-01-01 RX ADMIN — MAGNESIUM SULFATE HEPTAHYDRATE 2 G: 40 INJECTION, SOLUTION INTRAVENOUS at 16:36

## 2019-01-01 RX ADMIN — BUDESONIDE 500 MCG: 0.5 INHALANT RESPIRATORY (INHALATION) at 08:47

## 2019-01-01 RX ADMIN — INSULIN LISPRO 15 UNITS: 100 INJECTION, SOLUTION INTRAVENOUS; SUBCUTANEOUS at 12:04

## 2019-01-01 RX ADMIN — VALPROATE SODIUM 1000 MG: 100 INJECTION, SOLUTION INTRAVENOUS at 10:29

## 2019-01-01 RX ADMIN — MORPHINE SULFATE 6 MG: 4 INJECTION, SOLUTION INTRAMUSCULAR; INTRAVENOUS at 12:17

## 2019-01-01 RX ADMIN — SODIUM CHLORIDE 50 ML/HR: 900 INJECTION, SOLUTION INTRAVENOUS at 11:18

## 2019-01-01 RX ADMIN — DIPHENHYDRAMINE HYDROCHLORIDE 50 MG: 50 INJECTION, SOLUTION INTRAMUSCULAR; INTRAVENOUS at 10:05

## 2019-01-01 RX ADMIN — CLOPIDOGREL 75 MG: 75 TABLET, FILM COATED ORAL at 09:07

## 2019-01-01 RX ADMIN — INSULIN LISPRO 7 UNITS: 100 INJECTION, SOLUTION INTRAVENOUS; SUBCUTANEOUS at 09:07

## 2019-01-01 RX ADMIN — NITROGLYCERIN 0.4 MG: 0.4 TABLET, ORALLY DISINTEGRATING SUBLINGUAL at 08:44

## 2019-01-01 RX ADMIN — HEPARIN SODIUM 5000 UNITS: 5000 INJECTION INTRAVENOUS; SUBCUTANEOUS at 05:28

## 2019-01-01 RX ADMIN — INSULIN GLARGINE 30 UNITS: 100 INJECTION, SOLUTION SUBCUTANEOUS at 21:12

## 2019-01-01 RX ADMIN — CARVEDILOL 3.12 MG: 3.12 TABLET, FILM COATED ORAL at 16:41

## 2019-01-01 RX ADMIN — INSULIN LISPRO 5 UNITS: 100 INJECTION, SOLUTION INTRAVENOUS; SUBCUTANEOUS at 12:04

## 2019-01-01 RX ADMIN — PANTOPRAZOLE SODIUM 40 MG: 40 TABLET, DELAYED RELEASE ORAL at 08:23

## 2019-01-01 RX ADMIN — ACETAMINOPHEN 650 MG: 325 TABLET ORAL at 06:42

## 2019-01-01 RX ADMIN — MONTELUKAST SODIUM 10 MG: 10 TABLET, FILM COATED ORAL at 23:04

## 2019-01-01 RX ADMIN — PERFLUTREN 2 ML: 6.52 INJECTION, SUSPENSION INTRAVENOUS at 09:02

## 2019-01-01 RX ADMIN — SODIUM CHLORIDE 140 ML/HR: 900 INJECTION, SOLUTION INTRAVENOUS at 13:11

## 2019-01-01 RX ADMIN — HEPARIN SODIUM 5000 UNITS: 5000 INJECTION INTRAVENOUS; SUBCUTANEOUS at 05:13

## 2019-01-01 RX ADMIN — HYDROCODONE BITARTRATE AND ACETAMINOPHEN 2 TABLET: 5; 325 TABLET ORAL at 11:58

## 2019-01-01 RX ADMIN — MORPHINE SULFATE 4 MG: 4 INJECTION, SOLUTION INTRAMUSCULAR; INTRAVENOUS at 09:32

## 2019-01-01 RX ADMIN — INSULIN LISPRO 10 UNITS: 100 INJECTION, SOLUTION INTRAVENOUS; SUBCUTANEOUS at 13:27

## 2019-01-01 RX ADMIN — CLOPIDOGREL 75 MG: 75 TABLET, FILM COATED ORAL at 08:23

## 2019-01-01 RX ADMIN — MORPHINE SULFATE 4 MG: 4 INJECTION, SOLUTION INTRAMUSCULAR; INTRAVENOUS at 11:48

## 2019-01-01 RX ADMIN — IOPAMIDOL 100 ML: 755 INJECTION, SOLUTION INTRAVENOUS at 10:32

## 2019-01-01 RX ADMIN — DIPHENHYDRAMINE HYDROCHLORIDE 25 MG: 25 CAPSULE ORAL at 08:23

## 2019-01-01 RX ADMIN — INSULIN LISPRO 5 UNITS: 100 INJECTION, SOLUTION INTRAVENOUS; SUBCUTANEOUS at 17:08

## 2019-01-01 RX ADMIN — FENTANYL CITRATE 25 MCG: 50 INJECTION, SOLUTION INTRAMUSCULAR; INTRAVENOUS at 12:50

## 2019-01-01 RX ADMIN — LOSARTAN POTASSIUM 12.5 MG: 25 TABLET, FILM COATED ORAL at 09:13

## 2019-01-01 RX ADMIN — Medication 10 ML: at 13:27

## 2019-01-01 RX ADMIN — METHYLPREDNISOLONE SODIUM SUCCINATE 125 MG: 125 INJECTION, POWDER, FOR SOLUTION INTRAMUSCULAR; INTRAVENOUS at 05:27

## 2019-01-01 RX ADMIN — BUDESONIDE 500 MCG: 0.5 INHALANT RESPIRATORY (INHALATION) at 20:42

## 2019-01-01 RX ADMIN — FENTANYL CITRATE 50 MCG: 50 INJECTION, SOLUTION INTRAMUSCULAR; INTRAVENOUS at 13:43

## 2019-01-01 RX ADMIN — ACETAMINOPHEN 650 MG: 325 TABLET ORAL at 05:21

## 2019-01-01 RX ADMIN — MONTELUKAST SODIUM 10 MG: 10 TABLET, FILM COATED ORAL at 21:10

## 2019-01-01 RX ADMIN — MORPHINE SULFATE 4 MG: 4 INJECTION, SOLUTION INTRAMUSCULAR; INTRAVENOUS at 10:06

## 2019-01-01 RX ADMIN — ALBUTEROL SULFATE 2.5 MG: 2.5 SOLUTION RESPIRATORY (INHALATION) at 08:40

## 2019-01-01 RX ADMIN — Medication 10 ML: at 21:13

## 2019-01-01 RX ADMIN — METHYLPREDNISOLONE SODIUM SUCCINATE 125 MG: 125 INJECTION, POWDER, FOR SOLUTION INTRAMUSCULAR; INTRAVENOUS at 21:12

## 2019-01-01 RX ADMIN — LORATADINE 10 MG: 10 TABLET ORAL at 08:23

## 2019-01-01 RX ADMIN — PROCHLORPERAZINE EDISYLATE 10 MG: 5 INJECTION INTRAMUSCULAR; INTRAVENOUS at 16:35

## 2019-01-01 RX ADMIN — KETOROLAC TROMETHAMINE 30 MG: 30 INJECTION, SOLUTION INTRAMUSCULAR; INTRAVENOUS at 10:52

## 2019-01-01 RX ADMIN — INSULIN LISPRO 4 UNITS: 100 INJECTION, SOLUTION INTRAVENOUS; SUBCUTANEOUS at 21:12

## 2019-01-02 NOTE — TELEPHONE ENCOUNTER
Scheduled appt for soonest available. It will be 4/12/19 w/ Dr Murray Floor [facility] endocrinology. LVM for patient to call back to give details.

## 2019-01-02 NOTE — TELEPHONE ENCOUNTER
----- Message from Cornelia Hawkins sent at 1/2/2019 10:41 AM EST -----  Regarding: REYNA Kaplan/Telephone  PT returning missed call from the practice.  Best Contact [phone] (VM can be left)

## 2019-01-07 NOTE — TELEPHONE ENCOUNTER
Pt called stating that the pharmacy is saying medication Apidra is needing Prior authorization. She is aware of upcoming appt with Dr Leti High.

## 2019-01-11 NOTE — TELEPHONE ENCOUNTER
The pt is calling back to check on the status of this request.    Best call back number is [phone]

## 2019-01-14 NOTE — TELEPHONE ENCOUNTER
I spoke with pt. She needs prior auth to get APIDRA insulin. She has failed on Humalog and Novolog in past - side effects: headaches. Please get auth from [employer].

## 2019-01-16 NOTE — TELEPHONE ENCOUNTER
Pt called stating that she was expecting a call yesterday to know if Apidra was approved. Please advise.     Best contact: [phone]

## 2019-01-23 NOTE — TELEPHONE ENCOUNTER
Please call pt. [facility] refuses to approve Apidra. Will need to switch to Humalog. I will send in order.

## 2019-01-25 NOTE — ED NOTES
The patient was discharged home by Dr. Desiree Boyer  in stable condition. The patient is alert and oriented, in no respiratory distress and discharge vital signs obtained. The patient's diagnosis, condition and treatment were explained. The patient expressed understanding. No prescriptions given. No work/school note given. A discharge plan has been developed. A  was not involved in the process. Aftercare instructions were given. Pt ambulatory out of the ED  with family.

## 2019-01-25 NOTE — LETTER
[facility] EMERGENCY DEPT 
[address] [place] [address] [place] [address] [id number] 
[phone] Work/School Note Date: 1/25/2019 To Whom It May concern: 
 
Juanito Hooper was seen and treated today in the emergency room by the following provider(s): 
Attending Provider: Arielle Hayes MD.   
 
Juanito Hooper may return to work on 1/27/19. Sincerely, Homer Mata MD

## 2019-01-25 NOTE — ED NOTES
Pt resting on stretcher in a position of comfort. Pt reports not feeling any better at this time. Dr. Fern Daugherty notified. Pt transported off the floor to imaging.

## 2019-01-25 NOTE — DISCHARGE INSTRUCTIONS
Patient Education        Muscle Aches: Care Instructions  Your Care Instructions    Muscle aches have many possible causes. Some common ones are overuse, tension, and injuries such as a strained muscle. An infection such as the flu can cause muscle aches. Or the aches may be caused by some medicines, such as antipsychotics. Muscle aches may also be a symptom of a disease like lupus or fibromyalgia. Myalgia is the medical term for muscle aches. The doctor will do a physical exam and ask questions to try to find what is causing your pain. You may also have tests such as blood tests or imaging tests like X-rays. These can help find or rule out serious problems. The doctor has checked you carefully, but problems can develop later. If you notice any problems or new symptoms, get medical treatment right away. Follow-up care is a key part of your treatment and safety. Be sure to make and go to all appointments, and call your doctor if you are having problems. It's also a good idea to know your test results and keep a list of the medicines you take. How can you care for yourself at home? · Rest the area that hurts. You may need to stop or reduce the activity that causes your symptoms. Then you can return to it slowly. · Put ice or a cold pack on the area for 10 to 20 minutes at a time to ease pain. Put a thin cloth between the ice and your skin. · Take an over-the-counter pain medicine, such as acetaminophen (Tylenol), ibuprofen (Advil, Motrin), or naproxen (Aleve). Be safe with medicines. Read and follow all instructions on the label. When should you call for help? Call your doctor now or seek immediate medical care if:    · Your pain gets worse.     · You have new symptoms, such as a fever, swelling, or a rash.    Watch closely for changes in your health, and be sure to contact your doctor if:    · You do not get better as expected. Where can you learn more?   Go to http://aziza-niyah.info/. Enter G355 in the search box to learn more about \"Muscle Aches: Care Instructions. \"  Current as of: Shameka 3, 2018  Content Version: 11.9  © 2238-9598 [employer]. Care instructions adapted under license by [employer] (which disclaims liability or warranty for this information). If you have questions about a medical condition or this instruction, always ask your healthcare professional. [place] [address] any warranty or liability for your use of this information.

## 2019-01-25 NOTE — ED TRIAGE NOTES
\"I'm in pain from my head to my foot\". Pt reports generalized body aches that began Monday. Pt took one Tylenol this morning. Pt denies fevers

## 2019-01-25 NOTE — ED PROVIDER NOTES
Patient is a 27-year-old female presenting to the emergency department with generalized body aches. Patient says that on Monday she started with a headache and \"hurting from my head to my toes\" patient denies any sick contacts except for one of her children she states had an ear infection earlier in the week. Patient's been having nausea productive cough with whitish phlegm, abdominal pain, nausea without vomiting, denies diarrhea or constipation. Patient's been experiencing chills but denies fever. Patient denies being pregnant since her last menstrual cycle was a week ago. Past Medical History:  
Diagnosis Date  Abnormal pap 2012 10/5/13   
 hx w/Colpo  Arthritis  Asthma  Diabetes ([address] type 2  
 Headache(784.0)  Hyperlipidemia  Hypertension  Joint pain  DOC (obstructive sleep apnea)  Pap smear for cervical cancer screening 10/1/15 ASCUS HPV NEG RP 3 YEARS  Ringing in ears Past Surgical History:  
Procedure Laterality Date  HX  SECTION  01 & 08  HX COLPOSCOPY  2012 neg  HX HEENT    
 cataract R eye  HX TUBAL LIGATION Family History:  
Problem Relation Age of Onset  Diabetes Mother  Heart Disease Mother  Hypertension Mother  Stroke Mother  Hypertension Father  Stroke Father  Heart Disease Father  Breast Cancer Neg Hx Social History Socioeconomic History  Marital status:  Spouse name: Not on file  Number of children: 2  
 Years of education: Not on file  Highest education level: Not on file Social Needs  Financial resource strain: Not on file  Food insecurity - worry: Not on file  Food insecurity - inability: Not on file  Transportation needs - medical: Not on file  Transportation needs - non-medical: Not on file Occupational History  Occupation: -- Tobacco Use  Smoking status: Never Smoker  Smokeless tobacco: Never Used Substance and Sexual Activity  Alcohol use: No  
 Drug use: No  
 Sexual activity: Yes  
  Partners: Male Birth control/protection: Surgical  
Other Topics Concern   Service No  
 Blood Transfusions No  
 Caffeine Concern No  
 Occupational Exposure No  
 Hobby Hazards No  
 Sleep Concern Yes  Stress Concern Yes  Weight Concern Yes  Special Diet Yes Comment: DM  
 Back Care Yes  Exercise Yes  Bike Helmet No  
 Seat Belt Yes  Self-Exams Yes Social History Narrative  Not on file ALLERGIES: Aspirin; Contrast agent [iodine]; Dilaudid [hydromorphone]; Iodinated contrast- oral and iv dye; Lisinopril; Metformin; Reglan  [metoclopramide hcl]; Reglan [metoclopramide]; and Ritalin [methylphenidate] Review of Systems Constitutional: Positive for activity change, appetite change, chills and fatigue. Negative for fever. Respiratory: Positive for cough. Gastrointestinal: Positive for abdominal pain and nausea. Negative for constipation, diarrhea and vomiting. Genitourinary: Negative for dysuria. Musculoskeletal: Positive for arthralgias and myalgias. Skin: Negative for color change. Neurological: Positive for headaches. All other systems reviewed and are negative. Vitals:  
 01/25/19 2453 BP: 168/85 Pulse: 95 Resp: 12 Temp: 97.7 °F (36.5 °C) SpO2: 98% Weight: 96.2 kg (212 lb 1.3 oz) Height: 5' 2\" (1.575 m) Physical Exam  
Constitutional: She is oriented to person, place, and time. She appears well-developed and well-nourished. HENT:  
Head: Normocephalic and atraumatic. Eyes: Conjunctivae and EOM are normal. Pupils are equal, round, and reactive to light. Neck: Normal range of motion. Neck supple. Cardiovascular: Normal rate, regular rhythm and normal heart sounds. Pulmonary/Chest: Effort normal and breath sounds normal. No stridor.  No respiratory distress. She has no wheezes. She has no rales. Abdominal: There is tenderness (tender throughout). There is no guarding. Musculoskeletal: Normal range of motion. Neurological: She is alert and oriented to person, place, and time. Skin: Skin is warm and dry. Psychiatric: She has a normal mood and affect. Her behavior is normal. Judgment and thought content normal.  
Nursing note and vitals reviewed. MDM Number of Diagnoses or Management Options Diagnosis management comments: Assessment/plan: URI, rhabdomyolysis, UTI, viral illness. 57-year-old female presenting with diffuse myalgias arthralgias subjective cough likely viral URI her on chart review patient is on a statin does get occasional myalgias in the past concern for rhabdomyolysis on differential but less likely. CBC, CMP, UA, CK, point her urine pregnancy test. IV hydration 1 L normal saline, Toradol IV 15 mg patient states that all pain medications cause her to itch she requires Benadryl we'll give her 25 mg p.o. and will reassess. Amount and/or Complexity of Data Reviewed Clinical lab tests: ordered and reviewed Review and summarize past medical records: yes Independent visualization of images, tracings, or specimens: yes Risk of Complications, Morbidity, and/or Mortality Presenting problems: moderate Diagnostic procedures: moderate Management options: moderate Patient Progress Patient progress: stable Procedures

## 2019-01-25 NOTE — ED NOTES
IV access obtained. IV fluids infusing without difficulty, medications explained and administered to patient. Time factors for lab/imaging results discussed with patient. Pt on monitor x2. Warm blanket provided for comfort. Call bell in reach

## 2019-01-30 NOTE — TELEPHONE ENCOUNTER
Insurance will not cover Humalog pens but will cover vials. Record shows she filled RX Novolog pen on 1/25/19 written by another MD Dr Ida Moritz. I called [facility] to confirm. There was a refill for Novolog 20 units TID from [facility] last NOV. Pt uses 80 units TID. I spoke with pharmacist to increase dose of Novolog to 80 units TID. Pt is awaiting appt with new ENDO. Please let pt know she has Novolog pens refills at [facility].

## 2019-01-31 NOTE — TELEPHONE ENCOUNTER
She said she had to take benadryl with the Novolog because it makes her itch. She will google the  to get meds free. I told her to let me know.

## 2019-01-31 NOTE — TELEPHONE ENCOUNTER
Note from [place]     She said she had to take benadryl with the Novolog because it makes her itch. She will google the  to get meds free. I told her to let me know. Note from  [facility] will not cover Humalog pens but will cover vials. Record shows she filled RX Novolog pen on 1/25/19 written by another MD Dr Son Holder. I called [facility] to confirm. There was a refill for Novolog 20 units TID from [facility] last NOV. Pt uses 80 units TID. I spoke with pharmacist to increase dose of Novolog to 80 units TID. Pt is awaiting appt with new ENDO. Please let pt know she has Novolog pens refills at [facility].

## 2019-02-14 NOTE — ED PROVIDER NOTES
22-year-old female with a history of asthma, diabetes, hypertension presents with a sudden onset of sharp chest pain and shortness of breath that started while in the emergency department as a visitor for her . She had been feeling well this morning other than having a runny nose. She rates her pain as severe. She denies any leg swelling or pain. She denies any estrogen use, recent prolonged travel, smoking, history of blood clots, recent surgeries or hospitalizations. She has had a cough recently. Shortness of Breath Associated symptoms include rhinorrhea and chest pain. Pertinent negatives include no fever, no headaches, no sore throat, no ear pain, no vomiting, no abdominal pain, no rash and no leg swelling. Past Medical History:  
Diagnosis Date  Abnormal pap 2012 10/5/13   
 hx w/Colpo  Arthritis  Asthma  Diabetes ([address] type 2  
 Headache(784.0)  Hyperlipidemia  Hypertension  Joint pain  DOC (obstructive sleep apnea)  Pap smear for cervical cancer screening 10/1/15 ASCUS HPV NEG RP 3 YEARS  Ringing in ears Past Surgical History:  
Procedure Laterality Date  HX  SECTION  01 & 08  HX COLPOSCOPY  2012 neg  HX HEENT    
 cataract R eye  HX TUBAL LIGATION Family History:  
Problem Relation Age of Onset  Diabetes Mother  Heart Disease Mother  Hypertension Mother  Stroke Mother  Hypertension Father  Stroke Father  Heart Disease Father  Breast Cancer Neg Hx Social History Socioeconomic History  Marital status:  Spouse name: Not on file  Number of children: 2  
 Years of education: Not on file  Highest education level: Not on file Social Needs  Financial resource strain: Not on file  Food insecurity - worry: Not on file  Food insecurity - inability: Not on file  Transportation needs - medical: Not on file  Transportation needs - non-medical: Not on file Occupational History  Occupation: -- Tobacco Use  Smoking status: Never Smoker  Smokeless tobacco: Never Used Substance and Sexual Activity  Alcohol use: No  
 Drug use: No  
 Sexual activity: Yes  
  Partners: Male Birth control/protection: Surgical  
Other Topics Concern   Service No  
 Blood Transfusions No  
 Caffeine Concern No  
 Occupational Exposure No  
 Hobby Hazards No  
 Sleep Concern Yes  Stress Concern Yes  Weight Concern Yes  Special Diet Yes Comment: DM  
 Back Care Yes  Exercise Yes  Bike Helmet No  
 Seat Belt Yes  Self-Exams Yes Social History Narrative  Not on file ALLERGIES: Aspirin; Contrast agent [iodine]; Dilaudid [hydromorphone]; Iodinated contrast- oral and iv dye; Lisinopril; Metformin; Reglan  [metoclopramide hcl]; Reglan [metoclopramide]; and Ritalin [methylphenidate] Review of Systems Constitutional: Negative for chills and fever. HENT: Positive for rhinorrhea. Negative for ear pain and sore throat. Eyes: Negative for pain. Respiratory: Positive for shortness of breath. Negative for chest tightness. Cardiovascular: Positive for chest pain. Negative for leg swelling. Gastrointestinal: Negative for abdominal pain, nausea and vomiting. Genitourinary: Negative for dysuria and flank pain. Musculoskeletal: Negative for back pain. Skin: Negative for rash. Neurological: Negative for headaches. All other systems reviewed and are negative. Vitals:  
 02/14/19 4310 02/14/19 7165 BP: (!) 141/122 Pulse: (!) 124 (!) 105 Resp: 30 22 Temp: 97.9 °F (36.6 °C) SpO2: 94% 100% Weight: 91.2 kg (201 lb) Height: 5' 2\" (1.575 m) Physical Exam  
Constitutional: She appears distressed. HENT:  
Head: Normocephalic and atraumatic. Mouth/Throat: Oropharynx is clear and moist.  
rhinorrhea Eyes: Conjunctivae are normal. Pupils are equal, round, and reactive to light. No scleral icterus. Neck: Neck supple. No tracheal deviation present. Cardiovascular: Regular rhythm and intact distal pulses. tachycardia Pulmonary/Chest: Breath sounds normal. No stridor. She is in respiratory distress. She has no decreased breath sounds. She has no wheezes. She has no rales. She exhibits tenderness. Abdominal: Soft. She exhibits no distension. There is no tenderness. Genitourinary:  
Genitourinary Comments: deferred Musculoskeletal: She exhibits no edema or deformity. Neurological: She is alert. Skin: Skin is warm and dry. Psychiatric: She has a normal mood and affect. Nursing note and vitals reviewed. MDM Number of Diagnoses or Management Options Chest pain, unspecified type:  
Diagnosis management comments: 70-year-old female with history of asthma, hypertension, diabetes presents with sudden onset of severe chest pain with differential diagnosis of PE, pneumothorax, dissection, less likely ACS 
 
EKG shows sinus tachycardia rate of 108, normal intervals, normal axis, nonspecific T-wave abnormalities, no acute infarction. Procedures 1:08 PM 
The patient has been reevaluated. The patient is ready for discharge. The patient's signs, symptoms, diagnosis, and discharge instructions have been discussed and the patient/ family has conveyed their understanding. The patient is to follow up as recommended or return to the ED should their symptoms worsen. Plan has been discussed and the patient is in agreement. LABORATORY TESTS: 
Labs Reviewed MAGNESIUM - Abnormal; Notable for the following components:  
    Result Value Magnesium 1.1 (*) All other components within normal limits METABOLIC PANEL, BASIC - Abnormal; Notable for the following components:  
 Glucose 253 (*) Creatinine 1.35 (*)   
 GFR est AA 53 (*)   
 GFR est non-AA 44 (*) All other components within normal limits SAMPLES BEING HELD  
CBC WITH AUTOMATED DIFF  
HCG QL SERUM  
D DIMER  
POC TROPONIN-I  
 
 
IMAGING RESULTS: 
Cta Chest W Or W Wo Cont Result Date: 2/14/2019 Clinical indication: Acute chest pain. Localizer obtained without contrast at the level of the pulmonary arteries. Injection rate of 100 cc of Isovue-370 with review of the raw data and MIP reconstructions. Comparison August 14, 2017. CT dose reduction was achieved through the use of a standardized protocol tailored for this examination and automatic exposure control for dose modulation . [place] Dials There is no pulmonary emboli. There is no pericardial pleural effusion. There is no shift or pneumothorax. There is no mediastinal masses or adenopathy. There is no parenchymal mass. There is mild distention of the esophagus. The heart size is normal.  
 
IMPRESSION: No acute changes. Xr Chest [facility] Result Date: 2/14/2019 EXAM:  XR CHEST PORT INDICATION: Chest pain. COMPARISON: 1/25/2019 TECHNIQUE: Portable AP upright chest view at 0956 hours FINDINGS: Cardiac monitoring wires overlie the thorax. The cardiomediastinal contours are stable. The pulmonary vasculature is within normal limits. The lungs and pleural spaces are clear. There is no pneumothorax. The bones and upper abdomen are stable. IMPRESSION: No acute process. Stable exam.  
 
 
 
  
 
 
MEDICATIONS GIVEN: 
Medications  
morphine injection 4 mg (4 mg IntraVENous Given 2/14/19 0932) methylPREDNISolone (PF) (Solu-MEDROL) injection 125 mg (125 mg IntraVENous Given 2/14/19 1004) diphenhydrAMINE (BENADRYL) injection 50 mg (50 mg IntraVENous Given 2/14/19 1005)  
magnesium sulfate 2 g/50 ml IVPB (premix or compounded) (0 g IntraVENous IV Completed 2/14/19 1133) iopamidol (ISOVUE-370) 76 % injection 100 mL (100 mL IntraVENous Given 2/14/19 1032) sodium chloride 0.9 % bolus infusion 1,000 mL (0 mL IntraVENous IV Completed 2/14/19 1133) fentaNYL citrate (PF) injection 50 mcg (50 mcg IntraVENous Given 2/14/19 1051)  
ketorolac (TORADOL) injection 30 mg (30 mg IntraVENous Given 2/14/19 1052) HYDROcodone-acetaminophen (NORCO) 5-325 mg per tablet 2 Tab (2 Tabs Oral Given 2/14/19 1158) IMPRESSION: 
1. Chest pain, unspecified type PLAN: 
1. Discharge Medication List as of 2/14/2019 12:39 PM  
  
 
2. Follow-up Information Follow up With Specialties Details Why Contact Info Addison Whyte MD Cardiology Go in 1 day  [facility] [address] [place] [address] 
[phone] [facility] EMERGENCY DEPT Emergency Medicine  If symptoms worsen or new concerns [facility] [address] [facility] 
[phone] 3. Return to ED for new or worsening symptoms Immanuel [place]. Jamal Tristan MD 
 
Total critical care time spent exclusive of procedures:  32 minutes taking care of pt with intractable pain requiring multiple opioid analgesics

## 2019-02-14 NOTE — ED NOTES
2nd trop negative. MD notified. Pt sitting on side of stretcher eating ice chips. Pt denies dizziness.

## 2019-02-14 NOTE — ED NOTES
Dr. Matthias Rowe spoke with radiologist regarding pt's allergy to IV contrast.  IV benadryl and solumedrol ordered to give now. Will administer as ordered. CT notified.

## 2019-02-14 NOTE — DISCHARGE INSTRUCTIONS

## 2019-02-14 NOTE — ED TRIAGE NOTES
Pt's reports sudden onset of SOB. Pt appears in distress and states \"I feel dizzy. \"  Pt assisted from chair to stretcher. Pt reports \"my chest and my left arm feel tight. \"  Dr. Deyvi Reid at bedside.

## 2019-02-15 NOTE — PROGRESS NOTES
Chief Complaint   Patient presents with    Follow-up     Patient presents today for a follow up visit    Hypertension     Visit Vitals  BP (!) 148/98 (BP 1 Location: Left arm, BP Patient Position: Sitting)   Pulse (!) 104   Resp 18   Ht 5' 2\" (1.575 m)   Wt 222 lb 9.6 oz (101 kg)   SpO2 98%   BMI 40.71 kg/m²     Patient presents today for a follow up visit for HTN. Patient was seen yesterday in the ER for chest pains & SOB. Pain scale 9/10 for pain in chest.  Sharp pains in her chest going doing her left arm, started yesterday morning was prescribed Hydrocodone from the ER it did help with the pain but patient stated she is out of it now.

## 2019-02-15 NOTE — PROGRESS NOTES
Nydia Hoyos MD    [id number] [place], [address] [facility]    Office [phone],YTK [phone]  Pager [phone]    Maru Parker is a 44 y.o. female is here for f/u visit. Primary care physician:  Minh Herrera MD    Patient Active Problem List   Diagnosis Code    Asthma J45.909    Diabetes mellitus ([address] E11.9    Avascular necrosis of bones of both hips ([address] M87.051, M87.052    Lumbar degenerative disc disease M51.36    LGSIL (low grade squamous intraepithelial dysplasia) [id number]    Non compliance with medical treatment Z91.19    Obesity E66.9    BMI 35.0-35.9,adult Z68.35    Essential hypertension I10    Noncompliance of patient with dietary regimen Z91.11    Ankle edema M25.473    Muscle spasms of neck M62.838    Recurrent streptococcal tonsillitis J03.01    Encounter for long-term (current) use of insulin (HCC) Z79.4    Muscle spasm of back M62.830    Elevated serum creatinine R79.89    Type 2 diabetes mellitus with diabetic neuropathy (HCC) E11.40    Obesity, morbid (HCC) E66.01    Type 2 diabetes with nephropathy (HCC) E11.21    Abnormal liver enzymes R74.8    NAFLD (nonalcoholic fatty liver disease) K76.0    Hyperlipidemia E78.5    DOC (obstructive sleep apnea) G47.33             Chief complaint:  Chief Complaint   Patient presents with    Follow-up     Patient presents today for a follow up visit    Hypertension       Assessment:  Dyspnea/CP -cardiac cath 5/2018-nonobstructive branch vessel disease. Medical management. Normal EF. Edema-component of venous insufficiency  Palpitations/Tachycardia -sinus tachycardia on Holter monitor  DM - uncontrolled. Hb A1c 12.3 4/11/18  HTN  Hx of Asthma      Plan:       Patient's right heart catheterization showed normal right-sided pressures including wedge/pulmonary artery pressure. Her cardiac catheterization showed nonobstructive branch vessel disease. Blood pressure elevated today.   She states that it has been running within normal limits previously. Medical management for her coronary artery disease-aspirin/beta-blocker/Imdur/Cozaar/statin. Aggressive cardiovascular risk factor modification. Needs better control of her diabetes, needs to lose weight. Follow-up with 6 mths to 1 year or earlier as needed. Gina Marin MD., [facility]      Patient understands the plan. All questions were answered to the patient's satisfaction. Medication Side Effects and Warnings were discussed with patient: yes  Patient Labs were reviewed and or requested:  yes  Patient Past Records were reviewed and or requested: yes    I appreciate the opportunity to be involved in Ms. Frost. See note below for details. Please do not hesitate to contact us with questions or concerns. ATTENTION:   This medical record was transcribed using an electronic medical records/speech recognition system. Although proofread, it may and can contain electronic, spelling and other errors. Corrections may be executed at a later time. Please feel free to contact us for any clarifications as needed. Autumn Prakash MD    Cardiac Testing/ Procedures: A. Cardiac Cath/PCI: 5/3/18 Tried Brachial vein access - unsuccessful      R radial acess - easy access  Difficulty gettting into asc aorta   R SCV angiogram - tortuous     R FV access - micropuncture  R CFA access - micropuncture        L Main: Long ; Large - Nml     LAD: Med - prox ; Small distal ; Mid 20%; D1 - Small     LCflex: OM1 - Med - Prox 30%; Mid- Small to med; 60%;  Prob component of spasm     RCA: Small ; Prox catheter - 10%; PDA - very small     LVEDP: 14 mm Hg     LVEF: No assessed     No significant gradient across aortic valve.                 RHC findings:     RAPm=   7    mmHg  RVSP= 24     mmHg  PAPm=  19      mmHg  PCWPm=  10  mmHg  CO= 4.33         l/min  CI= 2.16 l/min/m2     Specimens Removed : None     Closure Device:  Manual    B.ECHO/MARK: 5/16/2016-EF 96-18%, grade 1 diastolic dysfunction    C. StressNuclear/Stress ECHO/Stress test: May 12, 2016-7 minutes, normal exercise nuclear study. EF 70%    D. Vascular:    E. EP: 4/22/2016-Holter sinus tachycardia 99-1 63 bpm, no PACs, 6 PVCs. Diary entries of dyspnea, chest pain, dizziness correlates with sinus tachycardia. F. Miscellaneous:    Subjective:  Selina Soulier is a 44 y.o. female who returns for follow up visit. Continues to have intermittent atypical chest pain. Went to the emergency room yesterday. CTA chest-normal.  Troponin within normal limits. No significant change in symptoms. She states that she saw pulmonary and they have done a workup and was told that \"everything was okay from a lung  standpoint\". ROS:  (bold if positive, if negative)    Palpitations         Medications before admission:    Current Outpatient Medications   Medication Sig Dispense    HYDROcodone-acetaminophen (NORCO) 5-325 mg per tablet Take 1 Tab by mouth every four (4) hours as needed for Pain. Max Daily Amount: 6 Tabs. 6 Tab    isosorbide mononitrate ER (IMDUR) 30 mg tablet TAKE 1 TABLET BY MOUTH ONCE DAILY *PATIENT  WILL  NEED  APPOINTMENT  BEFORE  ANYMORE  REFILLS* 30 Tab    insulin aspart U-100 (NOVOLOG) 100 unit/mL inpn 80 Units by SubCUTAneous route Before breakfast, lunch, and dinner. 25 Pen    carvedilol (COREG) 3.125 mg tablet TAKE 1 TABLET BY MOUTH TWICE DAILY WITH MEALS 60 Tab    gabapentin (NEURONTIN) 300 mg capsule TAKE 2 CAPSULES BY MOUTH THREE TIMES DAILY 180 Cap    bumetanide (BUMEX) 0.5 mg tablet Take 0.5 mg by mouth once over twenty-four (24) hours.  sodium bicarbonate 650 mg tablet Take 650 mg by mouth two (2) times a day.  prednisoLONE acetate (PRED FORTE) 1 % ophthalmic suspension      insulin glargine (LANTUS SOLOSTAR U-100 INSULIN) 100 unit/mL (3 mL) inpn 30 Units by SubCUTAneous route two (2) times a day.      albuterol (PROVENTIL HFA, VENTOLIN HFA, PROAIR HFA) 90 mcg/actuation inhaler Take 2 Puffs by inhalation every four (4) hours as needed for Wheezing. 1 Inhaler    Insulin Needles, Disposable, 31 gauge x 5/16\" ndle USE TO INJECT LANTUS AND APIDRA DAILY. 100 Pen Needle    ARNUITY ELLIPTA 200 mcg/actuation dsdv inhaler INHALE ONE PUFF BY MOUTH ONCE DAILY 1 Inhaler    losartan (COZAAR) 100 mg tablet Take 1 Tab by mouth daily. 90 Tab    montelukast (SINGULAIR) 10 mg tablet TAKE ONE TABLET BY MOUTH ONCE DAILY 30 Tab    VITAMIN D2 50,000 unit capsule Take 50,000 Units by mouth two (2) times a week.  glucose blood VI test strips (BLOOD GLUCOSE TEST) strip Patient request the [facility] [employer] brand of meter and strips and to test QID. 100 Strip    Lancets misc Bid for diabetes 250.00  Lot # [id number]  Exp 7/2018  Man Brian Nordisk 400 Package     No current facility-administered medications for this visit.    v    Physical Exam:  Visit Vitals  BP (!) 148/98 (BP 1 Location: Left arm, BP Patient Position: Sitting)   Pulse (!) 104   Resp 18   Ht 5' 2\" (1.575 m)   Wt 222 lb 9.6 oz (101 kg)   LMP 01/30/2019   SpO2 98%   BMI 40.71 kg/m²          Gen: Well-developed, well-nourished, in no acute distress, walks with a cane  Neck: Supple,No JVD, No Carotid Bruit,   Resp: No accessory muscle use, Clear breath sounds, No rales or rhonchi  Card: Tachy,Reg Rythm,Normal S1, S2, No murmurs, rubs or gallop. No thrills.    Abd:  Soft, non-tender, non-distended,BS+,   MSK: No cyanosis  Skin: No rashes    Neuro: moving all four extremities , follows commands appropriately  Psych:  Good insight, oriented to person, place , alert, Nml Affect  LE: trace edema    EKG:   Results for orders placed or performed during the hospital encounter of 02/14/19   EKG, 12 LEAD, INITIAL   Result Value Ref Range    Ventricular Rate 108 BPM    Atrial Rate 108 BPM    P-R Interval 134 ms    QRS Duration 68 ms    Q-T Interval 334 ms    QTC Calculation (Bezet) 447 ms    Calculated P Axis 52 degrees    Calculated R Axis 13 degrees    Calculated T Axis 66 degrees    Diagnosis       Sinus tachycardia  Cannot rule out Anterior infarct , age undetermined  Abnormal ECG    Confirmed by Patricio OWENS, Solomon Santos ([id number]) on 2/14/2019 2:19:02 PM           LABS:        Lab Results   Component Value Date/Time    WBC 7.0 02/14/2019 09:20 AM    HGB 13.1 02/14/2019 09:20 AM    HCT 38.8 02/14/2019 09:20 AM    PLATELET 684 04/65/4285 09:20 AM    MCV 93.7 02/14/2019 09:20 AM     Lab Results   Component Value Date/Time    Sodium 138 02/14/2019 09:20 AM    Potassium 3.9 02/14/2019 09:20 AM    Chloride 100 02/14/2019 09:20 AM    CO2 28 02/14/2019 09:20 AM    Anion gap 10 02/14/2019 09:20 AM    Glucose 253 (H) 02/14/2019 09:20 AM    BUN 16 02/14/2019 09:20 AM    Creatinine 1.35 (H) 02/14/2019 09:20 AM    BUN/Creatinine ratio 12 02/14/2019 09:20 AM    GFR est AA 53 (L) 02/14/2019 09:20 AM    GFR est non-AA 44 (L) 02/14/2019 09:20 AM    Calcium 9.2 02/14/2019 09:20 AM       No results found for: APTT  No results found for: INR, PTMR, PTP, PT1, PT2  No components found for: LAST LIPIDS        Mike Isaac, MDdddd

## 2019-02-22 NOTE — PROGRESS NOTES
Identified pt with two pt identifiers(name and ). Chief Complaint   Patient presents with    Fever     101.  Chills    Bladder Infection     Previously Diagnosed with UTI at [facility] Saturday. took only 1 or 2 days of antibiotic due to throwing it up. [id number] Maintenance Due   Topic    Pneumococcal 19-64 Medium Risk (1 of 1 - PPSV23)    EYE EXAM RETINAL OR DILATED     FOOT EXAM Q1     PAP AKA CERVICAL CYTOLOGY     HEMOGLOBIN A1C Q6M        Wt Readings from Last 3 Encounters:   19 218 lb (98.9 kg)   02/15/19 222 lb 9.6 oz (101 kg)   19 201 lb (91.2 kg)     Temp Readings from Last 3 Encounters:   19 99 °F (37.2 °C) (Oral)   19 97.9 °F (36.6 °C)   19 97.7 °F (36.5 °C)     BP Readings from Last 3 Encounters:   19 126/72   02/15/19 (!) 148/98   19 (!) 155/93     Pulse Readings from Last 3 Encounters:   19 70   02/15/19 (!) 104   19 90         Learning Assessment:  :     Learning Assessment 7/3/2018 4/10/2018 1/10/2018 2016 2015 10/6/2015 2014   PRIMARY LEARNER Patient Patient Patient Patient Patient Patient Patient   HIGHEST LEVEL OF EDUCATION - PRIMARY LEARNER  - - - - - DID NOT GRADUATE HIGH SCHOOL SOME COLLEGE   BARRIERS PRIMARY LEARNER - - - - - [address] [place] [address],[address] CAREGIVER - - - - - No -   PRIMARY LANGUAGE ENGLISH ENGLISH ENGLISH ENGLISH ENGLISH ENGLISH ENGLISH   LEARNER PREFERENCE PRIMARY READING READING READING LISTENING DEMONSTRATION DEMONSTRATION LISTENING   ANSWERED BY Mana Bermudez patient Patient patient patient   RELATIONSHIP SELF SELF SELF SELF SELF SELF SELF       Depression Screening:  :     3 most recent PHQ Screens 2018   Little interest or pleasure in doing things Not at all   Feeling down, depressed, irritable, or hopeless Not at all   Total Score PHQ 2 0       Fall Risk Assessment:  :     Fall Risk Assessment, last 12 mths 8/15/2017   Able to walk? Yes   Fall in past 12 months? No       Abuse Screening:  :     Abuse Screening Questionnaire 12/7/2018 8/15/2016 5/29/2014 4/9/2014   Do you ever feel afraid of your partner? N N N N   Are you in a relationship with someone who physically or mentally threatens you? N N N N   Is it safe for you to go home? Sadi Sanchez       Coordination of Care Questionnaire:  :     1) Have you been to an emergency room, urgent care clinic since your last visit? Yes JW for UTI                                                                                                                                                     Hospitalized since your last visit? no        2) Have you seen or consulted any other health care providers outside of [address] [place] [address] since your last visit? no  (Include any pap smears or colon screenings in this section.)    3) Do you have an Advance Directive on file? no  Are you interested in receiving information about Advance Directives? no.    Reviewed record in preparation for visit and have obtained necessary documentation. Medication reconciliation up to date and corrected with patient at this time.

## 2019-02-22 NOTE — PATIENT INSTRUCTIONS
Nausea and Vomiting: Care Instructions  Your Care Instructions    When you are nauseated, you may feel weak and sweaty and notice a lot of saliva in your mouth. Nausea often leads to vomiting. Most of the time you do not need to worry about nausea and vomiting, but they can be signs of other illnesses. Two common causes of nausea and vomiting are stomach flu and food poisoning. Nausea and vomiting from viral stomach flu will usually start to improve within 24 hours. Nausea and vomiting from food poisoning may last from 12 to 48 hours. The doctor has checked you carefully, but problems can develop later. If you notice any problems or new symptoms, get medical treatment right away. Follow-up care is a key part of your treatment and safety. Be sure to make and go to all appointments, and call your doctor if you are having problems. It's also a good idea to know your test results and keep a list of the medicines you take. How can you care for yourself at home? · To prevent dehydration, drink plenty of fluids, enough so that your urine is light yellow or clear like water. Choose water and other caffeine-free clear liquids until you feel better. If you have kidney, heart, or liver disease and have to limit fluids, talk with your doctor before you increase the amount of fluids you drink. · Rest in bed until you feel better. · When you are able to eat, try clear soups, mild foods, and liquids until all symptoms are gone for 12 to 48 hours. Other good choices include dry toast, crackers, cooked cereal, and gelatin dessert, such as Jell-O. When should you call for help? Call 911 anytime you think you may need emergency care. For example, call if:    · You passed out (lost consciousness).    Call your doctor now or seek immediate medical care if:    · You have symptoms of dehydration, such as:  ? Dry eyes and a dry mouth. ? Passing only a little dark urine. ?  Feeling thirstier than usual.     · You have new or worsening belly pain.     · You have a new or higher fever.     · You vomit blood or what looks like coffee grounds.    Watch closely for changes in your health, and be sure to contact your doctor if:    · You have ongoing nausea and vomiting.     · Your vomiting is getting worse.     · Your vomiting lasts longer than 2 days.     · You are not getting better as expected. Where can you learn more? Go to http://aziza-niyah.info/. Enter 25 [id number] in the search box to learn more about \"Nausea and Vomiting: Care Instructions. \"  Current as of: September 23, 2018  Content Version: 11.9  © 0563-3511 [employer]. Care instructions adapted under license by [employer] (which disclaims liability or warranty for this information). If you have questions about a medical condition or this instruction, always ask your healthcare professional. [address] any warranty or liability for your use of this information.

## 2019-02-22 NOTE — PROGRESS NOTES
Subjective:      José Miguel Yusuf is a 44 y.o. female here today with complaint of headache, body ache, fever (Tmax 101 degrees F, worse at night), chills, abdominal pain,. Onset was ~1 week ago. She now reports nausea, vomiting, and diarrhea. Denies dysuria, urinary frequency. Evaluated at [facility] on 2/16/19 for these symptoms. Reports that testing was performed and was diagnosed with UTI. Prescribed Keflex which she took for a few days but discontinued due to vomiting. No known sick contacts. She has been eating and drinking well. She has not taken any OTC due to her diabetes. Current Outpatient Medications   Medication Sig Dispense Refill    isosorbide mononitrate ER (IMDUR) 30 mg tablet TAKE 1 TABLET BY MOUTH ONCE DAILY *PATIENT  WILL  NEED  APPOINTMENT  BEFORE  ANYMORE  REFILLS* 30 Tab 0    carvedilol (COREG) 3.125 mg tablet TAKE 1 TABLET BY MOUTH TWICE DAILY WITH MEALS 60 Tab 1    gabapentin (NEURONTIN) 300 mg capsule TAKE 2 CAPSULES BY MOUTH THREE TIMES DAILY 180 Cap 3    bumetanide (BUMEX) 0.5 mg tablet Take 0.5 mg by mouth once over twenty-four (24) hours.  sodium bicarbonate 650 mg tablet Take 650 mg by mouth two (2) times a day.  prednisoLONE acetate (PRED FORTE) 1 % ophthalmic suspension       insulin glargine (LANTUS SOLOSTAR U-100 INSULIN) 100 unit/mL (3 mL) inpn 30 Units by SubCUTAneous route two (2) times a day.  albuterol (PROVENTIL HFA, VENTOLIN HFA, PROAIR HFA) 90 mcg/actuation inhaler Take 2 Puffs by inhalation every four (4) hours as needed for Wheezing. 1 Inhaler 2    Insulin Needles, Disposable, 31 gauge x 5/16\" ndle USE TO INJECT LANTUS AND APIDRA DAILY. 100 Pen Needle 23    ARNUITY ELLIPTA 200 mcg/actuation dsdv inhaler INHALE ONE PUFF BY MOUTH ONCE DAILY 1 Inhaler 2    losartan (COZAAR) 100 mg tablet Take 1 Tab by mouth daily.  90 Tab 1    montelukast (SINGULAIR) 10 mg tablet TAKE ONE TABLET BY MOUTH ONCE DAILY 30 Tab 2    VITAMIN D2 50,000 unit capsule Take 50,000 Units by mouth two (2) times a week.  glucose blood VI test strips (BLOOD GLUCOSE TEST) strip Patient request the [facility] [employer] brand of meter and strips and to test QID. 100 Strip 5    Lancets misc Bid for diabetes 250.00  Lot # [id number]  Exp 7/2018  Man Brian Nordisk 400 Package 0    insulin aspart U-100 (NOVOLOG) 100 unit/mL inpn 80 Units by SubCUTAneous route Before breakfast, lunch, and dinner. 25 Pen 3       Allergies   Allergen Reactions    Aspirin Itching    Contrast Agent [Iodine] Hives     IV only, can tolerate oral contrast    Dilaudid [Hydromorphone] Hives    Iodinated Contrast- Oral And Iv Dye Itching    Lisinopril Other (comments)    Metformin Nausea Only    Reglan  [Metoclopramide Hcl] Other (comments)    Reglan [Metoclopramide] Other (comments)     Impaired speech/LOWER EXTREMITY EDEMA-RECENTLY D/C'D BY MD    Ritalin [Methylphenidate] Other (comments)     No allergy per patient. 5/30/18       Past Medical History:   Diagnosis Date    Abnormal pap 2012 10/5/13     hx w/Colpo     Arthritis     Asthma     Diabetes ([address]     type 2    Headache(784.0)     Hyperlipidemia     Hypertension     Joint pain     DOC (obstructive sleep apnea)     Pap smear for cervical cancer screening 10/1/15 ASCUS HPV NEG RP 3 YEARS    Ringing in ears        Social History     Tobacco Use    Smoking status: Never Smoker    Smokeless tobacco: Never Used   Substance Use Topics    Alcohol use: No        Review of Systems  Pertinent items are noted in HPI.      Objective:     Visit Vitals  /72 (BP 1 Location: Right arm, BP Patient Position: Sitting) Comment: Manual   Pulse 70   Temp 99 °F (37.2 °C) (Oral) Comment: /.   Resp 18   Ht 5' 2\" (1.575 m)   Wt 218 lb (98.9 kg)   LMP 01/30/2019 (Approximate)   SpO2 100%   BMI 39.87 kg/m²      General appearance - alert, well appearing, and in no distress  Neck - supple, no significant adenopathy  Chest - clear to auscultation, no wheezes, rales or rhonchi, symmetric air entry, no tachypnea, retractions or cyanosis  Heart - normal rate, regular rhythm, normal S1, S2, no murmurs, rubs, clicks or gallops  Abdomen - soft, nontender, nondistended, no masses or organomegaly  no CVA tenderness    Recent Results (from the past 12 hour(s))   AMB POC URINALYSIS DIP STICK AUTO W/O MICRO    Collection Time: 02/22/19  3:08 PM   Result Value Ref Range    Color (UA POC) Yellow     Clarity (UA POC) Clear     Glucose (UA POC) Negative Negative    Bilirubin (UA POC) Negative Negative    Ketones (UA POC) Negative Negative    Specific gravity (UA POC) 1.020 1.001 - 1.035    Blood (UA POC) Trace Negative    pH (UA POC) 7 4.6 - 8.0    Protein (UA POC) 2+ Negative    Urobilinogen (UA POC) 0.2 mg/dL 0.2 - 1    Nitrites (UA POC) Negative Negative    Leukocyte esterase (UA POC) Trace Negative         Assessment/Plan:   Robinson Rizzo is a 44 y.o. female seen for:     1. Urinary tract infection without hematuria, site unspecified  - AMB POC URINALYSIS DIP STICK AUTO W/O MICRO  - CULTURE, URINE  - cefdinir (OMNICEF) 300 mg capsule; Take 1 Cap by mouth two (2) times a day for 10 days. Dispense: 20 Cap; Refill: 0  - Push fluids    - Pyridium OTC as needed for discomfort (advised that use may turn urine orange/red in color)     2. Nausea  - ondansetron (ZOFRAN ODT) 4 mg disintegrating tablet; Take 1 Tab by mouth every eight (8) hours as needed for Nausea. Dispense: 20 Tab; Refill: 0    3. Muscle spasm  - cyclobenzaprine (FLEXERIL) 5 mg tablet; Take 1 Tab by mouth nightly as needed for Muscle Spasm(s). Dispense: 20 Tab; Refill: 0    4. Other general symptoms and signs    I have discussed the diagnosis with the patient and the intended plan as seen in the above orders. The patient has received an after-visit summary and questions were answered concerning future plans. I have discussed medication side effects and warnings with the patient as well.  Informed patient to return to the office if symptoms worsen or if new symptoms arise. Follow-up Disposition:  Return if symptoms worsen or fail to improve.

## 2019-03-05 NOTE — ED NOTES
Provider reviewed discharge instructions with the patient. The patient verbalized understanding. All questions and concerns were addressed. The patient  discharged via wheelchair in the care of family members with instructions and prescriptions in hand. Pt is alert and oriented x 4. Respirations are clear and unlabored.

## 2019-03-05 NOTE — ED TRIAGE NOTES
\"I have chest pain and shortness of breath, it started yesterday while I was laying down watching TV. \" Pt says symptoms are constant. She describes the pain as going across her entire upper chest bilaterally, +nausea. Hx of HTN, denies cardiac hx.

## 2019-03-05 NOTE — ED PROVIDER NOTES
44 y.o. female with past medical history significant for Asthma, Hypertension, Arthritis, Diabetes, Hyperlipidemia, and DOC who presents from home with chief complaint of chest pain. Patient states onset yesterday morning of chest pain while watching TV. Patient reports constant left sided chest pain described as \"sharp\" that radiates up into her left shoulder and back. Patient presents to [facility] ED today with persisting left sided chest pain that is constant and worsened in nature at 2100 last night, and upon examination rated as 10/10 in severity. Patient reports aggravation of left sided chest pain with pleuritic and positional movement. Patient states she was unable to sleep last night due to severe chest pain. Patient endorses history of chest pain presented today when she had chest pain last year, and had cardiac cath which showed a blockage, but did not have any stents placed; and patient states symptoms today feel similar. Patient states she if followed by cardiologist Dr. Brigette Reyes MD. Patient denies taking ASA for symptoms. Pt denies fever, chills, cough, congestion, shortness of breath, abdominal pain, nausea, vomiting, diarrhea, difficulty with urination or dysuria. There are no other acute medical concerns at this time.     PCP: Diane Lopez MD    Note written by Lizz Keys, as dictated by Go Escudero MD 8:06 AM               Past Medical History:   Diagnosis Date    Abnormal pap 2012 10/5/13     hx w/Colpo     Arthritis     Asthma     Diabetes ([facility] [address]     type 2    Headache(784.0)     Hyperlipidemia     Hypertension     Joint pain     DOC (obstructive sleep apnea)     Pap smear for cervical cancer screening 10/1/15 ASCUS HPV NEG RP 3 YEARS    Ringing in ears        Past Surgical History:   Procedure Laterality Date    HX  SECTION  01 & 08    HX COLPOSCOPY  2012 neg    HX HEENT      cataract R eye    HX TUBAL LIGATION           Family History:   Problem Relation Age of Onset    Diabetes Mother     Heart Disease Mother     Hypertension Mother     Stroke Mother     Hypertension Father     Stroke Father     Heart Disease Father     Breast Cancer Neg Hx        Social History     Socioeconomic History    Marital status:      Spouse name: Not on file    Number of children: 2    Years of education: Not on file    Highest education level: Not on file   Social Needs    Financial resource strain: Not on file    Food insecurity - worry: Not on file    Food insecurity - inability: Not on file   [employer] needs - medical: Not on file   [employer] needs - non-medical: Not on file   Occupational History    Occupation: --   Tobacco Use    Smoking status: Never Smoker    Smokeless tobacco: Never Used   Substance and Sexual Activity    Alcohol use: No    Drug use: No    Sexual activity: Yes     Partners: Male     Birth control/protection: Surgical   Other Topics Concern     Service No    Blood Transfusions No    Caffeine Concern No    Occupational Exposure No    Hobby Hazards No    Sleep Concern Yes    Stress Concern Yes    Weight Concern Yes    Special Diet Yes     Comment: DM    Back Care Yes    Exercise Yes    Bike Helmet No    Seat Belt Yes    Self-Exams Yes   Social History Narrative    Not on file         ALLERGIES: Aspirin; Contrast agent [iodine]; Dilaudid [hydromorphone]; Iodinated contrast- oral and iv dye; Lisinopril; Metformin; Reglan  [metoclopramide hcl]; Reglan [metoclopramide]; and Ritalin [methylphenidate]    Review of Systems   Constitutional: Negative for chills and fever. HENT: Negative for congestion. Respiratory: Negative for cough and shortness of breath. Cardiovascular: Positive for chest pain. Gastrointestinal: Negative for abdominal pain, diarrhea, nausea and vomiting. Genitourinary: Negative for difficulty urinating and dysuria. Musculoskeletal: Positive for back pain. Skin: Negative for rash. All other systems reviewed and are negative. Vitals:    03/05/19 0731   BP: 118/73   Pulse: 99   Resp: 18   Temp: 98 °F (36.7 °C)   SpO2: 98%   Weight: 97.5 kg (215 lb)   Height: 5' 2\" (1.575 m)            Physical Exam   Constitutional: She is oriented to person, place, and time. She appears well-developed. Obese, appeared to be in moderate to severe pain, crying   HENT:   Head: Normocephalic and atraumatic. Eyes: Conjunctivae are normal. No scleral icterus. Neck: Neck supple. No tracheal deviation present. Cardiovascular: Normal rate, regular rhythm, normal heart sounds and intact distal pulses. Exam reveals no gallop and no friction rub. No murmur heard. Pulmonary/Chest: Effort normal and breath sounds normal. She has no wheezes. She has no rales. Abdominal: Soft. She exhibits no distension. There is no tenderness. There is no rebound and no guarding. Musculoskeletal: She exhibits no edema. Neurological: She is alert and oriented to person, place, and time. Skin: Skin is warm and dry. No rash noted. Psychiatric: She has a normal mood and affect. Nursing note and vitals reviewed. Note written by Lizz Parra, as dictated by Izaiah Bales MD 8:06 AM       MDM       Procedures  ED EKG interpretation:  Rhythm: sinus tachycardia; and regular . Rate (approx.): 105 bpm; ST/T wave: No acute ST changes. Note written by Lizz Parra, as dictated by Izaiah Bales MD 7:39 AM    CONSULT NOTE:  10:08 AM Izaiah Bales MD spoke with Dr. Dereck Eisenmenger, Consult for Cardiology. Discussed available diagnostic tests and clinical findings. Dr. Sofy Alexis states that patient always comes in with atypical chest pain, says he did cath previously which did not show any significant lesions, and is comfortable with discharge.     PROGRESS NOTE:  10:09 AM Will do another troponin and if negative will discharge home. PROGRESS NOTE:  10:46 AM Patient's second troponin negative. PROGRESS NOTE:  12:56 PM Patient complaining of abdominal pain and left leg pain, completed CT abd pelv which showed a small omental infarct adjacent to the left ovary, will treat with Ultram and Naproxen.

## 2019-03-05 NOTE — ED NOTES
Patient now c/o left arm pain in addition to the left flank and leg pain. Patient states that she does not want to bear weight on her left leg due to pain and it feeling weak when transferring to bedside commode. Patient states her arm and flank are tender to palpation, states he left leg is painful weak and cannot feel me touching it when palpating, patient is able to move the extremity on her own without difficulty. Dr. Chandrika sotelo.

## 2019-03-05 NOTE — PROGRESS NOTES
[facility]: MED RECONCILIATION    Comments/Recommendations:    Patient is awake, alert, and knowledgeable about home medications    Verifies allergies and preferred pharmacy listed   Reports compliance to prescribed regimen   Blood glucose is monitored at home and is less than 120 on average   Blood pressure is periodically monitored at home and is 120/80    Medications added:     · atorvastatin    Medications removed:    · Cefdinir  · Cyclobenzaprine  · Arnuity Ellipta - uses seasonally not current using  · Ondansetron  · Prednisolone acetate    Medications adjusted:    · Montelukast changed to daily prn  · Vitamin D2 changed to once weekly    Allergies: Aspirin; Contrast agent [iodine]; Dilaudid [hydromorphone]; Iodinated contrast- oral and iv dye; Lisinopril; Metformin; Reglan  [metoclopramide hcl]; Reglan [metoclopramide]; and Ritalin [methylphenidate]    Prior to Admission Medications:   Prior to Admission Medications   Prescriptions Last Dose Informant Patient Reported? Taking? VITAMIN D2 50,000 unit capsule 3/3/2019 Self Yes Yes   Sig: Take 50,000 Units by mouth every . albuterol (PROVENTIL HFA, VENTOLIN HFA, PROAIR HFA) 90 mcg/actuation inhaler  Self No Yes   Sig: Take 2 Puffs by inhalation every four (4) hours as needed for Wheezing. bumetanide (BUMEX) 0.5 mg tablet 3/4/2019 at Unknown time Self Yes Yes   Sig: Take 0.5 mg by mouth daily. carvedilol (COREG) 3.125 mg tablet 3/4/2019 at Unknown time Self No Yes   Sig: TAKE 1 TABLET BY MOUTH TWICE DAILY WITH MEALS   gabapentin (NEURONTIN) 300 mg capsule 3/4/2019 at Unknown time Self No Yes   Sig: TAKE 2 CAPSULES BY MOUTH THREE TIMES DAILY   insulin aspart U-100 (NOVOLOG) 100 unit/mL inpn 3/4/2019 at Unknown time Self No Yes   Si Units by SubCUTAneous route Before breakfast, lunch, and dinner.    insulin glargine (LANTUS SOLOSTAR U-100 INSULIN) 100 unit/mL (3 mL) inpn 3/4/2019 at Unknown time Self Yes Yes   Si Units by SubCUTAneous route ACB/HS. isosorbide mononitrate ER (IMDUR) 30 mg tablet 3/4/2019 at Unknown time Self No Yes   Sig: TAKE 1 TABLET BY MOUTH ONCE DAILY *PATIENT  WILL  NEED  APPOINTMENT  BEFORE  ANYMORE  REFILLS*   losartan (COZAAR) 100 mg tablet 3/4/2019 at Unknown time Self No Yes   Sig: Take 1 Tab by mouth daily. montelukast (SINGULAIR) 10 mg tablet  Self Yes Yes   Sig: Take 10 mg by mouth daily as needed (allergies). sodium bicarbonate 650 mg tablet 3/4/2019 at Unknown time Self Yes Yes   Sig: Take 650 mg by mouth two (2) times a day.       Facility-Administered Medications: None      Thank you,      Layton Robles, PolyD, BCPS

## 2019-03-05 NOTE — LETTER
[address] 
[facility] EMERGENCY DEPT 
[address] [place] [address] Arpita Resendiz [id number] [phone] Work/School Note Date: 3/5/2019 To Whom It May concern: 
 
Deny Kirk was seen and treated today in the emergency room by the following provider(s): 
Attending Provider: Karol Shnaks MD. Ced Nuñez accompanied patient to the ED today. Sincerely, Rubi Mejia RN

## 2019-03-08 NOTE — PROGRESS NOTES
HISTORY OF PRESENT ILLNESS  Robinson Rizzo is a 44 y.o. female. HPI  Pt presents with \"ER follow up\"    Pt states that she went to the [facility] ER the other day, for chest pain and abdominal pain. Pain is noted on left side of lower abdomen  Pt states that \"they told her that something was wrong with her imaging, and was told to follow up here\"    Pt is not in pain at this time and has no further complaints at this time  Review of Systems   Constitutional: Negative for fever. HENT: Negative for congestion. Respiratory: Negative for cough. Physical Exam   Constitutional: She is oriented to person, place, and time. She appears well-developed and well-nourished. HENT:   Head: Normocephalic and atraumatic. Cardiovascular: Normal rate, regular rhythm and normal heart sounds. Pulmonary/Chest: Effort normal and breath sounds normal.   Neurological: She is alert and oriented to person, place, and time. Skin: Skin is warm and dry. Psychiatric: She has a normal mood and affect. Her behavior is normal.       ASSESSMENT and PLAN    ICD-10-CM ICD-9-CM    1. Abnormal abdominal CT scan R93.5 793.6 REFERRAL TO OBSTETRICS AND GYNECOLOGY     Reviewed ER note, and found abnormal ovarian lesion noted on CT of abdomen, that is new from prior CT. Pt has OBGYN at [facility], and states that she will make an appointment with them, in regards to this. Educated about importance of proper follow up, ASAP. Should notify office with any continued and/or worsening of symptoms    Pt informed to return to office with worsening of symptoms, or PRN with any questions or concerns. Pt verbalizes understanding of plan of care and denies further questions or concerns at this time.

## 2019-03-08 NOTE — LETTER
NOTIFICATION RETURN TO WORK / SCHOOL 
 
3/8/2019 2:22 PM 
 
Ms. Elliott Tfaoya [place] [id number] To Whom It May Concern: 
 
Elliott Tafoya is currently under the care of [address] [place] [address]. She was seen in office on 3/8, and her  Ced Portillo, needs to be excused from work due to this appointment If there are questions or concerns please have the patient contact our office. Sincerely, Lisa Valdivia NP

## 2019-03-08 NOTE — PATIENT INSTRUCTIONS

## 2019-03-15 NOTE — TELEPHONE ENCOUNTER
PA was denied. Looked up pt's formulary on-line and found that ProAir and Proventil are covered. I spoke with [facility] as script sent 11/06/2018 has both of those names listed in script and I was advised that because they have been filling it for ventolin they are unable to change it without new script. Please erx new script for pt. Thanks.

## 2019-03-15 NOTE — PROGRESS NOTES
1. Have you been to the ER, urgent care clinic since your last visit? Hospitalized since your last visit? Yes Where: Yes, [address] [facility] for left side pain    2. Have you seen or consulted any other health care providers outside of the [address] [place] [address] since your last visit? Include any pap smears or colon screening. No     Chief Complaint   Patient presents with    Follow-up     Visit Vitals  /72 (BP 1 Location: Left arm, BP Patient Position: Sitting)   Pulse 100   Temp 99.2 °F (37.3 °C) (Tympanic)   Ht 5' 2\" (1.575 m)   Wt 214 lb 6.4 oz (97.3 kg)   SpO2 98%   BMI 39.21 kg/m²     3 most recent PHQ Screens 3/15/2019   Little interest or pleasure in doing things Not at all   Feeling down, depressed, irritable, or hopeless Not at all   Total Score PHQ 2 0     Fall Risk Assessment, last 12 mths 8/15/2017   Able to walk? Yes   Fall in past 12 months? No       Learning Assessment 3/15/2019   PRIMARY LEARNER Patient   HIGHEST LEVEL OF EDUCATION - PRIMARY LEARNER  -   BARRIERS PRIMARY LEARNER NONE   CO-LEARNER CAREGIVER No   PRIMARY LANGUAGE ENGLISH   LEARNER PREFERENCE PRIMARY LISTENING   ANSWERED BY patient   RELATIONSHIP SELF     Abuse Screening Questionnaire 3/15/2019   Do you ever feel afraid of your partner? N   Are you in a relationship with someone who physically or mentally threatens you? N   Is it safe for you to go home?  [place]

## 2019-03-15 NOTE — PROGRESS NOTES
[facility], MD, Linda, [facility], [place]       April Jethro Peterson, NP    Brandon Lyle, GINI Martins, MARIA GUADALUPE-BC   Jaydon Sutton, REYNA Joel, REYNA Rosas [facility] 136    at [address], [address] [place] [address], [facility] 22.    [phone]    FAX: [address] [place] [address], [address]    [phone]    FAX: [phone]       Patient Care Team:  Kristina Lewis MD as PCP - General (Family Practice)  Arianne Small MD as Physician (Obstetrics & Gynecology)  Ulices Patel MD (Endocrinology)  Jeanette Damon MD as Physician (Cardiology)    Problem List  Date Reviewed: 4/8/2019          Codes Class Noted    Hyperlipidemia ICD-10-CM: E78.5  ICD-9-CM: 272.4  Unknown        DOC (obstructive sleep apnea) ICD-10-CM: G47.33  ICD-9-CM: 327.23  Unknown        Abnormal liver enzymes ICD-10-CM: R74.8  ICD-9-CM: 790.5  5/30/2018        NAFLD (nonalcoholic fatty liver disease) ICD-10-CM: K76.0  ICD-9-CM: 571.8  5/30/2018        Type 2 diabetes with nephropathy ([address] ICD-10-CM: E11.21  ICD-9-CM: 250.40, 583.81  5/1/2018        Obesity, morbid ([address] ICD-10-CM: E66.01  ICD-9-CM: 278.01  4/10/2018        Type 2 diabetes mellitus with diabetic neuropathy ([address] ICD-10-CM: E11.40  ICD-9-CM: 250.60, 357.2  1/10/2018        Elevated serum creatinine ICD-10-CM: R79.89  ICD-9-CM: 790.99  10/20/2016        Muscle spasm of back ICD-10-CM: M62.830  ICD-9-CM: 724.8  7/6/2016        Encounter for long-term (current) use of insulin ([address] ICD-10-CM: Z79.4  ICD-9-CM: V58.67  5/12/2016        Recurrent streptococcal tonsillitis ICD-10-CM: J03.01  ICD-9-CM: 034.0  2/12/2016        Muscle spasms of neck ICD-10-CM: N87.447  ICD-9-CM: 728.85  2/1/2016 Ankle edema ICD-10-CM: M25.473  ICD-9-CM: 719.07  1/12/2016        Obesity ICD-10-CM: E66.9  ICD-9-CM: 278.00  11/28/2015        BMI 35.0-35.9,adult ICD-10-CM: Z68.35  ICD-9-CM: V85.35  11/28/2015        Essential hypertension ICD-10-CM: I10  ICD-9-CM: 401.9  11/28/2015        Noncompliance of patient with dietary regimen ICD-10-CM: Z91.11  ICD-9-CM: V15.81  11/28/2015        Non compliance with medical treatment ICD-10-CM: Z91.19  ICD-9-CM: V15.81  12/19/2014        LGSIL (low grade squamous intraepithelial dysplasia) ICD-10-CM: CCE1415  ICD-9-CM: 796.9  11/20/2013        Lumbar degenerative disc disease ICD-10-CM: M51.36  ICD-9-CM: 722.52  11/13/2013        Avascular necrosis of bones of both hips ([place] [address].) ICD-10-CM: M87.051, M87.052  ICD-9-CM: 733.42  10/14/2013    Overview Signed 10/14/2013  3:61 PM by Genoveva Dowd MD     3/[facility]             Asthma ICD-10-CM: T12.529  ICD-9-CM: 493.90  3/5/2010        Diabetes mellitus ([facility] ICD-10-CM: E11.9  ICD-9-CM: 250.00  3/5/2010              Anneliese Barger returns to the [place] [address] regarding fatty liver disease and its management. The active problem list, all pertinent past medical history, medications, radiologic findings and laboratory findings related to the liver disorder were reviewed with the patient. The patient is a 44 y.o.  female who is suspected to have fatty liver disease based upon ultrasound from 10/2017,       An assessment of liver fibrosis with biopsy or elastography has not been performed because this was not approved for payment by her insurance carrier. Since the last office appointment the patient has:  Not participated in a formal exercise program.    Not made at attempt to increase exercise. Had a reduction in body weight by 6 pounds. The patient has no symptoms which can be attributed to the liver disorder.     The patient has not experienced fatigue, pain in the right side over the liver,     The patient completes all daily activities without any functional limitations. ASSESSMENT AND PLAN:  Fatty liver  Suspect the patient has fatty liver based upon imaging, features of metabolic syndrome, serologic studies that are negative for other causes of chronic liver disease,     Liver transaminases are normal.  ALP is normal.  Liver function is normal.  The platelet count is normal.      Only a liver biopsy can confirm if the patient does have fatty liver and differentiate NAFL from NERI. The treatment is the same; weight reduction. If the liver biopsy demonstrates NERI the patient could be eligible for enrollment into clinical trials for treatment of NERI. The need to perform a liver biopsy to help determine the cause and severity of the liver test abnormalities was discussed. The risks of performing the liver biopsy including pain, puncture of the lung, gallbladder, intestine or kidney and bleeding were discussed. The patient has decided to have a liver biopsy. This will be scheduled. Treatment of other medical problems in patients with chronic liver disease  There are no contraindications for the patient to take most medications that are necessary for treatment of other medical issues. Counseling for alcohol in patients with chronic liver disease  The patient was counseled regarding alcohol consumption and the effect of alcohol on chronic liver disease. The patient does not consume any significant amount of alcohol. Vaccinations   Vaccination for viral hepatitis B is recommended since the patient has no serologic evidence of previous exposure or vaccination with immunity. The need for vaccination against viral hepatitis A will be assessed with serologic and instituted as appropriate. Routine vaccinations against other bacterial and viral agents can be performed as indicated. Annual flu vaccination should be administered if indicated.          ALLERGIES  Allergies Allergen Reactions    Aspirin Itching    Contrast Agent [Iodine] Hives     IV only, can tolerate oral contrast    Dilaudid [Hydromorphone] Hives    Iodinated Contrast- Oral And Iv Dye Itching    Lisinopril Itching     itching    Metformin Nausea Only    Reglan  [Metoclopramide Hcl] Other (comments)    Reglan [Metoclopramide] Other (comments)     Impaired speech/LOWER EXTREMITY EDEMA-RECENTLY D/C'D BY MD       MEDICATIONS  Current Outpatient Medications   Medication Sig    isosorbide mononitrate ER (IMDUR) 30 mg tablet TAKE 1 TABLET BY MOUTH ONCE DAILY -  APPOINTMENT  NEEDED  BEFORE  REFILLS    losartan (COZAAR) 100 mg tablet TAKE 1 TABLET BY MOUTH ONCE DAILY    montelukast (SINGULAIR) 10 mg tablet Take 10 mg by mouth daily as needed (allergies).  atorvastatin (LIPITOR) 20 mg tablet Take 20 mg by mouth daily.  naproxen (NAPROSYN) 500 mg tablet Take 1 Tab by mouth two (2) times daily (with meals) for 10 days.  insulin aspart U-100 (NOVOLOG) 100 unit/mL inpn 80 Units by SubCUTAneous route Before breakfast, lunch, and dinner.  carvedilol (COREG) 3.125 mg tablet TAKE 1 TABLET BY MOUTH TWICE DAILY WITH MEALS    gabapentin (NEURONTIN) 300 mg capsule TAKE 2 CAPSULES BY MOUTH THREE TIMES DAILY    bumetanide (BUMEX) 0.5 mg tablet Take 0.5 mg by mouth daily.  sodium bicarbonate 650 mg tablet Take 650 mg by mouth two (2) times a day.  insulin glargine (LANTUS SOLOSTAR U-100 INSULIN) 100 unit/mL (3 mL) inpn 30 Units by SubCUTAneous route ACB/HS.  albuterol (PROVENTIL HFA, VENTOLIN HFA, PROAIR HFA) 90 mcg/actuation inhaler Take 2 Puffs by inhalation every four (4) hours as needed for Wheezing.  VITAMIN D2 50,000 unit capsule Take 50,000 Units by mouth every Sunday. No current facility-administered medications for this visit. SYSTEM REVIEW NOT RELATED TO LIVER DISEASE OR REVIEWED ABOVE:  Constitution systems: Negative for fever, chills, weight gain, weight loss.    Eyes: Negative for visual changes. ENT: Negative for sore throat, painful swallowing. Respiratory: Negative for cough, hemoptysis, SOB. Cardiology: Negative for chest pain, palpitations. GI:  Negative for constipation or diarrhea. : Negative for urinary frequency, dysuria, hematuria, nocturia. Skin: Negative for rash. Hematology: Negative for easy bruising, blood clots. Musculo-skelatal: Negative for back pain, muscle pain, weakness. Neurologic: Negative for headaches, dizziness, vertigo, memory problems not related to HE. Psychology: Negative for anxiety, depression. FAMILY HISTORY:  The father has a history of liver disease. Patient is unsure of liver diagnosis. The mother  of a myocardial infarction. SOCIAL HISTORY:  The patient is . The patient has 2 children. The patient has never used tobacco products. The patient has never consumed significant amounts of alcohol. The patient does not work. The patient is applying for disability. PHYSICAL EXAMINATION:  Visit Vitals  /72 (BP 1 Location: Left arm, BP Patient Position: Sitting)   Pulse 100   Temp 99.2 °F (37.3 °C) (Tympanic)   Ht 5' 2\" (1.575 m)   Wt 214 lb 6.4 oz (97.3 kg)   LMP 2019   SpO2 98%   BMI 39.21 kg/m²     General: No acute distress. Eyes: Sclera anicteric. ENT: No oral lesions. Thyroid normal.  Nodes: No adenopathy. Skin: No spider angiomata. No jaundice. No palmar erythema. Respiratory: Lungs clear to auscultation. Cardiovascular: Regular heart rate. No murmurs. No JVD. Abdomen: Soft, tenderness upon palpation in the epigastric area and RUQ. Liver size normal to percussion/palpation. Spleen not palpable. No obvious ascites. Extremities: No edema. No muscle wasting. No gross arthritic changes. Neurologic: Alert and oriented. Cranial nerves grossly intact. No asterixis.     LABORATORY STUDIES:  [facility] of [address] [place] [address] & Units 3/5/2019   WBC 3.6 - 11.0 K/uL 6.5   ANC 1.8 - 8.0 K/UL 3.4   HGB 11.5 - 16.0 g/dL 10.9 (L)    - 400 K/uL 415 (H)   INR 0.8 - 1.2    AST 0 - 40 IU/L 21   ALT 0 - 32 IU/L 38   Alk Phos 39 - 117 IU/L 79   Bili, Total 0.0 - 1.2 mg/dL 0.3   Albumin 3.5 - 5.5 g/dL 3.0 (L)   BUN 6 - 20 mg/dL 13   Creat 0.57 - 1.00 mg/dL 1.54 (H)   Na 134 - 144 mmol/L 136   K 3.5 - 5.2 mmol/L 4.2   Cl 96 - 106 mmol/L 100   CO2 20 - 29 mmol/L 26   Glucose 65 - 99 mg/dL 366 (H)     SEROLOGIES:  Serologies Latest Ref Rng & Units 5/1/2018   Hep B Surface Ag Negative Negative   Hep B Core Ab, Total Negative Negative   Hep B Surface AB QL  Non Reactive   Hep C Ab 0.0 - 0.9 s/co ratio <0.1   Ferritin 15 - 150 ng/mL 53   Iron % Saturation 15 - 55 % 23   IZA Ab, Direct Negative Negative   M2 Ab 0.0 - 20.0 Units 4.4     LIVER HISTOLOGY:  Not available or performed    ENDOSCOPIC PROCEDURES:  Not available or performed    RADIOLOGY:  10/2017. Ultrasound of liver. Persistently increased hepatic echogenicity, compatible with hepatic parenchymal disease, likely hepatic steatosis. Normal gallbladder. 5/2018. CT scan abdomen without IV contrast.  Normal appearing liver. No liver mass lesions. Normal spleen. No ascites. OTHER TESTING:  Not available or performed    FOLLOW-UP:  All of the issues listed above in the Assessment and Plan were discussed with the patient. All questions were answered. The patient expressed a clear understanding of the above. [address] [place] [address] in 2 weeks after liver biopsy.       MD Delicia Izquierdo 13 of [address], [address] [facility] 22.  [phone]  [address] [facility]

## 2019-03-19 NOTE — PROGRESS NOTES
Identified pt with two pt identifiers(name and ). Chief Complaint   Patient presents with    Rash     around her neck - she used baby wipes last week    Spasms     stomach and left side off and on this week    Allergies        Health Maintenance Due   Topic    Pneumococcal 19-64 Medium Risk (1 of 1 - PPSV23)    EYE EXAM RETINAL OR DILATED     FOOT EXAM Q1     PAP AKA CERVICAL CYTOLOGY     HEMOGLOBIN A1C Q6M     MICROALBUMIN Q1    Dr Rochelle Nielsen - eye[place]    Wt Readings from Last 3 Encounters:   19 214 lb 6.4 oz (97.3 kg)   03/15/19 214 lb 6.4 oz (97.3 kg)   19 211 lb (95.7 kg)     Temp Readings from Last 3 Encounters:   19 98.2 °F (36.8 °C) (Oral)   03/15/19 99.2 °F (37.3 °C) (Tympanic)   19 98 °F (36.7 °C)     BP Readings from Last 3 Encounters:   19 146/90   03/15/19 130/72   19 130/70     Pulse Readings from Last 3 Encounters:   19 (!) 106   03/15/19 100   19 80         Learning Assessment:  :     Learning Assessment 3/15/2019 7/3/2018 4/10/2018 1/10/2018 2016 2015 10/6/2015   PRIMARY LEARNER Patient Patient Patient Patient Patient Patient Patient   HIGHEST LEVEL OF EDUCATION - PRIMARY LEARNER  - - - - - - DID NOT GRADUATE [facility] LEARNER NONE - - - - - [address] CAREGIVER No - - - - - No   PRIMARY [address]   LEARNER PREFERENCE PRIMARY LISTENING READING READING READING LISTENING DEMONSTRATION DEMONSTRATION   ANSWERED BY patient Arnav Vaughn patient Patient patient   RELATIONSHIP SELF SELF SELF SELF SELF SELF SELF       Depression Screening:  :     3 most recent PHQ Screens 3/15/2019   Little interest or pleasure in doing things Not at all   Feeling down, depressed, irritable, or hopeless Not at all   Total Score PHQ 2 0       Fall Risk Assessment:  :     Fall Risk Assessment, last 12 mths 8/15/2017   Able to walk?  Yes   Fall in past 12 months? No       Abuse Screening:  :     Abuse Screening Questionnaire 3/15/2019 12/7/2018 8/15/2016 5/29/2014 4/9/2014   Do you ever feel afraid of your partner? N N N N N   Are you in a relationship with someone who physically or mentally threatens you? N N N N N   Is it safe for you to go home? Y Y Y Y Y       Coordination of Care Questionnaire:  :     1) Have you been to an emergency room, urgent care clinic since your last visit? no   Hospitalized since your last visit? no             2) Have you seen or consulted any other health care providers outside of [address] [place] [address] since your last visit? yes  Dr Keith Ochoa (Include any pap smears or colon screenings in this section.)    3) Do you have an Advance Directive on file? no  Are you interested in receiving information about Advance Directives? no    Patient is accompanied by son I have received verbal consent from Nomi Muller to discuss any/all medical information while they are present in the room. Reviewed record in preparation for visit and have obtained necessary documentation. Medication reconciliation up to date and corrected with patient at this time.

## 2019-03-19 NOTE — PROGRESS NOTES
Chief Complaint:  Chief Complaint   Patient presents with    Rash     around her neck - she used baby wipes last week    Spasms     stomach and left side off and on this week    Allergies       History of Present Illness:  She is a 44 y.o. female who presents with c/o am itchy rash around her neck that started after she used a baby wipe last week. Since then, the left side of her neck has been itchy. Also, she is c/o muscles spasms along the left side of her neck and flank area and mostly in the upper L-arm. She reports that her shoulder seems tight. She denies any injury to the arm or any other trauma. In reviewing her chart, I note that she seems to be a very uncontrolled type II DM. She has been non-compliant with following up and with taking medications. She was insisting on being started on prednisone, but I declined because of her BS and found alternative treatments to assist her pain and topical steroids were employed for her neck. I cautioned her about not coming in for appointments and for not following up about her type II DM. It is not clear that she is taking her medications as prescribed either. Reviewed PmHx, RxHx, FmHx, SocHx, AllgHx and updated and dated in the chart.     Patient Active Problem List    Diagnosis    Hyperlipidemia    DOC (obstructive sleep apnea)    Abnormal liver enzymes    NAFLD (nonalcoholic fatty liver disease)    Type 2 diabetes with nephropathy (HCC)    Obesity, morbid (HCC)    Type 2 diabetes mellitus with diabetic neuropathy (HCC)    Elevated serum creatinine    Muscle spasm of back    Encounter for long-term (current) use of insulin (HCC)    Recurrent streptococcal tonsillitis    Muscle spasms of neck    Ankle edema    Obesity    BMI 35.0-35.9,adult    Essential hypertension    Noncompliance of patient with dietary regimen    Non compliance with medical treatment    LGSIL (low grade squamous intraepithelial dysplasia)    Lumbar degenerative disc disease    Avascular necrosis of bones of both hips ([facility]     9/2013 [facility]      Asthma    Diabetes mellitus [facility])       Review of Systems - negative except as listed above in the HPI    Objective:     Vitals:    03/19/19 1636   BP: 146/90   Pulse: (!) 106   Resp: 20   Temp: 98.2 °F (36.8 °C)   TempSrc: Oral   SpO2: 98%   Weight: 214 lb 6.4 oz (97.3 kg)   Height: 5' 2\" (1.575 m)     Physical Examination:   General appearance - Patient has a cushingoid appearance. Mental status - alert, oriented to person, place, and time, agitated  Neck - thick hyperpigmented leathery rash around her neck consistent with ACN  Lymphatics - no palpable lymphadenopathy, no hepatosplenomegaly  Chest - clear to auscultation, no wheezes, rales or rhonchi, symmetric air entry  Heart - normal rate, regular rhythm, normal S1, S2, no murmurs, rubs, clicks or gallops  Musculoskeletal - abnormal exam of left shoulder. Decreased ROM. Extremities - peripheral pulses normal, no pedal edema, no clubbing or cyanosis  Skin - normal coloration and turgor, no rashes, no suspicious skin lesions noted    Assessment/ Plan:   39F with uncontrolled type II DM. She is showing end organ disease with profound Acanthosis Nigricans. However, she had a mild eczematous rash around the L-neck as well.   specific diabetic recommendations: annual eye examinations at Ophthalmology discussed, glycohemoglobin and other lab monitoring discussed, long term diabetic complications discussed and labs immediately prior to next visit. Diagnoses and all orders for this visit:    1. Other eczema  -     hydrocortisone (HYTONE) 2.5 % topical cream; Apply  to affected area two (2) times a day. use thin layer    2. H/O multiple allergies    3. Muscle spasm  -     tiZANidine (ZANAFLEX) 2 mg tablet; 1-2 tablets, 1-2 hours prior to bedtime. 4. Acanthosis nigricans   Discussed that this is due to uncontrolled type II DM.      I have discussed the diagnosis with the patient and the intended treatment plan as seen in the above orders. The patient has received an after-visit summary and questions were answered concerning future plans. Asked to return should symptoms worsen or not improve with treatment. Any pending labs and studies will be relayed to patient when they become available. Pt verbalizes understanding of plan of care and denies further questions or concerns at this time. Follow-up and Dispositions    · Return if symptoms worsen or fail to improve.          Abby Smith MD

## 2019-03-27 NOTE — ED NOTES
Dr. Cody Jenkins has reviewed discharge instructions with patient and family including prescription(s) if applicable. Patient has received and verbalizes understanding of all instructions and has no further questions at this time. Patient exits ED via ambulatory accompanied by family. Patient in no acute distress.

## 2019-03-27 NOTE — ED PROVIDER NOTES
44 y.o. female with past medical history significant for asthma, HTN, DM, HLD, DOC, who presents via EMS to the ED with cc of chest pain. Pt reports left-sided chest pain extending into her left shoulder onset at ~8:00AM today while at rest lying down. She states the pain is exacerbated by any movement. She endorses associated shortness of breath. She denies use of medications to treat pain prior to arrival. She states she has had similar pain in the past, most recently 1-2 weeks ago. Pt states she is complaint with her medications for HTN, HLD, and DM. She denies history of kidney issues. Pt specifically denies any fevers, chills, vomiting, urinary changes, bowel changes, or swelling. There are no other acute medical concerns at this time. Social Hx: Never Tobacco use, denies EtOH use, denies Illicit Drug use PCP: Kylie Frances MD 
 
Note written by Lizz Johnson, as dictated by Lew Campos MD 10:00 AM  
 
 
The history is provided by the patient. No  was used. Past Medical History:  
Diagnosis Date  Abnormal pap 2012 10/5/13   
 hx w/Colpo  Arthritis  Asthma  Diabetes ([address] type 2  
 Headache(784.0)  Hyperlipidemia  Hypertension  Joint pain  DOC (obstructive sleep apnea)  Pap smear for cervical cancer screening 10/1/15 ASCUS HPV NEG RP 3 YEARS  Ringing in ears Past Surgical History:  
Procedure Laterality Date  HX  SECTION  01 & 08  HX COLPOSCOPY  2012 neg  HX HEENT    
 cataract R eye  HX TUBAL LIGATION Family History:  
Problem Relation Age of Onset  Diabetes Mother  Heart Disease Mother  Hypertension Mother  Stroke Mother  Hypertension Father  Stroke Father  Heart Disease Father  Breast Cancer Neg Hx Social History Socioeconomic History  Marital status:  Spouse name: Not on file  Number of children: 2  Years of education: Not on file  Highest education level: Not on file Occupational History  Occupation: -- Social Needs  Financial resource strain: Not on file  Food insecurity:  
  Worry: Not on file Inability: Not on file  Transportation needs:  
  Medical: Not on file Non-medical: Not on file Tobacco Use  Smoking status: Never Smoker  Smokeless tobacco: Never Used Substance and Sexual Activity  Alcohol use: No  
 Drug use: No  
 Sexual activity: Yes  
  Partners: Male Birth control/protection: Surgical  
Lifestyle  Physical activity:  
  Days per week: Not on file Minutes per session: Not on file  Stress: Not on file Relationships  Social connections:  
  Talks on phone: Not on file Gets together: Not on file Attends Judaism service: Not on file Active member of club or organization: Not on file Attends meetings of clubs or organizations: Not on file Relationship status: Not on file  Intimate partner violence:  
  Fear of current or ex partner: Not on file Emotionally abused: Not on file Physically abused: Not on file Forced sexual activity: Not on file Other Topics Concern   Service No  
 Blood Transfusions No  
 Caffeine Concern No  
 Occupational Exposure No  
 Hobby Hazards No  
 Sleep Concern Yes  Stress Concern Yes  Weight Concern Yes  Special Diet Yes Comment: DM  
 Back Care Yes  Exercise Yes  Bike Helmet No  
 Seat Belt Yes  Self-Exams Yes Social History Narrative  Not on file ALLERGIES: Aspirin; Contrast agent [iodine]; Dilaudid [hydromorphone]; Iodinated contrast- oral and iv dye; Lisinopril; Metformin; Reglan  [metoclopramide hcl]; and Reglan [metoclopramide] Review of Systems Constitutional: Negative for chills and fever. HENT: Negative for rhinorrhea and sore throat. Respiratory: Positive for shortness of breath. Negative for cough. Cardiovascular: Positive for chest pain. Negative for leg swelling. Gastrointestinal: Negative for abdominal pain, blood in stool, constipation, diarrhea, nausea and vomiting. Genitourinary: Negative for decreased urine volume, difficulty urinating, dysuria, frequency, hematuria and urgency. Musculoskeletal: Negative for arthralgias, back pain and joint swelling. Skin: Negative for rash. Neurological: Negative for dizziness, weakness and light-headedness. Vitals:  
 03/27/19 1001 03/27/19 1006 BP: (!) 151/91 (!) 151/91 Pulse: (!) 101 (!) 102 Resp: 24 24 Temp:  97.5 °F (36.4 °C) SpO2: 100% 100% Weight: 97.3 kg (214 lb 8.1 oz) Height: 5' 2\" (1.575 m) Physical Exam  
Vital signs reviewed. Nursing notes reviewed. Const:  No acute distress, well developed, well nourished Head:  Atraumatic, normocephalic Eyes:  PERRL, conjunctiva normal, no scleral icterus Neck:  Supple, trachea midline Cardiovascular:  Regular rhythm, no murmurs, no gallops, no rubs, mild tachycardia Resp:  No resp distress, no increased work of breathing, no wheezes, no rhonchi, no rales, Abd:  Soft, non-tender, non-distended, no rebound, no guarding, no CVA tenderness :  Deferred MSK:  No pedal edema, normal ROM, exquisite tenderness to light touch on her left chest and shoulder Neuro:  Alert and oriented x3, no cranial nerve defect Skin:  Warm, dry, intact Psych: Behavior is normal, judgement and thought content is normal, crying during exam 
 
Note written by Lizz Negro, as dictated by Yoselin Nava MD 10:00 AM  
 
MDM Number of Diagnoses or Management Options Acute chest pain:  
  
Amount and/or Complexity of Data Reviewed Clinical lab tests: ordered and reviewed Tests in the radiology section of CPT®: ordered and reviewed Review and summarize past medical records: yes Patient Progress Patient progress: stable Pt. Presents to the ER with complaints of chest pain. Pt's pain seems very much like MSK pain. She has exquisite tenderness to even lite touch. No rash or signs of shingles or infection on exam.  Negative troponins. No pneumonia, mass, or other acute process on xray. I will start her on a lidoderm patch. Pt. To f/u with her PCP or return to the ER with worsening sx. Procedures

## 2019-03-27 NOTE — DISCHARGE INSTRUCTIONS
Patient Education        Chest Pain: Care Instructions  Your Care Instructions    There are many things that can cause chest pain. Some are not serious and will get better on their own in a few days. But some kinds of chest pain need more testing and treatment. Your doctor may have recommended a follow-up visit in the next 8 to 12 hours. If you are not getting better, you may need more tests or treatment. Even though your doctor has released you, you still need to watch for any problems. The doctor carefully checked you, but sometimes problems can develop later. If you have new symptoms or if your symptoms do not get better, get medical care right away. If you have worse or different chest pain or pressure that lasts more than 5 minutes or you passed out (lost consciousness), call 911 or seek other emergency help right away. A medical visit is only one step in your treatment. Even if you feel better, you still need to do what your doctor recommends, such as going to all suggested follow-up appointments and taking medicines exactly as directed. This will help you recover and help prevent future problems. How can you care for yourself at home? · Rest until you feel better. · Take your medicine exactly as prescribed. Call your doctor if you think you are having a problem with your medicine. · Do not drive after taking a prescription pain medicine. When should you call for help? Call 911 if:    · You passed out (lost consciousness).     · You have severe difficulty breathing.     · You have symptoms of a heart attack. These may include:  ? Chest pain or pressure, or a strange feeling in your chest.  ? Sweating. ? Shortness of breath. ? Nausea or vomiting. ? Pain, pressure, or a strange feeling in your back, neck, jaw, or upper belly or in one or both shoulders or arms. ? Lightheadedness or sudden weakness. ? A fast or irregular heartbeat.   After you call 911, the  may tell you to chew 1 adult-strength or 2 to 4 low-dose aspirin. Wait for an ambulance. Do not try to drive yourself.    Call your doctor today if:    · You have any trouble breathing.     · Your chest pain gets worse.     · You are dizzy or lightheaded, or you feel like you may faint.     · You are not getting better as expected.     · You are having new or different chest pain. Where can you learn more? Go to http://aziza-niyah.info/. Enter A120 in the search box to learn more about \"Chest Pain: Care Instructions. \"  Current as of: September 23, 2018  Content Version: 11.9  © 8914-3283 [employer]. Care instructions adapted under license by [employer] (which disclaims liability or warranty for this information). If you have questions about a medical condition or this instruction, always ask your healthcare professional. [place] [address] any warranty or liability for your use of this information. Patient Education        Musculoskeletal Chest Pain: Care Instructions  Your Care Instructions    Chest pain is not always a sign that something is wrong with your heart or that you have another serious problem. The doctor thinks your chest pain is caused by strained muscles or ligaments, inflamed chest cartilage, or another problem in your chest, rather than by your heart. You may need more tests to find the cause of your chest pain. Follow-up care is a key part of your treatment and safety. Be sure to make and go to all appointments, and call your doctor if you are having problems. It's also a good idea to know your test results and keep a list of the medicines you take. How can you care for yourself at home? · Take pain medicines exactly as directed. ? If the doctor gave you a prescription medicine for pain, take it as prescribed. ? If you are not taking a prescription pain medicine, ask your doctor if you can take an over-the-counter medicine.   · Rest and protect the sore area. · Stop, change, or take a break from any activity that may be causing your pain or soreness. · Put ice or a cold pack on the sore area for 10 to 20 minutes at a time. Try to do this every 1 to 2 hours for the next 3 days (when you are awake) or until the swelling goes down. Put a thin cloth between the ice and your skin. · After 2 or 3 days, apply a heating pad set on low or a warm cloth to the area that hurts. Some doctors suggest that you go back and forth between hot and cold. · Do not wrap or tape your ribs for support. This may cause you to take smaller breaths, which could increase your risk of lung problems. · Mentholated creams such as Bengay or Icy Hot may soothe sore muscles. Follow the instructions on the package. · Follow your doctor's instructions for exercising. · Gentle stretching and massage may help you get better faster. Stretch slowly to the point just before pain begins, and hold the stretch for at least 15 to 30 seconds. Do this 3 or 4 times a day. Stretch just after you have applied heat. · As your pain gets better, slowly return to your normal activities. Any increased pain may be a sign that you need to rest a while longer. When should you call for help? Call 911 anytime you think you may need emergency care. For example, call if:    · You have chest pain or pressure. This may occur with:  ? Sweating. ? Shortness of breath. ? Nausea or vomiting. ? Pain that spreads from the chest to the neck, jaw, or one or both shoulders or arms. ? Dizziness or lightheadedness. ? A fast or uneven pulse. After calling 911, chew 1 adult-strength aspirin. Wait for an ambulance.  Do not try to drive yourself.     · You have sudden chest pain and shortness of breath, or you cough up blood.    Call your doctor now or seek immediate medical care if:    · You have any trouble breathing.     · Your chest pain gets worse.     · Your chest pain occurs consistently with exercise and is relieved by rest.    Watch closely for changes in your health, and be sure to contact your doctor if:    · Your chest pain does not get better after 1 week. Where can you learn more? Go to http://aziza-niyah.info/. Enter V293 in the search box to learn more about \"Musculoskeletal Chest Pain: Care Instructions. \"  Current as of: September 23, 2018  Content Version: 11.9  © 9702-0409 [employer]. Care instructions adapted under license by [employer] (which disclaims liability or warranty for this information). If you have questions about a medical condition or this instruction, always ask your healthcare professional. [address] any warranty or liability for your use of this information.

## 2019-04-05 NOTE — TELEPHONE ENCOUNTER
Last OV 2/15/19  Next OV 8/16/19    Requested Prescriptions     Pending Prescriptions Disp Refills    carvedilol (COREG) 3.125 mg tablet [Pharmacy Med Name: CARVEDILOL 3.125MG  TAB] 90 Tab 1     Sig: TAKE 1 TABLET BY MOUTH TWICE DAILY WITH MEALS    isosorbide mononitrate ER (IMDUR) 30 mg tablet [Pharmacy Med Name: ISOSORB MONO ER 30MGTAB] 90 Tab 1     Sig: Take 1 Tab by mouth every morning.

## 2019-04-08 NOTE — PATIENT INSTRUCTIONS
A Healthy Lifestyle: Care Instructions Your Care Instructions A healthy lifestyle can help you feel good, stay at a healthy weight, and have plenty of energy for both work and play. A healthy lifestyle is something you can share with your whole family. A healthy lifestyle also can lower your risk for serious health problems, such as high blood pressure, heart disease, and diabetes. You can follow a few steps listed below to improve your health and the health of your family. Follow-up care is a key part of your treatment and safety. Be sure to make and go to all appointments, and call your doctor if you are having problems. It's also a good idea to know your test results and keep a list of the medicines you take. How can you care for yourself at home? · Do not eat too much sugar, fat, or fast foods. You can still have dessert and treats now and then. The goal is moderation. · Start small to improve your eating habits. Pay attention to portion sizes, drink less juice and soda pop, and eat more fruits and vegetables. ? Eat a healthy amount of food. A 3-ounce serving of meat, for example, is about the size of a deck of cards. Fill the rest of your plate with vegetables and whole grains. ? Limit the amount of soda and sports drinks you have every day. Drink more water when you are thirsty. ? Eat at least 5 servings of fruits and vegetables every day. It may seem like a lot, but it is not hard to reach this goal. A serving or helping is 1 piece of fruit, 1 cup of vegetables, or 2 cups of leafy, raw vegetables. Have an apple or some carrot sticks as an afternoon snack instead of a candy bar. Try to have fruits and/or vegetables at every meal. 
· Make exercise part of your daily routine. You may want to start with simple activities, such as walking, bicycling, or slow swimming. Try to be active 30 to 60 minutes every day.  You do not need to do all 30 to 60 minutes all at once. For example, you can exercise 3 times a day for 10 or 20 minutes. Moderate exercise is safe for most people, but it is always a good idea to talk to your doctor before starting an exercise program. 
· Keep moving. [place] the lawn, work in the garden, or [employer]. Take the stairs instead of the elevator at work. · If you smoke, quit. People who smoke have an increased risk for heart attack, stroke, cancer, and other lung illnesses. Quitting is hard, but there are ways to boost your chance of quitting tobacco for good. ? Use nicotine gum, patches, or lozenges. ? Ask your doctor about stop-smoking programs and medicines. ? Keep trying. In addition to reducing your risk of diseases in the future, you will notice some benefits soon after you stop using tobacco. If you have shortness of breath or asthma symptoms, they will likely get better within a few weeks after you quit. · Limit how much alcohol you drink. Moderate amounts of alcohol (up to 2 drinks a day for men, 1 drink a day for women) are okay. But drinking too much can lead to liver problems, high blood pressure, and other health problems. Family health If you have a family, there are many things you can do together to improve your health. · Eat meals together as a family as often as possible. · Eat healthy foods. This includes fruits, vegetables, lean meats and dairy, and whole grains. · Include your family in your fitness plan. Most people think of activities such as jogging or tennis as the way to fitness, but there are many ways you and your family can be more active. Anything that makes you breathe hard and gets your heart pumping is exercise. Here are some tips: 
? Walk to do errands or to take your child to school or the bus. 
? Go for a family bike ride after dinner instead of watching TV. Where can you learn more? Go to http://aziza-niyah.info/. Enter K584 in the search box to learn more about \"A Healthy Lifestyle: Care Instructions. \" Current as of: September 11, 2018 Content Version: 11.9 © 9285-9675 [employer]. Care instructions adapted under license by [employer] (which disclaims liability or warranty for this information). If you have questions about a medical condition or this instruction, always ask your healthcare professional. [place] [address] any warranty or liability for your use of this information.

## 2019-04-08 NOTE — PROGRESS NOTES
Subjective:  
 
Sher Hamm is a 44 y.o. female who presents for follow up of diabetes and \"annual check up\". Diabetes mellitus: · Medication compliance: compliant all of the time · Diabetic diet compliance: noncompliant some of the time · Home glucose monitoring: twice daily - varies. Reports AM values in the 110's-120's. · Further diabetic ROS: no polyuria or polydipsia, no chest pain, dyspnea or TIA's, no hypoglycemia, no medication side effects noted · Eye exam: 3/2019, [facility], [place] · Podiatry: Cresencio Cervantes Hypertension: - Diet and Lifestyle: generally follows a low fat low cholesterol diet, generally follows a low sodium diet, exercises sporadically - Home BP Monitoring: is not measured at home Cardiovascular ROS: taking medications as instructed, no medication side effects noted, no TIA's, no chest pain on exertion, no dyspnea on exertion, no swelling of ankles, no orthostatic dizziness or lightheadedness. New concerns: none. Current Outpatient Medications Medication Sig Dispense Refill  carvedilol (COREG) 3.125 mg tablet TAKE 1 TABLET BY MOUTH TWICE DAILY WITH MEALS 90 Tab 1  
 isosorbide mononitrate ER (IMDUR) 30 mg tablet Take 1 Tab by mouth every morning. 90 Tab 1  
 lidocaine (LIDODERM) 5 % Apply patch to the affected area for 12 hours a day and remove for 12 hours a day. 1 Package 0  
 albuterol (PROVENTIL HFA, VENTOLIN HFA, PROAIR HFA) 90 mcg/actuation inhaler Take 2 Puffs by inhalation every four (4) hours as needed for Wheezing. PRO AIR HFA 1 Inhaler 3  
 losartan (COZAAR) 100 mg tablet TAKE 1 TABLET BY MOUTH ONCE DAILY 90 Tab 1  
 montelukast (SINGULAIR) 10 mg tablet Take 10 mg by mouth daily as needed (allergies).  atorvastatin (LIPITOR) 20 mg tablet Take 20 mg by mouth daily.  insulin aspart U-100 (NOVOLOG) 100 unit/mL inpn 80 Units by SubCUTAneous route Before breakfast, lunch, and dinner.  25 Pen 3  
  gabapentin (NEURONTIN) 300 mg capsule TAKE 2 CAPSULES BY MOUTH THREE TIMES DAILY 180 Cap 3  
 bumetanide (BUMEX) 0.5 mg tablet Take 0.5 mg by mouth daily.  sodium bicarbonate 650 mg tablet Take 650 mg by mouth two (2) times a day.  insulin glargine (LANTUS SOLOSTAR U-100 INSULIN) 100 unit/mL (3 mL) inpn 30 Units by SubCUTAneous route ACB/HS.  VITAMIN D2 50,000 unit capsule Take 50,000 Units by mouth every Sunday. Allergies Allergen Reactions  Aspirin Itching  Contrast Agent [Iodine] Hives IV only, can tolerate oral contrast  
 Dilaudid [Hydromorphone] Hives  Iodinated Contrast- Oral And Iv Dye Itching  Lisinopril Itching  
  itching  Metformin Nausea Only  Reglan  [Metoclopramide Hcl] Other (comments)  Reglan [Metoclopramide] Other (comments) Impaired speech/LOWER EXTREMITY EDEMA-RECENTLY D/C'D BY MD  
 
 
Past Medical History:  
Diagnosis Date  Abnormal pap 2012 10/5/13   
 hx w/Colpo  Arthritis  Asthma  Diabetes ([address] type 2  
 Headache(784.0)  Hyperlipidemia  Hypertension  Joint pain  DOC (obstructive sleep apnea)  Pap smear for cervical cancer screening 10/1/15 ASCUS HPV NEG RP 3 YEARS  Ringing in ears Social History Tobacco Use  Smoking status: Never Smoker  Smokeless tobacco: Never Used Substance Use Topics  Alcohol use: No  
  
 
Lab Results Component Value Date/Time  Hemoglobin A1c 10.9 (H) 05/30/2018 10:43 AM  
 Hemoglobin A1c 12.3 (H) 04/11/2018 08:21 AM  
 Hemoglobin A1c 14.6 (H) 08/31/2016 12:19 PM  
 Glucose 203 (H) 03/27/2019 10:45 AM  
 Glucose (POC) 161 (H) 09/08/2018 11:05 AM  
 Microalbumin/Creat ratio (mg/g creat) 13 10/01/2010 09:40 AM  
 Microalb/Creat ratio (ug/mg creat.) 92.2 (H) 04/13/2016 02:46 PM  
 Microalbumin,urine random 2.44 10/01/2010 09:40 AM  
 LDL,Direct 168 (H) 08/31/2016 12:19 PM  
 LDL, calculated 92 05/30/2018 10:43 AM  
 Creatinine 1.08 (H) 03/27/2019 10:45 AM  
   
 
Review of Systems, additional: 
Pertinent items are noted in HPI. Objective:  
 
Visit Vitals /90 (BP 1 Location: Right arm, BP Patient Position: Sitting) Comment: Manual  
Pulse 100 Temp 98.6 °F (37 °C) (Oral) Comment: . Resp 18 Ht 5' 2\" (1.575 m) Wt 215 lb (97.5 kg) LMP 03/06/2019 (Approximate) SpO2 98% BMI 39.32 kg/m² General appearance - alert, well appearing, and in no distress Mental status - alert, oriented to person, place, and time, normal mood, behavior, speech, dress, motor activity, and thought processes Chest - clear to auscultation, no wheezes, rales or rhonchi, symmetric air entry, no tachypnea, retractions or cyanosis Heart - normal rate, regular rhythm, normal S1, S2, no murmurs, rubs, clicks or gallops Recent Results (from the past 12 hour(s)) AMB POC URINE, MICROALBUMIN, SEMIQUANT (3 RESULTS) Collection Time: 04/08/19  8:33 AM  
Result Value Ref Range ALBUMIN, URINE  Negative mg/L  
 CREATININE, URINE  mg/dL Microalbumin/creat ratio (POC) >300 <30 MG/G  
 
 
Lab review: orders written for new lab studies as appropriate; see orders. Assessment/Plan:  
Emir Kemp is a 44 y.o. female seen today for:  
 
1. Type 2 diabetes with nephropathy [facility]): check labs as below. Not currently being seen by Endocrinology. - AMB POC URINE, MICROALBUMIN, SEMIQUANT (3 RESULTS) 
- HEMOGLOBIN A1C WITH EAG 
- METABOLIC PANEL, COMPREHENSIVE 
- LIPID PANEL 2. Other eczema 
- triamcinolone acetonide (KENALOG) 0.1 % topical cream; Apply  to affected area two (2) times a day. use thin layer  Dispense: 15 g; Refill: 2 3. Hyperlipidemia, unspecified hyperlipidemia type - METABOLIC PANEL, COMPREHENSIVE 
- LIPID PANEL 4. BMI 39.0-39.9,adult: I have reviewed/discussed the above normal BMI with the patient.   I have recommended the following interventions: dietary management education, guidance, and counseling and encourage exercise. 5. Essential hypertension: continue with current therapy. - METABOLIC PANEL, COMPREHENSIVE I have discussed the diagnosis with the patient and the intended plan as seen in the above orders. The patient has received an after-visit summary and questions were answered concerning future plans. I have discussed medication side effects and warnings with the patient as well. Patient verbalizes understanding of plan of care and denies further questions or concerns at this time. Informed patient to return to the office if symptoms worsen or if new symptoms arise. Follow-up and Dispositions · Return in about 4 months (around 8/8/2019) for diabetes follow up or sooner as needed.

## 2019-04-08 NOTE — PROGRESS NOTES
Identified pt with two pt identifiers(name and ). Chief Complaint Patient presents with  Diabetes  Labs Patient is fasting Health Maintenance Due Topic  Pneumococcal 0-64 years (1 of 1 - PPSV23)  EYE EXAM RETINAL OR DILATED  FOOT EXAM Q1   
 PAP AKA CERVICAL CYTOLOGY  HEMOGLOBIN A1C Q6M   
 MICROALBUMIN Q1 Wt Readings from Last 3 Encounters:  
19 215 lb (97.5 kg) 19 214 lb 8.1 oz (97.3 kg) 19 214 lb 6.4 oz (97.3 kg) Temp Readings from Last 3 Encounters:  
19 98.6 °F (37 °C) (Oral) 19 97.5 °F (36.4 °C)  
19 98.2 °F (36.8 °C) (Oral) BP Readings from Last 3 Encounters:  
19 158/90  
19 151/89  
19 146/90 Pulse Readings from Last 3 Encounters:  
19 100  
19 89  
19 (!) 106 Learning Assessment: 
:  
 
Learning Assessment 3/15/2019 7/3/2018 4/10/2018 1/10/2018 2016 2015 10/6/2015 PRIMARY LEARNER Patient Patient Patient Patient Patient Patient Patient HIGHEST LEVEL OF EDUCATION - PRIMARY LEARNER  - - - - - - DID NOT GRADUATE HIGH SCHOOL  
BARRIERS PRIMARY LEARNER NONE - - - - - NONE  
CO-LEARNER CAREGIVER No - - - - - No  
PRIMARY LANGUAGE ENGLISH ENGLISH ENGLISH ENGLISH ENGLISH ENGLISH ENGLISH  
LEARNER PREFERENCE PRIMARY LISTENING READING READING READING LISTENING DEMONSTRATION DEMONSTRATION  
ANSWERED BY patient Yojana Wilcox patient Patient patient RELATIONSHIP SELF SELF SELF SELF SELF SELF SELF Depression Screening: 
:  
 
3 most recent PHQ Screens 3/15/2019 Little interest or pleasure in doing things Not at all Feeling down, depressed, irritable, or hopeless Not at all Total Score PHQ 2 0 Fall Risk Assessment: 
:  
 
Fall Risk Assessment, last 12 mths 8/15/2017 Able to walk? Yes Fall in past 12 months? No  
 
 
Abuse Screening: 
:  
 
Abuse Screening Questionnaire 3/15/2019 2018 8/15/2016 2014 2014 Do you ever feel afraid of your partner? N N N N N Are you in a relationship with someone who physically or mentally threatens you? N N N N N Is it safe for you to go home? Casey Nichole Coordination of Care Questionnaire: 
:  
 
1) Have you been to an emergency room, urgent care clinic since your last visit? yes Back pain [facility] Hospitalized since your last visit? no          
 
2) Have you seen or consulted any other health care providers outside of [facility] since your last visit? no  (Include any pap smears or colon screenings in this section.) 3) Do you have an Advance Directive on file? no 
Are you interested in receiving information about Advance Directives? no 
 
Reviewed record in preparation for visit and have obtained necessary documentation. Medication reconciliation up to date and corrected with patient at this time.

## 2019-04-25 NOTE — TELEPHONE ENCOUNTER
I had to reschedule the patient from 5/16/19 to 6/6/19 because of [place]'s availabilty. She said she can't wait that long so I'm writing to see if she can be put in some place sooner than June.

## 2019-05-03 NOTE — TELEPHONE ENCOUNTER
----- Message from Patti Greenberg sent at 5/3/2019 12:13 PM EDT -----  Regarding: REYNA Newell/Refrosa  Pt requested a call from the nurse today regarding her medication. Best contact number [phone].

## 2019-05-06 NOTE — TELEPHONE ENCOUNTER
Just an FYI. Art Landtommy Guy patient called and will need a Rx for her Aimovig sent to the pharmacy. She has been trying to get in with you to see if you could increase it. She didn't go into anymore detail than that. She has an appt for June 6th. Will send you the request for there Justice Umana to go to her local pharmacy.

## 2019-05-06 NOTE — PROGRESS NOTES
Subjective:      Edy Carrera is a 44 y.o. female here with complaint of sore throat, pain with swallowing, sinus pain and pressure, headache for 4 days. Associated with subjective chills, no documented fevers. She denies a history of nausea, vomiting and cough. No known sick contacts. Evaluation to date: none   Treatment to date: OTC cold medication without much improvement     [facility]: reports that she has appointment scheduled for June 2019. She also is requested Lidoderm patches and Flexeril for lower back pain. No reported injury. Therapies have been effective. Current Outpatient Medications   Medication Sig Dispense Refill    triamcinolone acetonide (KENALOG) 0.1 % topical cream Apply  to affected area two (2) times a day. use thin layer 15 g 2    carvedilol (COREG) 3.125 mg tablet TAKE 1 TABLET BY MOUTH TWICE DAILY WITH MEALS 90 Tab 1    isosorbide mononitrate ER (IMDUR) 30 mg tablet Take 1 Tab by mouth every morning. 90 Tab 1    lidocaine (LIDODERM) 5 % Apply patch to the affected area for 12 hours a day and remove for 12 hours a day. 1 Package 0    albuterol (PROVENTIL HFA, VENTOLIN HFA, PROAIR HFA) 90 mcg/actuation inhaler Take 2 Puffs by inhalation every four (4) hours as needed for Wheezing. PRO AIR HFA 1 Inhaler 3    losartan (COZAAR) 100 mg tablet TAKE 1 TABLET BY MOUTH ONCE DAILY 90 Tab 1    montelukast (SINGULAIR) 10 mg tablet Take 10 mg by mouth daily as needed (allergies).  atorvastatin (LIPITOR) 20 mg tablet Take 20 mg by mouth daily.  insulin aspart U-100 (NOVOLOG) 100 unit/mL inpn 80 Units by SubCUTAneous route Before breakfast, lunch, and dinner. 25 Pen 3    gabapentin (NEURONTIN) 300 mg capsule TAKE 2 CAPSULES BY MOUTH THREE TIMES DAILY 180 Cap 3    bumetanide (BUMEX) 0.5 mg tablet Take 0.5 mg by mouth daily.  sodium bicarbonate 650 mg tablet Take 650 mg by mouth two (2) times a day.       insulin glargine (LANTUS SOLOSTAR U-100 INSULIN) 100 unit/mL (3 mL) inpn 30 Units by SubCUTAneous route ACB/HS.  VITAMIN D2 50,000 unit capsule Take 50,000 Units by mouth every Sunday. Allergies   Allergen Reactions    Aspirin Itching    Contrast Agent [Iodine] Hives     IV only, can tolerate oral contrast    Dilaudid [Hydromorphone] Hives    Iodinated Contrast- Oral And Iv Dye Itching    Lisinopril Itching     itching    Metformin Nausea Only    Reglan  [Metoclopramide Hcl] Other (comments)    Reglan [Metoclopramide] Other (comments)     Impaired speech/LOWER EXTREMITY EDEMA-RECENTLY D/C'D BY MD       Past Medical History:   Diagnosis Date    Abnormal pap 2012 10/5/13     hx w/Colpo     Arthritis     Asthma     Diabetes ([address]     type 2    Headache(784.0)     Hyperlipidemia     Hypertension     Joint pain     DOC (obstructive sleep apnea)     Pap smear for cervical cancer screening 10/1/15 ASCUS HPV NEG RP 3 YEARS    Ringing in ears        Social History     Tobacco Use    Smoking status: Never Smoker    Smokeless tobacco: Never Used   Substance Use Topics    Alcohol use: No        Review of Systems  Pertinent items are noted in HPI.      Objective:     Visit Vitals  BP (!) 148/98 (BP 1 Location: Right arm, BP Patient Position: Sitting) Comment: Manual   Pulse 96   Temp 98 °F (36.7 °C) (Oral) Comment: .   Resp 18   Ht 5' 2\" (1.575 m)   Wt 215 lb (97.5 kg)   LMP 05/03/2019 (Exact Date)   SpO2 98%   BMI 39.32 kg/m²      General appearance - alert, well appearing, and in no distress  Ears - bilateral TM's and external ear canals normal  Nose - mucosal congestion, mucosal erythema and sinus tenderness noted bilateral maxillary sinuses   Oropharyngx - mucous membranes moist, pharynx normal without lesions  Neck - supple, no significant adenopathy  Chest - clear to auscultation, no wheezes, rales or rhonchi, symmetric air entry, no tachypnea, retractions or cyanosis  Heart - normal rate, regular rhythm, normal S1, S2, no murmurs, rubs, clicks or gallops  Musculoskeletal - (+) TTP of lumbar spine and bilateral lumbar paraspinal muscles     Assessment/Plan:   Tegan Deal is a 44 y.o. female seen for:     1. Acute rhinosinusitis  - amoxicillin-clavulanate (AUGMENTIN) 875-125 mg per tablet; Take 1 Tab by mouth two (2) times a day for 7 days. Dispense: 14 Tab; Refill: 0  - warm compresses to sinuses, nasal saline spray as needed for congestion, OTC analgesic of choice for pain    2. Lumbar back pain  - lidocaine (LIDODERM) 5 %; Apply patch to the affected area for 12 hours a day and remove for 12 hours a day. Dispense: 1 Package; Refill: 0  - cyclobenzaprine (FLEXERIL) 5 mg tablet; Take 1 Tab by mouth three (3) times daily as needed for Muscle Spasm(s). Dispense: 30 Tab; Refill: 0    I have discussed the diagnosis with the patient and the intended plan as seen in the above orders. The patient has received an after-visit summary and questions were answered concerning future plans. I have discussed medication side effects and warnings with the patient as well. Patient verbalizes understanding of plan of care and denies further questions or concerns at this time. Informed patient to return to the office if symptoms worsen or if new symptoms arise. Follow-up and Dispositions    · Return if symptoms worsen or fail to improve.

## 2019-05-06 NOTE — PROGRESS NOTES
Identified pt with two pt identifiers(name and ). Chief Complaint   Patient presents with    Sinus Pain     Symptoms began about thursday    Sore Throat    Medication Refill     patient would like to know if Dottie Luis will prescribe lidocane patch. Health Maintenance Due   Topic    Pneumococcal 0-64 years (1 of 1 - PPSV23)    FOOT EXAM Q1     PAP AKA CERVICAL CYTOLOGY        Wt Readings from Last 3 Encounters:   19 215 lb (97.5 kg)   19 215 lb (97.5 kg)   19 214 lb 8.1 oz (97.3 kg)     Temp Readings from Last 3 Encounters:   19 98 °F (36.7 °C) (Oral)   19 98.6 °F (37 °C) (Oral)   19 97.5 °F (36.4 °C)     BP Readings from Last 3 Encounters:   19 (!) 148/98   19 158/90   19 151/89     Pulse Readings from Last 3 Encounters:   19 96   19 100   19 89         Learning Assessment:  :     Learning Assessment 3/15/2019 7/3/2018 4/10/2018 1/10/2018 2016 2015 10/6/2015   PRIMARY LEARNER Patient Patient Patient Patient Patient Patient Patient   HIGHEST LEVEL OF EDUCATION - PRIMARY LEARNER  - - - - - - DID NOT GRADUATE [facility] PRIMARY LEARNER NONE - - - - - [address] CAREGIVER No - - - - - No   PRIMARY LANGUAGE ENGLISH ENGLISH ENGLISH ENGLISH ENGLISH ENGLISH ENGLISH   LEARNER PREFERENCE PRIMARY LISTENING READING READING READING LISTENING DEMONSTRATION DEMONSTRATION   ANSWERED BY patient Pinky Aryan patient Patient patient   RELATIONSHIP SELF SELF SELF SELF SELF SELF SELF       Depression Screening:  :     3 most recent PHQ Screens 3/15/2019   Little interest or pleasure in doing things Not at all   Feeling down, depressed, irritable, or hopeless Not at all   Total Score PHQ 2 0       Fall Risk Assessment:  :     Fall Risk Assessment, last 12 mths 8/15/2017   Able to walk? Yes   Fall in past 12 months?  No       Abuse Screening:  :     Abuse Screening Questionnaire 3/15/2019 2018 8/15/2016 5/29/2014 4/9/2014   Do you ever feel afraid of your partner? N N N N N   Are you in a relationship with someone who physically or mentally threatens you? N N N N N   Is it safe for you to go home? Y Y Y Y Y       Coordination of Care Questionnaire:  :     1) Have you been to an emergency room, urgent care clinic since your last visit? no   Hospitalized since your last visit? no             2) Have you seen or consulted any other health care providers outside of [address] [place] [address] since your last visit? no  (Include any pap smears or colon screenings in this section.)    3) Do you have an Advance Directive on file? no  Are you interested in receiving information about Advance Directives? no    Reviewed record in preparation for visit and have obtained necessary documentation. Medication reconciliation up to date and corrected with patient at this time.

## 2019-05-09 NOTE — PROGRESS NOTES
Pt s/p liver bx . Vss. Pt received total of 100mcq Fentanyl IV for right shoulder and right rib cage pain. Pt is remain having some pain of scale of 8/10. Dr. Jorge Anderson in checking on pt and aware of her discomfort. Will medicate pt another dose 50mcq Fentanyl prior to d/c pt to [facility] per Dr. Jorge Anderson if remain having pain.

## 2019-05-09 NOTE — PROCEDURES
[address] Iker Leonardo MD, Lianne Escudero, FAASLD Claude Fossa, GINI Coello [facility]   Brian Mckeon, REYNA Menjivaro Cancer, NP Yanira Rosas [facility] [address] 
  at [address], [address] [place] [address] 22. 
  [phone] FAX: [address] [place] [address] 
  [address], [address] 98 [facility], [address] 
  [phone] FAX: [phone] LIVER BIOPSY PROCEDURE NOTE [employer] 1980 INDICATIONS/PRE-OPERATIVE  DIAGNOSIS:  NAFLD : Iker Leonardo MD 
 
SEDATION: 1% Lidocaine injection 20 ml PROCEDURE: 
Informed consent to perform the procedure was obtained from the patient. The patient was positioned on the edge of the stretcher lying flat in the supine position. Ultrasound was utilized to image the liver. The diaphragm and any major mass lesion or vascular structures within the liver were identified. An appropriate site for liver biopsy was identified. The distance from the surface of the skin to the liver capsule was 5 cm. This area was prepped with betadine and draped in sterile fashion. The skin was infiltrated with 1% lidocaine. The deeper subcutanous tissues and liver capsule overlying the biopsy site were then infiltrated with 1% lidocaine until appropriate anesthesia was obtained. A small incision was made in the skin so the biopsy devise could be easily inserted. A total of 2 passes with the 16 gauge Bard biopsy devise was then made into the liver. Core(s) of liver tissue totaling 4 cm in length were obtained and placed into tissue fixative. A band aid was placed over the biopsy site.   The patient was then repositioned on the right side and transported to the recovery area on the stretcher for routine monitoring until discharge. The specimen was sent to pathology for processing via the normal transport mechanism. SPECIMEN REMOVED: Liver ESTIMATED BLOOD LOSS: Negligible. POST-OPERATIVE DIAGNOSIS: Same as Pre-operative Diagnosis MD Yanira Das [facility] 136 [address] [address], [address] [facility] 22. 
[phone] [address]

## 2019-05-09 NOTE — H&P
500 [facility], [address] 137 [facility] Lexi Mendez Ramirez MD, Jacinta oRwan, [facility] Telma Jennings, NP Bibiana Ramsay, GINI Maldonado, [facility]   Nichol Vanessa, REYNA Blancas, REYNA Rosas [facility] [address] 
  at [address] [place], [address] [address]. 
  [phone] FAX: [address] 
  [facility] 
  [address], [address] 98 [facility] Charmaine Zheng, [address] 
  [phone] FAX: [phone] PRE-PROCEDURE NOTE - LIVER BIOPSY H and P from last office visit reviewed. Allergies reviewed. Out-patient medication list reviewed. Patient Active Problem List  
Diagnosis Code  Asthma J45.909  Diabetes mellitus ([facility] E11.9  Avascular necrosis of bones of both hips (HCC) M87.051, M87.052  Lumbar degenerative disc disease M51.36  
 LGSIL (low grade squamous intraepithelial dysplasia) [id number]  Non compliance with medical treatment Z91.19  
 Obesity E66.9  BMI 35.0-35.9,adult Z68.35  
 Essential hypertension I10  
 Noncompliance of patient with dietary regimen Z91.11  
 Ankle edema M25.473  Muscle spasms of neck M62.838  Recurrent streptococcal tonsillitis J03.01  
 Encounter for long-term (current) use of insulin ([facility] Z79.4  Muscle spasm of back M62.830  
 Elevated serum creatinine R79.89  Type 2 diabetes mellitus with diabetic neuropathy (HCC) E11.40  Obesity, morbid ([address] E66.01  
 Type 2 diabetes with nephropathy (HCC) E11.21  
 Abnormal liver enzymes R74.8  
 NAFLD (nonalcoholic fatty liver disease) K76.0  Hyperlipidemia E78.5  DOC (obstructive sleep apnea) G47.33 Allergies Allergen Reactions  Aspirin Itching  Contrast Agent [Iodine] Hives IV only, can tolerate oral contrast  
 Dilaudid [Hydromorphone] Hives  Iodinated Contrast- Oral And Iv Dye Itching  Lisinopril Itching  
  itching  Metformin Nausea Only  Reglan  [Metoclopramide Hcl] Other (comments)  Reglan [Metoclopramide] Other (comments) Impaired speech/LOWER EXTREMITY EDEMA-RECENTLY D/C'D BY MD  
 
 
No current facility-administered medications on file prior to encounter. Current Outpatient Medications on File Prior to Encounter Medication Sig Dispense Refill  amoxicillin-clavulanate (AUGMENTIN) 875-125 mg per tablet Take 1 Tab by mouth two (2) times a day for 7 days. 14 Tab 0  carvedilol (COREG) 3.125 mg tablet TAKE 1 TABLET BY MOUTH TWICE DAILY WITH MEALS 90 Tab 1  
 isosorbide mononitrate ER (IMDUR) 30 mg tablet Take 1 Tab by mouth every morning. 90 Tab 1  
 albuterol (PROVENTIL HFA, VENTOLIN HFA, PROAIR HFA) 90 mcg/actuation inhaler Take 2 Puffs by inhalation every four (4) hours as needed for Wheezing. PRO AIR HFA 1 Inhaler 3  
 losartan (COZAAR) 100 mg tablet TAKE 1 TABLET BY MOUTH ONCE DAILY 90 Tab 1  
 atorvastatin (LIPITOR) 20 mg tablet Take 20 mg by mouth daily.  insulin aspart U-100 (NOVOLOG) 100 unit/mL inpn 80 Units by SubCUTAneous route Before breakfast, lunch, and dinner. 25 Pen 3  
 gabapentin (NEURONTIN) 300 mg capsule TAKE 2 CAPSULES BY MOUTH THREE TIMES DAILY 180 Cap 3  
 bumetanide (BUMEX) 0.5 mg tablet Take 0.5 mg by mouth daily.  sodium bicarbonate 650 mg tablet Take 650 mg by mouth two (2) times a day.  insulin glargine (LANTUS SOLOSTAR U-100 INSULIN) 100 unit/mL (3 mL) inpn 30 Units by SubCUTAneous route ACB/HS.  VITAMIN D2 50,000 unit capsule Take 50,000 Units by mouth every Sunday.  lidocaine (LIDODERM) 5 % Apply patch to the affected area for 12 hours a day and remove for 12 hours a day. 1 Package 0  
 cyclobenzaprine (FLEXERIL) 5 mg tablet Take 1 Tab by mouth three (3) times daily as needed for Muscle Spasm(s).  30 Tab 0  
  triamcinolone acetonide (KENALOG) 0.1 % topical cream Apply  to affected area two (2) times a day. use thin layer 15 g 2  
 montelukast (SINGULAIR) 10 mg tablet Take 10 mg by mouth daily as needed (allergies). For liver biopsy to assess NALFD. The risks of the procedure were discussed with the patient. This included bleeding, pain, and puncture of other organs. All questions were answered. The patient wishes to proceed with the procedure. PHYSICAL EXAMINATION: 
VS: per nursing note General: No acute distress. Eyes: Sclera anicteric. ENT: No oral lesions. Thyroid normal. 
Nodes: No adenopathy. Skin: No spider angiomata. No jaundice. No palmar erythema. Respiratory: Lungs clear to auscultation. Cardiovascular: Regular heart rate. No murmurs. No JVD. Abdomen: Soft non-tender, liver size normal to percussion/palpation. Spleen not palpable. No obvious ascites. Extremities: No edema. No muscle wasting. No gross arthritic changes. Neurologic: Alert and oriented. Cranial nerves grossly intact. No asterixis. LABS: 
Lab Results Component Value Date/Time WBC 7.8 03/27/2019 10:45 AM  
 HGB 11.2 (L) 03/27/2019 10:45 AM  
 HCT 34.2 (L) 03/27/2019 10:45 AM  
 PLATELET 177 41/12/9322 10:45 AM  
 MCV 95.0 03/27/2019 10:45 AM  
 
Lab Results Component Value Date/Time INR 0.9 03/15/2019 01:50 PM  
 Prothrombin time 9.8 03/15/2019 01:50 PM  
 
 
ASSESSMENT AND PLAN: 
Liver biopsy under ultrasound guidance. MD Yanira [facility]land [facility] [place] 136 [address] [address], [address] [facility] 22. 
[phone] 101[address] [facility]

## 2019-05-09 NOTE — ROUTINE PROCESS
Valeria Hunt 1980 
[id number] Situation: 
Verbal report received from: Jose Smith RN Procedure: Procedure(s): LIVER BIOPSY Background: 
 
Preoperative diagnosis: CIRRHOSIS Postoperative diagnosis: 1. Liver Biopsy :  Dr. Keo Bazan Assistant(s): Endoscopy RN-1: Ambreen Dudley RN Endoscopy RN-2: Merary Anderson RN Specimens:  
ID Type Source Tests Collected by Time Destination 1 : Liver Biopsy Preservative   Marisa Altamirano MD 5/9/2019 1154 Pathology H. Pylori  no Assessment: 
Intra-procedure medications Anesthesia gave intra-procedure sedation and medications, see anesthesia flow sheet no Intravenous fluids: NS@ [place] Vital signs stable Abdominal assessment: round and soft Recommendation: 
Discharge patient per MD order. Family or Friend Permission to share finding with family or friend yes

## 2019-05-09 NOTE — DISCHARGE INSTRUCTIONS
6700 [facility], [place], [address], [facility], [place]       Millie Cooper, GINI Contreras, MARIA GUADALUPE-ANTONIO Persaud, REYNA Rubio [facility] [address]    at [address], [address] [address]  22.    [phone]    FAX: [address] [place] [address] Drive, [address], [address]    [phone]    FAX: [phone]         LIVER BIOPSY DISCHARGE INSTRUCTIONS      Magalys Garcia  1980  Date: 5/9/2019    DIET:    You may resume your previous diet. ACTIVITIES:  Rest quietly the rest of today. You should not lift any objects more than 20 pounds for the next 2 days. If you work sitting down without strenuous activity you may return to work tomorrow. If you exert yourself or do heavy lifting at work you should take tomorrow off. Do not drive or operate hazardous machinery for 12 hours. SPECIAL INSTRUCTIONS:  Do not use any aspirin or non-steroidal (Motin, Advil, Naproxen, etc) pain medications for the next 3 days. You may use extra-strength Tylenol (acetaminophen) if you experience pain or discomfort later today. Call the [employer] [facility] office if you experience any of the following:  Persistent or severe abdominal pain. Persistent or severe abdominal distention. Fever and chills   Nausea and vomiting. New or unusual symptoms. Follow-up care: You should have a follow up appointment with Dr. Mathew Mcclelland to review the results of the liver biopsy results in 2 weeks. If you do not have an appointment please call the office at the number listed above to schedule this. Other instructions:    If you have any problems or questions call the [employer] [facility] office at the phone number listed above.    Robin Ace from Nurse:     The following personal items collected during your admission are returned to you:   Dental Appliance: Dental Appliances: None  Vision: Visual Aid: Glasses  Hearing Aid:    Jewelry:    Clothing:    Other Valuables:    Valuables sent to safe:

## 2019-05-09 NOTE — PROGRESS NOTES
Liver bx site with no oozing or hematoma noted, vss. No acute distress noted. Applied new band aid to site. Discharge instructions and site care given to pt and her daughter.

## 2019-05-10 NOTE — TELEPHONE ENCOUNTER
Patient called complaining of pain  on her right side \" where Dr. Samantha Erickson did my biopsy. \" Patient stated that pain is anywhere from a 4-8/10. She has been taking Tylenol as prescribed, but it only dulls the pain. Patient denies bleeding from the site, swelling,  dizziness,  nausea/vomiting, chills, and/or fever. Per Dr. Samantha Erickson, verbal order for Tramadol 50mg, every 6 hours x 3 days as needed for pain sent to pharmacy of choice. Will ask patient if she tolerates tramadol well. Will inform patient to discontinue Flexaril while taking Tramadol due to side effects. 10:57am:  Patient states she has taken Tramadol in the past and tolerates it well. Patient informed not to take Flexaril due to possible side effects. Patient repeated back instructions. Patient informed that prescription called into pharmacy of choice. Patient made aware that if symptoms increase or she develops symptoms listed above to contact the office or go the emergency room. Patient expressed an understanding and had no further questions.

## 2019-05-17 NOTE — PROGRESS NOTES
PT INITIAL EVALUATION NOTE 2-15 Patient Name: Magalys Garcia Date:2019 : 1980 [x]  Patient  Verified Payor: [employer] / Plan: [address] [place] [address] / Product Type: Managed Care Medicaid / In time:8:40 AM  Out time:9:30 AM 
Total Treatment Time (min): 50 Visit #: 1 Treatment Area: Low back pain [M54.5] SUBJECTIVE Pain Level (0-10 scale): 10 Any medication changes, allergies to medications, adverse drug reactions, diagnosis change, or new procedure performed?: [] No    [x] Yes (see summary sheet for update) Subjective:    
Chronic low back pain PLOF: Significantly limited in standing and walking, but the patient reports a gradual worsening over the past several years. Mechanism of Injury: Insidious, pain is located along the base of the lumbar spine with radiation into both legs Previous Treatment/Compliance: She has had PT before, but reports no success. She was referred to this location for PT by her pain management physician. He requested that she complete more physical therapy before they agree to epidural injections. PMHx/Surgical Hx: DM, migraines Work Hx: n/a Living Situation: lives at home with family Pt Goals: to reduce pain and improve mobility Barriers: chronicity, severity Motivation: very motivated Substance use: none FABQ Score: n/a Cognition: A & O x 4     
  
OBJECTIVE/EXAMINATION Gait and Functional Mobility:   
Gait: Antalgic gait pattern, slow speed, no AD Transfers: Significant reliance on UE support, independent of assistance Bed mobility: Rolling in both directions causes significant pain Palpation: TTP along both the right and left lumbar paraspinals Joint Mobility:NT   
                                                Lumbar AROM:                      
                                                R                      L 
Flexion             Limited by        50% Extension                                25% Side Bending                           50%                 25% Rotation                                   50%                 50%                                                                  
  
Aberrant movements: 
Painful arc: [x] Yes  [] No 
Instability catch: [] Yes  [x] No 
Difficulty returning from flexion: [x] Yes  [] No 
Reversal of lumbopelvic rhythm: [] Yes  [x] No 
  
MMT:  All LE myotomes: >4/5 Neurological: Sensation: Intact Special Tests:                                                 
            Slump: Positive for low back pain SLR:Positive for low back pain PHILLIP: Positive for low back pain 
                                                             
  
  
     
Modality rationale: decrease pain and increase tissue extensibility to improve the patients ability to walk without pain Min Type Additional Details 15 [x] Estim: []Att   [x]Unatt        []TENS instruct [x]IFC  []Premod   []NMES []Other:  []w/US   []w/ice   [x]w/heat Position: supine Location: low back  
  []  Traction: [] Cervical       []Lumbar 
                     [] Prone          []Supine []Intermittent   []Continuous Lbs: 
[] before manual 
[] after manual 
[]w/heat  
  []  Ultrasound: []Continuous   [] Pulsed at:  
                         []1MHz   []3MHz Location: 
W/cm2:  
  []  Paraffin Location: 
[]w/heat  
  []  Ice     []  Heat 
[]  Ice massage Position: Location:  
  []  Laser 
[]  Other: Position: Location:  
  []  Vasopneumatic Device Pressure:       [] lo [] med [] hi  
Temperature:   
[x] Skin assessment post-treatment:  [x]intact []redness- no adverse reaction 
  []redness  adverse reaction:  
  
 25 min Therapeutic Exercise:  [x] See flow sheet :  
Rationale: increase ROM, increase strength and improve coordination to improve the patients ability to walk without pain 
  
With 
 [] TE 
 [] TA 
 [] neuro 
 [] other: Patient Education: [x] Review HEP [] Progressed/Changed HEP based on:  
[] positioning   [] body mechanics   [] transfers   [] heat/ice application   
[] other:   
  
  
Other Objective/Functional Measures: 
  
Pain Level (0-10 scale) post treatment: 5 
  
  
ASSESSMENT:  
  
[x]  See Plan of Care 
  
 
Rachel Jorgensen PT , DPT, OCS, Cert. DN 
 5/17/2019

## 2019-05-17 NOTE — PROGRESS NOTES
[facility] [address], [address] [place] Phone: [phone]  Fax: [phone] Plan of Care/ Statement of Necessity for Physical Therapy Services 2-15 Patient name: Rockey Favre  : 1980  Provider#: [id number] Referral source: Ezekiel Mayo MD     
Medical/Treatment Diagnosis: Low back pain [M54.5] Prior Hospitalization: see medical history Comorbidities: DM, migraines, obesity Prior Level of Function: see initial eval 
Medications: Verified on Patient Summary List 
 
Start of Care: 19      Onset Date: Many years ago The Plan of Care and following information is based on the information from the initial evaluation. Assessment/ key information: Patient presents with chronic low back pain with a gradual worsening in functional capacity over the past several years. She demonstrated significant impairments in ROM, lumbopelvic stability, and reduced tolerance to standing and walking. 
  
Evaluation Complexity History MEDIUM  Complexity : 1-2 comorbidities / personal factors will impact the outcome/ POC ; Examination MEDIUM Complexity : 3 Standardized tests and measures addressing body structure, function, activity limitation and / or participation in recreation  ;Presentation MEDIUM Complexity : Evolving with changing characteristics  ; Clinical Decision Making MEDIUM Complexity : FOTO score of 26-74 Overall Complexity Rating: MEDIUM 
  
Problem List: pain affecting function, decrease ROM, decrease strength, impaired gait/ balance, decrease ADL/ functional abilitiies, decrease activity tolerance, decrease flexibility/ joint mobility and decrease transfer abilities Treatment Plan may include any combination of the following: Therapeutic exercise, Therapeutic activities, Neuromuscular re-education, Physical agent/modality, Gait/balance training, Manual therapy, Patient education, Self Care training, Functional mobility training, Home safety training and Stair training Patient / Family readiness to learn indicated by: asking questions, trying to perform skills and interest 
Persons(s) to be included in education: patient (P) Barriers to Learning/Limitations: None Patient Goal (s): I want to be able to stand without increasing my pain.  Patient Self Reported Health Status: excellent Rehabilitation Potential: excellent 
  
Short Term Goals: To be accomplished in 8 treatments: 1. Patient will be able to walk for two blocks with <7/10 low back pain. 2. Patient will be able to bend forward and tie her shoes with <7/10 low back pain. 3. Patient will be able to get in and out of a car with </10 low back pain. Long Term Goals: To be accomplished in 16 treatments: 1. Patient will be able to walk 1/4 mile with <5/10 low back pain 2. Patient will be able to stand for 30 minutes with <5/10 low back pain 3. Patient will be able to roll over in bed in either direction with <5/10 low back pain. Frequency / Duration: Patient to be seen 2 times per week for 8 weeks. 
  
Patient/ Caregiver education and instruction: self care, activity modification and exercises 
  
[x]  Plan of care has been reviewed with PTA Tari Farias, PT , DPT, OCS, Cert. DN 
 5/17/2019  
 
________________________________________________________________________ I certify that the above Therapy Services are being furnished while the patient is under my care. I agree with the treatment plan and certify that this therapy is necessary. [de-identified] Signature:____________________  Date:____________Time: _________

## 2019-05-23 NOTE — PROGRESS NOTES
Identified pt with two pt identifiers(name and ). Chief Complaint   Patient presents with    Cough     Unsure of beging of symptoms    Sore Throat    Nasal Congestion        Health Maintenance Due   Topic    Pneumococcal 0-64 years (1 of 1 - PPSV23)    FOOT EXAM Q1     PAP AKA CERVICAL CYTOLOGY        Wt Readings from Last 3 Encounters:   19 224 lb (101.6 kg)   19 225 lb (102.1 kg)   19 215 lb (97.5 kg)     Temp Readings from Last 3 Encounters:   19 98.6 °F (37 °C) (Oral)   19 98.3 °F (36.8 °C)   19 98 °F (36.7 °C) (Oral)     BP Readings from Last 3 Encounters:   19 (!) 138/92   19 122/87   19 (!) 148/98     Pulse Readings from Last 3 Encounters:   19 97   19 87   19 96         Learning Assessment:  :     Learning Assessment 3/15/2019 7/3/2018 4/10/2018 1/10/2018 2016 2015 10/6/2015   PRIMARY LEARNER Patient Patient Patient Patient Patient Patient Patient   HIGHEST LEVEL OF EDUCATION - PRIMARY LEARNER  - - - - - - DID NOT GRADUATE [facility] PRIMARY LEARNER NONE - - - - - [address] CAREGIVER No - - - - - No   PRIMARY LANGUAGE ENGLISH ENGLISH ENGLISH ENGLISH ENGLISH ENGLISH ENGLISH   LEARNER PREFERENCE PRIMARY LISTENING READING READING READING LISTENING DEMONSTRATION DEMONSTRATION   ANSWERED BY patient Brenda Logan patient Patient patient   RELATIONSHIP SELF SELF SELF SELF SELF SELF SELF       Depression Screening:  :     3 most recent PHQ Screens 3/15/2019   Little interest or pleasure in doing things Not at all   Feeling down, depressed, irritable, or hopeless Not at all   Total Score PHQ 2 0       Fall Risk Assessment:  :     Fall Risk Assessment, last 12 mths 8/15/2017   Able to walk? Yes   Fall in past 12 months? No       Abuse Screening:  :     Abuse Screening Questionnaire 3/15/2019 2018 8/15/2016 2014 2014   Do you ever feel afraid of your partner?  N N N N N   Are you in a relationship with someone who physically or mentally threatens you? N N N N N   Is it safe for you to go home? Y Y Y Y Y       Coordination of Care Questionnaire:  :     1) Have you been to an emergency room, urgent care clinic since your last visit? no   Hospitalized since your last visit? no             2) Have you seen or consulted any other health care providers outside of [address] [place] [address] since your last visit? no  (Include any pap smears or colon screenings in this section.)    3) Do you have an Advance Directive on file? no  Are you interested in receiving information about Advance Directives? no    Reviewed record in preparation for visit and have obtained necessary documentation. Medication reconciliation up to date and corrected with patient at this time.

## 2019-05-23 NOTE — PROGRESS NOTES
Subjective:      Jamilah Recio is a 44 y.o. female here with complaint of sinus pressure, postnasal drainage, nasal congestion, sore throat for several days. Associated with mild nonproductive cough. She denies a history of fevers and shortness of breath. No known sick contacts. She has been eating and drinking well. Evaluation to date: none  Treatment to date: Allegra which she has been taking daily. She would like to go back to loratadine as she feels this worked better for her allergy symptoms. Current Outpatient Medications   Medication Sig Dispense Refill    lidocaine (LIDODERM) 5 % Apply patch to the affected area for 12 hours a day and remove for 12 hours a day. 1 Package 0    cyclobenzaprine (FLEXERIL) 5 mg tablet Take 1 Tab by mouth three (3) times daily as needed for Muscle Spasm(s). 30 Tab 0    carvedilol (COREG) 3.125 mg tablet TAKE 1 TABLET BY MOUTH TWICE DAILY WITH MEALS 90 Tab 1    isosorbide mononitrate ER (IMDUR) 30 mg tablet Take 1 Tab by mouth every morning. 90 Tab 1    albuterol (PROVENTIL HFA, VENTOLIN HFA, PROAIR HFA) 90 mcg/actuation inhaler Take 2 Puffs by inhalation every four (4) hours as needed for Wheezing. PRO AIR HFA 1 Inhaler 3    losartan (COZAAR) 100 mg tablet TAKE 1 TABLET BY MOUTH ONCE DAILY 90 Tab 1    montelukast (SINGULAIR) 10 mg tablet Take 10 mg by mouth daily as needed (allergies).  atorvastatin (LIPITOR) 20 mg tablet Take 20 mg by mouth daily.  insulin aspart U-100 (NOVOLOG) 100 unit/mL inpn 80 Units by SubCUTAneous route Before breakfast, lunch, and dinner. 25 Pen 3    gabapentin (NEURONTIN) 300 mg capsule TAKE 2 CAPSULES BY MOUTH THREE TIMES DAILY 180 Cap 3    bumetanide (BUMEX) 0.5 mg tablet Take 0.5 mg by mouth daily.  sodium bicarbonate 650 mg tablet Take 650 mg by mouth two (2) times a day.  insulin glargine (LANTUS SOLOSTAR U-100 INSULIN) 100 unit/mL (3 mL) inpn 30 Units by SubCUTAneous route ACB/HS.       VITAMIN D2 50,000 unit capsule Take 50,000 Units by mouth every Sunday.  triamcinolone acetonide (KENALOG) 0.1 % topical cream Apply  to affected area two (2) times a day. use thin layer 15 g 2       Allergies   Allergen Reactions    Aspirin Itching    Contrast Agent [Iodine] Hives     IV only, can tolerate oral contrast    Dilaudid [Hydromorphone] Hives    Iodinated Contrast- Oral And Iv Dye Itching    Lisinopril Itching     itching    Metformin Nausea Only    Reglan  [Metoclopramide Hcl] Other (comments)    Reglan [Metoclopramide] Other (comments)     Impaired speech/LOWER EXTREMITY EDEMA-RECENTLY D/C'D BY MD       Past Medical History:   Diagnosis Date    Abnormal pap 2012 10/5/13     hx w/Colpo     Arthritis     Asthma     Diabetes ([address]     type 2    GERD (gastroesophageal reflux disease)     Headache(784.0)     Hyperlipidemia     Hypertension     Joint pain     Legally blind     blind in right eye.  DOC (obstructive sleep apnea)     uses CPAP    Pap smear for cervical cancer screening 10/1/15 ASCUS HPV NEG RP 3 YEARS    PUD (peptic ulcer disease)     Ringing in ears        Social History     Tobacco Use    Smoking status: Never Smoker    Smokeless tobacco: Never Used   Substance Use Topics    Alcohol use: No        Review of Systems  Pertinent items are noted in HPI.      Objective:     Visit Vitals  BP (!) 138/92 (BP 1 Location: Right arm, BP Patient Position: Sitting) Comment: Manual   Pulse 97   Temp 98.6 °F (37 °C) (Oral) Comment: .   Resp 18   Ht 5' 2\" (1.575 m)   Wt 224 lb (101.6 kg)   LMP 05/03/2019 (Exact Date)   SpO2 100%   BMI 40.97 kg/m²      General appearance - alert, well appearing, and in no distress  Eyes - pupils equal and reactive, extraocular eye movements intact, sclera anicteric  Ears - bilateral TM's and external ear canals normal  Nose - mucosal congestion, mucosal erythema and sinus tenderness noted bilateral maxillary sinuses   Oropharyngx - mucous membranes moist, pharynx normal without lesions  Neck - supple, no significant adenopathy  Chest - clear to auscultation, no wheezes, rales or rhonchi, symmetric air entry, no tachypnea, retractions or cyanosis  Heart - normal rate, regular rhythm, normal S1, S2, no murmurs, rubs, clicks or gallops    Assessment/Plan:   Janis Nickerson is a 44 y.o. female seen for:     1. Acute rhinosinusitis  - cefdinir (OMNICEF) 300 mg capsule; Take 1 Cap by mouth two (2) times a day for 10 days. Dispense: 20 Cap; Refill: 0  - Symptomatic therapy suggested: push fluids, rest, use acetaminophen, ibuprofen prn and apply heat to sinuses prn, nasal saline sprays as needed for congestion    2. Environmental and seasonal allergies  - loratadine (CLARITIN) 10 mg tablet; Take 1 Tab by mouth daily. Dispense: 90 Tab; Refill: 3    I have discussed the diagnosis with the patient and the intended plan as seen in the above orders. The patient has received an after-visit summary and questions were answered concerning future plans. I have discussed medication side effects and warnings with the patient as well. Patient verbalizes understanding of plan of care and denies further questions or concerns at this time. Informed patient to return to the office if symptoms worsen or if new symptoms arise. Follow-up and Dispositions    · Return if symptoms worsen or fail to improve.

## 2019-05-23 NOTE — PATIENT INSTRUCTIONS
Sinusitis: Care Instructions  Your Care Instructions    Sinusitis is an infection of the lining of the sinus cavities in your head. Sinusitis often follows a cold. It causes pain and pressure in your head and face. In most cases, sinusitis gets better on its own in 1 to 2 weeks. But some mild symptoms may last for several weeks. Sometimes antibiotics are needed. Follow-up care is a key part of your treatment and safety. Be sure to make and go to all appointments, and call your doctor if you are having problems. It's also a good idea to know your test results and keep a list of the medicines you take. How can you care for yourself at home? · Take an over-the-counter pain medicine, such as acetaminophen (Tylenol), ibuprofen (Advil, Motrin), or naproxen (Aleve). Read and follow all instructions on the label. · If the doctor prescribed antibiotics, take them as directed. Do not stop taking them just because you feel better. You need to take the full course of antibiotics. · Be careful when taking over-the-counter cold or flu medicines and Tylenol at the same time. Many of these medicines have acetaminophen, which is Tylenol. Read the labels to make sure that you are not taking more than the recommended dose. Too much acetaminophen (Tylenol) can be harmful. · Breathe warm, moist air from a steamy shower, a hot bath, or a sink filled with hot water. Avoid cold, dry air. Using a humidifier in your home may help. Follow the directions for cleaning the machine. · Use saline (saltwater) nasal washes to help keep your nasal passages open and wash out mucus and bacteria. You can buy saline nose drops at a grocery store or drugstore. Or you can make your own at home by adding 1 teaspoon of salt and 1 teaspoon of baking soda to 2 cups of distilled water. If you make your own, fill a bulb syringe with the solution, insert the tip into your nostril, and squeeze gently. [place] your nose.   · Put a hot, wet towel or a warm gel pack on your face 3 or 4 times a day for 5 to 10 minutes each time. · Try a decongestant nasal spray like oxymetazoline (Afrin). Do not use it for more than 3 days in a row. Using it for more than 3 days can make your congestion worse. When should you call for help? Call your doctor now or seek immediate medical care if:    · You have new or worse swelling or redness in your face or around your eyes.     · You have a new or higher fever.    Watch closely for changes in your health, and be sure to contact your doctor if:    · You have new or worse facial pain.     · The mucus from your nose becomes thicker (like pus) or has new blood in it.     · You are not getting better as expected. Where can you learn more? Go to http://aziza-niyah.info/. Enter P652 in the search box to learn more about \"Sinusitis: Care Instructions. \"  Current as of: March 27, 2018  Content Version: 11.9  © 2294-1736 [employer]. Care instructions adapted under license by [employer] (which disclaims liability or warranty for this information). If you have questions about a medical condition or this instruction, always ask your healthcare professional. [address] any warranty or liability for your use of this information.

## 2019-05-24 PROBLEM — R74.8 ABNORMAL LIVER ENZYMES: Status: RESOLVED | Noted: 2018-05-30 | Resolved: 2019-01-01

## 2019-05-24 NOTE — PROGRESS NOTES
[address] [facility] 137 [facility] Lexi Olena Bledsoe MD, Jennyfer Martin, [facility] April Bob Cabezas, REYNA Mckenna, GINI Amador, [facility]   REYNA Steele NP [address] 
  at [facility] 
  [address] [facility], [address] [address] [facility] 22. 
  [phone] FAX: [address] [place] [address] Avenue 
  [facility] 
  [address], [address] [place], 300 [facility] [address] 
  [phone] FAX: [phone] Patient Care Team: 
Micheal Quick MD as PCP - [facility]) Zana Massey MD as Physician (Obstetrics & Gynecology) Tari Duran MD (Endocrinology) Nika Figueredo MD as Physician (Cardiology) Problem List  Date Reviewed: 5/24/2019 Codes Class Noted Hyperlipidemia ICD-10-CM: E78.5 ICD-9-CM: 272.4  Unknown DOC (obstructive sleep apnea) ICD-10-CM: G47.33 
ICD-9-CM: 327.23  Unknown NAFL (nonalcoholic fatty liver) PSA-73-RK: K76.0 ICD-9-CM: 571.8  5/30/2018 Type 2 diabetes with nephropathy ([facility] ICD-10-CM: E11.21 
ICD-9-CM: 250.40, 583.81  5/1/2018 Obesity, morbid ([facility] ICD-10-CM: E66.01 
ICD-9-CM: 278.01  4/10/2018 Type 2 diabetes mellitus with diabetic neuropathy (HCC) ICD-10-CM: E11.40 ICD-9-CM: 250.60, 357.2  1/10/2018 Elevated serum creatinine ICD-10-CM: R79.89 ICD-9-CM: 790.99  10/20/2016 Muscle spasm of back ICD-10-CM: W54.565 ICD-9-CM: 724.8  7/6/2016 Encounter for long-term (current) use of insulin ([facility] ICD-10-CM: Z79.4 ICD-9-CM: V58.67  5/12/2016 Recurrent streptococcal tonsillitis ICD-10-CM: J03.01 
ICD-9-CM: 034.0  2/12/2016 Muscle spasms of neck ICD-10-CM: H25.756 ICD-9-CM: 728.85  2/1/2016 Ankle edema ICD-10-CM: M25.473 ICD-9-CM: 719.07  1/12/2016 Obesity ICD-10-CM: E66.9 ICD-9-CM: 278.00  11/28/2015 Essential hypertension ICD-10-CM: I10 
ICD-9-CM: 401.9  11/28/2015 Noncompliance of patient with dietary regimen ICD-10-CM: Z91.11 
ICD-9-CM: V15.81  11/28/2015 Non compliance with medical treatment ICD-10-CM: Z91.19 ICD-9-CM: V15.81  12/19/2014 LGSIL (low grade squamous intraepithelial dysplasia) ICD-10-CM: [id number] ICD-9-CM: 796.9  11/20/2013 Lumbar degenerative disc disease ICD-10-CM: M51.36 
ICD-9-CM: 722.52  11/13/2013 Avascular necrosis of bones of both hips (HCC) ICD-10-CM: M87.051, M87.052 ICD-9-CM: 733.42  10/14/2013 Overview Signed 10/14/2013  7:67 PM by Kristina Lewis MD  
  5/3583 [facility] 
  
  
   
 Asthma ICD-10-CM: S97.283 ICD-9-CM: 493.90  3/5/2010 [facility] for management of non-alcoholic fatty liver (NAFL). The active problem list, all pertinent past medical history, medications, liver histology, radiologic findings and laboratory findings related to the liver disorder were reviewed with the patient. The patient is a 44 y.o.  female who was found to have fatty liver disease on ultrasound in 10/2017, The patient underwent a liver biopsy in 5/2019. The procedure was well tolerated. I have personally reviewed the liver biopsy slides. This demonstrates mild NAFL with no fibrosis. The patient still has right sided pain at the liver biopsy site. The patient has not experienced fatigue, pain in the right side over the liver, The patient completes all daily activities without any functional limitations. ASSESSMENT AND PLAN: 
Benign steatosis/NAFL The diagnosis is based upon liver biopsy, A liver biopsy performed in 5/2019 shows mild NAFL with no fibrosis NAFL is a benign form of fatty liver disease and not thought to progress to fibrosis or cirrhosis. If the patient looses 20% of current body weight, which is 44 pounds, down to a weight of of 180 pounds, all steatosis will have resolved. Liver transaminases are normal.  ALP is normal.  Liver function is normal.  The platelet count is normal.   
 
Counseling for diet for weight loss The patient was counseled regarding diet and exercise to achieve weight loss. The best diet for patients with fatty liver is one very low in carbohydrates and enriched with protein such as an Jessi's program.   
 
The patient was told not to consume any food products and drinks containing fructose as this enhances hepatic fat synthesis. There is no medication or vitamin supplements that we advocate for NERI. Using glitazones in patients without diabetes mellitus has been shown to reduce fat content in the liver but has no effect on fibrosis and is associated with weight gain. Vitamin E has also been used but the data is not very good and most experts no longer advocate this. We will continue to monitor the patient at periodic intervals. If weight loss is successful the fat will resolve from the liver and liver enzymes should return to the normal range. We would then repeat the ultrasound and Fibroscan to see if this also returns to normal.   
 
Treatment of other medical problems in patients with chronic liver disease There are no contraindications for the patient to take most medications that are necessary for treatment of other medical issues. Counseling for alcohol in patients with chronic liver disease The patient was counseled regarding alcohol consumption and the effect of alcohol on chronic liver disease. The patient does not consume any significant amount of alcohol. Vaccinations Vaccination for viral hepatitis B is recommended since the patient has no serologic evidence of previous exposure or vaccination with immunity.  
The need for vaccination against viral hepatitis A will be assessed with serologic and instituted as appropriate. Routine vaccinations against other bacterial and viral agents can be performed as indicated. Annual flu vaccination should be administered if indicated. ALLERGIES Allergies Allergen Reactions  Aspirin Itching  Contrast Agent [Iodine] Hives IV only, can tolerate oral contrast  
 Dilaudid [Hydromorphone] Hives  Iodinated Contrast- Oral And Iv Dye Itching  Lisinopril Itching  
  itching  Metformin Nausea Only  Reglan  [Metoclopramide Hcl] Other (comments)  Reglan [Metoclopramide] Other (comments) Impaired speech/LOWER EXTREMITY EDEMA-RECENTLY D/C'D BY MD  
 
 
MEDICATIONS Current Outpatient Medications Medication Sig  cefdinir (OMNICEF) 300 mg capsule Take 1 Cap by mouth two (2) times a day for 10 days.  loratadine (CLARITIN) 10 mg tablet Take 1 Tab by mouth daily.  lidocaine (LIDODERM) 5 % Apply patch to the affected area for 12 hours a day and remove for 12 hours a day.  cyclobenzaprine (FLEXERIL) 5 mg tablet Take 1 Tab by mouth three (3) times daily as needed for Muscle Spasm(s).  triamcinolone acetonide (KENALOG) 0.1 % topical cream Apply  to affected area two (2) times a day. use thin layer  carvedilol (COREG) 3.125 mg tablet TAKE 1 TABLET BY MOUTH TWICE DAILY WITH MEALS  isosorbide mononitrate ER (IMDUR) 30 mg tablet Take 1 Tab by mouth every morning.  albuterol (PROVENTIL HFA, VENTOLIN HFA, PROAIR HFA) 90 mcg/actuation inhaler Take 2 Puffs by inhalation every four (4) hours as needed for Wheezing. PRO AIR HFA  losartan (COZAAR) 100 mg tablet TAKE 1 TABLET BY MOUTH ONCE DAILY  montelukast (SINGULAIR) 10 mg tablet Take 10 mg by mouth daily as needed (allergies).  atorvastatin (LIPITOR) 20 mg tablet Take 20 mg by mouth daily.  insulin aspart U-100 (NOVOLOG) 100 unit/mL inpn 80 Units by SubCUTAneous route Before breakfast, lunch, and dinner.  gabapentin (NEURONTIN) 300 mg capsule TAKE 2 CAPSULES BY MOUTH THREE TIMES DAILY  bumetanide (BUMEX) 0.5 mg tablet Take 0.5 mg by mouth daily.  sodium bicarbonate 650 mg tablet Take 650 mg by mouth two (2) times a day.  insulin glargine (LANTUS SOLOSTAR U-100 INSULIN) 100 unit/mL (3 mL) inpn 30 Units by SubCUTAneous route ACB/HS.  VITAMIN D2 50,000 unit capsule Take 50,000 Units by mouth every . No current facility-administered medications for this visit. SYSTEM REVIEW NOT RELATED TO LIVER DISEASE OR REVIEWED ABOVE: 
Constitution systems: Negative for fever, chills, weight gain, weight loss. Eyes: Negative for visual changes. ENT: Negative for sore throat, painful swallowing. Respiratory: Negative for cough, hemoptysis, SOB. Cardiology: Negative for chest pain, palpitations. GI:  Negative for constipation or diarrhea. : Negative for urinary frequency, dysuria, hematuria, nocturia. Skin: Negative for rash. Hematology: Negative for easy bruising, blood clots. Musculo-skelatal: Negative for back pain, muscle pain, weakness. Neurologic: Negative for headaches, dizziness, vertigo, memory problems not related to HE. Psychology: Negative for anxiety, depression. FAMILY HISTORY: 
The father has a history of liver disease. Patient is unsure of liver diagnosis. The mother  of a myocardial infarction. SOCIAL HISTORY: 
The patient is . The patient has 2 children. The patient has never used tobacco products. The patient has never consumed significant amounts of alcohol. The patient does not work. The patient is applying for disability. PHYSICAL EXAMINATION: 
Visit Vitals /70 Pulse 99 Temp 98.5 °F (36.9 °C) (Tympanic) Ht 5' 2\" (1.575 m) Wt 220 lb 12.8 oz (100.2 kg) LMP 2019 (Exact Date) SpO2 99% BMI 40.38 kg/m² General: No acute distress. Eyes: Sclera anicteric. ENT: No oral lesions.  Thyroid normal. 
 Nodes: No adenopathy. Skin: No spider angiomata. No jaundice. No palmar erythema. Respiratory: Lungs clear to auscultation. Cardiovascular: Regular heart rate. No murmurs. No JVD. Abdomen: Soft, tenderness upon palpation in the epigastric area and RUQ. Liver size normal to percussion/palpation. Spleen not palpable. No obvious ascites. Extremities: No edema. No muscle wasting. No gross arthritic changes. Neurologic: Alert and oriented. Cranial nerves grossly intact. No asterixis. LABORATORY STUDIES: 
Liver [place] [address] & Units 3/5/2019 WBC 3.6 - 11.0 K/uL 6.5 ANC 1.8 - 8.0 K/UL 3.4 HGB 11.5 - 16.0 g/dL 10.9 (L)  - 400 K/uL 415 (H) INR 0.8 - 1.2 AST 0 - 40 IU/L 21 ALT 0 - 32 IU/L 38 Alk Phos 39 - 117 IU/L 79 Bili, Total 0.0 - 1.2 mg/dL 0.3 Albumin 3.5 - 5.5 g/dL 3.0 (L) BUN 6 - 20 mg/dL 13 Creat 0.57 - 1.00 mg/dL 1.54 (H) Na 134 - 144 mmol/L 136  
K 3.5 - 5.2 mmol/L 4.2 Cl 96 - 106 mmol/L 100 CO2 20 - 29 mmol/L 26 Glucose 65 - 99 mg/dL 366 (H) SEROLOGIES: 
Serologies Latest Ref Rng & Units 5/1/2018 Hep B Surface Ag Negative Negative Hep B Core Ab, Total Negative Negative Hep B Surface AB QL  Non Reactive Hep C Ab 0.0 - 0.9 s/co ratio <0.1 Ferritin 15 - 150 ng/mL 53 Iron % Saturation 15 - 55 % 23 IZA Ab, Direct Negative Negative M2 Ab 0.0 - 20.0 Units 4.4 LIVER HISTOLOGY: 
5/2019. Slides reviewed by MLS. NAFL. 10-25% macro and micovesicular steatosis. Mild ballooning. Mild inflammation. No fibrosis. RYQ(357). ENDOSCOPIC PROCEDURES: 
Not available or performed RADIOLOGY: 
10/2017. Ultrasound of liver. Persistently increased hepatic echogenicity, compatible with hepatic parenchymal disease, likely hepatic steatosis. Normal gallbladder. 5/2018. CT scan abdomen without IV contrast.  Normal appearing liver. No liver mass lesions. Normal spleen. No ascites. OTHER TESTING: 
Not available or performed FOLLOW-UP: 
All of the issues listed above in the Assessment and Plan were discussed with the patient. All questions were answered. The patient expressed a clear understanding of the above. [facility] [place] [address] in 6 months to assess the impact of diet and weight loss. MD Yanira Davila DepRUST [facility] 136 [address] [address], [address] [facility] 22. 
[phone] [address] [place] [address]

## 2019-05-24 NOTE — PROGRESS NOTES
PT DAILY TREATMENT NOTE 2-15 Patient Name: Charles Jorgensen Date:2019 : 1980 [x]  Patient  Verified Payor: [employer]ICA[employer] / Plan: [address] [place] [address] / [employer] Type: Managed Care Medicaid / In time:11:30  Out time:12:15 Total Treatment Time (min): 45 Visit #: 2 Treatment Area: Low back pain [M54.5] SUBJECTIVE Pain Level (0-10 scale): 8 Any medication changes, allergies to medications, adverse drug reactions, diagnosis change, or new procedure performed?: [x] No    [] Yes (see summary sheet for update) Subjective functional status/changes:   [] No changes reported Pt reports that she is feeling better since doing her HEP. OBJECTIVE Modality rationale: decrease edema, decrease inflammation, decrease pain, increase tissue extensibility and increase muscle contraction/control to improve the patients ability to walk without pain Min Type Additional Details 15 [x] Estim: []Att   [x]Unatt    []TENS instruct [x]IFC  []Premod   []NMES []Other:  []w/US   []w/ice   [x]w/heat Position: supine Location: lumbar paraspinals  
  
 []  Traction: [] Cervical       []Lumbar 
                     [] Prone          []Supine []Intermittent   []Continuous Lbs: 
[] before manual 
[] after manual 
[]w/heat  
 []  Ultrasound: []Continuous   [] Pulsed  
                    at: []1MHz   []3MHz Location: 
W/cm2:  
 [] Paraffin Location:  
[]w/heat  
 []  Ice     []  Heat 
[]  Ice massage Position: Location:  
 []  Laser 
[]  Other: Position: Location:  
 
 []  Vasopneumatic Device Pressure:       [] lo [] med [] hi  
Temperature:   
[x] Skin assessment post-treatment:  [x]intact []redness- no adverse reaction 
  []redness  adverse reaction:  
 
 
 
25 min Therapeutic Exercise:  [x] See flow sheet :  
Rationale: increase ROM, increase strength and improve coordination to improve the patients ability to walk without pain With 
 [] TE 
 [] TA 
 [] neuro 
 [] other: Patient Education: [x] Review HEP [] Progressed/Changed HEP based on:  
[] positioning   [] body mechanics   [] transfers   [] heat/ice application   
[] other:   
 
Other Objective/Functional Measures:   
 
Pain Level (0-10 scale) post treatment: 8 
 
ASSESSMENT/Changes in Function: Pt continues to present with high levels of pain during all activities specifically when attempting to perform single knee to chest exercise. Pt remains hesitant to initiate any movements due to pain. Patient will continue to benefit from skilled PT services to modify and progress therapeutic interventions, address functional mobility deficits, address ROM deficits, address strength deficits, analyze and address soft tissue restrictions, analyze and cue movement patterns, analyze and modify body mechanics/ergonomics and assess and modify postural abnormalities to attain remaining goals. []  See Plan of Care 
[]  See progress note/recertification 
[]  See Discharge Summary Progress towards goals / Updated goals: 
Pt continues to present with high levels of pain during all activities. Will continue to perform treatment strategies directed at decreasing pain and improving tolerance of standing and walking. PLAN [x]  Upgrade activities as tolerated     [x]  Continue plan of care 
[]  Update interventions per flow sheet      
[]  Discharge due to:_ 
[]  Other:_   
 
Ashlyn Ireland 5/24/2019

## 2019-05-24 NOTE — Clinical Note
5/24/19 Patient: Sher Hamm YOB: 1980 Date of Visit: 5/24/2019 Yusra Harris MD 
[address] [facility] [phone] VIA In Basket Dear Yusra Harris MD, [facility] you [facility]. My notes for this consultation are attached. If you have questions, please do not hesitate to call me. I look forward to following your patient along with you. Sincerely, Louisa Llamas MD

## 2019-05-24 NOTE — PROGRESS NOTES
1. Have you been to the ER, urgent care clinic since your last visit? Hospitalized since your last visit? No 
 
2. Have you seen or consulted any other health care providers outside of the [address] [place] [address] since your last visit? Include any pap smears or colon screening. No 
 
Chief Complaint Patient presents with  Follow-up Visit Vitals /70 Pulse 99 Temp 98.5 °F (36.9 °C) (Tympanic) Ht 5' 2\" (1.575 m) Wt 220 lb 12.8 oz (100.2 kg) SpO2 99% BMI 40.38 kg/m² 3 most recent PHQ Screens 5/24/2019 Little interest or pleasure in doing things Not at all Feeling down, depressed, irritable, or hopeless Not at all Total Score PHQ 2 0 Learning Assessment 5/24/2019 PRIMARY LEARNER Patient HIGHEST LEVEL OF EDUCATION - PRIMARY LEARNER  -  
BARRIERS PRIMARY LEARNER NONE  
CO-LEARNER CAREGIVER No  
PRIMARY LANGUAGE ENGLISH  
LEARNER PREFERENCE PRIMARY LISTENING  
ANSWERED BY patient RELATIONSHIP SELF Abuse Screening Questionnaire 5/24/2019 Do you ever feel afraid of your partner? Mago Hampton Are you in a relationship with someone who physically or mentally threatens you? Mago Hampton Is it safe for you to go home?  Telma Patiño

## 2019-06-06 NOTE — PATIENT INSTRUCTIONS
Neck: Exercises  Your Care Instructions  Here are some examples of typical rehabilitation exercises for your condition. Start each exercise slowly. Ease off the exercise if you start to have pain. Your doctor or physical therapist will tell you when you can start these exercises and which ones will work best for you. How to do the exercises  Neck stretch    1. This stretch works best if you keep your shoulder down as you lean away from it. To help you remember to do this, start by relaxing your shoulders and lightly holding on to your thighs or your chair. 2. Tilt your head toward your shoulder and hold for 15 to 30 seconds. Let the weight of your head stretch your muscles. 3. If you would like a little added stretch, use your hand to gently and steadily pull your head toward your shoulder. For example, keeping your right shoulder down, lean your head to the left. 4. Repeat 2 to 4 times toward each shoulder. Diagonal neck stretch    1. Turn your head slightly toward the direction you will be stretching, and tilt your head diagonally toward your chest and hold for 15 to 30 seconds. 2. If you would like a little added stretch, use your hand to gently and steadily pull your head forward on the diagonal.  3. Repeat 2 to 4 times toward each side. Dorsal glide stretch    1. Sit or stand tall and look straight ahead. 2. Slowly tuck your chin as you glide your head backward over your body  3. Hold for a count of 6, and then relax for up to 10 seconds. 4. Repeat 8 to 12 times. Chest and shoulder stretch    1. Sit or stand tall and glide your head backward as in the dorsal glide stretch. 2. Raise both arms so that your hands are next to your ears. 3. Take a deep breath, and as you breathe out, lower your elbows down and behind your back. You will feel your shoulder blades slide down and together, and at the same time you will feel a stretch across your chest and the front of your shoulders.   4. Hold for about 6 seconds, and then relax for up to 10 seconds. 5. Repeat 8 to 12 times. Strengthening: Hands on head    1. Move your head backward, forward, and side to side against gentle pressure from your hands, holding each position for about 6 seconds. 2. Repeat 8 to 12 times. Follow-up care is a key part of your treatment and safety. Be sure to make and go to all appointments, and call your doctor if you are having problems. It's also a good idea to know your test results and keep a list of the medicines you take. Where can you learn more? Go to http://aziza-niyah.info/. Enter P975 in the search box to learn more about \"Neck: Exercises. \"  Current as of: September 20, 2018  Content Version: 11.9  © 1272-6827 [employer]. Care instructions adapted under license by [employer] (which disclaims liability or warranty for this information). If you have questions about a medical condition or this instruction, always ask your healthcare professional. [address] any warranty or liability for your use of this information.
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
## 2019-06-12 NOTE — ED NOTES
Pt resting on stretcher awaiting all results. No needs at this time. Call bell in reach extra-ocular movements intact/cranial nerves 2-12 intact/tongue is midline/central vision intact

## 2019-06-12 NOTE — ED NOTES
The patient was discharged home by [facility], NP  in stable condition. The patient is alert and oriented, in no respiratory distress and discharge vital signs obtained. The patient's diagnosis, condition and treatment were explained. The patient expressed understanding. No prescriptions given. No work/school note given. A discharge plan has been developed. A  was not involved in the process. Aftercare instructions were given. Pt ambulatory out of the ED.

## 2019-06-12 NOTE — DISCHARGE INSTRUCTIONS
Thank you for allowing us to care for you today. Please follow-up with your Primary Care provider in the next 2-3 days if your symptoms do not improve. Plan for home:     Lidoderm 5% patch: apply & leave in place for 12 hours. Then remove and leave off for 12 hours. If the 5% patch is not covered by your insurance try the 4% patch that is available Over-the-counter. It is made by [employer] or [employer]. [employer] and [facility] are the least expensive. [employer] was the most expensive. Follow-up with Orthopedics for the continuing problem of avascular necrosis on the femur. Come back to the ER if you have worsening symptoms, fevers over 100.9, shaking chills, nausea or vomiting. Patient Education        Hip Pain: Care Instructions  Your Care Instructions    Hip pain may be caused by many things, including overuse, a fall, or a twisting movement. Another cause of hip pain is arthritis. Your pain may increase when you stand up, walk, or squat. The pain may come and go or may be constant. Home treatment can help relieve hip pain, swelling, and stiffness. If your pain is ongoing, you may need more tests and treatment. Follow-up care is a key part of your treatment and safety. Be sure to make and go to all appointments, and call your doctor if you are having problems. It's also a good idea to know your test results and keep a list of the medicines you take. How can you care for yourself at home? · Take pain medicines exactly as directed. ? If the doctor gave you a prescription medicine for pain, take it as prescribed. ? If you are not taking a prescription pain medicine, ask your doctor if you can take an over-the-counter medicine. · Rest and protect your hip. Take a break from any activity, including standing or walking, that may cause pain. · Put ice or a cold pack against your hip for 10 to 20 minutes at a time.  Try to do this every 1 to 2 hours for the next 3 days (when you are awake) or until the swelling goes down. Put a thin cloth between the ice and your skin. · Sleep on your healthy side with a pillow between your knees, or sleep on your back with pillows under your knees. · If there is no swelling, you can put moist heat, a heating pad, or a warm cloth on your hip. Do gentle stretching exercises to help keep your hip flexible. · Learn how to prevent falls. Have your vision and hearing checked regularly. Wear slippers or shoes with a nonskid sole. · Stay at a healthy weight. · Wear comfortable shoes. When should you call for help? Call 911 anytime you think you may need emergency care. For example, call if:    · You have sudden chest pain and shortness of breath, or you cough up blood.     · You are not able to stand or walk or bear weight.     · Your buttocks, legs, or feet feel numb or tingly.     · Your leg or foot is cool or pale or changes color.     · You have severe pain.    Call your doctor now or seek immediate medical care if:    · You have signs of infection, such as:  ? Increased pain, swelling, warmth, or redness in the hip area. ? Red streaks leading from the hip area. ? Pus draining from the hip area. ? A fever.     · You have signs of a blood clot, such as:  ? Pain in your calf, back of the knee, thigh, or groin. ? Redness and swelling in your leg or groin.     · You are not able to bend, straighten, or move your leg normally.     · You have trouble urinating or having bowel movements.    Watch closely for changes in your health, and be sure to contact your doctor if:    · You do not get better as expected. Where can you learn more? Go to http://aziza-niyah.info/. Enter A370 in the search box to learn more about \"Hip Pain: Care Instructions. \"  Current as of: September 23, 2018  Content Version: 11.9  © 3080-1829 [employer].  Care instructions adapted under license by [employer] (which disclaims liability or warranty for this information). If you have questions about a medical condition or this instruction, always ask your healthcare professional. [place] [address] any warranty or liability for your use of this information.
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
## 2019-06-20 NOTE — PROGRESS NOTES
Identified pt with two pt identifiers(name and ). Chief Complaint   Patient presents with    Pre-op Exam     cataract removal Left eye        Health Maintenance Due   Topic    Pneumococcal 0-64 years (1 of 1 - PPSV23)    FOOT EXAM Q1     PAP AKA CERVICAL CYTOLOGY        Wt Readings from Last 3 Encounters:   19 213 lb (96.6 kg)   19 217 lb (98.4 kg)   19 215 lb (97.5 kg)     Temp Readings from Last 3 Encounters:   19 97 °F (36.1 °C) (Oral)   19 98.8 °F (37.1 °C)   19 98.5 °F (36.9 °C) (Oral)     BP Readings from Last 3 Encounters:   19 117/78   19 135/72   19 122/80     Pulse Readings from Last 3 Encounters:   19 100   19 99   19 78         Learning Assessment:  :     Learning Assessment 2019 3/15/2019 7/3/2018 4/10/2018 1/10/2018 2016 2015   PRIMARY LEARNER Patient Patient Patient Patient Patient Patient Patient   HIGHEST LEVEL OF EDUCATION - PRIMARY LEARNER  - - - - - - -   BARRIERS PRIMARY LEARNER NONE NONE - - - - -   CO-LEARNER CAREGIVER No No - - - - -   PRIMARY [address]   LEARNER PREFERENCE PRIMARY LISTENING LISTENING READING READING READING LISTENING DEMONSTRATION   ANSWERED BY patient patient Marsha Patel patient Patient   RELATIONSHIP SELF SELF SELF SELF SELF SELF SELF       Depression Screening:  :     3 most recent PHQ Screens 2019   Little interest or pleasure in doing things Not at all   Feeling down, depressed, irritable, or hopeless Not at all   Total Score PHQ 2 0       Fall Risk Assessment:  :     Fall Risk Assessment, last 12 mths 8/15/2017   Able to walk? Yes   Fall in past 12 months? No       Abuse Screening:  :     Abuse Screening Questionnaire 2019 3/15/2019 2018 8/15/2016 2014 2014   Do you ever feel afraid of your partner?  N N N N N N   Are you in a relationship with someone who physically or mentally threatens you? N N N N N N   Is it safe for you to go home? Y Y Y Y Y Y       Coordination of Care Questionnaire:  :     1) Have you been to an emergency room, urgent care clinic since your last visit? no   Hospitalized since your last visit? no             2) Have you seen or consulted any other health care providers outside of [address] [place] [address] since your last visit? no  (Include any pap smears or colon screenings in this section.)    3) Do you have an Advance Directive on file? no  Are you interested in receiving information about Advance Directives? no    Reviewed record in preparation for visit and have obtained necessary documentation. Medication reconciliation up to date and corrected with patient at this time.

## 2019-06-20 NOTE — PROGRESS NOTES
Preoperative Evaluation    Date of Exam: 2019    Brooke Roa is a 44 y.o. female (:1980) who presents for preoperative evaluation. Scheduled for left cataract extraction on 7/10/19 with Dr. Sarah Beth Hancock. Anesthesia: peribulbar/MAC. She has been doing well. No additional concerns today. Latex Allergy: no    Medical History:     Past Medical History:   Diagnosis Date    Abnormal pap 2012 10/5/13     hx w/Colpo     Arthritis     Asthma     Diabetes ([address]     type 2    GERD (gastroesophageal reflux disease)     Headache(784.0)     Hyperlipidemia     Hypertension     Joint pain     Legally blind     blind in right eye.  DOC (obstructive sleep apnea)     uses CPAP    Pap smear for cervical cancer screening 10/1/15 ASCUS HPV NEG RP 3 YEARS    PUD (peptic ulcer disease)     Ringing in ears      Allergies: Allergies   Allergen Reactions    Aspirin Itching    Contrast Agent [Iodine] Hives     IV only, can tolerate oral contrast    Dilaudid [Hydromorphone] Hives    Iodinated Contrast- Oral And Iv Dye Itching    Lisinopril Itching     itching    Metformin Nausea Only    Reglan  [Metoclopramide Hcl] Other (comments)    Reglan [Metoclopramide] Other (comments)     Impaired speech/LOWER EXTREMITY EDEMA-RECENTLY D/C'D BY MD      Medications:     Current Outpatient Medications   Medication Sig    diclofenac EC (VOLTAREN) 75 mg EC tablet Take 75 mg by mouth daily.  ofloxacin (FLOXIN) 0.3 % ophthalmic solution INSTILL 1 DROP 4 TIMES DAILY INTO SURGERY EYE START 2 DAYS PRIOR TO SURGERY    prednisoLONE acetate (PRED FORTE) 1 % ophthalmic suspension INSTILL 1 DROP INTO SURGERY EYE 4 TIMES A DAY TAPER AS DIRECTED    empagliflozin (JARDIANCE) 10 mg tablet Take 10 mg by mouth daily.     NOVOLOG FLEXPEN U-100 INSULIN 100 unit/mL (3 mL) inpn INJECT 80 UNITS SUBCUTANEOUSLY 3 TIMES DAILY    Insulin Needles, Disposable, 31 gauge x 5/16\" ndle USE TO INJECT LANTUS AND APIDRA DAILY    cyclobenzaprine (FLEXERIL) 5 mg tablet Take 1 Tab by mouth three (3) times daily as needed for Muscle Spasm(s).  lidocaine (LIDODERM) 5 % Apply patch to the affected area for 12 hours a day and remove for 12 hours a day.  loratadine (CLARITIN) 10 mg tablet Take 1 Tab by mouth daily.  carvedilol (COREG) 3.125 mg tablet TAKE 1 TABLET BY MOUTH TWICE DAILY WITH MEALS    isosorbide mononitrate ER (IMDUR) 30 mg tablet Take 1 Tab by mouth every morning.  albuterol (PROVENTIL HFA, VENTOLIN HFA, PROAIR HFA) 90 mcg/actuation inhaler Take 2 Puffs by inhalation every four (4) hours as needed for Wheezing. PRO AIR HFA    losartan (COZAAR) 100 mg tablet TAKE 1 TABLET BY MOUTH ONCE DAILY    montelukast (SINGULAIR) 10 mg tablet Take 10 mg by mouth daily as needed (allergies).  atorvastatin (LIPITOR) 20 mg tablet Take 20 mg by mouth daily.  gabapentin (NEURONTIN) 300 mg capsule TAKE 2 CAPSULES BY MOUTH THREE TIMES DAILY    bumetanide (BUMEX) 0.5 mg tablet Take 0.5 mg by mouth daily.  sodium bicarbonate 650 mg tablet Take 650 mg by mouth two (2) times a day.  insulin glargine (LANTUS SOLOSTAR U-100 INSULIN) 100 unit/mL (3 mL) inpn 30 Units by SubCUTAneous route ACB/HS.  VITAMIN D2 50,000 unit capsule Take 50,000 Units by mouth every . No current facility-administered medications for this visit.       Surgical History:     Past Surgical History:   Procedure Laterality Date    HX  SECTION  01 & 08    HX COLPOSCOPY  2012 neg    HX HEENT      cataract R eye    HX RETINAL DETACHMENT REPAIR Left     \"put a gas bubble in the back of the eye\"    HX TUBAL LIGATION       Social History:     Social History     Socioeconomic History    Marital status:      Spouse name: Not on file    Number of children: 2    Years of education: Not on file    Highest education level: Not on file   Occupational History    Occupation: --   Tobacco Use    Smoking status: Never Smoker    Smokeless tobacco: Never Used   Substance and Sexual Activity    Alcohol use: No    Drug use: No    Sexual activity: Yes     Partners: Male     Birth control/protection: Surgical   Other Topics Concern     Service No    Blood Transfusions No    Caffeine Concern No    Occupational Exposure No    Hobby Hazards No    Sleep Concern Yes    Stress Concern Yes    Weight Concern Yes    Special Diet Yes     Comment: DM    Back Care Yes    Exercise Yes    Bike Helmet No    Seat Belt Yes    Self-Exams Yes       Anesthesia Complications: None  History of abnormal bleeding : None  History of Blood Transfusions: no  Health Care Directive or Living Will: no    Review of Systems:   Constitutional: negative for fevers and chills  Ears, nose, mouth, throat, and face: negative for nasal congestion and sore throat  Respiratory: negative for cough, sputum or dyspnea on exertion  Cardiovascular: negative for chest pain, chest pressure/discomfort, palpitations, irregular heart beats, lower extremity edema  Gastrointestinal: negative for nausea, vomiting, change in bowel habits and abdominal pain  Genitourinary:negative for frequency, dysuria and hematuria  Musculoskeletal:negative for myalgias and arthralgias  Neurological: negative for headaches, dizziness and paresthesia       Objective:   /78 (BP 1 Location: Right arm, BP Patient Position: Sitting) Comment: Manual  Pulse 100   Temp 97 °F (36.1 °C) (Oral) Comment: .  Resp 18   Ht 5' 2\" (1.575 m)   Wt 213 lb (96.6 kg)   LMP 05/30/2019 (Approximate)   SpO2 100%   BMI 38.96 kg/m²     General appearance - alert, well appearing, and in no distress  Eyes - pupils equal and reactive, extraocular eye movements intact, sclera anicteric  Mouth - mucous membranes moist, pharynx normal without lesions  Neck - supple, no significant adenopathy  Chest - clear to auscultation, no wheezes, rales or rhonchi, symmetric air entry  Heart - normal rate, regular rhythm, normal S1, S2, no murmurs, rubs, clicks or gallops    IMPRESSION: Sher Hamm is a 44 y.o. female scheduled for left cataract extraction. She has an acceptable risk for planned surgery and may proceed with no contraindications. Will complete H&P form and fax to her Ophthalmologist.       ICD-10-CM ICD-9-CM    1. Cataract of left eye, unspecified cataract type H26.9 366.9    2.  Preoperative examination Z01.818 V72.84        Mehreen Lora MD   6/20/2019

## 2019-06-20 NOTE — LETTER
6/20/2019 Ms. Jada Sweeney [place] [id number] Medications:  
Current Outpatient Medications Medication Sig  
 diclofenac EC (VOLTAREN) 75 mg EC tablet Take 75 mg by mouth daily.  ofloxacin (FLOXIN) 0.3 % ophthalmic solution INSTILL 1 DROP 4 TIMES DAILY INTO SURGERY EYE START 2 DAYS PRIOR TO SURGERY  prednisoLONE acetate (PRED FORTE) 1 % ophthalmic suspension INSTILL 1 DROP INTO SURGERY EYE 4 TIMES A DAY TAPER AS DIRECTED  empagliflozin (JARDIANCE) 10 mg tablet Take 10 mg by mouth daily.  NOVOLOG FLEXPEN U-100 INSULIN 100 unit/mL (3 mL) inpn INJECT 80 UNITS SUBCUTANEOUSLY 3 TIMES DAILY  Insulin Needles, Disposable, 31 gauge x 5/16\" ndle USE TO INJECT LANTUS AND APIDRA DAILY  cyclobenzaprine (FLEXERIL) 5 mg tablet Take 1 Tab by mouth three (3) times daily as needed for Muscle Spasm(s).  lidocaine (LIDODERM) 5 % Apply patch to the affected area for 12 hours a day and remove for 12 hours a day.  loratadine (CLARITIN) 10 mg tablet Take 1 Tab by mouth daily.  carvedilol (COREG) 3.125 mg tablet TAKE 1 TABLET BY MOUTH TWICE DAILY WITH MEALS  isosorbide mononitrate ER (IMDUR) 30 mg tablet Take 1 Tab by mouth every morning.  albuterol (PROVENTIL HFA, VENTOLIN HFA, PROAIR HFA) 90 mcg/actuation inhaler Take 2 Puffs by inhalation every four (4) hours as needed for Wheezing. PRO AIR HFA  losartan (COZAAR) 100 mg tablet TAKE 1 TABLET BY MOUTH ONCE DAILY  montelukast (SINGULAIR) 10 mg tablet Take 10 mg by mouth daily as needed (allergies).  atorvastatin (LIPITOR) 20 mg tablet Take 20 mg by mouth daily.  gabapentin (NEURONTIN) 300 mg capsule TAKE 2 CAPSULES BY MOUTH THREE TIMES DAILY  bumetanide (BUMEX) 0.5 mg tablet Take 0.5 mg by mouth daily.  sodium bicarbonate 650 mg tablet Take 650 mg by mouth two (2) times a day.  insulin glargine (LANTUS SOLOSTAR U-100 INSULIN) 100 unit/mL (3 mL) inpn 30 Units by SubCUTAneous route ACB/HS.  VITAMIN D2 50,000 unit capsule Take 50,000 Units by mouth every . No current facility-administered medications for this visit. Allergies and Reaction(s): Allergies Allergen Reactions  Aspirin Itching  Contrast Agent [Iodine] Hives IV only, can tolerate oral contrast  
 Dilaudid [Hydromorphone] Hives  Iodinated Contrast- Oral And Iv Dye Itching  Lisinopril Itching  
  itching  Metformin Nausea Only  Reglan  [Metoclopramide Hcl] Other (comments)  Reglan [Metoclopramide] Other (comments) Impaired speech/LOWER EXTREMITY EDEMA-RECENTLY D/C'D BY MD  
 
 
Surgical History:  
Past Surgical History:  
Procedure Laterality Date  HX  SECTION  01 & 08  HX COLPOSCOPY  2012 neg  HX HEENT    
 cataract R eye  HX RETINAL DETACHMENT REPAIR Left \"put a gas bubble in the back of the eye\"  HX TUBAL LIGATION Sincerely, Shari Fuentes MD

## 2019-06-27 NOTE — PROGRESS NOTES
PT DAILY TREATMENT NOTE 2-15 Patient Name: Iván Amaya Date:2019 : 1980 [x]  Patient  Verified Payor: [employer]ICA[employer] / Plan: [address] [place] [address] / Product Type: Managed Care Medicaid / In time:335 pm  Out time:430 pm 
Total Treatment Time (min): 55 Visit #: 3 Treatment Area: Low back pain [M54.5] SUBJECTIVE Pain Level (0-10 scale): 8 Any medication changes, allergies to medications, adverse drug reactions, diagnosis change, or new procedure performed?: [x] No    [] Yes (see summary sheet for update) Subjective functional status/changes:   [] No changes reported Pt reports that she is feeling the same. OBJECTIVE Modality rationale: decrease edema, decrease inflammation, decrease pain, increase tissue extensibility and increase muscle contraction/control to improve the patients ability to walk without pain Min Type Additional Details 15 [x] Estim: []Att   [x]Unatt    []TENS instruct [x]IFC  []Premod   []NMES []Other:  []w/US   []w/ice   [x]w/heat Position: supine Location: lumbar paraspinals  
  
 []  Traction: [] Cervical       []Lumbar 
                     [] Prone          []Supine []Intermittent   []Continuous Lbs: 
[] before manual 
[] after manual 
[]w/heat  
 []  Ultrasound: []Continuous   [] Pulsed  
                    at: []1MHz   []3MHz Location: 
W/cm2:  
 [] Paraffin Location:  
[]w/heat  
 []  Ice     []  Heat 
[]  Ice massage Position: Location:  
 []  Laser 
[]  Other: Position: Location:  
 
 []  Vasopneumatic Device Pressure:       [] lo [] med [] hi  
Temperature:   
[x] Skin assessment post-treatment:  [x]intact []redness- no adverse reaction 
  []redness  adverse reaction:  
 
 
 
45 min Therapeutic Exercise:  [x] See flow sheet :  
Rationale: increase ROM, increase strength and improve coordination to improve the patients ability to walk without pain With 
 [] TE 
 [] TA 
 [] neuro 
 [] other: Patient Education: [x] Review HEP [] Progressed/Changed HEP based on:  
[] positioning   [] body mechanics   [] transfers   [] heat/ice application   
[] other:   
 
Other Objective/Functional Measures:   
Patient willing to do other exercises today with good tolerance noted. Pain Level (0-10 scale) post treatment: 7 ASSESSMENT/Changes in Function:  
 
Patient will continue to benefit from skilled PT services to modify and progress therapeutic interventions, address functional mobility deficits, address ROM deficits, address strength deficits, analyze and address soft tissue restrictions, analyze and cue movement patterns, analyze and modify body mechanics/ergonomics and assess and modify postural abnormalities to attain remaining goals. []  See Plan of Care 
[]  See progress note/recertification 
[]  See Discharge Summary Progress towards goals / Updated goals: Pt was last seen ~1 month ago and has shown little to no progress with continued high pain levels. PLAN [x]  Upgrade activities as tolerated     [x]  Continue plan of care [x]  Update interventions per flow sheet      
[]  Discharge due to:_ 
[]  Other:_ Guilherme Murcia PTA, CPT 6/27/2019

## 2019-07-02 NOTE — PROGRESS NOTES
PT DAILY TREATMENT NOTE 2-15 Patient Name: Pierce Standard Date:2019 : 1980 [x]  Patient  Verified Payor: [employer] / Plan: [address] [place] [address] / Product Type: Managed Care Medicaid / In time:830 am  Out time:930 am 
Total Treatment Time (min): 60 Visit #: 4 Treatment Area: Low back pain [M54.5] SUBJECTIVE Pain Level (0-10 scale): 8 Any medication changes, allergies to medications, adverse drug reactions, diagnosis change, or new procedure performed?: [x] No    [] Yes (see summary sheet for update) Subjective functional status/changes:   [] No changes reported Pt reports that she was really sore after last visit. OBJECTIVE Modality rationale: decrease edema, decrease inflammation, decrease pain, increase tissue extensibility and increase muscle contraction/control to improve the patients ability to walk without pain Min Type Additional Details 15 [x] Estim: []Att   [x]Unatt    []TENS instruct [x]IFC  []Premod   []NMES []Other:  []w/US   []w/ice   [x]w/heat Position: supine Location: lumbar paraspinals  
  
 []  Traction: [] Cervical       []Lumbar 
                     [] Prone          []Supine []Intermittent   []Continuous Lbs: 
[] before manual 
[] after manual 
[]w/heat  
 []  Ultrasound: []Continuous   [] Pulsed  
                    at: []1MHz   []3MHz Location: 
W/cm2:  
 [] Paraffin Location:  
[]w/heat  
 []  Ice     []  Heat 
[]  Ice massage Position: Location:  
 []  Laser 
[]  Other: Position: Location:  
 
 []  Vasopneumatic Device Pressure:       [] lo [] med [] hi  
Temperature:   
[x] Skin assessment post-treatment:  [x]intact []redness- no adverse reaction 
  []redness  adverse reaction:  
 
 
 
45 min Therapeutic Exercise:  [x] See flow sheet :  
Rationale: increase ROM, increase strength and improve coordination to improve the patients ability to walk without pain With 
 [] TE 
 [] TA 
 [] neuro 
 [] other: Patient Education: [x] Review HEP [] Progressed/Changed HEP based on:  
[] positioning   [] body mechanics   [] transfers   [] heat/ice application   
[] other:   
 
Other Objective/Functional Measures: Mod fatigue with today's new standing exercises. Pain Level (0-10 scale) post treatment: 5 
 
ASSESSMENT/Changes in Function:  
 
Patient will continue to benefit from skilled PT services to modify and progress therapeutic interventions, address functional mobility deficits, address ROM deficits, address strength deficits, analyze and address soft tissue restrictions, analyze and cue movement patterns, analyze and modify body mechanics/ergonomics and assess and modify postural abnormalities to attain remaining goals. []  See Plan of Care 
[]  See progress note/recertification 
[]  See Discharge Summary Progress towards goals / Updated goals: 
Continued limited progress due to high pain levels. PLAN [x]  Upgrade activities as tolerated     [x]  Continue plan of care [x]  Update interventions per flow sheet      
[]  Discharge due to:_ 
[]  Other:_ Jeremiah Urbano PTA, CPT 7/2/2019

## 2019-07-09 NOTE — PROGRESS NOTES
PT DAILY TREATMENT NOTE 2-15 Patient Name: Frankie Vicente Date:2019 : 1980 [x]  Patient  Verified Payor: [employer]ICA[employer] / Plan: [address] [place] [address] / Product Type: Managed Care Medicaid / In time:9:30 AM  Out time:10:25 AM 
Total Treatment Time (min): 55 Visit #: 2 Treatment Area: Low back pain [M54.5] SUBJECTIVE Pain Level (0-10 scale): 6 Any medication changes, allergies to medications, adverse drug reactions, diagnosis change, or new procedure performed?: [x] No    [] Yes (see summary sheet for update) Subjective functional status/changes:   [] No changes reported Patient reports that she is feeling better overall since her past two visits. She has cataracts surgery next week and is unsure when she will be able to return to PT after that. OBJECTIVE Modality rationale: decrease edema, decrease inflammation, decrease pain, increase tissue extensibility and increase muscle contraction/control to improve the patients ability to walk without pain Min Type Additional Details 15 [x] Estim: []Att   [x]Unatt    []TENS instruct [x]IFC  []Premod   []NMES []Other:  []w/US   []w/ice   [x]w/heat Position: supine Location: lumbar paraspinals  
  
 []  Traction: [] Cervical       []Lumbar 
                     [] Prone          []Supine []Intermittent   []Continuous Lbs: 
[] before manual 
[] after manual 
[]w/heat  
 []  Ultrasound: []Continuous   [] Pulsed  
                    at: []1MHz   []3MHz Location: 
W/cm2:  
 [] Paraffin Location:  
[]w/heat  
 []  Ice     []  Heat 
[]  Ice massage Position: Location:  
 []  Laser 
[]  Other: Position: Location:  
 
 []  Vasopneumatic Device Pressure:       [] lo [] med [] hi  
Temperature:   
[x] Skin assessment post-treatment:  [x]intact []redness- no adverse reaction 
  []redness  adverse reaction: 40 min Therapeutic Exercise:  [x] See flow sheet :  
Rationale: increase ROM, increase strength and improve coordination to improve the patients ability to walk without pain With 
 [] TE 
 [] TA 
 [] neuro 
 [] other: Patient Education: [x] Review HEP [] Progressed/Changed HEP based on:  
[] positioning   [] body mechanics   [] transfers   [] heat/ice application   
[] other:   
 
Other Objective/Functional Measures:   
Several rest breaks required with standing ther ex due to fatigue Pain Level (0-10 scale) post treatment: 5 
 
ASSESSMENT/Changes in Function:  
 
Patient will continue to benefit from skilled PT services to modify and progress therapeutic interventions, address functional mobility deficits, address ROM deficits, address strength deficits, analyze and address soft tissue restrictions, analyze and cue movement patterns, analyze and modify body mechanics/ergonomics and assess and modify postural abnormalities to attain remaining goals. []  See Plan of Care 
[]  See progress note/recertification 
[]  See Discharge Summary Progress towards goals / Updated goals: 
Patient tolerated today's progression of therapeutic exercises very well and will continue to progress slowly as tolerated next visit. PLAN [x]  Upgrade activities as tolerated     [x]  Continue plan of care [x]  Update interventions per flow sheet      
[]  Discharge due to:_ 
[]  Other:_   
 
Sixto Bamberger, PT  , DPT, OCS, Cert. DN 
 7/9/2019

## 2019-07-24 NOTE — PROGRESS NOTES
Date:  19     Name:  Aviva Hargrove  :  1980  MRN:  [id number]     PCP:  Kylee Leonardo MD    Chief Complaint   Patient presents with    Migraine     HISTORY OF PRESENT ILLNESS: Follow up for migraines. Since her last office visit, she was started on Aimovig. She was getting this up until March through patient assistance. However, once this ran out, she has not been using this. While she was on the medication, she indicates that she was doing pretty well. States that while she was on the [address] [place] [address] Road, she was not having migraines at all. Since she ran out of it, she has been having the headaches daily again. The only new thing was that she had a retinal detachment which was repaired followed by a cataract removal. Last week, she went to the ER due to numbness in her right leg and weakness. Supposedly, they looked at her MRI from  and told her she had a stroke on the left side. Except as noted above, denies  fever, chills, cough. No CP or SOB. No dysuria, loss of bowel or bladder control. No Weight loss. Appetite good. Sleeping well. No sweats. No edema. No bruising or bleeding. No nausea or vomit. No diarrhea. No frequency, urgency, No depressive sxs. No anxiety. Denies sore throat, nasal congestion, nasal discharge, epistaxis, tinnitus, hearing loss, back pain, muscle pain, or joint pain.        Current Outpatient Medications   Medication Sig    gabapentin (NEURONTIN) 300 mg capsule TAKE 2 CAPSULES BY MOUTH THREE TIMES DAILY  Indications: Needs follow up appt    carvedilol (COREG) 3.125 mg tablet TAKE 1 TABLET BY MOUTH TWICE DAILY WITH MEALS    ARNUITY ELLIPTA 200 mcg/actuation dsdv inhaler INHALE 1 PUFF BY MOUTH ONCE DAILY    cyclobenzaprine (FLEXERIL) 5 mg tablet TAKE 1 TABLET BY MOUTH THREE TIMES DAILY AS NEEDED FOR MUSCLE SPASM    ofloxacin (FLOXIN) 0.3 % ophthalmic solution INSTILL 1 DROP 4 TIMES DAILY INTO SURGERY EYE START 2 DAYS PRIOR TO SURGERY    prednisoLONE acetate (PRED FORTE) 1 % ophthalmic suspension INSTILL 1 DROP INTO SURGERY EYE 4 TIMES A DAY TAPER AS DIRECTED    empagliflozin (JARDIANCE) 10 mg tablet Take 10 mg by mouth daily.  NOVOLOG FLEXPEN U-100 INSULIN 100 unit/mL (3 mL) inpn INJECT 80 UNITS SUBCUTANEOUSLY 3 TIMES DAILY    Insulin Needles, Disposable, 31 gauge x 5/16\" ndle USE TO INJECT LANTUS AND APIDRA DAILY    lidocaine (LIDODERM) 5 % Apply patch to the affected area for 12 hours a day and remove for 12 hours a day.  loratadine (CLARITIN) 10 mg tablet Take 1 Tab by mouth daily.  isosorbide mononitrate ER (IMDUR) 30 mg tablet Take 1 Tab by mouth every morning.  albuterol (PROVENTIL HFA, VENTOLIN HFA, PROAIR HFA) 90 mcg/actuation inhaler Take 2 Puffs by inhalation every four (4) hours as needed for Wheezing. PRO AIR HFA    losartan (COZAAR) 100 mg tablet TAKE 1 TABLET BY MOUTH ONCE DAILY    montelukast (SINGULAIR) 10 mg tablet Take 10 mg by mouth daily as needed (allergies).  atorvastatin (LIPITOR) 20 mg tablet Take 20 mg by mouth daily.  bumetanide (BUMEX) 0.5 mg tablet Take 0.5 mg by mouth daily.  sodium bicarbonate 650 mg tablet Take 650 mg by mouth two (2) times a day.  insulin glargine (LANTUS SOLOSTAR U-100 INSULIN) 100 unit/mL (3 mL) inpn 30 Units by SubCUTAneous route ACB/HS.  VITAMIN D2 50,000 unit capsule Take 50,000 Units by mouth every Sunday. No current facility-administered medications for this visit.       Allergies   Allergen Reactions    Aspirin Itching    Contrast Agent [Iodine] Hives     IV only, can tolerate oral contrast    Dilaudid [Hydromorphone] Hives    Iodinated Contrast- Oral And Iv Dye Itching    Lisinopril Itching     itching    Metformin Nausea Only    Reglan  [Metoclopramide Hcl] Other (comments)    Reglan [Metoclopramide] Other (comments)     Impaired speech/LOWER EXTREMITY EDEMA-RECENTLY D/C'D BY MD     Past Medical History:   Diagnosis Date    Abnormal pap 2012 10/5/13     hx w/Colpo     Arthritis     Asthma     Diabetes ([facility] [address]     type 2    GERD (gastroesophageal reflux disease)     Headache(784.0)     Hyperlipidemia     Hypertension     Joint pain     Legally blind     blind in right eye.     DOC (obstructive sleep apnea)     uses CPAP    Pap smear for cervical cancer screening 10/1/15 ASCUS HPV NEG RP 3 YEARS    PUD (peptic ulcer disease)     Ringing in ears      Past Surgical History:   Procedure Laterality Date    HX CATARACT REMOVAL  2019    left eye     HX  SECTION  01 & 08    HX COLPOSCOPY  2012 neg    HX HEENT      cataract R eye    HX OTHER SURGICAL  2018    retina reattached in left eye     HX RETINAL DETACHMENT REPAIR Left     \"put a gas bubble in the back of the eye\"    HX TUBAL LIGATION       Social History     Socioeconomic History    Marital status:      Spouse name: Not on file    Number of children: 2    Years of education: Not on file    Highest education level: Not on file   Occupational History    Occupation: --   Social Needs    Financial resource strain: Not on file    Food insecurity:     Worry: Not on file     Inability: Not on file    Transportation needs:     Medical: Not on file     Non-medical: Not on file   Tobacco Use    Smoking status: Never Smoker    Smokeless tobacco: Never Used   Substance and Sexual Activity    Alcohol use: No    Drug use: No    Sexual activity: Yes     Partners: Male     Birth control/protection: Surgical   Lifestyle    Physical activity:     Days per week: Not on file     Minutes per session: Not on file    Stress: Not on file   Relationships    Social connections:     Talks on phone: Not on file     Gets together: Not on file     Attends Christian service: Not on file     Active member of club or organization: Not on file     Attends meetings of clubs or organizations: Not on file     Relationship status: Not on file   Jayson Intimate partner violence:     Fear of current or ex partner: Not on file     Emotionally abused: Not on file     Physically abused: Not on file     Forced sexual activity: Not on file   Other Topics Concern     Service No    Blood Transfusions No    Caffeine Concern No    Occupational Exposure No    Hobby Hazards No    Sleep Concern Yes    Stress Concern Yes    Weight Concern Yes    Special Diet Yes     Comment: DM    Back Care Yes    Exercise Yes    Bike Helmet No    Seat Belt Yes    Self-Exams Yes   Social History Narrative    Not on file     Family History   Problem Relation Age of Onset    Diabetes Mother     Heart Disease Mother     Hypertension Mother     Stroke Mother     Hypertension Father     Stroke Father     Heart Disease Father     Breast Cancer Neg Hx        PHYSICAL EXAMINATION:    Visit Vitals  BP (!) 144/92   Pulse 95   Resp 20   Ht 5' 2\" (1.575 m)   SpO2 99%   BMI 38.96 kg/m²     General:  Well defined, nourished, and groomed individual in no acute distress.    Neck:             Supple, nontender, no bruits, no pain with resistance to active range of motion.    Heart:            Regular rate and rhythm, no murmurs, rub, or gallop.  Normal S1S2. Lungs:  Clear to auscultation bilaterally with equal chest expansion, no cough, no wheeze  Musculoskeletal:  Extremities revealed no edema and had full range of motion of joints.    Psych:  Good mood and bright affect      NEUROLOGICAL EXAMINATION:     Mental Status:   Alert and oriented to person, place, and time with recent and remote memory intact.  Attention span and concentration are normal. Speech is fluent with a full fund of knowledge.        Cranial Nerves:    II, III, IV, VI:  Visual acuity grossly intact. Visual fields are normal.    Pupils are equal, round, and reactive to light and accommodation.    Extra-ocular movements are full and fluid.  Fundoscopic exam was benign, no ptosis.  Positive nystagmus noted in the right eye with the fast phase to the left which may relate to a lazy eye.    V-XII:             Hearing is grossly intact.  Facial features are symmetric, with normal sensation and strength.  The palate rises symmetrically and the tongue protrudes midline.  Sternocleidomastoids 5/5.        Motor Examination:               Normal tone, bulk, and strength, 5/5 muscle strength throughout.        Coordination:  Finger to nose was normal.   No resting or intention tremor      Gait and Station: [employer] while walking.  Normal arm swing.  No pronator drift.   No muscle wasting or fasiculations noted.        Reflexes:  DTRs 2+ throughout.      ASSESSMENT AND PLAN    ICD-10-CM ICD-9-CM    1. Chronic migraine without aura without status migrainosus, not intractable G43.709 346.70 erenumab-aooe (AIMOVIG AUTOINJECTOR) 70 mg/mL injection     Given frequency of migraine and previous success on Aimovig, this was restarted. With regard to her cardiovascular and cerebrovascular risk, supposedly she did have a stroke per [employer] when they looked ar her previous imaging. Based on the imaging report from 2016, she had a normal brain. Subsequent MRI in 2017 as well as CT scans, last of which was in March of 2018, were also normal. Certainly, she does have significant stroke risk to include DM, HTN, obesity, and she should mitigate her risk through risk modification where possible. She should continue following up with her other healthcare providers. Again, her LDL should be less than 75 per ADA guidelines. Her blood pressure today was still suboptimal and she should discuss this with her PCP. Follow up in 6 months     [address] [facility].  Jennifer Owens

## 2019-07-24 NOTE — LETTER
7/24/19 Patient: Tish Guo YOB: 1980 Date of Visit: 7/24/2019 Mirtha Guerra MD 
[address] [facility] [phone] VIA In Basket Dear Mirtha Guerra MD, Thank you for referring Ms. Katia Botello to [facility] for evaluation. My notes for this consultation are attached. If you have questions, please do not hesitate to call me. I look forward to following your patient along with you.  
 
 
Sincerely, 
 
Evaristo Rey NP

## 2019-07-24 NOTE — PROGRESS NOTES
Migraines- they have gotten worse since she stopped taking that medication   The injection she was getting   Precious Wooten to the ER last Thursday- lost feeling on her right side it was just from her migraine she hasn't been able to get rid of it since, it has been terrible     Migraines are everyday   When she was on the medication they were doing good   She started in September and was doing the injection until March after that she hasn't been able to get it

## 2019-07-31 NOTE — PROGRESS NOTES
Identified pt with two pt identifiers(name and ). Chief Complaint   Patient presents with    Rash     on right side of face. appeared about 2 weeks ago. [facility] Follow Up        Health Maintenance Due   Topic    Pneumococcal 0-64 years (1 of 1 - PPSV23)    PAP AKA CERVICAL CYTOLOGY        Wt Readings from Last 3 Encounters:   19 210 lb (95.3 kg)   19 213 lb (96.6 kg)   19 217 lb (98.4 kg)     Temp Readings from Last 3 Encounters:   19 98.7 °F (37.1 °C) (Oral)   19 97 °F (36.1 °C) (Oral)   19 98.8 °F (37.1 °C)     BP Readings from Last 3 Encounters:   19 100/72   19 (!) 144/92   19 117/78     Pulse Readings from Last 3 Encounters:   19 (!) 110   19 95   19 100         Learning Assessment:  :     Learning Assessment 2019 3/15/2019 7/3/2018 4/10/2018 1/10/2018 2016 2015   PRIMARY LEARNER Patient Patient Patient Patient Patient Patient Patient   HIGHEST LEVEL OF EDUCATION - PRIMARY LEARNER  - - - - - - -   BARRIERS PRIMARY LEARNER NONE NONE - - - - -   CO-LEARNER CAREGIVER No No - - - - -   PRIMARY [address]   LEARNER PREFERENCE PRIMARY LISTENING LISTENING READING READING READING LISTENING DEMONSTRATION   ANSWERED BY patient patient Reyna Jama patient Patient   RELATIONSHIP SELF SELF SELF SELF SELF SELF SELF       Depression Screening:  :     3 most recent PHQ Screens 2019   Little interest or pleasure in doing things Not at all   Feeling down, depressed, irritable, or hopeless Not at all   Total Score PHQ 2 0       Fall Risk Assessment:  :     Fall Risk Assessment, last 12 mths 8/15/2017   Able to walk? Yes   Fall in past 12 months? No       Abuse Screening:  :     Abuse Screening Questionnaire 2019 3/15/2019 2018 8/15/2016 2014 2014   Do you ever feel afraid of your partner?  N N N N N N   Are you in a relationship with someone who physically or mentally threatens you? N N N N N N   Is it safe for you to go home? Y Y Y Y Y Y       Coordination of Care Questionnaire:  :     1) Have you been to an emergency room, urgent care clinic since your last visit? no   Hospitalized since your last visit? no             2) Have you seen or consulted any other health care providers outside of [address] [place] [address] since your last visit? no  (Include any pap smears or colon screenings in this section.)    3) Do you have an Advance Directive on file? no  Are you interested in receiving information about Advance Directives? no    Reviewed record in preparation for visit and have obtained necessary documentation. Medication reconciliation up to date and corrected with patient at this time.

## 2019-07-31 NOTE — PROGRESS NOTES
Subjective:      Sneha Li is a 44 y.o. female here for hospital follow up. Recent admissions to [facility] with last admission last week for hypoglycemia. Reports that her BG 27. Admission prior to this was for evaluation of right-sided weakness. Has follow up with Endocrinology on 8/13/19. Novolog was adjusted and is currently taking 3 units before meals. Has not no recurrent hypoglycemia. Rash: on the face x 2 weeks. No change in soaps, lotions, detergents, make-up. Associated with pruritis. No pain. No evaluation or treatment to date. Requesting Flexeril for muscle spasms. Current Outpatient Medications   Medication Sig Dispense Refill    gabapentin (NEURONTIN) 300 mg capsule TAKE 2 CAPSULES BY MOUTH THREE TIMES DAILY  Indications: Needs follow up appt 180 Cap 0    carvedilol (COREG) 3.125 mg tablet TAKE 1 TABLET BY MOUTH TWICE DAILY WITH MEALS 90 Tab 1    ARNUITY ELLIPTA 200 mcg/actuation dsdv inhaler INHALE 1 PUFF BY MOUTH ONCE DAILY 1 Inhaler 3    cyclobenzaprine (FLEXERIL) 5 mg tablet TAKE 1 TABLET BY MOUTH THREE TIMES DAILY AS NEEDED FOR MUSCLE SPASM 30 Tab 0    empagliflozin (JARDIANCE) 10 mg tablet Take 10 mg by mouth daily.  NOVOLOG FLEXPEN U-100 INSULIN 100 unit/mL (3 mL) inpn INJECT 80 UNITS SUBCUTANEOUSLY 3 TIMES DAILY (Patient taking differently: 3 Units by SubCUTAneous route Before breakfast, lunch, and dinner.) 75 mL 3    Insulin Needles, Disposable, 31 gauge x 5/16\" ndle USE TO INJECT LANTUS AND APIDRA DAILY  23    lidocaine (LIDODERM) 5 % Apply patch to the affected area for 12 hours a day and remove for 12 hours a day. 1 Package 2    loratadine (CLARITIN) 10 mg tablet Take 1 Tab by mouth daily. 90 Tab 3    isosorbide mononitrate ER (IMDUR) 30 mg tablet Take 1 Tab by mouth every morning. 90 Tab 1    albuterol (PROVENTIL HFA, VENTOLIN HFA, PROAIR HFA) 90 mcg/actuation inhaler Take 2 Puffs by inhalation every four (4) hours as needed for Wheezing.  PRO AIR HFA 1 Inhaler 3  losartan (COZAAR) 100 mg tablet TAKE 1 TABLET BY MOUTH ONCE DAILY 90 Tab 1    montelukast (SINGULAIR) 10 mg tablet Take 10 mg by mouth daily as needed (allergies).  atorvastatin (LIPITOR) 20 mg tablet Take 20 mg by mouth daily.  bumetanide (BUMEX) 0.5 mg tablet Take 0.5 mg by mouth daily.  sodium bicarbonate 650 mg tablet Take 650 mg by mouth two (2) times a day.  insulin glargine (LANTUS SOLOSTAR U-100 INSULIN) 100 unit/mL (3 mL) inpn 30 Units by SubCUTAneous route ACB/HS.  VITAMIN D2 50,000 unit capsule Take 50,000 Units by mouth every Sunday.  erenumab-aooe (AIMOVIG AUTOINJECTOR) 70 mg/mL injection 1 mL by SubCUTAneous route every thirty (30) days. 1 Each 0       Allergies   Allergen Reactions    Aspirin Itching    Contrast Agent [Iodine] Hives     IV only, can tolerate oral contrast    Dilaudid [Hydromorphone] Hives    Iodinated Contrast- Oral And Iv Dye Itching    Lisinopril Itching     itching    Metformin Nausea Only    Reglan  [Metoclopramide Hcl] Other (comments)    Reglan [Metoclopramide] Other (comments)     Impaired speech/LOWER EXTREMITY EDEMA-RECENTLY D/C'D BY MD       Past Medical History:   Diagnosis Date    Abnormal pap 2012 10/5/13     hx w/Colpo     Arthritis     Asthma     Diabetes ([address]     type 2    GERD (gastroesophageal reflux disease)     Headache(784.0)     Hyperlipidemia     Hypertension     Joint pain     Legally blind     blind in right eye.  DOC (obstructive sleep apnea)     uses CPAP    Pap smear for cervical cancer screening 10/1/15 ASCUS HPV NEG RP 3 YEARS    PUD (peptic ulcer disease)     Ringing in ears        Social History     Tobacco Use    Smoking status: Never Smoker    Smokeless tobacco: Never Used   Substance Use Topics    Alcohol use: No        Review of Systems  Pertinent items are noted in HPI.      Objective:     Visit Vitals  /72 (BP 1 Location: Right arm, BP Patient Position: Sitting) Comment: Manual Pulse (!) 110   Temp 98.7 °F (37.1 °C) (Oral) Comment: .   Resp 18   Ht 5' 2\" (1.575 m)   Wt 210 lb (95.3 kg)   LMP 07/30/2019 (Exact Date)   SpO2 98%   BMI 38.41 kg/m²      General appearance - alert, well appearing, and in no distress  Chest - clear to auscultation, no wheezes, rales or rhonchi, symmetric air entry, no tachypnea, retractions or cyanosis  Heart - normal rate, regular rhythm, normal S1, S2, no murmurs, rubs, clicks or gallops  Skin - pustular rash on the cheeks and chin, no excoriations     Assessment/Plan:   Manjit Adler is a 44 y.o. female seen for:     1. Facial rash  - doxycycline (MONODOX) 100 mg capsule; Take 1 Cap by mouth two (2) times a day for 10 days. Dispense: 20 Cap; Refill: 0  - use mild facial cleanser     2. Cervical muscle pain  - cyclobenzaprine (FLEXERIL) 5 mg tablet; TAKE 1 TABLET BY MOUTH THREE TIMES DAILY AS NEEDED FOR MUSCLE SPASM  Dispense: 30 Tab; Refill: 0    3. Musculoskeletal disorder involving upper trapezius muscle  - cyclobenzaprine (FLEXERIL) 5 mg tablet; TAKE 1 TABLET BY MOUTH THREE TIMES DAILY AS NEEDED FOR MUSCLE SPASM  Dispense: 30 Tab; Refill: 0    4. History of hypoglycemia: request discharge summary from [facility]. Follow up with Endocrinology as scheduled. Has history of noncompliance. I have discussed the diagnosis with the patient and the intended plan as seen in the above orders. The patient has received an after-visit summary and questions were answered concerning future plans. I have discussed medication side effects and warnings with the patient as well. Patient verbalizes understanding of plan of care and denies further questions or concerns at this time. Informed patient to return to the office if symptoms worsen or if new symptoms arise.

## 2019-08-05 NOTE — PROGRESS NOTES
Deloise Felty, MD    [address] [facility] [place], [address] [facility]    Office [phone],EDX [phone]  Pager [phone]    Jessenia Kaufman is a 44 y.o. female is here for f/u visit. Primary care physician:  Mala Mireles MD    Patient Active Problem List   Diagnosis Code    Asthma J45.909    Avascular necrosis of bones of both hips (HCC) M87.051, M87.052    Lumbar degenerative disc disease M51.36    LGSIL (low grade squamous intraepithelial dysplasia) [id number]    Non compliance with medical treatment Z91.19    Obesity E66.9    Essential hypertension I10    Noncompliance of patient with dietary regimen Z91.11    Ankle edema M25.473    Muscle spasms of neck M62.838    Recurrent streptococcal tonsillitis J03.01    Encounter for long-term (current) use of insulin ([facility]) Z79.4    Muscle spasm of back M62.830    Elevated serum creatinine R79.89    Type 2 diabetes mellitus with diabetic neuropathy ([facility]) E11.40    Obesity, morbid ([address] E66.01    Type 2 diabetes with nephropathy ([facility]) E11.21    NAFL (nonalcoholic fatty liver) E22.9    Hyperlipidemia E78.5    DOC (obstructive sleep apnea) G47.33             Chief complaint:  Chief Complaint   Patient presents with    Follow-up     6 mo f/u     Hypertension    Chest Pain    Breathing Problem       Assessment:  Dyspnea/CP -cardiac cath 5/2018-nonobstructive branch vessel disease. Medical management. Normal EF. Edema-component of venous insufficiency  Palpitations/Tachycardia -sinus tachycardia on Holter monitor  DM - uncontrolled. Hb A1c 12.3 4/11/18  HTN  Hx of Asthma  Recent [facility] admission for hypoglycemia      Plan:       Patient's right heart catheterization showed normal right-sided pressures including wedge/pulmonary artery pressure. Her cardiac catheterization showed nonobstructive branch vessel disease previously. Patient has chronic chest pain. Probably has musculoskeletal component.   We will try to increase Imdur to 60 mg to see if it makes a difference. She stated that her blood pressure has been elevated which will also be helped by increasing the Imdur. Follow-up with PCP for blood pressure check. Medical management for her coronary artery disease-aspirin/beta-blocker/Imdur/Cozaar/statin. Aggressive cardiovascular risk factor modification. Needs better control of her diabetes, needs to lose weight. Follow-up with 6 mths to 1 year or earlier as needed. Rhett Marcial MD., [facility]      Patient understands the plan. All questions were answered to the patient's satisfaction. Medication Side Effects and Warnings were discussed with patient: yes  Patient Labs were reviewed and or requested:  yes  Patient Past Records were reviewed and or requested: yes    I appreciate the opportunity to be involved in Ms. Frost. See note below for details. Please do not hesitate to contact us with questions or concerns. ATTENTION:   This medical record was transcribed using an electronic medical records/speech recognition system. Although proofread, it may and can contain electronic, spelling and other errors. Corrections may be executed at a later time. Please feel free to contact us for any clarifications as needed. Gilbert Romero MD    Cardiac Testing/ Procedures: A. Cardiac Cath/PCI: 5/3/18 Tried Brachial vein access - unsuccessful      R radial acess - easy access  Difficulty gettting into asc aorta   R SCV angiogram - tortuous     R FV access - micropuncture  R CFA access - micropuncture        L Main: Long ; Large - Nml     LAD: Med - prox ; Small distal ; Mid 20%; D1 - Small     LCflex: OM1 - Med - Prox 30%; Mid- Small to med; 60%;  Prob component of spasm     RCA: Small ; Prox catheter - 10%; PDA - very small     LVEDP: 14 mm Hg     LVEF: No assessed     No significant gradient across aortic valve.                 RHC findings:     RAPm=   7    mmHg  RVSP= 24     mmHg  PAPm=  19 mmHg  PCWPm=  10  mmHg  CO= 4.33         l/min  CI= 2.16 l/min/m2     Specimens Removed : None     Closure Device:  Manual    B.ECHO/MARK: 5/16/2016-EF 09-22%, grade 1 diastolic dysfunction    C. StressNuclear/Stress ECHO/Stress test: May 12, 2016-7 minutes, normal exercise nuclear study. EF 70%    D. Vascular:    E. EP: 4/22/2016-Holter sinus tachycardia 99-1 63 bpm, no PACs, 6 PVCs. Diary entries of dyspnea, chest pain, dizziness correlates with sinus tachycardia. F. Miscellaneous:    Subjective:  Cristhian Jones is a 44 y.o. female who returns for follow up visit. Continues to have intermittent atypical chest pain. Outside hospital admission recently for hypoglycemia. Troponin within normal limits. No significant change in symptoms. She has seen pulmonary previously. ROS:  (bold if positive, if negative)    Palpitations         Medications before admission:    Current Outpatient Medications   Medication Sig Dispense    doxycycline (MONODOX) 100 mg capsule Take 1 Cap by mouth two (2) times a day for 10 days. 20 Cap    cyclobenzaprine (FLEXERIL) 5 mg tablet TAKE 1 TABLET BY MOUTH THREE TIMES DAILY AS NEEDED FOR MUSCLE SPASM 30 Tab    erenumab-aooe (AIMOVIG AUTOINJECTOR) 70 mg/mL injection 1 mL by SubCUTAneous route every thirty (30) days. 1 Each    gabapentin (NEURONTIN) 300 mg capsule TAKE 2 CAPSULES BY MOUTH THREE TIMES DAILY  Indications: Needs follow up appt 180 Cap    carvedilol (COREG) 3.125 mg tablet TAKE 1 TABLET BY MOUTH TWICE DAILY WITH MEALS 90 Tab    ARNUITY ELLIPTA 200 mcg/actuation dsdv inhaler INHALE 1 PUFF BY MOUTH ONCE DAILY 1 Inhaler    empagliflozin (JARDIANCE) 10 mg tablet Take 10 mg by mouth daily.      NOVOLOG FLEXPEN U-100 INSULIN 100 unit/mL (3 mL) inpn INJECT 80 UNITS SUBCUTANEOUSLY 3 TIMES DAILY (Patient taking differently: 3 Units by SubCUTAneous route Before breakfast, lunch, and dinner.) 75 mL    Insulin Needles, Disposable, 31 gauge x 5/16\" ndle USE TO INJECT LANTUS AND APIDRA DAILY     lidocaine (LIDODERM) 5 % Apply patch to the affected area for 12 hours a day and remove for 12 hours a day. 1 Package    loratadine (CLARITIN) 10 mg tablet Take 1 Tab by mouth daily. 90 Tab    isosorbide mononitrate ER (IMDUR) 30 mg tablet Take 1 Tab by mouth every morning. 90 Tab    albuterol (PROVENTIL HFA, VENTOLIN HFA, PROAIR HFA) 90 mcg/actuation inhaler Take 2 Puffs by inhalation every four (4) hours as needed for Wheezing. PRO AIR HFA 1 Inhaler    losartan (COZAAR) 100 mg tablet TAKE 1 TABLET BY MOUTH ONCE DAILY 90 Tab    montelukast (SINGULAIR) 10 mg tablet Take 10 mg by mouth daily as needed (allergies).  atorvastatin (LIPITOR) 20 mg tablet Take 20 mg by mouth daily.  bumetanide (BUMEX) 0.5 mg tablet Take 0.5 mg by mouth daily.  sodium bicarbonate 650 mg tablet Take 650 mg by mouth two (2) times a day.  insulin glargine (LANTUS SOLOSTAR U-100 INSULIN) 100 unit/mL (3 mL) inpn 30 Units by SubCUTAneous route ACB/HS.  VITAMIN D2 50,000 unit capsule Take 50,000 Units by mouth every Sunday. No current facility-administered medications for this visit.    v    Physical Exam:  Visit Vitals  BP (!) 136/94 (BP 1 Location: Right arm, BP Patient Position: Sitting)   Pulse 96   Resp 18   Ht 5' 2\" (1.575 m)   Wt 211 lb (95.7 kg)   LMP 07/30/2019 (Exact Date)   SpO2 98%   BMI 38.59 kg/m²          Gen: Well-developed, well-nourished, in no acute distress, walks with a cane  Neck: Supple,No JVD, No Carotid Bruit,   Resp: No accessory muscle use, Clear breath sounds, No rales or rhonchi  Card: Tachy,Reg Rythm,Normal S1, S2, No murmurs, rubs or gallop. No thrills.    Abd:  Soft, non-tender, non-distended,BS+,   MSK: No cyanosis  Skin: No rashes    Neuro: moving all four extremities , follows commands appropriately  Psych:  Good insight, oriented to person, place , alert, Nml Affect  LE: trace edema    EKG:   Results for orders placed or performed during the hospital encounter of 03/27/19   EKG, 12 LEAD, INITIAL   Result Value Ref Range    Ventricular Rate 103 BPM    Atrial Rate 103 BPM    P-R Interval 146 ms    QRS Duration 50 ms    Q-T Interval 332 ms    QTC Calculation (Bezet) 434 ms    Calculated P Axis 43 degrees    Calculated R Axis 12 degrees    Calculated T Axis 45 degrees    Diagnosis       Sinus tachycardia  Nonspecific T wave abnormality  Otherwise normal ECG  When compared with ECG of 05-MAR-2019 07:39,  No significant change was found  Confirmed by Marylou Dalton MD, [id number] ([id number]) on 3/27/2019 2:20:45 PM           LABS:        Lab Results   Component Value Date/Time    WBC 7.3 06/12/2019 01:11 PM    HGB 11.7 06/12/2019 01:11 PM    HCT 33.9 (L) 06/12/2019 01:11 PM    PLATELET 434 14/50/5637 01:11 PM    MCV 91.6 06/12/2019 01:11 PM     Lab Results   Component Value Date/Time    Sodium 136 06/12/2019 01:11 PM    Potassium 4.4 06/12/2019 01:11 PM    Chloride 101 06/12/2019 01:11 PM    CO2 28 06/12/2019 01:11 PM    Anion gap 7 06/12/2019 01:11 PM    Glucose 150 (H) 06/12/2019 01:11 PM    BUN 12 06/12/2019 01:11 PM    Creatinine 1.24 (H) 06/12/2019 01:11 PM    BUN/Creatinine ratio 10 (L) 06/12/2019 01:11 PM    GFR est AA 58 (L) 06/12/2019 01:11 PM    GFR est non-AA 48 (L) 06/12/2019 01:11 PM    Calcium 9.5 06/12/2019 01:11 PM       No results found for: APTT  Lab Results   Component Value Date/Time    INR 0.9 03/15/2019 01:50 PM    Prothrombin time 9.8 03/15/2019 01:50 PM     No components found for: LAST LIPIDS        Libby Comings, MDdddd

## 2019-08-05 NOTE — PROGRESS NOTES
Hima Cabrera is a 44 y.o. female    Chief Complaint   Patient presents with    Follow-up     6 mo f/u     Hypertension    Chest Pain    Breathing Problem       Chest pain Pt states having left sided chest pain that radiates to her back  SOB Pt states feeling SOB with exertion. Dizziness NO  Swelling NO  Recent hospital visit YES  Refills NO    Visit Vitals  BP (!) 136/94 (BP 1 Location: Right arm, BP Patient Position: Sitting)   Pulse 96   Resp 18   Ht 5' 2\" (1.575 m)   Wt 211 lb (95.7 kg)   LMP 07/30/2019 (Exact Date)   SpO2 98%   BMI 38.59 kg/m²       1. Have you been to the ER, urgent care clinic since your last visit? Hospitalized since your last visit? YES, [facility] for numbness in right leg and low BS. In the 2 weeks of July. 2. Have you seen or consulted any other health care providers outside of the [address] [place] [address] since your last visit? Include any pap smears or colon screening.   NO

## 2019-08-13 NOTE — LETTER
8/15/19 Patient: Charlotte Robledo YOB: 1980 Date of Visit: 8/13/2019 Mirna Hunter MD 
[address] [facility] [phone] VIA In Basket Dear Mirna Hunter MD, Thank you for referring Ms. Raisa Horton to [address] [place] [address] for evaluation. My notes for this consultation are attached. If you have questions, please do not hesitate to call me. I look forward to following your patient along with you. Sincerely, Golden Barton MD

## 2019-08-13 NOTE — PROGRESS NOTES
Toya Amador MD         Patient Information  Date:8/13/2019  Name : Sebas Figueroa 44 y.o.     YOB: 1980         Referred by: MD Juliana Ball NP        Chief Complaint   Patient presents with    Diabetes     LV 2017       History of Present Illness: Sebas Figueroa is a 44 y.o. female here for follow-up of  Type 2 Diabetes Mellitus. She has longstanding history of uncontrolled diabetes. Last seen in 2017  She was on prandial insulin 80 units 3 times daily, recent hospitalization for severe hypoglycemia. Now she is taking 3 units before each meal.  On Lantus 30 units twice daily  Has CKD and followed by nephrology  She is seeing multiple specialists: Pulmonary, nephrology, cardiology, hepatology     Because of noncompliance with my recommendation I had discussed her to see another endocrinologist, reportedly she went to [facility] twice and did not follow-up    Very poor compliance with the diet, insulin,        Type 2 Diabetes was diagnosed in 2002 . Wt Readings from Last 3 Encounters:   08/13/19 216 lb (98 kg)   08/05/19 211 lb (95.7 kg)   07/31/19 210 lb (95.3 kg)       BP Readings from Last 3 Encounters:   08/13/19 98/54   08/05/19 (!) 136/94   07/31/19 100/72           Past Medical History:   Diagnosis Date    Abnormal pap 2012 10/5/13     hx w/Colpo     Arthritis     Asthma     Diabetes ([address]     type 2    GERD (gastroesophageal reflux disease)     Headache(784.0)     Hyperlipidemia     Hypertension     Joint pain     Legally blind     blind in right eye.     DOC (obstructive sleep apnea)     uses CPAP    Pap smear for cervical cancer screening 10/1/15 ASCUS HPV NEG RP 3 YEARS    PUD (peptic ulcer disease)     Ringing in ears      Current Outpatient Medications   Medication Sig    montelukast (SINGULAIR) 10 mg tablet TAKE 1 TABLET BY MOUTH ONCE DAILY    isosorbide mononitrate ER (IMDUR) 60 mg CR tablet Take 1 Tab by mouth every morning.  cyclobenzaprine (FLEXERIL) 5 mg tablet TAKE 1 TABLET BY MOUTH THREE TIMES DAILY AS NEEDED FOR MUSCLE SPASM    erenumab-aooe (AIMOVIG AUTOINJECTOR) 70 mg/mL injection 1 mL by SubCUTAneous route every thirty (30) days.  gabapentin (NEURONTIN) 300 mg capsule TAKE 2 CAPSULES BY MOUTH THREE TIMES DAILY  Indications: Needs follow up appt    carvedilol (COREG) 3.125 mg tablet TAKE 1 TABLET BY MOUTH TWICE DAILY WITH MEALS    ARNUITY ELLIPTA 200 mcg/actuation dsdv inhaler INHALE 1 PUFF BY MOUTH ONCE DAILY    empagliflozin (JARDIANCE) 10 mg tablet Take 10 mg by mouth daily.  NOVOLOG FLEXPEN U-100 INSULIN 100 unit/mL (3 mL) inpn INJECT 80 UNITS SUBCUTANEOUSLY 3 TIMES DAILY (Patient taking differently: 3 Units by SubCUTAneous route Before breakfast, lunch, and dinner.)    Insulin Needles, Disposable, 31 gauge x 5/16\" ndle USE TO INJECT LANTUS AND APIDRA DAILY    lidocaine (LIDODERM) 5 % Apply patch to the affected area for 12 hours a day and remove for 12 hours a day.  loratadine (CLARITIN) 10 mg tablet Take 1 Tab by mouth daily.  albuterol (PROVENTIL HFA, VENTOLIN HFA, PROAIR HFA) 90 mcg/actuation inhaler Take 2 Puffs by inhalation every four (4) hours as needed for Wheezing. PRO AIR HFA    losartan (COZAAR) 100 mg tablet TAKE 1 TABLET BY MOUTH ONCE DAILY    atorvastatin (LIPITOR) 20 mg tablet Take 20 mg by mouth daily.  bumetanide (BUMEX) 0.5 mg tablet Take 0.5 mg by mouth daily.  sodium bicarbonate 650 mg tablet Take 650 mg by mouth two (2) times a day.  insulin glargine (LANTUS SOLOSTAR U-100 INSULIN) 100 unit/mL (3 mL) inpn 30 Units by SubCUTAneous route ACB/HS.  VITAMIN D2 50,000 unit capsule Take 50,000 Units by mouth every Sunday. No current facility-administered medications for this visit.       Allergies   Allergen Reactions    Aspirin Itching    Contrast Agent [Iodine] Hives     IV only, can tolerate oral contrast    Dilaudid [Hydromorphone] Hives    Iodinated Contrast Media Itching    Lisinopril Itching     itching    Metformin Nausea Only    Reglan  [Metoclopramide Hcl] Other (comments)    Reglan [Metoclopramide] Other (comments)     Impaired speech/LOWER EXTREMITY EDEMA-RECENTLY D/C'D BY MD         Review of Systems:  - Eyes: no blurry vision no double vision  - Cardiovascular: no chest pain ,no palpitations  - Respiratory: no cough + shortness of breath  - Gastrointestinal: no dysphagia no  abdominal pain  - Musculoskeletal: no joint pains + weakness  - Integumentary: no rashes  - Neurological: + numbness, tingling, no  headaches  -     Physical Examination:   Blood pressure 98/54, pulse 93, temperature 97 °F (36.1 °C), temperature source Oral, resp. rate 14, height 5' 2\" (1.575 m), weight 216 lb (98 kg), last menstrual period 07/30/2019, SpO2 99 %. Estimated body mass index is 39.51 kg/m² as calculated from the following:    Height as of this encounter: 5' 2\" (1.575 m). -   Weight as of this encounter: 216 lb (98 kg). - General: pleasant, no distress, good eye contact,obese  - HEENT: no pallor, no periorbital edema, EOMI  - Neck: supple,   - Cardiovascular: regular, normal rate, normal S1 and S2  - Respiratory: clear to auscultation bilaterally  - Gastrointestinal: soft, nontender, nondistended,  BS +  - Musculoskeletal: + edema  - Neurological:alert and oriented  - Psychiatric: normal mood and affect  - Skin: color, texture, turgor normal.       Data Reviewed:     [] Glucose records reviewed. [] See glucose records for details (to be scanned).   [x] A1C  [x] Reviewed labs    Lab Results   Component Value Date/Time    Hemoglobin A1c 9.1 (H) 04/08/2019 09:12 AM    Hemoglobin A1c 10.9 (H) 05/30/2018 10:43 AM    Hemoglobin A1c 12.3 (H) 04/11/2018 08:21 AM    Glucose 150 (H) 06/12/2019 01:11 PM    Glucose (POC) 161 (H) 09/08/2018 11:05 AM    Microalbumin/Creat ratio (mg/g creat) 13 10/01/2010 09:40 AM    Microalb/Creat ratio (ug/mg creat.) 92.2 (H) 04/13/2016 02:46 PM    Microalbumin,urine random 2.44 10/01/2010 09:40 AM    LDL,Direct 168 (H) 08/31/2016 12:19 PM    LDL, calculated 95 04/08/2019 09:12 AM    Creatinine 1.24 (H) 06/12/2019 01:11 PM      Lab Results   Component Value Date/Time    Cholesterol, total 175 04/08/2019 09:12 AM    HDL Cholesterol 52 04/08/2019 09:12 AM    LDL,Direct 168 (H) 08/31/2016 12:19 PM    LDL, calculated 95 04/08/2019 09:12 AM    Triglyceride 140 04/08/2019 09:12 AM    CHOL/HDL Ratio 3.9 10/01/2010 09:43 AM     Lab Results   Component Value Date/Time    ALT (SGPT) 43 06/12/2019 01:11 PM    AST (SGOT) 46 (H) 06/12/2019 01:11 PM    Alk. phosphatase 64 06/12/2019 01:11 PM    Bilirubin, direct <0.10 10/06/2015 10:42 AM    Bilirubin, total 0.5 06/12/2019 01:11 PM     Lab Results   Component Value Date/Time    GFR est AA 58 (L) 06/12/2019 01:11 PM    GFR est non-AA 48 (L) 06/12/2019 01:11 PM    Creatinine 1.24 (H) 06/12/2019 01:11 PM    BUN 12 06/12/2019 01:11 PM    Sodium 136 06/12/2019 01:11 PM    Potassium 4.4 06/12/2019 01:11 PM    Chloride 101 06/12/2019 01:11 PM    CO2 28 06/12/2019 01:11 PM      Lab Results   Component Value Date/Time    TSH 1.440 08/22/2017 01:22 PM    TSH 0.820 08/31/2016 12:19 PM    T4, Free 1.12 12/19/2014 11:29 AM      Lab Results   Component Value Date/Time    Glucose 150 (H) 06/12/2019 01:11 PM    Glucose (POC) 161 (H) 09/08/2018 11:05 AM        Assessment/Plan:     1. Type 2 Diabetes Mellitus ,uncontrolled,neuropathy , severe insulin resistance,noncompliance  Uncontrolled diabetes due to noncompliance . She is on 3 units of prandial insulin which is not enough, discussed the basis of prandial insulin and recommended to increase the dose which she does not want to do. Lantus 30 units twice daily  Victoza, side effects discussed  Hold prandial insulin while on Victoza  She has multiple comorbidities  Refused metformin          3 HTN - cozaar     4. Obesity:Body mass index is 39.51 kg/m².       5. CKD        Thank you for allowing me to participate in the care of this patient.     Camila Oliva MD    Patient verbalized understanding

## 2019-08-13 NOTE — PATIENT INSTRUCTIONS
Check blood sugars before breakfast, dinner and at bedtime. If the bedtime sugars are less than 100 ,eat a 15 gm snack. Lantus 30 units twice a day     Stop Novolog when you start Victoza     Begin Victoza 0.6 mg daily in AM for a week and increase to 1.2 mg daily as tolerated. Some of the side effects are severe nausea and severe abdominal pain.       If you have low sugars < 70 , we have to decrease lantus by 5 units at a time     No peaches, pineapple or watermelon     If you cannot tolerate Victoza then resume Novolog 8- 10 units before meals     No Novolog if sugars are less than 70

## 2019-08-13 NOTE — PROGRESS NOTES
Martin Damian is a 44 y.o. female here for   Chief Complaint   Patient presents with    Diabetes     LV 2017       Functional glucose monitor and record keeping system? -yes   Eye exam within last year? - on file  Foot exam within last year? - on file    1. Have you been to the ER, urgent care clinic since your last visit? Hospitalized since your last visit? -x2 [facility] last 2 weeks in July one time for weakness in legs and second time for BG of 27    2. Have you seen or consulted any other health care providers outside of the [address] [place] [address] since your last visit?   Include any pap smears or colon screening.-no

## 2019-08-15 NOTE — PROGRESS NOTES
[address] [place] [address] Tony Caruso  Phone: [phone]  Fax: [phone]    Discharge Summary  2-15    Patient name: Jesus Dalton  : 1980  Provider#: [id number]  Referral source: Stella Ely MD      Medical/Treatment Diagnosis: Low back pain [M54.5]     Prior Hospitalization: see medical history     Comorbidities: See Plan of Care  Prior Level of Function:See Plan of Care  Medications: Verified on Patient Summary List    Start of Care: 19      Onset Date: Many years ago   Visits from Start of Care: 5     Missed Visits: Two cancellations  Reporting Period : 19 to 19      ASSESSMENT/SUMMARY OF CARE: Ms. Freddy Montesinos was treated for 5 PT visits with a focus on gentle therapeutic exercise and modalities for pain control. She did not show significant functional improvement and at this time will be discharged. Short Term Goals: To be accomplished in 8 treatments:  1. Patient will be able to walk for two blocks with <7/10 low back pain. Not met  2. Patient will be able to bend forward and tie her shoes with <7/10 low back pain. Not met  3. Patient will be able to get in and out of a car with </10 low back pain. Not met  Long Term Goals: To be accomplished in 16 treatments:  1. Patient will be able to walk 1/4 mile with <5/10 low back painNot met  2. Patient will be able to stand for 30 minutes with <5/10 low back painNot met  3. Patient will be able to roll over in bed in either direction with <5/10 low back pain. Not met        RECOMMENDATIONS:  [x]Discontinue therapy: []Patient has reached or is progressing toward set goals      []Patient is non-compliant or has abdicated      [x]Due to lack of appreciable progress towards set goals      []Other    [facility] Needs, PT 8/15/2019

## 2019-08-22 NOTE — PROGRESS NOTES
Subjective:      Colleen Ca is a 44 y.o. female here with complaint of left sided pain. Onset was 6 days ago. Pain has been constant and is described as sharp. Radiates around into the lower abdomen. Associated with mild nausea and headache. Denies fever, dysuria, burning with urination, urinary frequency, vomiting. Having regular BM's. Evaluation to date: none. Treatment to date: ibuprofen, Aleve, warm compresses, Lidocaine patches. Current Outpatient Medications   Medication Sig Dispense Refill    insulin glargine (LANTUS SOLOSTAR U-100 INSULIN) 100 unit/mL (3 mL) inpn 30 units in AM and 30 units at night  Indications: for a total of 60 units daily 60 mL 4    liraglutide (VICTOZA) 0.6 mg/0.1 mL (18 mg/3 mL) pnij Inject 1.2 mg daily Dispense as sample per Dr Brionna Everett 1 Pen 0    liraglutide (VICTOZA) 0.6 mg/0.1 mL (18 mg/3 mL) pnij Inject 1.2 mg in the AM 6 mL 11    montelukast (SINGULAIR) 10 mg tablet TAKE 1 TABLET BY MOUTH ONCE DAILY 90 Tab 3    isosorbide mononitrate ER (IMDUR) 60 mg CR tablet Take 1 Tab by mouth every morning. 90 Tab 3    cyclobenzaprine (FLEXERIL) 5 mg tablet TAKE 1 TABLET BY MOUTH THREE TIMES DAILY AS NEEDED FOR MUSCLE SPASM 30 Tab 0    erenumab-aooe (AIMOVIG AUTOINJECTOR) 70 mg/mL injection 1 mL by SubCUTAneous route every thirty (30) days. 1 Each 0    gabapentin (NEURONTIN) 300 mg capsule TAKE 2 CAPSULES BY MOUTH THREE TIMES DAILY  Indications: Needs follow up appt 180 Cap 0    carvedilol (COREG) 3.125 mg tablet TAKE 1 TABLET BY MOUTH TWICE DAILY WITH MEALS 90 Tab 1    ARNUITY ELLIPTA 200 mcg/actuation dsdv inhaler INHALE 1 PUFF BY MOUTH ONCE DAILY 1 Inhaler 3    empagliflozin (JARDIANCE) 10 mg tablet Take 10 mg by mouth daily.  Insulin Needles, Disposable, 31 gauge x 5/16\" ndle USE TO INJECT LANTUS AND APIDRA DAILY  23    lidocaine (LIDODERM) 5 % Apply patch to the affected area for 12 hours a day and remove for 12 hours a day.  1 Package 2    loratadine (CLARITIN) 10 mg tablet Take 1 Tab by mouth daily. 90 Tab 3    albuterol (PROVENTIL HFA, VENTOLIN HFA, PROAIR HFA) 90 mcg/actuation inhaler Take 2 Puffs by inhalation every four (4) hours as needed for Wheezing. PRO AIR HFA 1 Inhaler 3    losartan (COZAAR) 100 mg tablet TAKE 1 TABLET BY MOUTH ONCE DAILY 90 Tab 1    atorvastatin (LIPITOR) 20 mg tablet Take 20 mg by mouth daily.  bumetanide (BUMEX) 0.5 mg tablet Take 0.5 mg by mouth daily.  sodium bicarbonate 650 mg tablet Take 650 mg by mouth two (2) times a day.  VITAMIN D2 50,000 unit capsule Take 50,000 Units by mouth every Sunday.  insulin aspart U-100 (NOVOLOG FLEXPEN U-100 INSULIN) 100 unit/mL (3 mL) inpn 8- 10 units before meals 30 mL 5       Allergies   Allergen Reactions    Aspirin Itching    Contrast Agent [Iodine] Hives     IV only, can tolerate oral contrast    Dilaudid [Hydromorphone] Hives    Iodinated Contrast Media Itching    Lisinopril Itching     itching    Metformin Nausea Only    Reglan  [Metoclopramide Hcl] Other (comments)    Reglan [Metoclopramide] Other (comments)     Impaired speech/LOWER EXTREMITY EDEMA-RECENTLY D/C'D BY MD       Past Medical History:   Diagnosis Date    Abnormal pap 2012 10/5/13     hx w/Colpo     Arthritis     Asthma     Diabetes ([facility] [address] [address]     type 2    GERD (gastroesophageal reflux disease)     Headache(784.0)     Hyperlipidemia     Hypertension     Joint pain     Legally blind     blind in right eye.  DOC (obstructive sleep apnea)     uses CPAP    Pap smear for cervical cancer screening 10/1/15 ASCUS HPV NEG RP 3 YEARS    PUD (peptic ulcer disease)     Ringing in ears        Social History     Tobacco Use    Smoking status: Never Smoker    Smokeless tobacco: Never Used   Substance Use Topics    Alcohol use: No        Review of Systems  Pertinent items are noted in HPI.      Objective:     Visit Vitals  /78 (BP 1 Location: Right arm, BP Patient Position: Sitting) Comment: Manual Pulse (!) 107   Temp 98.2 °F (36.8 °C) (Oral) Comment: .   Resp 18   Ht 5' 2\" (1.575 m)   Wt 216 lb (98 kg)   LMP 07/30/2019 (Exact Date)   SpO2 98%   BMI 39.51 kg/m²      General appearance - alert, well appearing, and in no distress, on cell phone during examination  Chest - clear to auscultation, no wheezes, rales or rhonchi, symmetric air entry, no tachypnea, retractions or cyanosis  Heart - normal rate, regular rhythm, normal S1, S2, no murmurs, rubs, clicks or gallops  Abdomen - soft, bilateral lower quadrant tenderness without guarding or rebounding, nondistended,  (+) suprapubic tenderness, (+) left flank tenderness, normal bowel sounds     Recent Results (from the past 12 hour(s))   AMB POC URINALYSIS DIP STICK AUTO W/O MICRO    Collection Time: 08/22/19  8:52 AM   Result Value Ref Range    Color (UA POC) Yellow     Clarity (UA POC) Clear     Glucose (UA POC) 3+ Negative    Bilirubin (UA POC) Negative Negative    Ketones (UA POC) Negative Negative    Specific gravity (UA POC) 1.005 1.001 - 1.035    Blood (UA POC) Trace Negative    pH (UA POC) 6 4.6 - 8.0    Protein (UA POC) 1+ Negative    Urobilinogen (UA POC) 0.2 mg/dL 0.2 - 1    Nitrites (UA POC) Negative Negative    Leukocyte esterase (UA POC) Negative Negative       Assessment/Plan:   Raphael Tolbert is a 44 y.o. female seen for:     1. Flank pain: UA with glucosuria and proteinuria, otherwise normal. Given history and examination, will send urine for culture. Empirically start Keflex as below. Push fluids.   - AMB POC URINALYSIS DIP STICK AUTO W/O MICRO  - CULTURE, URINE  - cephALEXin (KEFLEX) 500 mg capsule; Take 1 Cap by mouth two (2) times a day for 7 days. Dispense: 14 Cap; Refill: 0    2. UTI symptoms  - CULTURE, URINE  - cephALEXin (KEFLEX) 500 mg capsule; Take 1 Cap by mouth two (2) times a day for 7 days. Dispense: 14 Cap; Refill: 0    I have discussed the diagnosis with the patient and the intended plan as seen in the above orders.  The patient has received an after-visit summary and questions were answered concerning future plans. I have discussed medication side effects and warnings with the patient as well. Patient verbalizes understanding of plan of care and denies further questions or concerns at this time. Informed patient to return to the office if symptoms worsen or if new symptoms arise. Follow-up and Dispositions    · Return if symptoms worsen or fail to improve.

## 2019-08-22 NOTE — PROGRESS NOTES
Identified pt with two pt identifiers(name and ). Chief Complaint   Patient presents with    Flank Pain     Left side is tender to touch No urinary symptoms. Symptoms begna friday    Nausea    Headache        Health Maintenance Due   Topic    Pneumococcal 0-64 years (1 of 1 - PPSV23)    PAP AKA CERVICAL CYTOLOGY     Influenza Age 5 to Adult        Wt Readings from Last 3 Encounters:   19 216 lb (98 kg)   19 216 lb (98 kg)   19 211 lb (95.7 kg)     Temp Readings from Last 3 Encounters:   19 98.2 °F (36.8 °C) (Oral)   19 97 °F (36.1 °C) (Oral)   19 98.7 °F (37.1 °C) (Oral)     BP Readings from Last 3 Encounters:   19 122/78   19 98/54   19 (!) 136/94     Pulse Readings from Last 3 Encounters:   19 (!) 107   19 93   19 96         Learning Assessment:  :     Learning Assessment 2019 3/15/2019 7/3/2018 4/10/2018 1/10/2018 2016 2015   PRIMARY LEARNER Patient Patient Patient Patient Patient Patient Patient   HIGHEST LEVEL OF EDUCATION - PRIMARY LEARNER  - - - - - - -   BARRIERS PRIMARY LEARNER NONE NONE - - - - -   CO-LEARNER CAREGIVER No No - - - - -   PRIMARY [address]   LEARNER PREFERENCE PRIMARY LISTENING LISTENING READING READING READING LISTENING DEMONSTRATION   ANSWERED BY patient patient Karen Steiner patient Patient   RELATIONSHIP SELF SELF SELF SELF SELF SELF SELF       Depression Screening:  :     3 most recent PHQ Screens 2019   Little interest or pleasure in doing things Not at all   Feeling down, depressed, irritable, or hopeless Not at all   Total Score PHQ 2 0                                                                     Fall Risk Assessment:  :     Fall Risk Assessment, last 12 mths 8/15/2017   Able to walk? Yes   Fall in past 12 months?  No       Abuse Screening:  :     Abuse Screening Questionnaire 2019 3/15/2019 2018 8/15/2016 5/29/2014 4/9/2014   Do you ever feel afraid of your partner? N N N N N N   Are you in a relationship with someone who physically or mentally threatens you? N N N N N N   Is it safe for you to go home? Y Y Y Y Y Y       Coordination of Care Questionnaire:  :     1) Have you been to an emergency room, urgent care clinic since your last visit? no   Hospitalized since your last visit? no             2) Have you seen or consulted any other health care providers outside of [address] [place] [address] since your last visit? no  (Include any pap smears or colon screenings in this section.)    3) Do you have an Advance Directive on file? no  Are you interested in receiving information about Advance Directives? no    Reviewed record in preparation for visit and have obtained necessary documentation. Medication reconciliation up to date and corrected with patient at this time.

## 2019-08-22 NOTE — PATIENT INSTRUCTIONS
Flank Pain: Care Instructions  Your Care Instructions  Flank pain is pain on the side of the back just below the rib cage and above the waist. It can be on one or both sides. Flank pain has many possible causes, including a kidney stone, a urinary tract infection, or back strain. Flank pain may get better on its own. But don't ignore new symptoms, such as fever, nausea and vomiting, urination problems, pain that gets worse, and dizziness. These may be signs of a more serious problem. You may have to have tests or other treatment. Follow-up care is a key part of your treatment and safety. Be sure to make and go to all appointments, and call your doctor if you are having problems. It's also a good idea to know your test results and keep a list of the medicines you take. How can you care for yourself at home? · Rest until you feel better. · Take pain medicines exactly as directed. ? If the doctor gave you a prescription medicine for pain, take it as prescribed. ? If you are not taking a prescription pain medicine, ask your doctor if you can take an over-the-counter pain medicine, such as acetaminophen (Tylenol), ibuprofen (Advil, Motrin), or naproxen (Aleve). Read and follow all instructions on the label. · If your doctor prescribed antibiotics, take them as directed. Do not stop taking them just because you feel better. You need to take the full course of antibiotics. · To apply heat, put a warm water bottle, a heating pad set on low, or a warm cloth on the painful area. Do not go to sleep with a heating pad on your skin. · To prevent dehydration, drink plenty of fluids, enough so that your urine is light yellow or clear like water. Choose water and other caffeine-free clear liquids until you feel better. If you have kidney, heart, or liver disease and have to limit fluids, talk with your doctor before you increase the amount of fluids you drink. When should you call for help?   Call your doctor now or seek immediate medical care if:    · Your flank pain gets worse.     · You have new symptoms, such as fever, nausea, or vomiting.     · You have symptoms of a urinary problem. For example:  ? You have blood or pus in your urine. ? You have chills or body aches. ? It hurts to urinate. ? You have groin or belly pain.    Watch closely for changes in your health, and be sure to contact your doctor if you do not get better as expected. Where can you learn more? Go to http://aziza-niyah.info/. Enter S191 in the search box to learn more about \"Flank Pain: Care Instructions. \"  Current as of: September 23, 2018  Content Version: 12.1  © 6629-5655 [employer]. Care instructions adapted under license by [employer] (which disclaims liability or warranty for this information). If you have questions about a medical condition or this instruction, always ask your healthcare professional. [place] [address] any warranty or liability for your use of this information.

## 2019-09-18 NOTE — PROGRESS NOTES
HISTORY OF PRESENT ILLNESS  Charlotte Robledo is a 44 y.o. female. HPI  Pt presents with \"nasal congestion and chills\"    Symptoms have been present for about 3 days  Nasal congestion  Chills  No fever  OTC: none  Review of Systems   Constitutional: Positive for chills. Negative for fever. HENT: Positive for congestion. Gastrointestinal: Negative for diarrhea and vomiting. Physical Exam   Constitutional: She is oriented to person, place, and time. She appears well-developed and well-nourished. HENT:   Head: Normocephalic and atraumatic. Right Ear: Hearing, tympanic membrane, external ear and ear canal normal.   Left Ear: Hearing, tympanic membrane, external ear and ear canal normal.   Nose: Mucosal edema present. Mouth/Throat: Posterior oropharyngeal edema and posterior oropharyngeal erythema present. Neck: Normal range of motion. Neck supple. Cardiovascular: Normal rate, regular rhythm and normal heart sounds. Pulmonary/Chest: Effort normal and breath sounds normal. She has no wheezes. She has no rales. Lymphadenopathy:     She has no cervical adenopathy. Neurological: She is alert and oriented to person, place, and time. Skin: Skin is warm and dry. Psychiatric: She has a normal mood and affect. Her behavior is normal.       ASSESSMENT and PLAN    ICD-10-CM ICD-9-CM    1. Strep throat J02.0 034.0 azithromycin (ZITHROMAX) 250 mg tablet   2. History of strep pharyngitis Z87.09 V12.09 AMB POC RAPID STREP TEST   3. Chills R68.83 780.64 AMB POC RAPID STREP TEST   4. Nasal congestion R09.81 478.19 AMB POC RAPID STREP TEST     Educated about taking medication with food  Should stay well hydrated, and treat fever as needed    Pt informed to return to office with worsening of symptoms, or PRN with any questions or concerns. Pt verbalizes understanding of plan of care and denies further questions or concerns at this time.

## 2019-09-18 NOTE — PATIENT INSTRUCTIONS

## 2019-09-18 NOTE — PROGRESS NOTES
Identified pt with two pt identifiers(name and ). Chief Complaint   Patient presents with    Nasal Congestion    [address] Maintenance Due   Topic    Pneumococcal 0-64 years (1 of 1 - PPSV23)    PAP AKA CERVICAL CYTOLOGY     Influenza Age 5 to Adult     HEMOGLOBIN A1C Q6M        Wt Readings from Last 3 Encounters:   19 208 lb (94.3 kg)   19 216 lb (98 kg)   19 216 lb (98 kg)     Temp Readings from Last 3 Encounters:   19 98.4 °F (36.9 °C) (Oral)   19 98.2 °F (36.8 °C) (Oral)   19 97 °F (36.1 °C) (Oral)     BP Readings from Last 3 Encounters:   19 128/77   19 122/78   19 98/54     Pulse Readings from Last 3 Encounters:   19 (!) 106   19 (!) 107   19 93         Learning Assessment:  :     Learning Assessment 2019 3/15/2019 7/3/2018 4/10/2018 1/10/2018 2016 2015   PRIMARY LEARNER Patient Patient Patient Patient Patient Patient Patient   HIGHEST LEVEL OF EDUCATION - PRIMARY LEARNER  - - - - - - -   BARRIERS PRIMARY LEARNER NONE NONE - - - - -   CO-LEARNER CAREGIVER No No - - - - -   PRIMARY [address]   LEARNER PREFERENCE PRIMARY LISTENING LISTENING READING READING READING LISTENING DEMONSTRATION   ANSWERED BY patient patient Jackeline Lozano patient Patient   RELATIONSHIP SELF SELF SELF SELF SELF SELF SELF       Depression Screening:  :     3 most recent PHQ Screens 2019   Little interest or pleasure in doing things Not at all   Feeling down, depressed, irritable, or hopeless Not at all   Total Score PHQ 2 0       Fall Risk Assessment:  :     Fall Risk Assessment, last 12 mths 2019   Able to walk? Yes   Fall in past 12 months? No       Abuse Screening:  :     Abuse Screening Questionnaire 2019 2019 3/15/2019 2018 8/15/2016 2014 2014   Do you ever feel afraid of your partner?  N N N N N N N   Are you in a relationship with someone who physically or mentally threatens you? N N N N N N N   Is it safe for you to go home? Y Y Y Y Y Y Y         Coordination of Care Questionnaire:  :     1) Have you been to an emergency room, urgent care clinic since your last visit? no   Hospitalized since your last visit? no             2) Have you seen or consulted any other health care providers outside of [address] [place] [address] since your last visit? no  (Include any pap smears or colon screenings in this section.)    3) Do you have an Advance Directive on file? no  Are you interested in receiving information about Advance Directives? no    Patient is accompanied by self. Reviewed record in preparation for visit and have obtained necessary documentation. Medication reconciliation up to date and corrected with patient at this time.      Order for POC Rapid Strep Testing placed per Sally Kaplan's verbal order. ]

## 2019-09-27 NOTE — PROGRESS NOTES
Bouchra Caballero is a 44 y.o. female here for   Chief Complaint   Patient presents with    Diabetes       Functional glucose monitor and record keeping system? -yes   Eye exam within last year? - on file  Foot exam within last year? - on file    1. Have you been to the ER, urgent care clinic since your last visit? Hospitalized since your last visit? -no    2. Have you seen or consulted any other health care providers outside of the [address] [place] [address] since your last visit?   Include any pap smears or colon screening.-no

## 2019-09-30 NOTE — LETTER
9/30/19 Patient: Colleen Ca YOB: 1980 Date of Visit: 9/30/2019 Daron Candelaria MD 
[address] [facility] [address] [facility] [phone] VIA In Basket Dear Daron Candelaria MD, Thank you for referring Ms. Nita Estrada to [id number] [place] [address] for evaluation. My notes for this consultation are attached. If you have questions, please do not hesitate to call me. I look forward to following your patient along with you. Sincerely, Wil Alas MD

## 2019-09-30 NOTE — PATIENT INSTRUCTIONS
Check blood sugars before breakfast, dinner and at bedtime. If the bedtime sugars are less than 100 ,eat a 15 gm snack.     Lantus 40 units AM and 30 units PM     Victoza

## 2019-09-30 NOTE — PROGRESS NOTES
Richard Ferreira MD         Patient Information  Date:9/30/2019  Name : Jesus Dalton 44 y.o.     YOB: 1980         Referred by: Hayden Sommer MD [place] NP        Chief Complaint   Patient presents with    Diabetes       History of Present Illness: Jesus Dalton is a 44 y.o. female here for follow-up of  Type 2 Diabetes Mellitus. She has longstanding history of uncontrolled diabetes. She did not bring the meter  She is taking Victoza, tolerating well, did not decrease her appetite    No nausea, vomiting  She is on Lantus  Has CKD and followed by nephrology  She is seeing multiple specialists: Pulmonary, nephrology, cardiology, hepatology     Because of noncompliance with my recommendation I had discussed her to see another endocrinologist, reportedly she went to [facility] twice and did not follow-up and came back. Very poor compliance with the diet, insulin,        Type 2 Diabetes was diagnosed in 2002 . Wt Readings from Last 3 Encounters:   09/30/19 217 lb (98.4 kg)   09/18/19 208 lb (94.3 kg)   08/22/19 216 lb (98 kg)       BP Readings from Last 3 Encounters:   09/30/19 129/69   09/18/19 128/77   08/22/19 122/78           Past Medical History:   Diagnosis Date    Abnormal pap 2012 10/5/13     hx w/Colpo     Arthritis     Asthma     Diabetes ([address]     type 2    GERD (gastroesophageal reflux disease)     Headache(784.0)     Hyperlipidemia     Hypertension     Joint pain     Legally blind     blind in right eye.     DOC (obstructive sleep apnea)     uses CPAP    Pap smear for cervical cancer screening 10/1/15 ASCUS HPV NEG RP 3 YEARS    PUD (peptic ulcer disease)     Ringing in ears      Current Outpatient Medications   Medication Sig    Insulin Needles, Disposable, 31 gauge x 5/16\" ndle USE TO INJECT LANTUS AND APIDRA DAILY    losartan (COZAAR) 100 mg tablet TAKE 1 TABLET BY MOUTH ONCE DAILY    insulin glargine (LANTUS SOLOSTAR U-100 INSULIN) 100 unit/mL (3 mL) inpn 30 units in AM and 30 units at night  Indications: for a total of 60 units daily    liraglutide (VICTOZA) 0.6 mg/0.1 mL (18 mg/3 mL) pnij Inject 1.2 mg daily Dispense as sample per Dr Fabien Blue    montelukast (SINGULAIR) 10 mg tablet TAKE 1 TABLET BY MOUTH ONCE DAILY    isosorbide mononitrate ER (IMDUR) 60 mg CR tablet Take 1 Tab by mouth every morning.  erenumab-aooe (AIMOVIG AUTOINJECTOR) 70 mg/mL injection 1 mL by SubCUTAneous route every thirty (30) days.  gabapentin (NEURONTIN) 300 mg capsule TAKE 2 CAPSULES BY MOUTH THREE TIMES DAILY  Indications: Needs follow up appt (Patient taking differently: Take 300 mg by mouth three (3) times daily. TAKE 2 CAPSULES BY MOUTH THREE TIMES DAILY  Indications: Needs follow up appt)    carvedilol (COREG) 3.125 mg tablet TAKE 1 TABLET BY MOUTH TWICE DAILY WITH MEALS    ARNUITY ELLIPTA 200 mcg/actuation dsdv inhaler INHALE 1 PUFF BY MOUTH ONCE DAILY    empagliflozin (JARDIANCE) 10 mg tablet Take 10 mg by mouth daily.  Insulin Needles, Disposable, 31 gauge x 5/16\" ndle USE TO INJECT LANTUS AND APIDRA DAILY    loratadine (CLARITIN) 10 mg tablet Take 1 Tab by mouth daily.  albuterol (PROVENTIL HFA, VENTOLIN HFA, PROAIR HFA) 90 mcg/actuation inhaler Take 2 Puffs by inhalation every four (4) hours as needed for Wheezing. PRO AIR HFA    atorvastatin (LIPITOR) 20 mg tablet Take 20 mg by mouth daily.  bumetanide (BUMEX) 0.5 mg tablet Take 0.5 mg by mouth daily.  sodium bicarbonate 650 mg tablet Take 650 mg by mouth two (2) times a day.  VITAMIN D2 50,000 unit capsule Take 50,000 Units by mouth every Sunday.  insulin aspart U-100 (NOVOLOG FLEXPEN U-100 INSULIN) 100 unit/mL (3 mL) inpn 8- 10 units before meals     No current facility-administered medications for this visit.       Allergies   Allergen Reactions    Aspirin Itching    Contrast Agent [Iodine] Hives     IV only, can tolerate oral contrast  Dilaudid [Hydromorphone] Hives    Iodinated Contrast Media Itching    Lisinopril Itching     itching    Metformin Nausea Only    Reglan  [Metoclopramide Hcl] Other (comments)    Reglan [Metoclopramide] Other (comments)     Impaired speech/LOWER EXTREMITY EDEMA-RECENTLY D/C'D BY MD         Review of Systems:  - Eyes: no blurry vision no double vision  - Cardiovascular: no chest pain ,no palpitations  - Respiratory: no cough + shortness of breath  - Gastrointestinal: no dysphagia no  abdominal pain  - Musculoskeletal: no joint pains + weakness  - Integumentary: no rashes  - Neurological: + numbness, tingling, no  headaches  -     Physical Examination:   Blood pressure 129/69, pulse (!) 109, resp. rate 14, height 5' 2\" (1.575 m), weight 217 lb (98.4 kg), last menstrual period 09/15/2019, SpO2 97 %. Estimated body mass index is 39.69 kg/m² as calculated from the following:    Height as of this encounter: 5' 2\" (1.575 m). -   Weight as of this encounter: 217 lb (98.4 kg).   - General: pleasant, no distress, good eye contact,obese  - HEENT: no pallor, no periorbital edema, EOMI  - Neck: supple,   - Cardiovascular: regular, normal rate, normal S1 and S2  - Respiratory: clear to auscultation bilaterally  - Gastrointestinal: soft, nontender, nondistended,  BS +  - Musculoskeletal: + edema  - Neurological:alert and oriented  - Psychiatric: normal mood and affect  - Skin: color, texture, turgor normal.     Diabetic Foot and Eye Exam HM Status   Topic Date Due    Diabetic Foot Care  06/27/2020    Eye Exam  08/16/2021       Lab Results   Component Value Date/Time    Hemoglobin A1c 9.1 (H) 04/08/2019 09:12 AM    Hemoglobin A1c 10.9 (H) 05/30/2018 10:43 AM    Hemoglobin A1c 12.3 (H) 04/11/2018 08:21 AM    Glucose 150 (H) 06/12/2019 01:11 PM    Glucose (POC) 161 (H) 09/08/2018 11:05 AM    Glucose  09/30/2019 01:14 PM    Microalbumin/Creat ratio (mg/g creat) 13 10/01/2010 09:40 AM    Microalb/Creat ratio (ug/mg creat.) 92.2 (H) 04/13/2016 02:46 PM    Microalbumin,urine random 2.44 10/01/2010 09:40 AM    LDL,Direct 168 (H) 08/31/2016 12:19 PM    LDL, calculated 95 04/08/2019 09:12 AM    Creatinine 1.24 (H) 06/12/2019 01:11 PM      Lab Results   Component Value Date/Time    Cholesterol, total 175 04/08/2019 09:12 AM    HDL Cholesterol 52 04/08/2019 09:12 AM    LDL,Direct 168 (H) 08/31/2016 12:19 PM    LDL, calculated 95 04/08/2019 09:12 AM    Triglyceride 140 04/08/2019 09:12 AM    CHOL/HDL Ratio 3.9 10/01/2010 09:43 AM     Lab Results   Component Value Date/Time    ALT (SGPT) 43 06/12/2019 01:11 PM    AST (SGOT) 46 (H) 06/12/2019 01:11 PM    Alk. phosphatase 64 06/12/2019 01:11 PM    Bilirubin, direct <0.10 10/06/2015 10:42 AM    Bilirubin, total 0.5 06/12/2019 01:11 PM     Lab Results   Component Value Date/Time    GFR est AA 58 (L) 06/12/2019 01:11 PM    GFR est non-AA 48 (L) 06/12/2019 01:11 PM    Creatinine 1.24 (H) 06/12/2019 01:11 PM    BUN 12 06/12/2019 01:11 PM    Sodium 136 06/12/2019 01:11 PM    Potassium 4.4 06/12/2019 01:11 PM    Chloride 101 06/12/2019 01:11 PM    CO2 28 06/12/2019 01:11 PM      Lab Results   Component Value Date/Time    TSH 1.440 08/22/2017 01:22 PM    TSH 0.820 08/31/2016 12:19 PM    T4, Free 1.12 12/19/2014 11:29 AM      Lab Results   Component Value Date/Time    Glucose 150 (H) 06/12/2019 01:11 PM    Glucose (POC) 161 (H) 09/08/2018 11:05 AM    Glucose  09/30/2019 01:14 PM        Assessment/Plan:     1. Type 2 Diabetes Mellitus ,uncontrolled,neuropathy , severe insulin resistance,noncompliance  Improvement in the blood glucose    Victoza, side effects discussed  Refused metformin  Lantus          3 HTN - cozaar     4. Obesity:Body mass index is 39.69 kg/m². 5.  CKD        Thank you for allowing me to participate in the care of this patient.     Forrest Childress MD    Patient verbalized understanding

## 2019-11-02 NOTE — PROGRESS NOTES
HISTORY OF PRESENT ILLNESS  Rick Michaela Blankenship is a 39 y.o. female. HPI  Pt presents with \"cold symptoms, sinus pain, and cough\"    Symptoms started last week   Night sweats  Headaches  Nasal congestion  Sore throat  Dry throat  Cough: dry  OTC: none  Review of Systems   Constitutional: Positive for malaise/fatigue. Negative for fever. HENT: Positive for congestion and sore throat. Respiratory: Positive for cough. Gastrointestinal: Negative for nausea and vomiting. Neurological: Positive for headaches. Physical Exam   Constitutional: She is oriented to person, place, and time. She appears well-developed and well-nourished. HENT:   Head: Normocephalic and atraumatic. Right Ear: Hearing, tympanic membrane, external ear and ear canal normal.   Left Ear: Hearing, tympanic membrane, external ear and ear canal normal.   Nose: Mucosal edema present. Mouth/Throat: Posterior oropharyngeal edema and posterior oropharyngeal erythema present. Neck: Normal range of motion. Neck supple. Cardiovascular: Normal rate, regular rhythm and normal heart sounds. Pulmonary/Chest: Effort normal and breath sounds normal.   Lymphadenopathy:     She has cervical adenopathy. Right cervical: Superficial cervical adenopathy present. Left cervical: Superficial cervical adenopathy present. Neurological: She is alert and oriented to person, place, and time. Skin: Skin is warm and dry. Psychiatric: She has a normal mood and affect. Her behavior is normal.       ASSESSMENT and PLAN    ICD-10-CM ICD-9-CM    1. Strep throat J02.0 034.0 amoxicillin (AMOXIL) 500 mg capsule   2. Sore throat J02.9 462 AMB POC RAPID STREP A     Informed patient that I have sent medication to the pharmacy, and she should take as prescribed. Educated about taking with food, to decrease the risk of stomach upset. Educated about monitoring fever, and treating with Tylenol or Motrin as needed.   Educated about throwing out toothbrush within 48 hours of starting antibiotics. Pt informed to return to office with worsening of symptoms, or PRN with any questions or concerns. Pt verbalizes understanding of plan of care and denies further questions or concerns at this time. Yes

## 2019-11-13 PROBLEM — R07.9 CHEST PAIN: Status: ACTIVE | Noted: 2019-01-01

## 2019-11-13 NOTE — Clinical Note
Lesion located in the Mid Cx. Balloon inserted. Balloon inflated using multiple inflations inflation technique. Pressure = 12 katerin; Duration = 27 sec. Inflation 2: Pressure: 15 katerin; Duration: 20 sec.

## 2019-11-13 NOTE — PROGRESS NOTES
5:17 PM 
Arterial sheath pulled from right Groin. Tam Hearn applied. Manual pressure held by Oren Hill RN. 5:27 PM 
Hemostasis achieved. Transparent dressing applied.

## 2019-11-13 NOTE — ED NOTES
2nd breathing treatment in progress pt states \"no help from first breathing treatment respirations 25 at this time pt is lying back in relaxed position. Pt is calm taking shallow breaths even when asked to take a deep breath. .  at bedside sitting quietly using cell phone.

## 2019-11-13 NOTE — PROGRESS NOTES
Yanira Garces [address] Office [phone] Fax [phone] Assessment and Plan Ruby Mendoza is a 44 y.o. female with history of history of HTN, asthma, DM, CAD, who is admitted for ACS. She has spent 0 night(s) in the hospital. 
 
24 Hour Events: No acute events. NSTEMI acute. S/p Cath 11/13 
- Cardiology following, appreciate recs 
- continue lopressor 2.5mg l7opstz, plavix 75mg daily 
- continue home Lipitor 20mg daily. 
  
Hypertension chronic, stable. Hold home coreg 3.125mg BID, imdur 60mg daily, losartan 100mg daily.  
  
Diabetes Mellitus T2 chronic.   
- holding home victoza and Januvia 
- SSI and home Lantus 30 units bid 
  
Asthma: 
- continue pulmicort and albuterol nebs prn in place of home arnuity and albuterol prn. 
- continue home claritin, singulair 
  
Elevated Creatinine on CKD3: stable. - Avoid nephrotoxic agents. 
  
Migraine headaches: stable. Home Aimovig. FEN/GI - Diabetic diet. Activity - Ambulate with assistance DVT prophylaxis - Sub Q Heparin GI prophylaxis - Not indicated at this time Disposition - Plan to d/c to Home. Code Status - Full Patient Ruby Signs will be discussed with Dr. Khushi Membreno. Hima Angel, DO Family Medicine Resident Subjective / Objective Pt complaining of reflux. Denies chest pain, SOB, nausea, vomiting, abdominal pain, dizziness. Inpatient Medications: 
Current Facility-Administered Medications Medication Dose Route Frequency  pantoprazole (PROTONIX) tablet 40 mg  40 mg Oral ACB  insulin lispro (HUMALOG) injection 10 Units  10 Units SubCUTAneous ONCE  
 influenza vaccine 2019-20 (6 mos+)(PF) (FLUARIX/FLULAVAL/FLUZONE QUAD) injection 0.5 mL  0.5 mL IntraMUSCular PRIOR TO DISCHARGE  albuterol (PROVENTIL VENTOLIN) nebulizer solution 2.5 mg  2.5 mg Nebulization Q6H PRN  
 budesonide (PULMICORT) 500 mcg/2 ml nebulizer suspension  500 mcg Nebulization BID RT  
  loratadine (CLARITIN) tablet 10 mg  10 mg Oral DAILY  montelukast (SINGULAIR) tablet 10 mg  10 mg Oral QHS  sodium chloride (NS) flush 5-40 mL  5-40 mL IntraVENous Q8H  
 sodium chloride (NS) flush 5-40 mL  5-40 mL IntraVENous PRN  
 heparin (porcine) injection 5,000 Units  5,000 Units SubCUTAneous Q8H  
 insulin lispro (HUMALOG) injection   SubCUTAneous AC&HS  
 glucose chewable tablet 16 g  4 Tab Oral PRN  
 dextrose 10% infusion 0-250 mL  0-250 mL IntraVENous PRN  
 glucagon (GLUCAGEN) injection 1 mg  1 mg IntraMUSCular PRN  
 erenumab-aooe (AIMOVIG) injection 70 mg   (Patient Supplied)  70 mg SubCUTAneous Q30D  
 insulin glargine (LANTUS) injection 30 Units  30 Units SubCUTAneous BID  atorvastatin (LIPITOR) tablet 20 mg  20 mg Oral DAILY  sodium chloride (NS) flush 5-40 mL  5-40 mL IntraVENous Q8H  
 sodium chloride (NS) flush 5-40 mL  5-40 mL IntraVENous PRN  
 acetaminophen (TYLENOL) tablet 650 mg  650 mg Oral Q4H PRN  
 metoprolol (LOPRESSOR) injection 2.5 mg  2.5 mg IntraVENous Q6H  
 fentaNYL citrate (PF) injection 25 mcg  25 mcg IntraVENous ONCE PRN  
 clopidogrel (PLAVIX) tablet 75 mg  75 mg Oral DAILY Temp (24hrs), Av.8 °F (36.6 °C), Min:96.4 °F (35.8 °C), Max:98.6 °F (37 °C) Respiratory: O2 Flow Rate (L/min): 2 l/min O2 Device: Room air Visit Vitals /78 (BP 1 Location: Right arm, BP Patient Position: At rest) Pulse 79 Temp 98.1 °F (36.7 °C) Resp 20 Ht 5' 2\" (1.575 m) Wt 220 lb (99.8 kg) SpO2 94% BMI 40.24 kg/m² Physical Exam: 
Physical Exam  
Constitutional: She is oriented to person, place, and time and well-developed, well-nourished, and in no distress. No distress. HENT:  
Head: Normocephalic and atraumatic. Eyes: Conjunctivae are normal.  
Cardiovascular: Normal rate. Exam reveals no friction rub. No murmur heard. Irregular rhythm Pulmonary/Chest: Effort normal and breath sounds normal. No respiratory distress. She has no wheezes. Abdominal: Soft. Bowel sounds are normal. She exhibits no distension. There is no tenderness. obese Musculoskeletal: She exhibits no edema. Neurological: She is alert and oriented to person, place, and time. Skin: No erythema. I/O: 
Date 11/13/19 0700 - 11/14/19 6159 11/14/19 0700 - 11/15/19 4737 Shift 4962-6165 0758-0568 24 Hour Total 9281-8611 9028-8890 24 Hour Total  
INTAKE Shift Total(mL/kg) OUTPUT Urine(mL/kg/hr) 400(0.3)  400(0.2) Urine Voided 400  400 Shift Total(mL/kg) 400(4)  400(4) NET -400  -400 Weight (kg) 99.8 99.8 99.8 99.8 99.8 99.8 CBC: 
Recent Labs 11/14/19 
0205 11/13/19 
7634 WBC 12.5* 7.6 HGB 12.5 11.9 HCT 37.9 36.8  289 Metabolic Panel: 
Recent Labs 11/14/19 
0205 11/13/19 
2428  139  
K 4.5 3.6  106 CO2 19* 26 BUN 23* 12  
CREA 1.90* 1.34* * 124* CA 8.5 8.7 ALB  --  2.7* SGOT  --  23 ALT  --  30 For Billing Chief Complaint Patient presents with  Chest Pain  Shortness of Breath Hospital Problems  Date Reviewed: 9/30/2019 Codes Class Noted POA Chest pain ICD-10-CM: R07.9 ICD-9-CM: 786.50  11/13/2019 Unknown [facility] ICD-10-CM: E11.21 
ICD-9-CM: 250.40, 583.81  5/1/2018 Yes Type 2 diabetes mellitus with diabetic neuropathy (HCC) ICD-10-CM: E11.40 ICD-9-CM: 250.60, 357.2  1/10/2018 Yes Elevated serum creatinine ICD-10-CM: R79.89 ICD-9-CM: 790.99  10/20/2016 Yes Essential hypertension ICD-10-CM: I10 
ICD-9-CM: 401.9  11/28/2015 Yes Asthma ICD-10-CM: C17.948 ICD-9-CM: 493.90  3/5/2010 Yes

## 2019-11-13 NOTE — ED NOTES
TRANSFER - OUT REPORT: 
 
Verbal report given to [facility] NRP with AVERY(name) on Sparkle Nuñez  being transferred to [address] [place] [address]  336(unit) for routine progression of care Report consisted of patients Situation, Background, Assessment and  
Recommendations(SBAR). Information from the following report(s) SBAR, MAR,telemetry, vitals, all test results  was reviewed with the Nrp with [facility]. Lines:  
Peripheral IV 11/13/19 Right Forearm (Active) Site Assessment Clean, dry, & intact 11/13/2019  8:49 AM  
Phlebitis Assessment 0 11/13/2019  8:49 AM  
Infiltration Assessment 0 11/13/2019  8:49 AM  
Dressing Status Clean, dry, & intact 11/13/2019  8:49 AM  
Dressing Type Transparent 11/13/2019  8:49 AM  
Hub Color/Line Status Pink 11/13/2019  8:49 AM  
  
 
Opportunity for questions and clarification was provided. Patient transported with: AVERY ALS monitoring. Reassessment at this time is unchanged pt is stable for transport.

## 2019-11-13 NOTE — ROUTINE PROCESS
TRANSFER - OUT REPORT: 
 
Verbal report given to [facility] RN(name) on Mabel Gordillo  being transferred to [address] [place] [address](unit) for routine progression of care Report consisted of patients Situation, Background, Assessment and  
Recommendations(SBAR). Information from the following report(s) SBAR, MAR, Pain, vitals, telemetry, All test results was reviewed with the receiving nurse. Lines:  
Peripheral IV 11/13/19 Right Forearm (Active) Site Assessment Clean, dry, & intact 11/13/2019  8:49 AM  
Phlebitis Assessment 0 11/13/2019  8:49 AM  
Infiltration Assessment 0 11/13/2019  8:49 AM  
Dressing Status Clean, dry, & intact 11/13/2019  8:49 AM  
Dressing Type Transparent 11/13/2019  8:49 AM  
Hub Color/Line Status Pink 11/13/2019  8:49 AM  
  
 
Opportunity for questions and clarification was provided. Patient transported with: ALS [facility] reassessment at this time is unchanged pt is stable for transport.

## 2019-11-13 NOTE — Clinical Note
TRANSFER - OUT REPORT:  
 
Verbal report given to: Jerri . Report consisted of patient's Situation, Background, Assessment and  
Recommendations(SBAR). Opportunity for questions and clarification was provided. Patient transported with a Registered Nurse. Patient transported to: [facility].

## 2019-11-13 NOTE — PROCEDURES
BRIEF PROCEDURE NOTE Date of Procedure: 11/13/2019 Preoperative Diagnosis: ACS Postoperative Diagnosis:Sig Lcflex dz; PTCA to Mid Lcflex Procedure: Left heart cath, LV angiography, coronary angiography Interventional Cardiologist: Stacie Feliz MD 
Assistant : none Anesthesia: local + IV sedation Estimated Blood Loss: Minimal 
Findings:  
 
R CFA access with micropuncture/ultrasound /lfuoroscopic guided access L Dominant L Main: Long/large/Nml 
 
LAD: Med/Tortuous - MLI; D1 - small to med ; MLI/D2 - small ; MLI 
 
LCflex: Dominant; Large OM1 - prox 60% Mid Lcflex - small to med vessel; Diffused prox/mid dz; Mid 95% RCA: Non dominant/small/MLI ; ostial catheter induced spasm noted LVEDP: 10 mm Hg LVEF: 55% No significant gradient across aortic valve. PCI: EBU 3.5 guide Prox/Mid Lcflex 95% Acute angle takeoff from L Main Difficulty wiring vessel Guidezilla used; OM1 wire with BMW and Lcflex wire with Runthrough - difficulty wiring vessel secondary to acute angle of takeoff from L Main Unable to advance 2.0 balloon; Progressive dilatation with 1.25 by 8 and 1.5 by 12 balloon, 2.0 by 15 balloon. Unable to advance 2.25 by 15 stent across the acute angle with guidezilla in place. Guidezilla and OM wire removed. Again tried to advance stent - not successful Tried to mikayla wire with BMW - unable to wire across mid vessel with Runthrough in situ  
2.25 by 8 stent was tried - could not cross the acute angle. Repeat ballooning of prox/mid lcflex with 2.25 by 12 balloon performed. Again unsuccessful in delivering short stent Residual 80% lesion mid lcflex/60% diffuse prox Lcflex post PTCA. ARMAND 3 flow Pt tachycardic ( sinus tachycardia) throughout procedure. Lopressor and IV fluid bolus given Specimens Removed : None Complications: None Closure Device: Manual 
 
 
 
See full cath note. Complications: none Stacie Feliz MD

## 2019-11-13 NOTE — PROGRESS NOTES
Bedside and Verbal shift change report given to A Anne Lloyd (oncoming nurse) by King Jessica (offgoing nurse). Report included the following information SBAR, Kardex, ED Summary, Intake/Output, MAR, Recent Results and Med Rec Status. 1830- TRANSFER - IN REPORT: 
 
Verbal report received from [facility]) on Paramjit Pate  being received from [facility] (unit) for routine progression of care Report consisted of patients Situation, Background, Assessment and  
Recommendations(SBAR). Information from the following report(s) SBAR, Kardex, ED Summary, Procedure Summary, Intake/Output and MAR was reviewed with the receiving nurse. Opportunity for questions and clarification was provided. Assessment completed upon patients arrival to unit and care assumed. Patient to lay flat until 1900, then can increase HOB by 10 degrees q30min. IVF to run until 0130

## 2019-11-13 NOTE — ED TRIAGE NOTES
Patient presents to treatment area via wheelchair. Patient complains of chest tightness and pain with breathing. Patient with history of asthma. Audible wheezing noted in triage. Tachypnea.

## 2019-11-13 NOTE — PROGRESS NOTES
[facility]  22. Medicine Residency Program  
 
 
Resident post op note 
S: . Patient seen and examined at bedside s/p cath. Patient states chest pain has improved. O: 
Visit Vitals /65 Pulse (!) 109 Temp 97.8 °F (36.6 °C) Resp 23 Ht 5' 2\" (1.575 m) Wt 220 lb (99.8 kg) LMP 10/15/2019 SpO2 97% BMI 40.24 kg/m² Physical Examination:  
General appearance - alert,and in no distress Chest - clear to auscultation, no wheezes, rales or rhonchi, symmetric air entry Heart - tachycardic, regular rhythm, normal S1, S2, no murmurs, rubs, clicks or gallops, Abdomen - soft, nontender, nondistended, no masses or organomegaly Neurological - alert, oriented, normal speech, no focal findings Extremities -   peripheral pulses normal, no pedal edema, no clubbing or cyanosis A/P:  
S/p cath, patient doing well. Full results of cath to follow. Awaiting cardiac recs Rest of plan per HPI. Tyree Mi MD 
Family Medicine Resident 6:28 PM

## 2019-11-13 NOTE — Clinical Note
Sheath #1: Sheath: left in place. Site secured by Tegaderm. Sheath connected to heparin pressure bag. Hemostasis not achieved.

## 2019-11-13 NOTE — Clinical Note
Lesion located in the Mid Cx. Balloon inserted. Balloon inflated using multiple inflations inflation technique. Pressure = 12 katerin; Duration = 23 sec. Inflation 2: Pressure: 14 katerin; Duration: 20 sec.

## 2019-11-13 NOTE — PROGRESS NOTES
1515 TRANSFER - IN REPORT: 
 
Verbal report received from radha(name) on Sparkle Nuñez  being received from cath lab(unit) for routine progression of care Report consisted of patients Situation, Background, Assessment and  
Recommendations(SBAR). Information from the following report(s) Procedure Summary was reviewed with the receiving nurse. Opportunity for questions and clarification was provided. Assessment completed upon patients arrival to unit and care assumed. Right groin sheath in place post angioplasty, sinus with retrograde p, sinus tachycardia 1830 report called to pcc preparing to transfer to room 336 on monitor 1855 right groin site assessed with oncoming nurse placed on heart monitor iv fluids continued

## 2019-11-13 NOTE — CONSULTS
Johnathan Aragon MD 
 
[address] [place], [address] [facility] Office [phone] Pager [phone] Date of  Admission: 11/13/2019  8:27 AM 
PCP- Kavitha Moreno MD 
 
James Romero is a 44 y.o. female admitted for Chest pain [R07.9]. Consult requested by Grant Powers MD 
 
Assessment/Plan Chest Pain/Dyspnea HX of CAD Hx of Asthma HTN/HLD/Morbid obesity/ DOC - on CPAP Uncontrolled DM 
CKD Allergy to ASA Allergy to contrast dye ( hives) Plan: 
Trop 0.56 ; 0.2 Pt has a hx of chronic chest pain On Coreg/Cozaar/statin - PTA. BP meds/Imdur held sec to low BP 
DC nitropaste Will start Plavix Serial trop ECHO I appreciate the opportunity to be involved in Ms. Frost. See below note for details. Please do not hesitate to contact us with questions or concerns. Johnathan Aragon MD 
 
Cardiac Testing/ Procedures: 
 
Cardiac Testing/ Procedures: 
  
A. Cardiac Cath/PCI: 5/3/18 Tried Brachial vein access - unsuccessful  
  
R radial acess - easy access Difficulty gettting into asc aorta R SCV angiogram - tortuous 
  
R FV access - micropuncture R CFA access - micropuncture 
  
  
L Main: Long ; Large - Nml 
  
LAD: Med - prox ; Small distal ; Mid 20%; D1 - Small 
  
LCflex: OM1 - Med - Prox 30%; Mid- Small to med; 60%; Prob component of spasm 
  
RCA: Small ; Prox catheter - 10%; PDA - very small 
  
LVEDP: 14 mm Hg 
  
LVEF: No assessed 
  
No significant gradient across aortic valve. 
  
  
  
  
  
RHC findings: 
  
RAPm=   7    mmHg RVSP= 24     mmHg PAPm=  19      mmHg PCWPm=  10  mmHg CO= 4.33         l/min CI= 2.16 l/min/m2 
  
Specimens Removed : None 
  
Closure Device:  Manual 
  
B. ECHO/MARK: 5/16/2016-EF 31-78%, grade 1 diastolic dysfunction 
  C. StressNuclear/Stress ECHO/Stress test: May 12, 2016-7 minutes, normal exercise nuclear study. EF 70% 
  D. Vascular: 
  
 NATE. EP: 2016-Holter sinus tachycardia 99-1 63 bpm, no PACs, 6 PVCs. Diary entries of dyspnea, chest pain, dizziness correlates with sinus tachycardia. Care Plan discussed with: Patient and Nursing Staff Subjective: 
 
 
44 yr old AAF with hx of non obstructive CAD by cath   ( nml EF) and multiple medical problems admitted for dyspena/wheezing/CP. Pt has hx of asthma. H x of  mildly elevated trop. Chronic swelling LE. Hx of chronic chest pain. No fever/chills/dizziness/syncope/diaphoresis Trop 0.56; Trop 0.2 Past Medical History:  
Diagnosis Date  Abnormal pap 2012 10/5/13   
 hx w/Colpo  Arthritis  Asthma  Diabetes ([address] type 2  
 GERD (gastroesophageal reflux disease)  Headache(784.0)  Hyperlipidemia  Hypertension  Joint pain  Legally blind   
 blind in right eye.  DOC (obstructive sleep apnea)   
 uses CPAP  
 Pap smear for cervical cancer screening 10/1/15 ASCUS HPV NEG RP 3 YEARS  
 PUD (peptic ulcer disease)  Ringing in ears Past Surgical History:  
Procedure Laterality Date  HX CATARACT REMOVAL  2019  
 left eye  HX  SECTION  01 & 08  HX COLPOSCOPY  2012 neg  HX HEENT    
 cataract R eye  HX OTHER SURGICAL  2018  
 retina reattached in left eye  HX RETINAL DETACHMENT REPAIR Left \"put a gas bubble in the back of the eye\"  HX TUBAL LIGATION Allergies Allergen Reactions  Aspirin Itching  Contrast Agent [Iodine] Hives IV only, can tolerate oral contrast  
 Dilaudid [Hydromorphone] Hives  Iodinated Contrast Media Itching  Lisinopril Itching  
  itching  Metformin Nausea Only  Reglan  [Metoclopramide Hcl] Other (comments)  Reglan [Metoclopramide] Other (comments) Impaired speech/LOWER EXTREMITY EDEMA-RECENTLY D/C'D BY MD  
 
Family History Problem Relation Age of Onset  Diabetes Mother  Heart Disease Mother  Hypertension Mother  Stroke Mother  Hypertension Father  Stroke Father  Heart Disease Father  Breast Cancer Neg Hx Social History Tobacco Use  Smoking status: Never Smoker  Smokeless tobacco: Never Used Substance Use Topics  Alcohol use: No  
 Drug use: No  
 
 
 
 Medications: 
Medications Prior to Admission Medication Sig  carvedilol (COREG) 3.125 mg tablet TAKE 1 TABLET BY MOUTH TWICE DAILY WITH MEALS  gabapentin (NEURONTIN) 300 mg capsule TAKE 2 CAPSULES BY MOUTH THREE TIMES DAILY -  FOLLOWUP  APPOINTMENT  NEEDED  Insulin Needles, Disposable, 31 gauge x 5/16\" ndle USE TO INJECT LANTUS AND APIDRA DAILY  losartan (COZAAR) 100 mg tablet TAKE 1 TABLET BY MOUTH ONCE DAILY  insulin glargine (LANTUS SOLOSTAR U-100 INSULIN) 100 unit/mL (3 mL) inpn 30 units in AM and 30 units at night  Indications: for a total of 60 units daily  insulin aspart U-100 (NOVOLOG FLEXPEN U-100 INSULIN) 100 unit/mL (3 mL) inpn 8- 10 units before meals  liraglutide (VICTOZA) 0.6 mg/0.1 mL (18 mg/3 mL) pnij Inject 1.2 mg daily Dispense as sample per Dr Dilcia Daniels  montelukast (SINGULAIR) 10 mg tablet TAKE 1 TABLET BY MOUTH ONCE DAILY  isosorbide mononitrate ER (IMDUR) 60 mg CR tablet Take 1 Tab by mouth every morning.  erenumab-aooe (AIMOVIG AUTOINJECTOR) 70 mg/mL injection 1 mL by SubCUTAneous route every thirty (30) days.  ARNUITY ELLIPTA 200 mcg/actuation dsdv inhaler INHALE 1 PUFF BY MOUTH ONCE DAILY  empagliflozin (JARDIANCE) 10 mg tablet Take 10 mg by mouth daily.  Insulin Needles, Disposable, 31 gauge x 5/16\" ndle USE TO INJECT LANTUS AND APIDRA DAILY  loratadine (CLARITIN) 10 mg tablet Take 1 Tab by mouth daily.  albuterol (PROVENTIL HFA, VENTOLIN HFA, PROAIR HFA) 90 mcg/actuation inhaler Take 2 Puffs by inhalation every four (4) hours as needed for Wheezing. PRO AIR HFA  atorvastatin (LIPITOR) 20 mg tablet Take 20 mg by mouth daily.  bumetanide (BUMEX) 0.5 mg tablet Take 0.5 mg by mouth daily.  sodium bicarbonate 650 mg tablet Take 650 mg by mouth two (2) times a day.  VITAMIN D2 50,000 unit capsule Take 50,000 Units by mouth every . No current facility-administered medications for this encounter. Review of Systems: 
(bold if positive, if negative) As in HPI - rest of ROS noncontributory Physical Exam: 
Visit Vitals BP 94/41 (BP 1 Location: Left arm, BP Patient Position: At rest) Pulse 68 Temp 97.8 °F (36.6 °C) Resp 22 Ht 5' 2\" (1.575 m) Wt 220 lb (99.8 kg) SpO2 97% BMI 40.24 kg/m² Telemetry:  
 
Gen: Well-developed, well-nourished, in no acute distress, obese HEENT: hearing intact to voice, moist mucous membranes Neck: Supple,No JVD, No Carotid Bruit, Thyroid- non tender Resp: No accessory muscle use, Clear breath sounds, No rales or rhonchi 
Card: Regular Rate,Rythm,Normal S1, S2, No murmurs, rubs or gallop. No thrills. Abd:  Soft, non-tender, non-distended, normoactive bowel sounds are present,  
MSK: No cyanosis or clubbing Skin: No rashes or ulcers Neuro:  moving all four extremities, no focal deficit, follows commands appropriately Psych:  Good insight, oriented to person, place and time, alert, Nml Affect LE: No edema Vascular:Radial  Pulses 2+ and symmetric EK19 EKG - Sinus tach/Prob septal MI 
19 1206 hours - SR/1deg AV block/Cannot rule out Mobitz type 1 2nd deg AV block; Minimal ST elevation 3/avF ; ST dperession ant leads/ 1 and Avl Cxray: 
 
LABS: 
 
 
 
Lab Results Component Value Date/Time WBC 7.6 2019 08:43 AM  
 HGB 11.9 2019 08:43 AM  
 HCT 36.8 2019 08:43 AM  
 PLATELET 504  08:43 AM  
 MCV 96.6 2019 08:43 AM  
 
Lab Results Component Value Date/Time  Sodium 139 2019 08:43 AM  
 Potassium 3.6 2019 08:43 AM  
 Chloride 106 2019 08:43 AM  
 CO2 26 2019 08:43 AM  
 Anion gap 7 11/13/2019 08:43 AM  
 Glucose 124 (H) 11/13/2019 08:43 AM  
 BUN 12 11/13/2019 08:43 AM  
 Creatinine 1.34 (H) 11/13/2019 08:43 AM  
 BUN/Creatinine ratio 9 (L) 11/13/2019 08:43 AM  
 GFR est AA 53 (L) 11/13/2019 08:43 AM  
 GFR est non-AA 44 (L) 11/13/2019 08:43 AM  
 Calcium 8.7 11/13/2019 08:43 AM  
 
Lab Results Component Value Date/Time  (H) 01/25/2019 08:40 AM  
 Troponin-I, Qt. 0.56 (H) 11/13/2019 08:43 AM  
 
No results found for: APTT Lab Results Component Value Date/Time INR 0.9 03/15/2019 01:50 PM  
 Prothrombin time 9.8 03/15/2019 01:50 PM  
 
No results found for: BNP, BNPP, XBNPT Nora Aceves MD

## 2019-11-13 NOTE — H&P
Yanira Garces [address] Office [phone], Fax [phone] History & Physical 
 
Date of admission: 11/13/2019 Patient name: Ruby Mendoza MRN: [id number] YOB: 1980 Age: 44 y.o. Primary care provider:  Allie Echols MD  
 
Source of Information: patient and medical records Subjective: Chief complain: Chest pain History of present illness: Ms. Kavon Hicks is a 44 y.o.  female with a history of  HTN, asthma, DM, CAD who presented to the Emergency Department today complaining of  Dull pressure like chest pain 9/10, radiating to the left arm and back since 6 am this morning associated with SOB, dry cough, minimal wheezing, dizziness, fatigue, nausea and vomiting x1. She tried her inhaler without much relief and came to the ED. Patient was admitted for ACS Chart was reviewed: she had a cath with Dr. Ann Hester on 05/03/18 which showed 60% Lcx Emergency Room Course:  
Patient Vitals for the past 12 hrs: 
 Temp Pulse Resp BP SpO2  
11/13/19 1154 97.8 °F (36.6 °C) 68 22 94/41   
11/13/19 1150  62     
11/13/19 1053 98.6 °F (37 °C)  19 104/60   
11/13/19 1051  (!) 110 19 104/60 97 % 11/13/19 1030  (!) 110 19 108/63 97 % 11/13/19 1015  (!) 108 19 108/78 99 % 11/13/19 1000  (!) 108 20 112/75 98 % 11/13/19 0945  (!) 109 24 128/81 98 % 11/13/19 0932  (!) 110  127/74   
11/13/19 0930  (!) 110 21 143/81 99 % 11/13/19 0908  (!) 109 21  96 % 11/13/19 0900  (!) 109 20 138/83 94 % 11/13/19 0845  (!) 110 26 119/78 96 % 11/13/19 0840  (!) 112  130/75   
11/13/19 0834 97.9 °F (36.6 °C) (!) 113 18 130/75 97 % 11/13/19 0830    130/75 98 % Labs: remarkable for trop of 0.56 -> 0.20 ProBNP: 24, Cr: 1.34 ( BL ~ 1-1.2)  CXR with no acute abnormality ECG showed [place] rhythm and ST elevated in III and AVF Pt was treated with Duoneb, Solumedrol, Morphine and Nitrobid. Allergies Allergen Reactions  Aspirin Itching  Contrast Agent [Iodine] Hives IV only, can tolerate oral contrast  
 Dilaudid [Hydromorphone] Hives  Iodinated Contrast Media Itching  Lisinopril Itching  
  itching  Metformin Nausea Only  Reglan  [Metoclopramide Hcl] Other (comments)  Reglan [Metoclopramide] Other (comments) Impaired speech/LOWER EXTREMITY EDEMA-RECENTLY D/C'D BY MD  
 
 
Prior to Admission Medications Prescriptions Last Dose Informant Patient Reported? Taking? ARNUITY ELLIPTA 200 mcg/actuation dsdv inhaler   No No  
Sig: INHALE 1 PUFF BY MOUTH ONCE DAILY Insulin Needles, Disposable, 31 gauge x 5/16\" ndle   Yes No  
Sig: USE TO INJECT LANTUS AND APIDRA DAILY Insulin Needles, Disposable, 31 gauge x 5/16\" ndle   No No  
Sig: USE TO INJECT LANTUS AND APIDRA DAILY  
VITAMIN D2 50,000 unit capsule  Self Yes No  
Sig: Take 50,000 Units by mouth every . albuterol (PROVENTIL HFA, VENTOLIN HFA, PROAIR HFA) 90 mcg/actuation inhaler   No No  
Sig: Take 2 Puffs by inhalation every four (4) hours as needed for Wheezing. PRO AIR HFA  
atorvastatin (LIPITOR) 20 mg tablet   Yes No  
Sig: Take 20 mg by mouth daily. bumetanide (BUMEX) 0.5 mg tablet  Self Yes No  
Sig: Take 0.5 mg by mouth daily. carvedilol (COREG) 3.125 mg tablet   No No  
Sig: TAKE 1 TABLET BY MOUTH TWICE DAILY WITH MEALS  
empagliflozin (JARDIANCE) 10 mg tablet   Yes No  
Sig: Take 10 mg by mouth daily. erenumab-aooe (AIMOVIG AUTOINJECTOR) 70 mg/mL injection   No No  
Si mL by SubCUTAneous route every thirty (30) days.   
gabapentin (NEURONTIN) 300 mg capsule   No No  
Sig: TAKE 2 CAPSULES BY MOUTH THREE TIMES DAILY -  FOLLOWUP  APPOINTMENT  NEEDED  
insulin aspart U-100 (NOVOLOG FLEXPEN U-100 INSULIN) 100 unit/mL (3 mL) inpn   No No  
Si- 10 units before meals  
insulin glargine (LANTUS SOLOSTAR U-100 INSULIN) 100 unit/mL (3 mL) inpn   No No  
 Si units in AM and 30 units at night  Indications: for a total of 60 units daily  
isosorbide mononitrate ER (IMDUR) 60 mg CR tablet   No No  
Sig: Take 1 Tab by mouth every morning. liraglutide (VICTOZA) 0.6 mg/0.1 mL (18 mg/3 mL) pnij   No No  
Sig: Inject 1.2 mg daily Dispense as sample per Dr Haim Rangel  
loratadine (CLARITIN) 10 mg tablet   No No  
Sig: Take 1 Tab by mouth daily. losartan (COZAAR) 100 mg tablet   No No  
Sig: TAKE 1 TABLET BY MOUTH ONCE DAILY  
montelukast (SINGULAIR) 10 mg tablet   No No  
Sig: TAKE 1 TABLET BY MOUTH ONCE DAILY  
sodium bicarbonate 650 mg tablet  Self Yes No  
Sig: Take 650 mg by mouth two (2) times a day. Facility-Administered Medications: None Past Medical History:  
Diagnosis Date  Abnormal pap 2012 10/5/13   
 hx w/Colpo  Arthritis  Asthma  Diabetes ([address] type 2  
 GERD (gastroesophageal reflux disease)  Headache(784.0)  Hyperlipidemia  Hypertension  Joint pain  Legally blind   
 blind in right eye.  DOC (obstructive sleep apnea)   
 uses CPAP  
 Pap smear for cervical cancer screening 10/1/15 ASCUS HPV NEG RP 3 YEARS  
 PUD (peptic ulcer disease)  Ringing in ears Past Surgical History:  
Procedure Laterality Date  HX CATARACT REMOVAL  2019  
 left eye  HX  SECTION  01 & 08  HX COLPOSCOPY  2012 neg  HX HEENT    
 cataract R eye  HX OTHER SURGICAL  2018  
 retina reattached in left eye  HX RETINAL DETACHMENT REPAIR Left \"put a gas bubble in the back of the eye\"  HX TUBAL LIGATION    
 
 
  
SOCIAL HISTORY 
 
- Alcohol history: none - Smoking history: Non-smoker - Illicit drug history: Not at all - Living arrangement: patient lives with their family. - Ambulates: Independently Family History Problem Relation Age of Onset  Diabetes Mother  Heart Disease Mother  Hypertension Mother  Stroke Mother  Hypertension Father  Stroke Father  Heart Disease Father  Breast Cancer Neg Hx Preventive history: 
Immunization History Administered Date(s) Administered  Influenza High Dose Vaccine PF 09/01/2016, 09/21/2017  Influenza Vaccine 09/01/2018  Influenza Vaccine (Quad) PF 11/23/2015, 10/04/2016, 09/25/2018  Pneumococcal Conjugate (PCV-13) 01/01/2015  Tdap 12/08/2015 CODE STATUS discussed with the patient/caregivers FULL CODE The following are negative unless bolded. General Fever, chills and unexpected weight change. HENT Ear pain, sinus pressure and sore throat. Eyes Visual disturbance. Respiratory Cough, chest tightness, shortness of breath and wheezing. Cardio Chest pain, palpitations and leg swelling. GI Nausea, vomiting, abd pain, diarrhea, constipation and blood in stool.  Dysuria. Extremities Myalgias and arthralgias. Skin Color change and pallor. Neuro Weakness and headaches. Behavioral Confusion, nervous, anxious. The remainder of the review of systems was reviewed and is noncontributory. Objective:   
 
Patient Vitals for the past 12 hrs: 
 BP Temp Pulse Resp SpO2 Height Weight 11/13/19 1154 94/41 97.8 °F (36.6 °C) 68 22     
11/13/19 1150   62      
11/13/19 1053 104/60 98.6 °F (37 °C)  19     
11/13/19 1051 104/60  (!) 110 19 97 %    
11/13/19 1030 108/63  (!) 110 19 97 %    
11/13/19 1015 108/78  (!) 108 19 99 %    
11/13/19 1000 112/75  (!) 108 20 98 %    
11/13/19 0945 128/81  (!) 109 24 98 %    
11/13/19 0932 127/74  (!) 110      
11/13/19 0930 143/81  (!) 110 21 99 %    
11/13/19 0908   (!) 109 21 96 %    
11/13/19 0900 138/83  (!) 109 20 94 %    
11/13/19 0845 119/78  (!) 110 26 96 %    
11/13/19 0840 130/75  (!) 112      
11/13/19 0834 130/75 97.9 °F (36.6 °C) (!) 113 18 97 % 5' 2\" (1.575 m) 220 lb (99.8 kg)  
11/13/19 0830 130/75    98 %   Temp (24hrs), Av.1 °F (36.7 °C), Min:97.8 °F (36.6 °C), Max:98.6 °F (37 °C) No intake or output data in the 24 hours ending 19 1303 O2 Flow Rate (L/min): 2 l/min O2 Device: Nasal cannula Physical Exam 
Visit Vitals BP 94/41 (BP 1 Location: Left arm, BP Patient Position: At rest) Pulse 68 Temp 97.8 °F (36.6 °C) Resp 22 Ht 5' 2\" (1.575 m) Wt 220 lb (99.8 kg) LMP 10/15/2019 SpO2 97% BMI 40.24 kg/m² Vitals Reviewed. General Oriented to person, place, and time and well-developed. Appears well-nourished, no distress. Not diaphoretic. HENT Head Normocephalic and atraumatic. Eyes Conjunctivae are normal, no discharge. No scleral icterus. Nose Nose normal, clear turbinates. Oral Oropharynx is clear and moist. No oropharyngeal exudate. Neck No thyromegaly present. No cervical adenopathy. Cardio Normal rate, regular rhythm. Exam reveals no gallop and no friction rub. No murmur heard. No chest wall tenderness. Pulmonary Effort normal and breath sounds normal. No respiratory distress. No wheezes, no rales. Abdominal Soft. Bowel sounds normal. No distension. No tenderness.  Deferred. Extremities No edema of lower extremities. No tenderness. Distal pulses intact. Neurological Alert and oriented to person, place, and time. Dermatology Skin is warm and dry. No rash noted. No erythema or pallor. Psychiatric Affect and judgment normal.   
 
 
Data Review Laboratory Data Recent Results (from the past 8 hour(s)) EKG, 12 LEAD, INITIAL Collection Time: 19  8:31 AM  
Result Value Ref Range Ventricular Rate 112 BPM  
 Atrial Rate 112 BPM  
 P-R Interval 136 ms QRS Duration 64 ms Q-T Interval 316 ms  
 QTC Calculation (Bezet) 431 ms Calculated P Axis 47 degrees Calculated R Axis 4 degrees Calculated T Axis 28 degrees Diagnosis Sinus tachycardia Septal infarct , age undetermined Abnormal ECG 
 When compared with ECG of 27-MAR-2019 10:01, No significant change was found SAMPLES BEING HELD Collection Time: 11/13/19  8:43 AM  
Result Value Ref Range SAMPLES BEING HELD RED,BLUE   
 COMMENT Add-on orders for these samples will be processed based on acceptable specimen integrity and analyte stability, which may vary by analyte. CBC WITH AUTOMATED DIFF Collection Time: 11/13/19  8:43 AM  
Result Value Ref Range WBC 7.6 3.6 - 11.0 K/uL  
 RBC 3.81 3.80 - 5.20 M/uL  
 HGB 11.9 11.5 - 16.0 g/dL HCT 36.8 35.0 - 47.0 % MCV 96.6 80.0 - 99.0 FL  
 MCH 31.2 26.0 - 34.0 PG  
 MCHC 32.3 30.0 - 36.5 g/dL  
 RDW 13.4 11.5 - 14.5 % PLATELET 366 194 - 032 K/uL MPV 9.7 8.9 - 12.9 FL  
 NEUTROPHILS 58 32 - 75 % LYMPHOCYTES 26 12 - 49 % MONOCYTES 14 (H) 5 - 13 % EOSINOPHILS 2 0 - 7 % BASOPHILS 0 0 - 1 %  
 ABS. NEUTROPHILS 4.4 1.8 - 8.0 K/UL  
 ABS. LYMPHOCYTES 2.0 0.8 - 3.5 K/UL  
 ABS. MONOCYTES 1.0 0.0 - 1.0 K/UL  
 ABS. EOSINOPHILS 0.1 0.0 - 0.4 K/UL  
 ABS. BASOPHILS 0.0 0.0 - 0.1 K/UL  
 DF AUTOMATED METABOLIC PANEL, COMPREHENSIVE Collection Time: 11/13/19  8:43 AM  
Result Value Ref Range Sodium 139 136 - 145 mmol/L Potassium 3.6 3.5 - 5.1 mmol/L Chloride 106 97 - 108 mmol/L  
 CO2 26 21 - 32 mmol/L Anion gap 7 5 - 15 mmol/L Glucose 124 (H) 65 - 100 mg/dL BUN 12 6 - 20 MG/DL Creatinine 1.34 (H) 0.55 - 1.02 MG/DL  
 BUN/Creatinine ratio 9 (L) 12 - 20 GFR est AA 53 (L) >60 ml/min/1.73m2 GFR est non-AA 44 (L) >60 ml/min/1.73m2 Calcium 8.7 8.5 - 10.1 MG/DL Bilirubin, total 0.4 0.2 - 1.0 MG/DL  
 ALT (SGPT) 30 12 - 78 U/L  
 AST (SGOT) 23 15 - 37 U/L Alk. phosphatase 65 45 - 117 U/L Protein, total 6.9 6.4 - 8.2 g/dL Albumin 2.7 (L) 3.5 - 5.0 g/dL Globulin 4.2 (H) 2.0 - 4.0 g/dL A-G Ratio 0.6 (L) 1.1 - 2.2    
TROPONIN I Collection Time: 11/13/19  8:43 AM  
Result Value Ref Range Troponin-I, Qt. 0.56 (H) <0.05 ng/mL NT-PRO BNP Collection Time: 11/13/19  8:43 AM  
Result Value Ref Range NT pro-BNP 24 0 - 125 PG/ML  
POC G3 - PUL Collection Time: 11/13/19  9:13 AM  
Result Value Ref Range FIO2 (POC) 21 % pH (POC) 7.346 (L) 7.35 - 7.45    
 pCO2 (POC) 41.1 35.0 - 45.0 MMHG  
 pO2 (POC) 71 (L) 80 - 100 MMHG  
 HCO3 (POC) 22.5 22 - 26 MMOL/L  
 sO2 (POC) 93 92 - 97 % Base deficit (POC) 3 mmol/L Site RIGHT BRACHIAL Device: ROOM AIR Allens test (POC) YES Specimen type (POC) ARTERIAL Total resp. rate 22 EKG, 12 LEAD, INITIAL Collection Time: 11/13/19  9:31 AM  
Result Value Ref Range Ventricular Rate 110 BPM  
 Atrial Rate 110 BPM  
 P-R Interval 146 ms  
 QRS Duration 60 ms  
 Q-T Interval 326 ms  
 QTC Calculation (Bezet) 441 ms Calculated P Axis 44 degrees Calculated R Axis 1 degrees Calculated T Axis 68 degrees Diagnosis Sinus tachycardia Septal infarct , age undetermined Abnormal ECG When compared with ECG of 13-NOV-2019 08:31, 
MANUAL COMPARISON REQUIRED, DATA IS UNCONFIRMED 
  
POC TROPONIN-I Collection Time: 11/13/19 10:46 AM  
Result Value Ref Range Troponin-I (POC) 0.20 (H) 0.00 - 0.08 ng/mL EKG, 12 LEAD, INITIAL Collection Time: 11/13/19 12:05 PM  
Result Value Ref Range Ventricular Rate 78 BPM  
 Atrial Rate 115 BPM  
 QRS Duration 74 ms Q-T Interval 398 ms QTC Calculation (Bezet) 453 ms Calculated P Axis 67 degrees Calculated R Axis 13 degrees Calculated T Axis 80 degrees Diagnosis ** Age and gender specific ECG analysis ** Sinus tachycardia with 2nd degree AV block ([place]ob[place] I) Possible Anterior infarct , age undetermined Inferior injury pattern 
** ** ACUTE MI / STEMI ** ** 
Consider right ventricular involvement in acute inferior infarct Abnormal ECG When compared with ECG of 13-NOV-2019 09:31, 
MANUAL COMPARISON REQUIRED, DATA IS UNCONFIRMED Imaging CXR Results  (Last 48 hours)  
          
 11/13/19 0920  XR CHEST PORT Final result Impression:  IMPRESSION: No acute abnormality. Narrative:  EXAM:  XR CHEST PORT. INDICATION: Chest pain. COMPARISON: 3/27/2019. FINDINGS:   
A portable AP radiograph of the chest was obtained at a time not indicated on  
the image. Lines and tubes: The patient is on a cardiac monitor. Lungs: The lungs are clear. Pleura: There is no pneumothorax or pleural effusion. Mediastinum: The cardiac and mediastinal contours and pulmonary vascularity are  
normal.  
Bones and soft tissues: The bones and soft tissues are grossly within normal  
limits. There is some gas in the stomach. CT Results  (Last 48 hours) None Assessment and Plan Jovi Reynaga is a 44 y.o. female who is admitted for acute coronary syndrome. Acute Chest Pain/ CAD/ NSTEMI. POA trop 0.56, now trending down EKG changed from prior study with new wenckebach and ST elevation in II and aVF . Risk factors include obesity, history of HTN, hyperlipidemia, CAD.  
- Admit to telemetry, consult Cardiology: recommended cath. Patient in cath lab now. - cardiac recs - serial troponins    
- hold Metoprolol for Wenckebach, continue Lipitor. Allergic to ASA. Will await cardiac recs s/p cath. ( solumedrol 125 given prior to cath due to allergy to contrast) Hypertension - BP on low side at this time Holding home BP medications. - Will continue to monitor at this time and readjust as BP's trend. Diabetes Mellitus T2  
 
- Last HgA1c 8.3 on 9/30/19.  
- holding home victoza and Januvia for now - Started on Lispro SSI and resumed home Lantus 30 units  bid 
- Insulin Sliding Scale normal sensitivity with AC&HS glucose checks. - Hypoglycemia protocols ordered. Asthma: mild intermittent, does not seem to be in acute exacerbation. S/p 2 doses of Solumedrol 125 mg and duoneb - continue home arnuity and albuterol prn. Pharmacy to substitute. - reassess respiratory status in am.  
- continue allergy medication: loratadine, singulair Elevated Creatinine on CKD3: POA: 1.34 ( BL ~1-1.2). Also patient is receiving contrast  
 
- holding nephrotoxic medication for now. holing Losartan, Bumex, jardiance, gabapentin Migraine headaches: on Aimovig. Next dose due tomorrow per patient. - resumed Aimovig. FEN/GI - NPO. NS at 140 mL/hr. Activity - Ambulate with assistance DVT prophylaxis - Sub Q Heparin GI prophylaxis - Not indicated at this time Disposition - Admit to Telemetry. Plan to d/c to Home. Code Status - Full, discussed with patient / caregivers. Patient Anat Burden to be discussed Dr. Sonja White (Attending Physician). 1:03 PM, 11/13/19 Tyree Mi MD 
Family Medicine Resident For Billing Chief Complaint Patient presents with  Chest Pain  Shortness of Breath Hospital Problems  Date Reviewed: 9/30/2019 Codes Class Noted POA Chest pain ICD-10-CM: R07.9 ICD-9-CM: 786.50  11/13/2019 Unknown

## 2019-11-13 NOTE — Clinical Note
Lesion located in the Mid Cx. Balloon inflated using multiple inflations inflation technique. Pressure = 18 katerin; Duration = 20 sec.

## 2019-11-13 NOTE — Clinical Note
Lesion located in the Mid Cx. Balloon inserted. Balloon inflated using multiple inflations inflation technique. Pressure = 12 katerin; Duration = 30 sec. Inflation 2: Pressure: 14 katerin; Duration: 30 sec.

## 2019-11-13 NOTE — ED PROVIDER NOTES
HPI the patient complains of shortness of breath, wheezing, pleuritic chest pain that started this morning. She has a history of asthma. She also admits to dry cough. She denies having fever, upper respiratory symptoms, vomiting, abdominal pain or leg swelling. Past Medical History:  
Diagnosis Date  Abnormal pap 2012 10/5/13   
 hx w/Colpo  Arthritis  Asthma  Diabetes ([address] type 2  
 GERD (gastroesophageal reflux disease)  Headache(784.0)  Hyperlipidemia  Hypertension  Joint pain  Legally blind   
 blind in right eye.  DOC (obstructive sleep apnea)   
 uses CPAP  
 Pap smear for cervical cancer screening 10/1/15 ASCUS HPV NEG RP 3 YEARS  
 PUD (peptic ulcer disease)  Ringing in ears Past Surgical History:  
Procedure Laterality Date  HX CATARACT REMOVAL  2019  
 left eye  HX  SECTION  01 & 08  HX COLPOSCOPY  2012 neg  HX HEENT    
 cataract R eye  HX OTHER SURGICAL  2018  
 retina reattached in left eye  HX RETINAL DETACHMENT REPAIR Left \"put a gas bubble in the back of the eye\"  HX TUBAL LIGATION Family History:  
Problem Relation Age of Onset  Diabetes Mother  Heart Disease Mother  Hypertension Mother  Stroke Mother  Hypertension Father  Stroke Father  Heart Disease Father  Breast Cancer Neg Hx Social History Socioeconomic History  Marital status:  Spouse name: Not on file  Number of children: 2  
 Years of education: Not on file  Highest education level: Not on file Occupational History  Occupation: -- Social Needs  Financial resource strain: Not on file  Food insecurity:  
  Worry: Not on file Inability: Not on file  Transportation needs:  
  Medical: Not on file Non-medical: Not on file Tobacco Use  Smoking status: Never Smoker  Smokeless tobacco: Never Used Substance and Sexual Activity  Alcohol use: No  
 Drug use: No  
 Sexual activity: Yes  
  Partners: Male Birth control/protection: Surgical  
Lifestyle  Physical activity:  
  Days per week: Not on file Minutes per session: Not on file  Stress: Not on file Relationships  Social connections:  
  Talks on phone: Not on file Gets together: Not on file Attends Orthodox service: Not on file Active member of club or organization: Not on file Attends meetings of clubs or organizations: Not on file Relationship status: Not on file  Intimate partner violence:  
  Fear of current or ex partner: Not on file Emotionally abused: Not on file Physically abused: Not on file Forced sexual activity: Not on file Other Topics Concern   Service No  
 Blood Transfusions No  
 Caffeine Concern No  
 Occupational Exposure No  
 Hobby Hazards No  
 Sleep Concern Yes  Stress Concern Yes  Weight Concern Yes  Special Diet Yes Comment: DM  
 Back Care Yes  Exercise Yes  Bike Helmet No  
 Seat Belt Yes  Self-Exams Yes Social History Narrative  Not on file ALLERGIES: Aspirin; Contrast agent [iodine]; Dilaudid [hydromorphone]; Iodinated contrast media; Lisinopril; Metformin; Reglan  [metoclopramide hcl]; and Reglan [metoclopramide] Review of Systems Constitutional: Negative for fever. Eyes: Negative for visual disturbance. Respiratory: Positive for cough, shortness of breath and wheezing. Cardiovascular: Positive for chest pain. Negative for leg swelling. Gastrointestinal: Negative for abdominal pain, diarrhea, nausea and vomiting. Genitourinary: Negative for flank pain. Musculoskeletal: Negative. Negative for back pain and neck stiffness. Skin: Negative for rash. Neurological: Negative. Negative for syncope and headaches. Psychiatric/Behavioral: Negative for confusion. All other systems reviewed and are negative. Vitals:  
 11/13/19 3272 BP: 130/75 Pulse: (!) 113 Resp: 18 Temp: 97.9 °F (36.6 °C) SpO2: 97% Weight: 99.8 kg (220 lb) Height: 5' 2\" (1.575 m) Physical Exam  
Constitutional: She appears well-developed and well-nourished. No distress. HENT:  
Head: Normocephalic. Eyes: Pupils are equal, round, and reactive to light. Neck: Normal range of motion. Cardiovascular: Normal rate and regular rhythm. No murmur heard. Pulmonary/Chest: Effort normal. No respiratory distress. She has decreased breath sounds. Breath sounds are decreased bilaterally. I do not hear wheezing in the lungs. She seems to make wheezing sounds with her vocal cords. Abdominal: Soft. There is no tenderness. Musculoskeletal: Normal range of motion. She exhibits no edema. Neurological: She is alert. She has normal strength. No cranial nerve deficit. Skin: Skin is warm and dry. Psychiatric: She has a normal mood and affect. Her behavior is normal.  
Nursing note and vitals reviewed. MDM Number of Diagnoses or Management Options Chest pain, unspecified type:  
Elevated troponin:  
  
Amount and/or Complexity of Data Reviewed Clinical lab tests: ordered and reviewed Obtain history from someone other than the patient: yes Review and summarize past medical records: yes Discuss the patient with other providers: yes Independent visualization of images, tracings, or specimens: yes Risk of Complications, Morbidity, and/or Mortality Presenting problems: high Diagnostic procedures: high Management options: high Critical Care Total time providing critical care: (70 min.) Procedures ED EKG interpretation: 
Rhythm: s tach rate 112. nsst changes. This EKG was interpreted by Andres López MD,ED Provider.  
 
9:55 AM-I have spoken with the family practice resident and she has accepted the patient for admission. We are awaiting cardiology callback. Spoke c dr. Josie Gunter - he will see at [address] [place] [address].

## 2019-11-13 NOTE — Clinical Note
Lesion located in the Mid Cx. Balloon inserted. Balloon inflated using multiple inflations inflation technique.

## 2019-11-13 NOTE — Clinical Note
Lesion located in the Mid Cx. Balloon inserted. Balloon inflated using multiple inflations inflation technique. Pressure = 8 katerin; Duration = 16 sec. Inflation 2: Pressure: 8 katerin; Duration: 25 sec.

## 2019-11-13 NOTE — PROGRESS NOTES
TRANSFER - IN REPORT: 
 
Verbal report received from Lucia(name) on Sparkle Nuñez  being received from [facility](unit) for routine progression of care Report consisted of patients Situation, Background, Assessment and  
Recommendations(SBAR). Information from the following report(s) SBAR, Kardex, ED Summary, Procedure Summary, Intake/Output, MAR, Recent Results and Cardiac Rhythm sinus tach was reviewed with the receiving nurse. Opportunity for questions and clarification was provided. Assessment completed upon patients arrival to unit and care assumed. Pt is pt of [facility], will notify cards and FP upon arrival. Pt has nitro paste on chest, received 2x 2mg morphine 0930 & 1000, nebs x3, on 2L O2. Pt was not able to list meds for RN. EMS has arrived at time of report & pt will arrive shortly. Labs remarkable for Crt 1.34, trop 0.56 
 
1201 family practice notified of pt arrival. Pt states cp 9/10, audible wheezing coming from upper anterior. BP soft, nitro paste removed. Pt has irregular HR per tele, EKG performed [address] Dr Rossy Keith given update re: EKG results, Dr Iliana Felix will round on pt soon. Pt to remain NPO until cards sees her, pt aware [address] [place] on floor, shown EKG & given update. Notified that this RN recommends pulm consult for asthma/SOB New orders as follows: 
NS @ 140 mL/hr 
NPO now Q6 trop EKG q6 performed concurrently, next due [address] SCDs ACHS BG/SSI Nebs BID/q6 prn 
 
1247 Dr Iliana Felix at bedside, shown EKG. Verbal order from Dr Robert York to dc nitro paste & any beta blockers. Pt to be taken to cath lab when slot available. Order for solumedrol 125 q8 to counteract contrast. Per MD continue q6 trop/EKG until cath, then dc. 1315 pt off floor to cath lab, tele notified [address] assumed care of pt, pt off floor at time of report

## 2019-11-14 PROBLEM — I21.4 NSTEMI (NON-ST ELEVATED MYOCARDIAL INFARCTION) (HCC): Status: ACTIVE | Noted: 2019-01-01

## 2019-11-14 NOTE — PROGRESS NOTES
Cardiology Progress Note [address] [facility]. [address], [place], [address] Phone [phone]; Fax [phone] 
 
 
 
2019 11:44 AM  
 
Admit Date:           2019 Admit Diagnosis:  Chest pain [R07.9] :          1980 MRN:          [id number] ASSESSMENT/RECOMMENDATION:  
CAD s/p PTCA to Mid Lcflex unable to deploy stent d/t anatomy: 
-start coreg 3.125 mg BID- home med - post procedure ECG shows sinus tachycardia- difficult to see P waves, hx of first degree block. Check troponin d/t left shoulder pain - atypical CP constant for over 6 hours 
-resume imdur at lower dose- hold till tomorrow d/t migraine  
-Resume ARB once renal function improves and BP can tolerate  
- cont plavix/statin. No ASA- d/t allergy 
-arterial duplex of groin to r/p pseudoaneurysm Echo shows normal LVEF Noted troponin 14- expected s/p cardiac cath and PTCA- reviewed w Dr Bernardino Coburn -trend troponin till they trend down JORGITO: creatinine 1.9 post procedure - start IVF Migraines DMII uncontrolled Obesity Body mass index is 40.24 kg/m². Pt personally seen and examined. Chart reviewed. Agree with advanced NP's history, exam and  A/P with changes/additons. Discussed with patient about her coronary anatomy/PTCA procedure done yesterday. Troponin 14: Recheck to see downward trend Echo-normal EF Patient has chronic chest pain/generalized pain. Will restart Imdur/Coreg. Uptitrate Coreg dose as tolerated. Her diabetes is not well controlled. Needs very aggressive risk factor modification. Since EF is normal on echo patient's chest pain is chronic-will consider repeat cath/reattempt PCI of native circumflex ; PCI of OM1 at later date if needed. Mild increase in creatinine post cath. IV fluids. Repeat creatinine in a.m. Discussed with patient/nursing Juan Faulkner MD, [facility] No intake/output data recorded. Last 3 Recorded Weights in this Encounter 11/13/19 7720 11/14/19 6020 Weight: 220 lb (99.8 kg) 220 lb (99.8 kg)  
 
 
 
11/12 1901 - 11/14 0700 In: -  
Out: 800 [Urine:800] SUBJECTIVE Isaak Joy denies palpitations, irregular heart beat, SOB. C/o headache C/o right groin tenderness + indgestion 
+ left shoulder pain constant since last night No lightheadedness or dizziness Current Facility-Administered Medications Medication Dose Route Frequency  pantoprazole (PROTONIX) tablet 40 mg  40 mg Oral ACB  insulin lispro (HUMALOG) injection 10 Units  10 Units SubCUTAneous ONCE  prochlorperazine (COMPAZINE) injection 10 mg  10 mg IntraVENous ONCE  
 metoprolol tartrate (LOPRESSOR) tablet 25 mg  25 mg Oral Q12H  
 0.9% sodium chloride infusion  50 mL/hr IntraVENous CONTINUOUS  
 insulin lispro (HUMALOG) injection 10 Units  10 Units SubCUTAneous TIDAC  insulin lispro (HUMALOG) injection 8 Units  8 Units SubCUTAneous ONCE  
 influenza vaccine 2019-20 (6 mos+)(PF) (FLUARIX/FLULAVAL/FLUZONE QUAD) injection 0.5 mL  0.5 mL IntraMUSCular PRIOR TO DISCHARGE  albuterol (PROVENTIL VENTOLIN) nebulizer solution 2.5 mg  2.5 mg Nebulization Q6H PRN  
 budesonide (PULMICORT) 500 mcg/2 ml nebulizer suspension  500 mcg Nebulization BID RT  
 loratadine (CLARITIN) tablet 10 mg  10 mg Oral DAILY  montelukast (SINGULAIR) tablet 10 mg  10 mg Oral QHS  sodium chloride (NS) flush 5-40 mL  5-40 mL IntraVENous Q8H  
 sodium chloride (NS) flush 5-40 mL  5-40 mL IntraVENous PRN  
 heparin (porcine) injection 5,000 Units  5,000 Units SubCUTAneous Q8H  
 insulin lispro (HUMALOG) injection   SubCUTAneous AC&HS  
 glucose chewable tablet 16 g  4 Tab Oral PRN  
 dextrose 10% infusion 0-250 mL  0-250 mL IntraVENous PRN  
  glucagon (GLUCAGEN) injection 1 mg  1 mg IntraMUSCular PRN  
 erenumab-aooe (AIMOVIG) injection 70 mg   (Patient Supplied)  70 mg SubCUTAneous Q30D  
 insulin glargine (LANTUS) injection 30 Units  30 Units SubCUTAneous BID  atorvastatin (LIPITOR) tablet 20 mg  20 mg Oral DAILY  sodium chloride (NS) flush 5-40 mL  5-40 mL IntraVENous Q8H  
 sodium chloride (NS) flush 5-40 mL  5-40 mL IntraVENous PRN  
 acetaminophen (TYLENOL) tablet 650 mg  650 mg Oral Q4H PRN  
 fentaNYL citrate (PF) injection 25 mcg  25 mcg IntraVENous ONCE PRN  
 clopidogrel (PLAVIX) tablet 75 mg  75 mg Oral DAILY OBJECTIVE Intake/Output Summary (Last 24 hours) at 11/14/2019 1144 Last data filed at 11/14/2019 0600 Gross per 24 hour Intake  Output 800 ml Net -800 ml Review of Systems - History obtained from the patient AS PER  HPI Telemetry sinus tachycardia first degree block PHYSICAL EXAM  
  
 
Visit Vitals BP (!) 164/92 (BP 1 Location: Right arm, BP Patient Position: At rest) Pulse (!) 113 Temp 97.2 °F (36.2 °C) Resp 22 Ht 5' 2\" (1.575 m) Wt 220 lb (99.8 kg) LMP 10/15/2019 SpO2 94% BMI 40.24 kg/m² Gen: Well-developed, morbid obesity , in no acute distress  alert and oriented x 3 HEENT:  Pink conjunctivae, Hearing grossly normal.No scleral icterus or conjunctival, moist mucous membranes Neck: Supple, No JVD Resp: No accessory muscle use, Clear breath sounds, No rales or rhonchi 
Card: Regular/tachycardic  Rate,Rythm,No murmurs, rubs or gallop. No thrills. GI:          soft, non-tender MSK: No cyanosis or clubbing, good capillary refill Skin: No rashes or ulcers, no bruising Neuro:  Cranial nerves are grossly intact, moving all four extremities, no focal deficit, follows commands appropriately Psych:  Good insight, oriented to person, place and time, alert, Nml Affect LE: No edema. +2 DP pulses kyle LE warm to touch Right groin site covered with CDI bandage - no drainage - very tender to touch. Difficult to exam for hematoma d/t body habitus and tenderness DATA REVIEW Laboratory and Imaging have been reviewed by me and are notable for Recent Labs 11/13/19 
6376 TROIQ 0.56* Recent Labs 11/14/19 
0205 11/13/19 
0195  139  
K 4.5 3.6 CO2 19* 26 BUN 23* 12  
CREA 1.90* 1.34* * 124* WBC 12.5* 7.6 HGB 12.5 11.9 HCT 37.9 36.8  289 Luana Grimes, REYNA

## 2019-11-14 NOTE — PROGRESS NOTES
CMS Note 11/14/2019 Patient received and signed both the Observation and MOON letters. Patient was given copies for their record. Zakiya Sterling CMS

## 2019-11-14 NOTE — PROGRESS NOTES
0700-Bedside and Verbal shift change report given to [address] [place] [address] (oncoming nurse) by Arnoldo Nyhan  (offgoing nurse). Report included the following information SBAR, Kardex, Intake/Output, MAR, Accordion, Recent Results, Med Rec Status and Cardiac Rhythm sinus tach with 1 degree avb.  
 
0734- Notified FP that pts BG is 572, no orders received at this time. 0830- Medicated pt with 10 units of SSI lispro and morning medications. Pt complaining of 10/10 headache pain. Notified FP.  
 
0930- Notified FP pt complaining of worsening 10/10 headache. MD acknowledged. 1000- Attempted to call FP again regarding pts headache, unable to reach. 1015- Notified FP that pt is complaining of a headache, MD reports she thought something was put in, will place orders now. 56-  Met FP MD at pts bedside. MD at bedside to assess pain. Notified FP that an MD from their group had placed orders for compazine. MD at bedside notified this RN that she will correct the order to include other components of the \"migraine cocktail\" that the pt is requesting. Awaiting orders. 1055- Notified FP medication orders have not placed. 1135- Notified from tech that pts was >600 . Notified FP. Ordered received to give 8 units of Lispo for SSI plus 10 units of lispro with meals. Will recheck in 30 mins. 1255-Notified FP of pts  after 18 units of lispro. 1510- Notified FP pts BG after recheck is 408 and that pt is requesting pain medication for her headache that has not resolved. FP acknowledged. 1946 Leah Weinberg with Dr. Aaliyah Barrientos, updated her on pts pain. MD acknowledged. 0- Spoke with pharmacist to determine best way to medicate pt. Pharmacist advised to start pt with magnesium sulfate then valproate. 1700- Attempted to draw troponin from pt, unsuccessful. JOSSELIN Robison attempted x2 also unsuccessful. Called ICU to ask for assistance.  Notified that they would come assist.  
 
 200- Dr. Antonina Gregorio notified that pts troponin 30.30. MD acknowledged. 1900- Bedside and Verbal shift change report given to JOSSELIN Springer  (oncoming nurse) by Le Beth (offgoing nurse). Report included the following information SBAR, Kardex, Intake/Output, MAR, Accordion, Recent Results, Med Rec Status and Cardiac Rhythm sinus tach. Karoline Walter

## 2019-11-14 NOTE — DIABETES MGMT
[facility] Progress Note Recommendations/ Comments: Review for extreme hyperglycemia. Noted 10 units of meal time insulin with each meal  resumed at lunch today. IV steroid d/c. Will continue to follow BS. Current hospital DM medication: lispro insulin correction scale plus 10 units with meals, Lantus 30 units bid Chart reviewed on Sparkle Nuñez. Patient is a 44 y.o. female with known  Type 2 Diabetes on oral agent (monotherapy): Jardiance 10 mg daily, insulin injections: Lantus : 30 units bid, aspart 8-10 units per meal,  Victoza 1.2 mg daily  at home. A1c:  
Lab Results Component Value Date/Time Hemoglobin A1c 9.1 (H) 04/08/2019 09:12 AM  
 Hemoglobin A1c 10.9 (H) 05/30/2018 10:43 AM  
 
 
Recent Glucose Results:  
Lab Results Component Value Date/Time  (H) 11/14/2019 02:05 AM  
 GLUCPOC 488 (H) 11/14/2019 12:51 PM  
 GLUCPOC 521 (H) 11/14/2019 11:28 AM  
 GLUCPOC >600 ([place]) 11/14/2019 11:27 AM  
  
 
Lab Results Component Value Date/Time Creatinine 1.90 (H) 11/14/2019 02:05 AM  
 
Estimated Creatinine Clearance: 43.9 mL/min (A) (based on SCr of 1.9 mg/dL (H)). Active Orders Diet DIET DIABETIC CONSISTENT CARB Regular PO intake: No data found. Will continue to follow as needed. Thank you Time spent: 6 min

## 2019-11-14 NOTE — CARDIO/PULMONARY
Cardiac Rehab: 43 yo female admitted with Chest Pain (11/13). Per Dr Danielle Wong, dx includes: NSTEMI. S/P PTCA to LCX (11/13). LVEF 55%. Core Measures: ACE/ARB - losartan BB - carvedilol Statin - atorvastatin 20 mg 
ASA - none (pt reported allergy to aspirin) Prior to admission meds also included: Imdur, Bumex, and sodium bicarb. New PO Cardiac Medications: Plavix. Smoking History: Never smoked. 11/14/2019 Met with Jovi Reynaga on [facility]. Her  was also present. Pt reported she resides with her  in [place]; has 24 yo daughter & 7 yo son. 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 Discussed pt's understanding of her cardiac condition and PCI procedure. Pt indicated awareness that she had a \"mild heart attack. \" Also aware that her blockage was \"ballooned,\" and that she has been started on Plavix. Provided MI education folder. Reviewed benefits of outpatient cardiac rehab. Pt reported she has home exercise equipment, but was unsure if it was a bike or a stepper. Stated, \"I've been lazy. \" Pt indicated that she would talk with Dr Jose Lawson about cardiac rehab. Pt verbalized basic understanding and had no questions.

## 2019-11-14 NOTE — PROGRESS NOTES
Reason for Admission:   Chest Pain RRAT Score:      4 Plan for utilizing home health:     Pt has never had home health before. Current Advanced Directive/Advance Care Plan: Full Code, no AD on File Transition of Care Plan:    Pt lives with her  Lore [phone]. Pt lives in a one story home with 3 steps to enter the home. Pt does not drive, she relies on family members for transportation. Pt has prescription coverage under her insurance plan, she gets her prescriptions filled at [facility]. Pt does not have any DME. NN notified of hospital admission. Plan 1. Return home with family assistance. 2. Follow up with PCP. 3. CM will continue to follow. Care Management Interventions PCP Verified by CM: Yes(Viji Henderson ) Bevhart Signup: No 
Discharge Durable Medical Equipment: No 
Health Maintenance Reviewed: Yes Physical Therapy Consult: No 
Occupational Therapy Consult: No 
Speech Therapy Consult: No 
Current Support Network: Lives with Spouse Confirm Follow Up Transport: Family Plan discussed with Pt/Family/Caregiver: (S) Yes [employer] Information Provided?: No 
Discharge Location Discharge Placement: Home Thurman Boeck, MSW

## 2019-11-15 NOTE — CDMP QUERY
2) Patient admitted with NSTEMI and is s/p cath, noted to have Type 4 MI per cardiology. If possible, please document in progress notes and d/c summary if you are evaluating and/or treating any of the following: 
 
=> Type 4 MI- s/p intervention 
=> No type 4 MI  
=> Other Explanation of clinical findings 
=> Clinically Undetermined (no explanation for clinical findings) The medical record reflects the following: 
   Risk Factors: 43 yo F admitted with NSTEMI and s/p cardiac cath Clinical Indicators: 11/14 cardio pn states \" Check troponin d/t left shoulder pain - atypical CP constant for over 6 hours\"   Troponin 14.10  39.70 33.50  
  11/15 cardio PN states \" Type 4 MI- s/p intervention-Troponin 39- continue to trend till it comes down q6H\" Treatment: trend troponins Please clarify and document your clinical opinion in the progress notes and discharge summary including the definitive and/or presumptive diagnosis, (suspected or probable), related to the above clinical findings. Please include clinical findings supporting your diagnosis. Thank you for your time [facility] RN/BSN, CCDS Desk:   [phone] Other:  [phone]

## 2019-11-15 NOTE — PROGRESS NOTES
Client requesting neb treatment for shortness of breath. Client has been set for discharge. Will call Family Practice and inform then of client's complaint. Neb treatment already requested from Respiratory Tech.

## 2019-11-15 NOTE — DISCHARGE INSTRUCTIONS
Patient Education     Heart Attack: Care Instructions  Your Care Instructions    A heart attack (myocardial infarction, or MI) occurs when one or more of the coronary arteries, which supply the heart with oxygen-rich blood, is blocked. A blockage usually occurs when plaque inside the artery breaks open and a blood clot forms in the artery. After a heart attack, you may be worried about your future. Over the next several weeks, your heart will start to heal. Though it can be hard to break old habits, you can prevent another heart attack by making some lifestyle changes and by taking medicines. You may use this information for ideas about what to do at home to speed your recovery. Follow-up care is a key part of your treatment and safety. Be sure to make and go to all appointments, and call your doctor if you are having problems. It's also a good idea to know your test results and keep a list of the medicines you take. How can you care for yourself at home? Activity    · Until your doctor says it is okay, do not do strenuous exercise. And do not lift, pull, or push anything heavy. Ask your doctor what types of activities are safe for you.     · If your doctor has not set you up with a cardiac rehabilitation (rehab) program, talk to him or her about whether that is right for you. Cardiac rehab includes supervised exercise. It also includes help with diet and lifestyle changes and emotional support. It may reduce your risk of future heart problems.     · Increase your activities slowly. Take short rest breaks when you get tired.     · If your doctor recommends it, get more exercise. Walking is a good choice. Bit by bit, increase the amount you walk every day. Try for at least 30 minutes on most days of the week. You also may want to swim, bike, or do other activities.  Talk with your doctor before you start an exercise program to make sure it is safe for you.     · Ask your doctor when you can drive, go back to work, and do other daily activities again.     · You can have sex as soon as you feel ready for it. Often this means when you can easily walk around or climb stairs. Talk with your doctor if you have any concerns. If you are taking nitroglycerin, do not take erection-enhancing medicine such as sildenafil (Viagra), tadalafil (Cialis), or vardenafil (Levitra) .    Lifestyle changes    · Do not smoke. Smoking increases your risk of another heart attack. If you need help quitting, talk to your doctor about stop-smoking programs and medicines. These can increase your chances of quitting for good.     · Eat a heart-healthy diet that is low in saturated fat and salt, and is full of fruits, vegetables and whole-grains. Eat at least two servings of fish each week. You may get more details about how to eat healthy. But these tips can help you get started.     · Stay at a healthy weight, or lose weight if you need to. Medicines    · Be safe with medicines. Take your medicines exactly as prescribed. Call your doctor if you think you are having a problem with your medicine. You will get more details on the specific medicines your doctor prescribes. Do not stop taking your medicine unless your doctor tells you to. Not taking your medicine might raise your risk of having another heart attack.     · You may need several medicines to help lower your risk of another heart attack. These include:  ? Blood pressure medicines such as angiotensin-converting enzyme (ACE) inhibitors, ARBs (angiotensin II receptor blockers), and beta-blockers. ? Cholesterol medicine called statins. ? Aspirin and other blood thinners. These prevent blood clots that can cause a heart attack.     · If your doctor has given you nitroglycerin, keep it with you at all times. If you have angina symptoms, such as chest pain or pressure, sit down and rest. Take the first dose of nitroglycerin as directed.  If symptoms get worse or are not getting better within 5 minutes, call 911 right away. Stay on the phone. The emergency  will tell you what to do.     · Do not take any over-the-counter medicines, vitamins, or herbal products without talking to your doctor first.    Staying healthy    · Manage other health conditions such as high blood pressure and diabetes.     · Avoid colds and flu. Get a pneumococcal vaccine shot. If you have had one before, ask your doctor whether you need another dose. Get the flu vaccine every year. If you must be around people with colds or flu, wash your hands often.     · Be sure to tell your doctor about any angina symptoms you have had, even if they went away. Pay attention to your angina symptoms. Know what is typical for you and learn how to control it. Know when to call for help.     · Talk to your family, friends, or a counselor about your feelings. It is normal to feel frightened, angry, hopeless, helpless, and even guilty. Talking openly about bad feelings can help you cope. If you have symptoms of depression, talk to your doctor. When should you call for help? Call 911 anytime you think you may need emergency care. For example, call if:    · You have symptoms of a heart attack. These may include:  ? Chest pain or pressure, or a strange feeling in the chest.  ? Sweating. ? Shortness of breath. ? Nausea or vomiting. ? Pain, pressure, or a strange feeling in the back, neck, jaw, or upper belly or in one or both shoulders or arms. ? Lightheadedness or sudden weakness. ? A fast or irregular heartbeat. After you call 911, the  may tell you to chew 1 adult-strength or 2 to 4 low-dose aspirin. Wait for an ambulance.  Do not try to drive yourself.     · You have angina symptoms (such as chest pain or pressure) that do not go away with rest or are not getting better within 5 minutes after you take a dose of nitroglycerin.     · You passed out (lost consciousness).     · You feel like you are having another heart attack.    Call your doctor now or seek immediate medical care if:    · You are having angina symptoms, such as chest pain or pressure, more often than usual, or the symptoms are different or worse than usual.     · You have new or increased shortness of breath.     · You are dizzy or lightheaded, or you feel like you may faint.    Watch closely for changes in your health, and be sure to contact your doctor if you have any problems. Where can you learn more? Go to http://tremaine.info/. Enter [id number] in the search box to learn more about \"Heart Attack: Care Instructions. \"  Current as of: 2019  Content Version: 12.2  © 1033-2567 [employer]. Care instructions adapted under license by [employer] (which disclaims liability or warranty for this information). If you have questions about a medical condition or this instruction, always ask your healthcare professional. [place] [address] any warranty or liability for your use of this information. HOME DISCHARGE INSTRUCTIONS    Sandhya Mercer / [id number] : 1980    Admission date: 2019 Discharge date: 11/15/2019     Please bring this form with you to show your care provider at your follow-up appointment. Primary care provider:  America Arango MD    Discharging provider:  Tao Murphy DO  - Family Medicine Resident  Dr. Avelina Coorna - Attending, Family Medicine     You have been admitted to the hospital with the following diagnoses:    ACUTE DIAGNOSES:  Chest pain [R07.9]  [facility]STEM[facility] (non-ST elevated [facility]) [facility] [I21.4]  . . . . . . . . . . . . . . . . . . . . . . . . . . . . . . . . . . . . . . . . . . . . . . . . . . . . . . . . . . . . . . . . . . . . . . . Timmy Lobo    FOLLOW-UP CARE RECOMMENDATIONS:    Appointments  Follow-up Information     Follow up With Specialties Details Why Contact Info    Quinton Chu MD Family Practice Go on 2019 @10:15AM [phone] [address] [facility]  [phone]      Roby Schaumann, MD Cardiology On 11/22/2019 1 PM [address]  [address] [facility] [phone]           Please follow up with your PCP regarding:  - resolution of your chest pain  - coordination of care with your Cardiologist  - diabetes medication management      MEDICATION CHANGES:  1. START Plavix 75mg (1 tablet) daily for your recent heart attack  2. START Lopressor 50mg (1 tablet) two times daily for your blood pressure  3. INCREASE home Lipitor 80mg (1 tablet) daily for your high cholesterol  4. START Imdur 30mg (1 tablet) daily for your blood pressure  5. START Losartan 12.5mg (half tablet) daily for your blood pressure    Monitor your blood sugars before each meal and before bedtime.   -If your blood sugar is between 140-199, give 2 units extra of insulin lispro  -Between 200-249, give 3 units of insulin lispro  -Between 250-299, give 4 units of insulin lispro  -Between 300-349, give 5 units of insulin lispro  -If > 350, call your PCP    Follow-up tests needed: None    Pending test results: At the time of your discharge the following test results are still pending: None. Please make sure you review these results with your outpatient follow-up provider(s). Specific symptoms to watch for: chest pain, shortness of breath, fever, chills, nausea, vomiting, diarrhea, change in mentation, falling, weakness, bleeding. DIET/what to eat:  Diabetic and Cardiac diet    ACTIVITY:  Activity as tolerated    Wound care: None    Equipment needed:  None    What to do if new or unexpected symptoms occur? If you experience any of the above symptoms (or should other concerns or questions arise after discharge) please call your primary care physician. Return to the emergency room if you cannot get hold of your doctor. · It is very important that you keep your follow-up appointment(s).   · Please bring discharge papers, medication list (and/or medication bottles) to your follow-up appointments for review by your outpatient provider(s). · Please check the list of medications and be sure it includes every medication (even non-prescription medications) that your provider wants you to take. · It is important that you take the medication exactly as they are prescribed. · Keep your medication in the bottles provided by the pharmacist and keep a list of the medication names, dosages, and times to be taken in your wallet. · Do not take other medications without consulting your doctor. · If you have any questions about your medications or other instructions, please talk to your nurse or care provider before you leave the hospital.     Information obtained by:     I understand that if any problems occur once I am at home I am to contact my physician. These instructions were explained to me and I had the opportunity to ask questions. I understand and acknowledge receipt of the instructions indicated above.

## 2019-11-15 NOTE — DIABETES MGMT
[facility] Progress Note Recommendations/ Comments: Review for extreme hyperglycemia. Note Lantus increased to 35 units BID today and meal time insulin increased to 15 units ac tid. DTC to continue to follow. Current hospital DM medication: lispro insulin correction scale plus 15 units with meals, Lantus 35 units bid Chart reviewed on Sparkle Nuñez. Patient is a 44 y.o. female with known  Type 2 Diabetes on oral agent (monotherapy): Jardiance 10 mg daily, insulin injections: Lantus : 30 units bid, aspart 8-10 units per meal,  Victoza 1.2 mg daily  at home. A1c:  
Lab Results Component Value Date/Time Hemoglobin A1c 9.1 (H) 04/08/2019 09:12 AM  
 Hemoglobin A1c 10.9 (H) 05/30/2018 10:43 AM  
 
 
Recent Glucose Results:  
Lab Results Component Value Date/Time  (H) 11/15/2019 01:19 AM  
 GLUCPOC 323 (H) 11/15/2019 06:17 AM  
 GLUCPOC 356 (H) 11/15/2019 12:53 AM  
 GLUCPOC 417 (H) 11/14/2019 09:07 PM  
  
 
Lab Results Component Value Date/Time Creatinine 1.83 (H) 11/15/2019 01:19 AM  
 
Estimated Creatinine Clearance: 45.3 mL/min (A) (based on SCr of 1.83 mg/dL (H)). Active Orders Diet DIET DIABETIC CONSISTENT CARB Regular PO intake: No data found. Will continue to follow as needed. Thank you Kedar Trujillo RD, CDE Time spent: 4 min

## 2019-11-15 NOTE — PROGRESS NOTES
[facility]an[facility] Zac [facility]rec[facility] [address] Office [phone] Fax [phone] Assessment and Plan Reginaldo Gates is a 44 y.o. female with history of history of HTN, asthma, DM, CAD, who is admitted for ACS. She has spent 1 night(s) in the hospital. 
 
24 Hour Events: No acute events. NSTEMI POA, now Type 4 MI acute. S/p Cath 11/13. Trop 0.56>14.1>30.3>39.7 
- Cardiology following, appreciate recs - trend trops q6H 
- plavix 75mg daily, home Lipitor 20mg daily. 
  
Hypertension chronic, stable. - Home coreg 3.125mg BID, imdur 60mg daily 
- Hold home losartan 100mg daily, bumex 0.5mg daily 
  
Diabetes Mellitus T2 with hyperglycemia chronic, uncontrolled. - holding home victoza and Januvia 
- SSI + (increased) 15 units lispro qAC, and home Lantus (increased) 35 units bid 
  
Asthma: 
- continue pulmicort and albuterol nebs prn in place of home arnuity and albuterol prn. 
- continue home claritin, singulair 
  
Elevated Creatinine on CKD3: stable. - Avoid nephrotoxic agents. 
  
Migraine headaches: stable. - Hold home [address] (due 11/18) - tylenol 650mg q6H prn 
- s/p valproate 1g FEN/GI - Diabetic diet. Activity - Ambulate with assistance DVT prophylaxis - Sub Q Heparin GI prophylaxis - Protonix Disposition - Plan to d/c to Home. Code Status - Full Patient Reginaldo Gates will be discussed with Dr. Carrie Fair (Attending physician). Bharati Costello DO Family Medicine Resident Subjective / Objective Pt states chest discomfort is improved from yesterday. Complains of headache this AM. Denies SOB, nausea, vomiting, abdominal pain, dizziness. Inpatient Medications: 
Current Facility-Administered Medications Medication Dose Route Frequency  pantoprazole (PROTONIX) tablet 40 mg  40 mg Oral ACB  insulin lispro (HUMALOG) injection 10 Units  10 Units SubCUTAneous TIDAC  carvedilol (COREG) tablet 3.125 mg  3.125 mg Oral BID WITH MEALS  
  isosorbide mononitrate ER (IMDUR) tablet 30 mg  30 mg Oral DAILY  influenza vaccine 2019-20 (6 mos+)(PF) (FLUARIX/FLULAVAL/FLUZONE QUAD) injection 0.5 mL  0.5 mL IntraMUSCular PRIOR TO DISCHARGE  albuterol (PROVENTIL VENTOLIN) nebulizer solution 2.5 mg  2.5 mg Nebulization Q6H PRN  
 budesonide (PULMICORT) 500 mcg/2 ml nebulizer suspension  500 mcg Nebulization BID RT  
 loratadine (CLARITIN) tablet 10 mg  10 mg Oral DAILY  montelukast (SINGULAIR) tablet 10 mg  10 mg Oral QHS  sodium chloride (NS) flush 5-40 mL  5-40 mL IntraVENous Q8H  
 sodium chloride (NS) flush 5-40 mL  5-40 mL IntraVENous PRN  
 heparin (porcine) injection 5,000 Units  5,000 Units SubCUTAneous Q8H  
 insulin lispro (HUMALOG) injection   SubCUTAneous AC&HS  
 glucose chewable tablet 16 g  4 Tab Oral PRN  
 dextrose 10% infusion 0-250 mL  0-250 mL IntraVENous PRN  
 glucagon (GLUCAGEN) injection 1 mg  1 mg IntraMUSCular PRN  
 erenumab-aooe (AIMOVIG) injection 70 mg   (Patient Supplied)  70 mg SubCUTAneous Q30D  
 insulin glargine (LANTUS) injection 30 Units  30 Units SubCUTAneous BID  atorvastatin (LIPITOR) tablet 20 mg  20 mg Oral DAILY  sodium chloride (NS) flush 5-40 mL  5-40 mL IntraVENous Q8H  
 sodium chloride (NS) flush 5-40 mL  5-40 mL IntraVENous PRN  
 acetaminophen (TYLENOL) tablet 650 mg  650 mg Oral Q4H PRN  
 clopidogrel (PLAVIX) tablet 75 mg  75 mg Oral DAILY Temp (24hrs), Av.6 °F (36.4 °C), Min:97.2 °F (36.2 °C), Max:97.8 °F (36.6 °C) Respiratory: O2 Flow Rate (L/min): 2 l/min O2 Device: Room air Visit Vitals /77 (BP 1 Location: Left arm, BP Patient Position: At rest) Pulse 100 Temp 97.8 °F (36.6 °C) Resp 20 Ht 5' 2\" (1.575 m) Wt 218 lb 1.6 oz (98.9 kg) SpO2 97% BMI 39.89 kg/m² Physical Exam: 
Physical Exam  
Constitutional: She is oriented to person, place, and time and well-developed, well-nourished, and in no distress. No distress.   
HENT:  
 Head: Normocephalic and atraumatic. Eyes: Conjunctivae are normal.  
Cardiovascular: Normal rate. Exam reveals no friction rub. No murmur heard. Irregular rhythm Pulmonary/Chest: Effort normal and breath sounds normal. No respiratory distress. She has no wheezes. Abdominal: Soft. Bowel sounds are normal. She exhibits no distension. There is no tenderness. obese Musculoskeletal: She exhibits no edema. Neurological: She is alert and oriented to person, place, and time. Skin: No erythema. I/O: 
Date 11/14/19 0700 - 11/15/19 1649 11/15/19 0700 - 11/16/19 4298 Shift 4011-8891 0516-1916 24 Hour Total 6806-7547 3804-2645 24 Hour Total  
INTAKE  
P.O.  240 240     
  P. O.  240 240     
I. V.(mL/kg/hr)  1246.6 1246.6 Angiomax Volume  50 50 Volume (dextrose 10% infusion 0-250 mL)  0 0 Volume (0.9% sodium chloride infusion)  1096.7 1096.7 Volume (magnesium sulfate 2 g/50 ml IVPB (premix or compounded))  50 50 Volume (valproate (DEPACON) 1,000 mg in 0.9% sodium chloride 50 mL IVPB)  50 50 Shift Total(mL/kg)  1486.6(15) 1486.6(15)     
OUTPUT Shift Total(mL/kg) NET  1486.6 1486. 6 Weight (kg) 99.8 98.9 98.9 98.9 98.9 98.9  
 
 
CBC: 
Recent Labs 11/15/19 
0119 11/14/19 
0205 11/13/19 
2610 WBC 19.5* 12.5* 7.6 HGB 11.3* 12.5 11.9 HCT 35.2 37.9 36.8  313 289 Metabolic Panel: 
Recent Labs 11/15/19 
0119 11/14/19 
0205 11/13/19 
2221  137 139  
K 4.4 4.5 3.6  104 106 CO2 20* 19* 26 BUN 32* 23* 12  
CREA 1.83* 1.90* 1.34* * 431* 124* CA 7.9* 8.5 8.7 ALB  --   --  2.7* SGOT  --   --  23 ALT  --   --  30 For Billing Chief Complaint Patient presents with  Chest Pain  Shortness of Breath Hospital Problems  Date Reviewed: 9/30/2019 Codes Class Noted POA  
 NSTEMI (non-ST elevated myocardial infarction) [facility] ICD-10-CM: I21.4 ICD-9-CM: 410.70  11/14/2019 Unknown Chest pain ICD-10-CM: R07.9 ICD-9-CM: 786.50  11/13/2019 Unknown Type 2 diabetes with nephropathy ([facility] ICD-10-CM: E11.21 
ICD-9-CM: 250.40, 583.81  5/1/2018 Yes Type 2 diabetes mellitus with diabetic neuropathy (HCC) ICD-10-CM: E11.40 ICD-9-CM: 250.60, 357.2  1/10/2018 Yes Elevated serum creatinine ICD-10-CM: R79.89 ICD-9-CM: 790.99  10/20/2016 Yes Essential hypertension ICD-10-CM: I10 
ICD-9-CM: 401.9  11/28/2015 Yes Asthma ICD-10-CM: B91.323 ICD-9-CM: 493.90  3/5/2010 Yes

## 2019-11-15 NOTE — PROGRESS NOTES
Cardiology Progress Note [address], [place], [address] [facility] Phone [phone]; Fax [phone] 
 
 
 
11/15/2019 11:44 AM  
 
Admit Date:           2019 Admit Diagnosis:  Chest pain [R07.9] NSTEMI (non-ST elevated myocardial infarction) ([facility] [I21.4] :          1980 MRN:          [id number] ASSESSMENT/RECOMMENDATION:  
CAD s/p PTCA to [facility] unable to deploy stent d/t anatomy: 
-metoprolol/imdur 
- cont plavix/statin (dose increased). No ASA- d/t allergy 
-arterial duplex of groin negative for pseudoaneurysm Echo shows normal LVEF Type 4 MI- s/p intervention-Troponin 39- continue to trend till it comes down q6H JORGITO: creatinine trending down. S/p IVF Intermittent weinkebache/ accelerated junctional tachycardia: cont to monitor- okay to cont BB. DOC contributing- non compliant w CPAP  
DOC- does not wear CPAP  
PVD: mild RLE per duplex - statin/plavix Migraines DMII uncontrolled Obesity Body mass index is 39.89 kg/m². Will arrange follow up w Dr. Sergio Hancock Need to see troponin's trend down before considering d/c No intake/output data recorded. Last 3 Recorded Weights in this Encounter 19 [phone] 11/15/19 0973 Weight: 220 lb (99.8 kg) 220 lb (99.8 kg) 218 lb 1.6 oz (98.9 kg)  
 
 
 
 1901 - 11/15 0700 In: 2486.6 [P.O.:240; I.V.:2246.6] Out: 400 [Urine:400] SUBJECTIVE Marbasim Uriostegui denies palpitations, irregular heart beat, SOB. Groin feels better today- less sore- she has been moving around H/A better No chest pain or SOB No lightheadedness or dizziness Current Facility-Administered Medications Medication Dose Route Frequency  insulin glargine (LANTUS) injection 35 Units  35 Units SubCUTAneous BID  
  insulin lispro (HUMALOG) injection 15 Units  15 Units SubCUTAneous TIDAC  atorvastatin (LIPITOR) tablet 80 mg  80 mg Oral DAILY  metoprolol tartrate (LOPRESSOR) tablet 50 mg  50 mg Oral BID  losartan (COZAAR) tablet 12.5 mg  12.5 mg Oral DAILY  pantoprazole (PROTONIX) tablet 40 mg  40 mg Oral ACB  isosorbide mononitrate ER (IMDUR) tablet 30 mg  30 mg Oral DAILY  influenza vaccine 2019-20 (6 mos+)(PF) (FLUARIX/FLULAVAL/FLUZONE QUAD) injection 0.5 mL  0.5 mL IntraMUSCular PRIOR TO DISCHARGE  albuterol (PROVENTIL VENTOLIN) nebulizer solution 2.5 mg  2.5 mg Nebulization Q6H PRN  
 budesonide (PULMICORT) 500 mcg/2 ml nebulizer suspension  500 mcg Nebulization BID RT  
 loratadine (CLARITIN) tablet 10 mg  10 mg Oral DAILY  montelukast (SINGULAIR) tablet 10 mg  10 mg Oral QHS  sodium chloride (NS) flush 5-40 mL  5-40 mL IntraVENous Q8H  
 sodium chloride (NS) flush 5-40 mL  5-40 mL IntraVENous PRN  
 heparin (porcine) injection 5,000 Units  5,000 Units SubCUTAneous Q8H  
 insulin lispro (HUMALOG) injection   SubCUTAneous AC&HS  
 glucose chewable tablet 16 g  4 Tab Oral PRN  
 dextrose 10% infusion 0-250 mL  0-250 mL IntraVENous PRN  
 glucagon (GLUCAGEN) injection 1 mg  1 mg IntraMUSCular PRN  
 erenumab-aooe (AIMOVIG) injection 70 mg   (Patient Supplied)  70 mg SubCUTAneous Q30D  
 sodium chloride (NS) flush 5-40 mL  5-40 mL IntraVENous Q8H  
 sodium chloride (NS) flush 5-40 mL  5-40 mL IntraVENous PRN  
 acetaminophen (TYLENOL) tablet 650 mg  650 mg Oral Q4H PRN  
 clopidogrel (PLAVIX) tablet 75 mg  75 mg Oral DAILY OBJECTIVE Intake/Output Summary (Last 24 hours) at 11/15/2019 1922 Last data filed at 11/15/2019 0700 Gross per 24 hour Intake 2486.62 ml Output  Net 2486.62 ml Review of Systems - History obtained from the patient AS PER  HPI Telemetry sinus tachycardia 
intermittent accelerated junctional tachycardia Mobitz type I weinkebache - brief PHYSICAL EXAM  
  
 
Visit Vitals /78 (BP 1 Location: Left arm, BP Patient Position: At rest) Pulse (!) 103 Temp 97.8 °F (36.6 °C) Resp 25 Ht 5' 2\" (1.575 m) Wt 218 lb 1.6 oz (98.9 kg) LMP 10/15/2019 SpO2 100% BMI 39.89 kg/m² Gen: Well-developed, morbid obesity , in no acute distress  alert and oriented x 3 HEENT:  Pink conjunctivae, Hearing grossly normal.No scleral icterus or conjunctival, moist mucous membranes Neck: Supple, cannot appreciate JVD d/t neck size Resp: No accessory muscle use, Clear breath sounds, No rales or rhonchi 
Card: Regular/tachycardic  Rate,Rythm,No murmurs, rubs or gallop. No thrills. GI:          soft, non-tender MSK: No cyanosis or clubbing, good capillary refill Skin: No rashes or ulcers, no bruising Neuro:  Cranial nerves are grossly intact, moving all four extremities, no focal deficit, follows commands appropriately Psych:  Good insight, oriented to person, place and time, alert, Nml Affect LE: No edema. +2 DP pulses kyle LE warm to touch Right groin site without  Drainage/hematoma/soft - mildly tender DATA REVIEW Laboratory and Imaging have been reviewed by me and are notable for Recent Labs 11/15/19 
0119 11/14/19 
1720 11/14/19 
1135 TROIQ 39.70* 30.30* 14.10* Recent Labs 11/15/19 
0119 11/14/19 
0205 11/13/19 
5406  137 139  
K 4.4 4.5 3.6 CO2 20* 19* 26 BUN 32* 23* 12  
CREA 1.83* 1.90* 1.34* * 431* 124* WBC 19.5* 12.5* 7.6 HGB 11.3* 12.5 11.9 HCT 35.2 37.9 36.8  313 289 Celestina Dempsey NP

## 2019-11-15 NOTE — PROGRESS NOTES
Problem: Falls - Risk of 
Goal: *Absence of Falls Description Document [facility] Fall Risk and appropriate interventions in the flowsheet. Outcome: Progressing Towards Goal 
Note:  
Fall Risk Interventions: 
Mobility Interventions: Patient to call before getting OOB Medication Interventions: Patient to call before getting OOB Elimination Interventions: Call light in reach

## 2019-11-15 NOTE — PROGRESS NOTES
2300 Hour: Troponin drawn late d/t patient is a hard stick and uncooperative at time with medical plan of care. Phlebotomist had to draw blood with success. Trop 39.70 Akira Stephenson Bedside, Verbal and  shift change report given to Gabi Campbell RN (oncoming nurse) by Margie Lanes (offgoing nurse). Report included the following information SBAR, Kardex, Intake/Output, MAR, Recent Results and Cardiac Rhythm Sinus Rhythm/Tachycardia.

## 2019-11-15 NOTE — PROGRESS NOTES
Spoke with Dr. Kathrin Patel from [facility]. Informed him of situation. Also explained that mild expiratory wheezing could be heard. Will wait for further instructions from [facility] before discharging.

## 2019-11-15 NOTE — PROGRESS NOTES
Change of Shift Report: 
 
2340:  Bedside and Verbal shift change report given to [facility], RN (oncoming nurse) by Rome Lucio (offgoing nurse). Report included the following information SBAR, Kardex, ED Summary, MAR, Accordion, Recent Results and Cardiac Rhythm Sinus Tach, NSR. Shift Summary: 
 
2346:  Patient's vitals taken and patient assessment completed. Patient AOx4. No complaints of pain at this time. Received in report that patient was a hard stick and previous RN could not draw Q6 trop. Also received in report that patient's BG was 417 and needed 8 units Humalog. Previous RN stated she received orders to check BG again after 1 hour and to notify MD. 
 
0119:  Patient's trop, along with other AM labs drawn by phlebotomist.   
 
Scot Armendariz:  Notified Dr. Minesh Lennon regarding patient's recheck BG of [phone].   
 
7521 8144:  Patient's vitals taken. 4548:  Patient complaining of 10/10 headache. Patient requesting, \"combination headache\" medication she received yesterday. Told patient that all she had available was Tylenol, and it it was ok to try that medication first.  Patient agreeable. 8884:  Patient given 650 mg PRN Tylenol for pain. End of Shift Report: 
 
0720: Bedside and Verbal shift change report given to Marcelino Kc RN (oncoming nurse) by [facility], RN (offgoing nurse). Report included the following information SBAR, Kardex, ED Summary, MAR, Accordion, Recent Results and Cardiac Rhythm Sinus Tach/NSR.

## 2019-11-15 NOTE — DISCHARGE SUMMARY
Yanira Garces [address] Office [phone] Fax [phone] Discharge / Transfer / Off-Service Note Name: Keyur Esaclera MRN: [id number]  Sex: Female YOB: 1980  Age: 44 y.o. PCP: Flex Sheffield MD  
 
Date of admission: 11/13/2019 Date of discharge/transfer: 11/15/2019 Attending physician at admission: Dr. Ayan Rhoades Attending physician at discharge/transfer: Dr. Sandy Batista Resident physician at discharge/transfer: Nathan Hoyos DO, PGY1 Consultants during hospitalization Cardiology Admission diagnoses Chest pain [R07.9] NSTEMI (non-ST elevated myocardial infarction) ([address] [I21.4] Recommended follow-up after discharge 1. PCP 2. Cardiologist 
 
Things to follow up on with PCP: 
- resolution of your chest pain 
- coordination of care with your Cardiologist 
- diabetes medication management Medication Changes: 1. New Medications: 
· Plavix 75mg daily · Lopressor 50mg BID · Imdur 30mg daily · Losartan 12.5mg daily 2. Modified Medications: 
· Lipitor 80mg daily · Sliding scale in addition to 10 units qAC · -199, give 2 units of insulin lispro · -249, give 3 units of insulin lispro · -299, give 4 units of insulin lispro · -349, give 5 units of insulin lispro · BG > 350, call your PCP 3. Discontinued Medications: None History of Present Illness Per admitting provider, \"Ms. Joseph Bermudez is a 44 y.o.  female with a history of  HTN, asthma, DM, CAD who presented to the Emergency Department today complaining of  Dull pressure like chest pain 9/10, radiating to the left arm and back since 6 am this morning associated with SOB, dry cough, minimal wheezing, dizziness, fatigue, nausea and vomiting x1. She tried her inhaler without much relief and came to the ED. Patient was admitted for ACS Chart was reviewed: she had a cath with Dr. Tracie Suazo on 05/03/18 which showed 60% Lcx\" HOSPITAL COURSE 
Ms. Elliot Carrasco was admitted into the Family Medicine Service from 11/13/2019 to 11/15/2019 for NSTEMI. During the course of this admission, the following conditions were addressed/managed; 
 
NSTEMI POA, now Type 4 MI acute. S/p Cath 11/13 with unsuccessful stenting. Patient with elevated trops post cath, peaked at 39.7.  
- start plavix 75mg daily - Follow up with Cardiology 
  
Hypertension chronic, stable. Continue home coreg 3.125mg BID and bumex 0.5mg daily. Decreased home imdur to 30mg daily and lopressor to 50mg daily. Start Losartan 12.5mg daily. 
- Follow up with PCP 
  
Diabetes Mellitus T2 with hyperglycemia chronic, uncontrolled. Continue home victoza, Januvia, Lispro qAC, Lantus BID 
- SSI in addition to mealtime insulin as above 
  
Asthma: Continue home arnuity, albuterol prn, claritin, and singulair. 
  
Elevated Creatinine on CKD3: stable. 
  
Migraine headaches: stable. Continue home Aimovig [id number]. Given valproate + compazine twice while hospitalized. Physical exam at discharge: 
Constitutional: She is oriented to person, place, and time and well-developed, well-nourished, and in no distress. No distress. HENT:  
Head: Normocephalic and atraumatic. Eyes: Conjunctivae are normal.  
Cardiovascular: Normal rate. Exam reveals no friction rub. No murmur heard. Irregular rhythm Pulmonary/Chest: Effort normal and breath sounds normal. No respiratory distress. She has no wheezes. Abdominal: Soft. Bowel sounds are normal. She exhibits no distension. There is no tenderness. obese Musculoskeletal: She exhibits no edema. Neurological: She is alert and oriented to person, place, and time. Skin: No erythema. Condition at discharge: Stable. Labs Recent Labs 11/15/19 
0119 11/14/19 
0205 11/13/19 
5087 WBC 19.5* 12.5* 7.6 HGB 11.3* 12.5 11.9 HCT 35.2 37.9 36.8  313 289 Recent Labs 11/15/19 
0119 11/14/19 
0205 11/13/19 
3866  137 139 K 4.4 4.5 3.6  104 106 CO2 20* 19* 26 BUN 32* 23* 12  
CREA 1.83* 1.90* 1.34* * 431* 124* CA 7.9* 8.5 8.7 Recent Labs 11/13/19 
7020 SGOT 23 ALT 30 AP 65 TBILI 0.4 TP 6.9 ALB 2.7*  
GLOB 4.2* Recent Labs 11/15/19 
1613 11/15/19 
1133 11/15/19 
1056 11/15/19 
1018 11/15/19 
0617 11/15/19 
0119 11/15/19 
0053  11/14/19 
1720  11/13/19 
1046 TNIPOC  --   --   --   --   --   --   --   --   --   --  0.20* TROIQ  --   --   --  33.50*  --  39.70*  --   --  30.30*   < >  --   
GLUCPOC 222* 279* 328*  --  323*  --  356*   < >  --    < >  --   
 < > = values in this interval not displayed. Cultures · None Procedures / Diagnostic Studies · Left Heart Cath 11/13/2019: mid circumflex lesion with unsuccessful stent placement Imaging Results from Hospital Encounter encounter on 11/13/19 XR CHEST PORT Narrative EXAM:  XR CHEST PORT. INDICATION: Chest pain. COMPARISON: 3/27/2019. FINDINGS:  
A portable AP radiograph of the chest was obtained at a time not indicated on 
the image. Lines and tubes: The patient is on a cardiac monitor. Lungs: The lungs are clear. Pleura: There is no pneumothorax or pleural effusion. Mediastinum: The cardiac and mediastinal contours and pulmonary vascularity are 
normal. 
Bones and soft tissues: The bones and soft tissues are grossly within normal 
limits. There is some gas in the stomach. Impression IMPRESSION: No acute abnormality. Chronic diagnoses Problem List as of 11/15/2019 Date Reviewed: 9/30/2019 Codes Class Noted - Resolved NSTEMI (non-ST elevated myocardial infarction) ([facility] ICD-10-CM: I21.4 ICD-9-CM: 410.70  11/14/2019 - Present Chest pain ICD-10-CM: R07.9 ICD-9-CM: 786.50  11/13/2019 - Present Hyperlipidemia ICD-10-CM: E78.5 ICD-9-CM: 272.4  Unknown - Present DOC (obstructive sleep apnea) ICD-10-CM: G47.33 
ICD-9-CM: 327.23  Unknown - Present NAFL (nonalcoholic fatty liver) PYD-21-GA: K76.0 ICD-9-CM: 571.8  5/30/2018 - Present Type 2 diabetes with nephropathy ([facility] ICD-10-CM: E11.21 
ICD-9-CM: 250.40, 583.81  5/1/2018 - Present Obesity, morbid ([facility] ICD-10-CM: E66.01 
ICD-9-CM: 278.01  4/10/2018 - Present Type 2 diabetes mellitus with diabetic neuropathy (HCC) ICD-10-CM: E11.40 ICD-9-CM: 250.60, 357.2  1/10/2018 - Present Elevated serum creatinine ICD-10-CM: R79.89 ICD-9-CM: 790.99  10/20/2016 - Present Muscle spasm of back ICD-10-CM: D89.196 ICD-9-CM: 724.8  7/6/2016 - Present Encounter for long-term (current) use of insulin ([facility] ICD-10-CM: Z79.4 ICD-9-CM: V58.67  5/12/2016 - Present Recurrent streptococcal tonsillitis ICD-10-CM: J03.01 
ICD-9-CM: 034.0  2/12/2016 - Present Muscle spasms of neck ICD-10-CM: B79.505 ICD-9-CM: 728.85  2/1/2016 - Present Ankle edema ICD-10-CM: M25.473 ICD-9-CM: 719.07  1/12/2016 - Present Obesity ICD-10-CM: E66.9 ICD-9-CM: 278.00  11/28/2015 - Present Essential hypertension ICD-10-CM: I10 
ICD-9-CM: 401.9  11/28/2015 - Present Noncompliance of patient with dietary regimen ICD-10-CM: Z91.11 
ICD-9-CM: V15.81  11/28/2015 - Present Non compliance with medical treatment ICD-10-CM: Z91.19 ICD-9-CM: V15.81  12/19/2014 - Present LGSIL (low grade squamous intraepithelial dysplasia) ICD-10-CM: KON2454 ICD-9-CM: 796.9  11/20/2013 - Present Lumbar degenerative disc disease ICD-10-CM: M51.36 
ICD-9-CM: 722.52  11/13/2013 - Present Avascular necrosis of bones of both hips (HCC) ICD-10-CM: M87.051, M87.052 ICD-9-CM: 733.42  10/14/2013 - Present Overview Signed 10/14/2013  2:78 PM by Renu Manning MD  
  6/[address] [facility] 
  
  
   
 Asthma ICD-10-CM: X36.997 ICD-9-CM: 493.90  3/5/2010 - Present RESOLVED: Abnormal liver enzymes ICD-10-CM: R74.8 ICD-9-CM: 790.5  5/30/2018 - 5/24/2019 RESOLVED: Type 2 diabetes mellitus with nephropathy ([facility] ICD-10-CM: E11.21 
ICD-9-CM: 250.40, 583.81  1/10/2018 - 5/1/2018 RESOLVED: Headache ICD-10-CM: F30 ICD-9-CM: 784.0  10/30/2017 - 5/1/2018 RESOLVED: Nausea ICD-10-CM: R11.0 ICD-9-CM: 787.02  10/30/2017 - 5/1/2018 RESOLVED: Acute non-recurrent maxillary sinusitis ICD-10-CM: J01.00 ICD-9-CM: 461.0  10/4/2017 - 5/1/2018 RESOLVED: Screening for thyroid disorder ICD-10-CM: Z13.29 ICD-9-CM: V77.0  8/22/2017 - 5/1/2018 RESOLVED: URI (upper respiratory infection) ICD-10-CM: J06.9 ICD-9-CM: 465.9  6/20/2017 - 5/1/2018 RESOLVED: Bronchitis ICD-10-CM: J40 ICD-9-CM: 789  3/27/2017 - 5/1/2018 RESOLVED: Acute bacterial conjunctivitis ICD-10-CM: H10.30 ICD-9-CM: 372.03  4/27/2016 - 5/1/2018 RESOLVED: Facial bruising ICD-10-CM: B73.17BB ICD-9-CM: 342  4/27/2016 - 5/1/2018 RESOLVED: Hypoalbuminemia ICD-10-CM: E88.09 
ICD-9-CM: 273.8  4/13/2016 - 5/1/2018 RESOLVED: Need for pneumococcal vaccination ICD-10-CM: V04 ICD-9-CM: V03.82  4/4/2016 - 5/1/2018 RESOLVED: Shortness of breath ICD-10-CM: R06.02 
ICD-9-CM: 786.05  3/25/2016 - 5/1/2018 RESOLVED: Dysuria ICD-10-CM: R30.0 ICD-9-CM: 788.1  3/25/2016 - 5/1/2018 RESOLVED: Strep throat ICD-10-CM: J02.0 ICD-9-CM: 034.0  1/12/2016 - 5/1/2018 RESOLVED: Sinusitis ICD-10-CM: J32.9 ICD-9-CM: 473.9  1/4/2016 - 5/1/2018 RESOLVED: Type II diabetes mellitus, uncontrolled ([facility] ICD-10-CM: E11.65 ICD-9-CM: 250.02  11/28/2015 - 5/1/2018 RESOLVED: BMI 35.0-35.9,adult ICD-10-CM: K19.15 ICD-9-CM: V85.35  11/28/2015 - 5/24/2019 RESOLVED: UTI (lower urinary tract infection) ICD-10-CM: N39.0 ICD-9-CM: 599.0  12/19/2014 - 5/1/2018 RESOLVED: Diabetes mellitus ([facility] ICD-10-CM: E11.9 ICD-9-CM: 250.00  3/5/2010 - 5/24/2019 Discharge/Transfer Medications Current Discharge Medication List  
  
 START taking these medications Details  
clopidogrel (PLAVIX) 75 mg tab Take 1 Tab by mouth daily. Qty: 30 Tab, Refills: 0  
  
metoprolol tartrate (LOPRESSOR) 50 mg tablet Take 1 Tab by mouth two (2) times a day. Qty: 60 Tab, Refills: 0 CONTINUE these medications which have CHANGED Details  
atorvastatin (LIPITOR) 80 mg tablet Take 1 Tab by mouth daily. Qty: 30 Tab, Refills: 0  
  
losartan (COZAAR) 25 mg tablet Take 0.5 Tabs by mouth daily. Qty: 30 Tab, Refills: 0  
  
isosorbide mononitrate ER (IMDUR) 30 mg tablet Take 1 Tab by mouth daily. Qty: 30 Tab, Refills: 0 CONTINUE these medications which have NOT CHANGED Details  
carvedilol (COREG) 3.125 mg tablet TAKE 1 TABLET BY MOUTH TWICE DAILY WITH MEALS Qty: 90 Tab, Refills: 1 Comments: Please consider 90 day supplies to promote better adherence  
  
gabapentin (NEURONTIN) 300 mg capsule TAKE 2 CAPSULES BY MOUTH THREE TIMES DAILY -  FOLLOWUP  APPOINTMENT  NEEDED Qty: 180 Cap, Refills: 0 Associated Diagnoses: Chronic migraine without aura without status migrainosus, not intractable  
  
!! Insulin Needles, Disposable, 31 gauge x 5/16\" ndle USE TO INJECT LANTUS AND APIDRA DAILY Qty: 100 Pen Needle, Refills: 23 Comments: Please consider 90 day supplies to promote better adherence Associated Diagnoses: Type 2 diabetes mellitus with hyperglycemia, with long-term current use of insulin (HCC)  
  
insulin glargine (LANTUS SOLOSTAR U-100 INSULIN) 100 unit/mL (3 mL) inpn 30 units in AM and 30 units at night  Indications: for a total of 60 units daily 
Qty: 60 mL, Refills: 4 Associated Diagnoses: Type 2 diabetes mellitus with hyperglycemia, with long-term current use of insulin (HCC)  
  
insulin aspart U-100 (NOVOLOG FLEXPEN U-100 INSULIN) 100 unit/mL (3 mL) inpn 8- 10 units before meals Qty: 30 mL, Refills: 5 Comments: Please consider 90 day supplies to promote better adherence Associated Diagnoses: Type 2 diabetes mellitus with diabetic neuropathy, with long-term current use of insulin ([address] liraglutide (VICTOZA) 0.6 mg/0.1 mL (18 mg/3 mL) pnij Inject 1.2 mg daily Dispense as sample per Dr Murtaza Butler Qty: 1 Pen, Refills: 0 Associated Diagnoses: Type 2 diabetes mellitus with hyperglycemia, with long-term current use of insulin ([address] Type 2 diabetes mellitus with diabetic neuropathy, with long-term current use of insulin ([facility])  
  
montelukast (SINGULAIR) 10 mg tablet TAKE 1 TABLET BY MOUTH ONCE DAILY Qty: 90 Tab, Refills: 3 Comments: Please consider 90 day supplies to promote better adherence Associated Diagnoses: Mild intermittent asthma without complication  
  
erenumab-aooe (AIMOVIG AUTOINJECTOR) 70 mg/mL injection 1 mL by SubCUTAneous route every thirty (30) days. Qty: 1 Each, Refills: 0  
  
ARNUITY ELLIPTA 200 mcg/actuation dsdv inhaler INHALE 1 PUFF BY MOUTH ONCE DAILY Qty: 1 Inhaler, Refills: 3 Comments: Please consider 90 day supplies to promote better adherence Associated Diagnoses: Mild intermittent asthma without complication  
  
empagliflozin (JARDIANCE) 10 mg tablet Take 10 mg by mouth daily. !! Insulin Needles, Disposable, 31 gauge x 5/16\" ndle USE TO INJECT LANTUS AND APIDRA DAILY Refills: 23  
  
loratadine (CLARITIN) 10 mg tablet Take 1 Tab by mouth daily. Qty: 90 Tab, Refills: 3 Associated Diagnoses: Environmental and seasonal allergies  
  
albuterol (PROVENTIL HFA, VENTOLIN HFA, PROAIR HFA) 90 mcg/actuation inhaler Take 2 Puffs by inhalation every four (4) hours as needed for Wheezing. PRO AIR HFA Qty: 1 Inhaler, Refills: 3  
  
bumetanide (BUMEX) 0.5 mg tablet Take 0.5 mg by mouth daily. sodium bicarbonate 650 mg tablet Take 650 mg by mouth two (2) times a day. VITAMIN D2 50,000 unit capsule Take 50,000 Units by mouth every Sunday. !! - Potential duplicate medications found. Please discuss with provider. Diet:  Regular diet. Activity:  As tolerated Disposition: Home or Self Care Discharge instructions to patient/family Please seek medical attention for any new or worsening symptoms particularly fever, chest pain, shortness of breath, abdominal pain, nausea, vomiting Follow up plans/appointments Follow-up Information Follow up With Specialties Details Why Contact Info Josiah Ronquillo MD Family Practice Go on 11/18/2019 @10:15AM [address] 
[phone] Isidoro Dodge MD Cardiology On 11/22/2019 1 PM [address] [facility] 
[phone] Gustavo Pugh MD Family Practice   [address] 
[phone] Jennyfer Millard DO Family Medicine Resident For Billing Chief Complaint Patient presents with  Chest Pain  Shortness of Breath Hospital Problems  Date Reviewed: 9/30/2019 Codes Class Noted POA  
 NSTEMI (non-ST elevated myocardial infarction) ([facility] ICD-10-CM: I21.4 ICD-9-CM: 410.70  11/14/2019 Unknown Chest pain ICD-10-CM: R07.9 ICD-9-CM: 786.50  11/13/2019 Unknown Type 2 diabetes with nephropathy ([facility] ICD-10-CM: E11.21 
ICD-9-CM: 250.40, 583.81  5/1/2018 Yes Type 2 diabetes mellitus with diabetic neuropathy (HCC) ICD-10-CM: E11.40 ICD-9-CM: 250.60, 357.2  1/10/2018 Yes Elevated serum creatinine ICD-10-CM: R79.89 ICD-9-CM: 790.99  10/20/2016 Yes Essential hypertension ICD-10-CM: I10 
ICD-9-CM: 401.9  11/28/2015 Yes Asthma ICD-10-CM: V34.646 ICD-9-CM: 493.90  3/5/2010 Yes

## 2019-11-15 NOTE — PROGRESS NOTES
Followed up with Family Practice regarding discharge and client has been cleared per Dr. Jose G Jorge. Client will be prepared for discharge at this time.

## 2019-11-15 NOTE — PROGRESS NOTES
Chart Reviewed Plan 1. Return home with family assistance. 2. Follow up with PCP. 3. CM will continue to follow.   
KARMEN Andres

## 2019-11-15 NOTE — CDMP QUERY
1) Patient admitted with NSTEMI . Pt noted to have Diabetes Mellitus T2 chronic, uncontrolled documented. Could uncontrolled be further specified as one of the following? 
 
=> Type 2 DM  with hyperglycemia 
=> Type 2 DM  with hyperglycemia 
=> Other explanation  
=> Clinically unable to determine The medical record reflects the following: 
 
  Risk Factors: 43 yo F admitted with NSTEMI Clinical Indicators:  [phone]  323  
 11/15 PN states \" Diabetes Mellitus T2 chronic, uncontrolled. - holding home victoza and [place]" Treatment: - SSI + (increased) 15 units lispro qAC, and  home Lantus (increased) 35 units bid 
  
 
Thank you for your time [facility] RN/BSN, CCDS Desk:   [phone] Other:  [phone]

## 2019-11-18 ENCOUNTER — TELEPHONE (OUTPATIENT)
Dept: FAMILY MEDICINE CLINIC | Age: 39
End: 2019-11-18

## 2019-11-18 ENCOUNTER — PATIENT OUTREACH (OUTPATIENT)
Dept: CARDIOLOGY CLINIC | Age: 39
End: 2019-11-18

## 2019-11-18 NOTE — PROGRESS NOTES
Care Transitions Nurse Note: When I reviewed the EMR- note there was a phone call into PCP office today from [address] [place] [address] that Ms. Nuñez had passed at home.

## 2019-11-18 NOTE — TELEPHONE ENCOUNTER
On Call Note: I received a call from [facility] on 11/16/19 stating that pt was found unresponsive by  and not able to resuscitate.

## 2019-11-19 ENCOUNTER — TELEPHONE (OUTPATIENT)
Dept: FAMILY MEDICINE CLINIC | Age: 39
End: 2019-11-19

## 2019-11-19 NOTE — TELEPHONE ENCOUNTER
----- Message from Johny Deal sent at 2019  2:54 PM EST -----  Regarding: : REYNA Ramirez/ Telephone  Callback required yes/no and why:  Yes- for time of death    Best contact number(s): [phone]    Details to clarify the request: Surekha Newton from [facility] is requesting the time of death of Pt.

## 2019-11-20 NOTE — PROGRESS NOTES
Patient with NSTEMI POA 11/13 s/p cardiac cath 11/13 with unsuccessful placement of stents. Patient with troponins elevated post cath and trended down prior to discharge -  Type 4 MI as a complication of the procedure. Please see discharge summary from 11/15 for further documentation.  
 
Purnima Guerrero DO 
11/20/2019 8:18 AM

## 2019-11-20 NOTE — CDMP QUERY
Patient was admitted to observation status with a NSTEMI. Patient underwent a 
cardiac catheterization with dilation of a coronary artery. A stent was unable 
to be placed. Patient was admitted to inpatient status the following day. The 
record later noted, \"NSTEMI POA, now Type 4 MI acute. S/p Cath 11/13 with 
unsuccessful stenting. Patient with elevated trops post cath, peaked at 44. 7. \" After study, can the reason for inpatient admission be further specified as: 
 
=> Continued treatment of initial NSTEMI 
=> Type 4 MI as a complication of the procedure 
=> Both 
=> Other_______ 
=> unable to determine The medical record reflects the following:   
   Risk Factors: 45 yo F admitted with NSTEMI Clinical Indicators: pt underwent a cardiac cath and stent was unable to be placed, later had    a type 4 MI . Trop 39.7 Treatment: continue current medical regimen, - uptitrated statin therapy,metoprolol , ARB Thank you for your time [facility] RN/BSN, CCDS Desk:   [phone] Other:  [phone]

## 2019-11-21 ENCOUNTER — DOCUMENTATION ONLY (OUTPATIENT)
Dept: FAMILY MEDICINE CLINIC | Age: 39
End: 2019-11-21

## 2021-01-06 NOTE — ED TRIAGE NOTES
Pt ambulates to treatment area she states since Thursday she has had RLQ pain with diarrhea, she states that every time she eats or drinks she has diarrhea. She is also c/o nausea. Denies any urinary symptoms. Medical Necessity Information: It is in your best interest to select a reason for this procedure from the list below. All of these items fulfill various CMS LCD requirements except the new and changing color options. Consent: Written consent obtained and the risks of skin tag removal was reviewed with the patient including but not limited to bleeding, pigmentary change, infection, pain, and remote possibility of scarring. Include Z78.9 (Other Specified Conditions Influencing Health Status) As An Associated Diagnosis?: No Anesthesia Type: 1% lidocaine with epinephrine Detail Level: Detailed Medical Necessity Clause: This procedure was medically necessary because the lesions that were treated were: Add Associated Diagnoses If Applicable When Selecting Medical Necessity: Yes Anesthesia Volume In Cc: 3

## 2024-02-27 NOTE — PROGRESS NOTES
HISTORY OF PRESENT ILLNESS  Wendie Kanner Lajean Croft is a 40 y.o. female. HPI  Pt presents with \"cold symptoms and cough\"    Symptoms started last week  Feels like she has strep throat, without the sore throat  Mild fever over the weekend  Dry throat  Hoarse voice  Cough: she is couging up white, creamy phlegm  No real nasal congestion  OTC: Nyquil  Fever: off and on  Review of Systems   Constitutional: Positive for fever. HENT: Positive for congestion. Respiratory: Positive for cough and sputum production. Gastrointestinal: Negative for diarrhea and vomiting. Physical Exam   Constitutional: She is oriented to person, place, and time. She appears well-developed and well-nourished. HENT:   Head: Normocephalic and atraumatic. Right Ear: Hearing, tympanic membrane, external ear and ear canal normal.   Left Ear: Hearing, tympanic membrane, external ear and ear canal normal.   Nose: Mucosal edema present. Mouth/Throat: Posterior oropharyngeal edema and posterior oropharyngeal erythema present. Neck: Normal range of motion. Neck supple. Cardiovascular: Normal rate, regular rhythm and normal heart sounds. Pulmonary/Chest: Effort normal and breath sounds normal.   Lymphadenopathy:     She has no cervical adenopathy. Neurological: She is alert and oriented to person, place, and time. Skin: Skin is warm and dry. Psychiatric: She has a normal mood and affect. Her behavior is normal.       ASSESSMENT and PLAN    ICD-10-CM ICD-9-CM    1. Acute pharyngitis, unspecified etiology J02.9 462 azithromycin (ZITHROMAX) 250 mg tablet     Educated about taking medication as prescribed, with food. Educated about staying well hydrated, by pushing fluids as much as possible. Pt informed to return to office with worsening of symptoms, or PRN with any questions or concerns. Pt verbalizes understanding of plan of care and denies further questions or concerns at this time. Opt out

## 2024-05-20 NOTE — TELEPHONE ENCOUNTER
Pt states her BG increased over the weekend. She states last week her BG was under 120, then this weekend it went up to 594. Pt states she is taking Lantus 80 units daily Apidra 20 units before each meal and Tanzeum 50 mcg once weekly. She states she amadeo to the ER and they were able to get it down to 150. She is asking what should she do.  Pt could not give exact meter readings at the moment CXR negative - No infiltrates, No consolidation, No atelectasis seen

## (undated) DEVICE — CONTAINER SPEC 20 ML LID NEUT BUFF FORMALIN 10 % POLYPR STS

## (undated) DEVICE — SOLUTION IV 0.9% NACL IRRIGATION 1000ML PLASTIC POUR BOTTLE

## (undated) DEVICE — Z DISCONTINUED NO SUB IDED SET EXTN W/ 4 W STPCOCK M SPIN LOK 36IN

## (undated) DEVICE — TRAY BX SFT TISS W/ RUL ALC PREP PD FEN DRP TWL LUERLOCK

## (undated) DEVICE — NEONATAL-ADULT SPO2 SENSOR: Brand: NELLCOR

## (undated) DEVICE — CATHETER PTCA EUPHORA L142CM BLLN L15MM DIA2MM 0.022IN 14ATM

## (undated) DEVICE — RUNTHROUGH NS EXTRA FLOPPY PTCA GUIDEWIRE: Brand: RUNTHROUGH

## (undated) DEVICE — HI-TORQUE PILOT 50 GUIDE WIRE .014 J TIP 3.0 CM X 190 CM: Brand: HI-TORQUE PILOT

## (undated) DEVICE — PRESSURE MONITORING SET: Brand: TRUWAVE

## (undated) DEVICE — SET ADMIN 16ML TBNG L100IN 2 Y INJ SITE IV PIGGY BK DISP

## (undated) DEVICE — CATHETER PTCA L142CM BLLN L15MM DIA1.5MM CROSSING PROF

## (undated) DEVICE — CATHETER BLLN SPRINTER LEGEND RX 1.25X12MM

## (undated) DEVICE — PINNACLE INTRODUCER SHEATH: Brand: PINNACLE

## (undated) DEVICE — BAG BELONG PT PERS CLEAR HANDL

## (undated) DEVICE — SYRINGE MED 20ML STD CLR PLAS LUERLOCK TIP N CTRL DISP

## (undated) DEVICE — Device: Brand: MEDICAL ACTION INDUSTRIES

## (undated) DEVICE — PINNACLE PRECISION ACCESS SYSTEM INTRODUCER SHEATH: Brand: PINNACLE PRECISION ACCESS SYSTEM

## (undated) DEVICE — CATH DIAG-D 6F MULTI PIG 155 5 -- IMPULSE 16599-302

## (undated) DEVICE — TREK CORONARY DILATATION CATHETER 2.25 MM X 8 MM / RAPID-EXCHANGE: Brand: TREK

## (undated) DEVICE — KENDALL RADIOLUCENT FOAM MONITORING ELECTRODE -RECTANGULAR SHAPE: Brand: KENDALL

## (undated) DEVICE — PACK PROCEDURE SURG HRT CATH

## (undated) DEVICE — TUBING PRSS MON L6IN PVC M FEM CONN

## (undated) DEVICE — CATH GUID COR EB35 6FR 100CM -- LAUNCHER

## (undated) DEVICE — PROCEDURE KIT FLUID MGMT CUST MAINFOLD STRL

## (undated) DEVICE — GUIDE EXTENSION CATHETER: Brand: GUIDEZILLA™ II

## (undated) DEVICE — DEVICE INFL 20ML 30ATM DGT FLD DISPNS SYR W ACCESSPLUS BLU

## (undated) DEVICE — COVER US PRB W15XL120CM W/ GEL RUBBERBAND TAPE STRP FLD GEN

## (undated) DEVICE — SYRINGE ANGIO 10ML RED FLAT GRP FIX M LUER CONN MEDALLION

## (undated) DEVICE — CATH IV AUTOGRD BC BLU 22GA 25 -- INSYTE

## (undated) DEVICE — HI-TORQUE BALANCE MIDDLEWEIGHT UNIVERSAL GUIDE WIRE .014 STRAIGHT TIP 3.0 CM X 190 CM: Brand: HI-TORQUE BALANCE MIDDLEWEIGHT UNIVERSAL

## (undated) DEVICE — MAX-CORE® DISPOSABLE CORE BIOPSY INSTRUMENT, 16G X 16CM: Brand: MAX-CORE